# Patient Record
Sex: FEMALE | Race: WHITE | Employment: OTHER | URBAN - METROPOLITAN AREA
[De-identification: names, ages, dates, MRNs, and addresses within clinical notes are randomized per-mention and may not be internally consistent; named-entity substitution may affect disease eponyms.]

---

## 2017-01-17 PROBLEM — R07.9 CHEST PAIN: Status: ACTIVE | Noted: 2017-01-17

## 2017-01-17 PROBLEM — I10 BENIGN ESSENTIAL HYPERTENSION: Status: ACTIVE | Noted: 2017-01-17

## 2017-01-17 PROBLEM — R55 NEAR SYNCOPE: Status: ACTIVE | Noted: 2017-01-17

## 2018-03-02 ENCOUNTER — TELEPHONE (OUTPATIENT)
Dept: CARDIOLOGY CLINIC | Age: 73
End: 2018-03-02

## 2018-03-02 ENCOUNTER — OFFICE VISIT (OUTPATIENT)
Dept: CARDIOLOGY CLINIC | Age: 73
End: 2018-03-02

## 2018-03-02 VITALS
BODY MASS INDEX: 33.59 KG/M2 | HEIGHT: 66 IN | OXYGEN SATURATION: 96 % | WEIGHT: 209 LBS | DIASTOLIC BLOOD PRESSURE: 76 MMHG | HEART RATE: 90 BPM | SYSTOLIC BLOOD PRESSURE: 138 MMHG

## 2018-03-02 DIAGNOSIS — R06.09 DOE (DYSPNEA ON EXERTION): ICD-10-CM

## 2018-03-02 DIAGNOSIS — R55 SYNCOPE AND COLLAPSE: ICD-10-CM

## 2018-03-02 DIAGNOSIS — I20.0 UNSTABLE ANGINA (HCC): Primary | ICD-10-CM

## 2018-03-02 DIAGNOSIS — I10 BENIGN ESSENTIAL HYPERTENSION: ICD-10-CM

## 2018-03-02 DIAGNOSIS — R42 DIZZINESS: ICD-10-CM

## 2018-03-02 PROCEDURE — G8417 CALC BMI ABV UP PARAM F/U: HCPCS | Performed by: INTERNAL MEDICINE

## 2018-03-02 PROCEDURE — 3014F SCREEN MAMMO DOC REV: CPT | Performed by: INTERNAL MEDICINE

## 2018-03-02 PROCEDURE — 93000 ELECTROCARDIOGRAM COMPLETE: CPT | Performed by: INTERNAL MEDICINE

## 2018-03-02 PROCEDURE — G8484 FLU IMMUNIZE NO ADMIN: HCPCS | Performed by: INTERNAL MEDICINE

## 2018-03-02 PROCEDURE — 99215 OFFICE O/P EST HI 40 MIN: CPT | Performed by: INTERNAL MEDICINE

## 2018-03-02 PROCEDURE — 1090F PRES/ABSN URINE INCON ASSESS: CPT | Performed by: INTERNAL MEDICINE

## 2018-03-02 PROCEDURE — 4040F PNEUMOC VAC/ADMIN/RCVD: CPT | Performed by: INTERNAL MEDICINE

## 2018-03-02 PROCEDURE — 3017F COLORECTAL CA SCREEN DOC REV: CPT | Performed by: INTERNAL MEDICINE

## 2018-03-02 PROCEDURE — G8427 DOCREV CUR MEDS BY ELIG CLIN: HCPCS | Performed by: INTERNAL MEDICINE

## 2018-03-02 RX ORDER — ASPIRIN 81 MG/1
81 TABLET ORAL DAILY
Qty: 30 TABLET | Refills: 3
Start: 2018-03-02

## 2018-03-02 RX ORDER — DICYCLOMINE HYDROCHLORIDE 10 MG/1
20 CAPSULE ORAL
COMMUNITY
End: 2021-05-21 | Stop reason: SDUPTHER

## 2018-03-02 NOTE — TELEPHONE ENCOUNTER
Patient is scheduled with Dr. Ronda Mota for Left Heart Cath on 3/7/18 at [facility], arrival time of 9:30am to the Cath Lab. Please have patient arrive to the main entrance of [facility] and check in with the registration desk. Patient will be calling to update medications with a nurse- please review instructions at that time.

## 2018-03-02 NOTE — PROGRESS NOTES
Section of Cardiology                                     Cardiovascular Evaluation      PATIENT: Sharita Camp: 3/2/2018  MRN: [id number]  CSN: [id number]  : 1945    Primary Care Doctor: Claire Weeks MD    Reason for evaluation:   New Patient; Hypertension; Chest Pain (has not had any cp for a few days, but has been having recent cp. ); Shortness of Breath (with activity, pt states it started 6 month ago. ); Dizziness (all the time, pt states it has been an on going issue/ ); History of present illness:  Jenny Land is a 67 y.o. patient who presents for chest pain, VELASQUEZ, dizziness, syncope and HTN. The pain is located on left side of her chest. She has been awaken from sleep with this pain. It occurs more often with activity. She has had the pain for a few months but it has worsened recently. She also admits to being short of breath with exertion. Denied PND or orthopnea. She has been getting dizzy. This occurrs most often when she gets up from sitting position. He sister states augusto has had syncopal episodes. Last episode was in [place] 17. She has dizziness prior to her syncopal episodes. Denies  edema, palpitations. Past Medical History:   has a past medical history of Benign essential hypertension; Bowel obstruction; Cancer [facility]); GERD (gastroesophageal reflux disease); Hypertension; and Irritable bowel syndrome (IBS). Surgical History:   has a past surgical history that includes Colon surgery; Cholecystectomy; Appendectomy; Colonoscopy (); and Colonoscopy (3/29/16). Social History:   reports that she quit smoking about 34 years ago. She has a 10.00 pack-year smoking history. She has never used smokeless tobacco. She reports that she does not drink alcohol or use drugs. Family History:  family history includes Cancer in her brother and mother; Heart Disease in her brother and sister. Home Medications:  Reviewed and are listed in nursing record. affect. Alert, oriented x 3       Neurologic: Normal gross motor and sensory exam.       DIAGNOSTIC WORK UP:  Lab Data:  CBC:   Lab Results   Component Value Date    WBC 5.5 01/17/2017    HGB 12.1 01/17/2017    HCT 34.8 01/17/2017    MCV 84.8 01/17/2017     01/17/2017     BMP:   Lab Results   Component Value Date     01/17/2017    K 3.8 01/17/2017     01/17/2017    CO2 27 01/17/2017    BUN 11 01/17/2017    CREATININE 0.9 01/17/2017    CALCIUM 9.2 01/17/2017    GFRAA >60 01/17/2017    MG 2.0 12/02/2013     LFTS:   Lab Results   Component Value Date    ALT 13 01/16/2017    AST 20 01/16/2017    ALKPHOS 97 01/16/2017    PROT 7.0 01/16/2017    AGRATIO 1.3 01/16/2017    BILITOT 0.4 01/16/2017     PT/INR: No results found for: PTINR  LIPID PANEL: No components found for: CHLPL  Lab Results   Component Value Date    TRIG 148 01/17/2017     Lab Results   Component Value Date    HDL 45 01/17/2017     Lab Results   Component Value Date    LDLCALC 146 (H) 01/17/2017     TSH: No results found for: TSH  FREET4: No results found for: Lafonda Maul: No components found for: FREET3  BNP: No results found for: BNP    Procedure/Imaging:  I have reviewed the below testing personally and my interpretation is below. ECHO 1/17/17:  Normal left ventricular systolic function with an estimated ejection fraction of 55%. Grade I diastolic dysfunction with normal filling pressure. Systolic pulmonary artery pressure (SPAP) is normal and estimated at 32 mmHg  (RA pressure 3 mmHg). STRESS TEST 1/17/17: There is normal isotope uptake at stress and rest. There is no evidence of  myocardial ischemia or scar. Hyperdynamic LV systolic function with CD>80%  with uniform wall motion. Low risk study. Assessment:  1. Unstable angina ([address]     2. VELASQUEZ (dyspnea on exertion)     3. Dizziness     4. Syncope and collapse     5. Benign essential hypertension       Recommendation:  - She has chest pain c/w angina.  Stress test

## 2018-03-14 ENCOUNTER — TELEPHONE (OUTPATIENT)
Dept: CARDIOLOGY CLINIC | Age: 73
End: 2018-03-14

## 2018-03-14 NOTE — TELEPHONE ENCOUNTER
Spoke with patient. Discussed her lab results with her. Even compared them to her lipids in 2017. Patient states that she will consider taking another medication other than lipitor. She states she has tried it in the passed and it doesn't agree with her. Would you be willing to RX something different?

## 2018-03-14 NOTE — TELEPHONE ENCOUNTER
Pt would like to go over recent labs. She states she does not want to take Lipitor, or really any other med for cholesterol. If her numbers are not that bad, she would like to try to improve diet first. Pt has not picked up this med. She states she tried Lipitor in the past and it made her feel funny and sick to her stomach. She did start the Ranexa. Please call to advise.

## 2018-03-16 RX ORDER — ROSUVASTATIN CALCIUM 20 MG/1
20 TABLET, COATED ORAL NIGHTLY
Qty: 90 TABLET | Refills: 3 | Status: SHIPPED | OUTPATIENT
Start: 2018-03-16 | End: 2018-03-19 | Stop reason: ALTCHOICE

## 2018-03-19 RX ORDER — ATORVASTATIN CALCIUM 40 MG/1
40 TABLET, FILM COATED ORAL DAILY
Qty: 30 TABLET | Refills: 3 | Status: SHIPPED | OUTPATIENT
Start: 2018-03-19 | End: 2018-04-09 | Stop reason: SINTOL

## 2018-04-09 ENCOUNTER — OFFICE VISIT (OUTPATIENT)
Dept: CARDIOLOGY CLINIC | Age: 73
End: 2018-04-09

## 2018-04-09 VITALS
HEIGHT: 66 IN | BODY MASS INDEX: 34.23 KG/M2 | OXYGEN SATURATION: 94 % | HEART RATE: 78 BPM | SYSTOLIC BLOOD PRESSURE: 114 MMHG | DIASTOLIC BLOOD PRESSURE: 80 MMHG | WEIGHT: 213 LBS

## 2018-04-09 DIAGNOSIS — R42 DIZZINESS: ICD-10-CM

## 2018-04-09 DIAGNOSIS — I10 BENIGN ESSENTIAL HYPERTENSION: ICD-10-CM

## 2018-04-09 DIAGNOSIS — E78.2 MIXED HYPERLIPIDEMIA: ICD-10-CM

## 2018-04-09 DIAGNOSIS — I25.118 CORONARY ARTERY DISEASE OF NATIVE ARTERY OF NATIVE HEART WITH STABLE ANGINA PECTORIS (HCC): ICD-10-CM

## 2018-04-09 DIAGNOSIS — I25.89 CARDIAC MICROVASCULAR DISEASE: Primary | ICD-10-CM

## 2018-04-09 DIAGNOSIS — R06.09 DOE (DYSPNEA ON EXERTION): ICD-10-CM

## 2018-04-09 PROBLEM — I25.85 CARDIAC MICROVASCULAR DISEASE: Status: ACTIVE | Noted: 2018-04-09

## 2018-04-09 PROCEDURE — G8598 ASA/ANTIPLAT THER USED: HCPCS | Performed by: INTERNAL MEDICINE

## 2018-04-09 PROCEDURE — 1123F ACP DISCUSS/DSCN MKR DOCD: CPT | Performed by: INTERNAL MEDICINE

## 2018-04-09 PROCEDURE — 3017F COLORECTAL CA SCREEN DOC REV: CPT | Performed by: INTERNAL MEDICINE

## 2018-04-09 PROCEDURE — G8400 PT W/DXA NO RESULTS DOC: HCPCS | Performed by: INTERNAL MEDICINE

## 2018-04-09 PROCEDURE — 1036F TOBACCO NON-USER: CPT | Performed by: INTERNAL MEDICINE

## 2018-04-09 PROCEDURE — 4040F PNEUMOC VAC/ADMIN/RCVD: CPT | Performed by: INTERNAL MEDICINE

## 2018-04-09 PROCEDURE — 1090F PRES/ABSN URINE INCON ASSESS: CPT | Performed by: INTERNAL MEDICINE

## 2018-04-09 PROCEDURE — 99214 OFFICE O/P EST MOD 30 MIN: CPT | Performed by: INTERNAL MEDICINE

## 2018-04-09 PROCEDURE — G8427 DOCREV CUR MEDS BY ELIG CLIN: HCPCS | Performed by: INTERNAL MEDICINE

## 2018-04-09 PROCEDURE — G8417 CALC BMI ABV UP PARAM F/U: HCPCS | Performed by: INTERNAL MEDICINE

## 2018-04-09 PROCEDURE — 3014F SCREEN MAMMO DOC REV: CPT | Performed by: INTERNAL MEDICINE

## 2018-04-09 RX ORDER — RANOLAZINE 500 MG/1
500 TABLET, EXTENDED RELEASE ORAL 2 TIMES DAILY
Qty: 180 TABLET | Refills: 3 | Status: SHIPPED | OUTPATIENT
Start: 2018-04-09 | End: 2018-04-17 | Stop reason: SDUPTHER

## 2018-04-11 ENCOUNTER — TELEPHONE (OUTPATIENT)
Dept: CARDIOLOGY CLINIC | Age: 73
End: 2018-04-11

## 2018-04-17 RX ORDER — RANOLAZINE 500 MG/1
500 TABLET, EXTENDED RELEASE ORAL 2 TIMES DAILY
Qty: 180 TABLET | Refills: 3 | Status: SHIPPED | OUTPATIENT
Start: 2018-04-17 | End: 2019-07-25

## 2018-04-30 ENCOUNTER — HOSPITAL ENCOUNTER (OUTPATIENT)
Dept: PULMONOLOGY | Age: 73
Discharge: HOME | End: 2018-04-30
Attending: INTERNAL MEDICINE | Admitting: INTERNAL MEDICINE

## 2018-04-30 DIAGNOSIS — R06.09 DOE (DYSPNEA ON EXERTION): ICD-10-CM

## 2018-04-30 DIAGNOSIS — I20.9 ANGINA PECTORIS (HCC): ICD-10-CM

## 2018-04-30 DIAGNOSIS — I25.89 CARDIAC MICROVASCULAR DISEASE: ICD-10-CM

## 2018-04-30 RX ORDER — ALBUTEROL SULFATE 2.5 MG/3ML
2.5 SOLUTION RESPIRATORY (INHALATION) ONCE
Status: COMPLETED | OUTPATIENT
Start: 2018-04-30 | End: 2018-04-30

## 2018-04-30 RX ADMIN — ALBUTEROL SULFATE 2.5 MG: 2.5 SOLUTION RESPIRATORY (INHALATION) at 15:14

## 2018-08-20 ENCOUNTER — HOSPITAL ENCOUNTER (OUTPATIENT)
Dept: GENERAL RADIOLOGY | Age: 73
Discharge: HOME | End: 2018-08-20
Payer: MEDICARE

## 2018-08-20 ENCOUNTER — HOSPITAL ENCOUNTER (OUTPATIENT)
Age: 73
Discharge: HOME | End: 2018-08-20
Payer: MEDICARE

## 2018-08-20 DIAGNOSIS — R06.02 SOB (SHORTNESS OF BREATH): ICD-10-CM

## 2018-08-20 PROCEDURE — 71046 X-RAY EXAM CHEST 2 VIEWS: CPT

## 2018-09-28 ENCOUNTER — OFFICE VISIT (OUTPATIENT)
Dept: CARDIOLOGY CLINIC | Age: 73
End: 2018-09-28
Payer: MEDICARE

## 2018-09-28 VITALS
OXYGEN SATURATION: 95 % | DIASTOLIC BLOOD PRESSURE: 78 MMHG | HEART RATE: 86 BPM | HEIGHT: 66 IN | BODY MASS INDEX: 34.55 KG/M2 | WEIGHT: 215 LBS | SYSTOLIC BLOOD PRESSURE: 124 MMHG

## 2018-09-28 DIAGNOSIS — I25.89 CARDIAC MICROVASCULAR DISEASE: Primary | ICD-10-CM

## 2018-09-28 DIAGNOSIS — R06.09 DOE (DYSPNEA ON EXERTION): ICD-10-CM

## 2018-09-28 DIAGNOSIS — I10 BENIGN ESSENTIAL HYPERTENSION: ICD-10-CM

## 2018-09-28 DIAGNOSIS — E78.2 MIXED HYPERLIPIDEMIA: ICD-10-CM

## 2018-09-28 DIAGNOSIS — R42 DIZZINESS: ICD-10-CM

## 2018-09-28 DIAGNOSIS — I25.118 CORONARY ARTERY DISEASE OF NATIVE ARTERY OF NATIVE HEART WITH STABLE ANGINA PECTORIS (HCC): ICD-10-CM

## 2018-09-28 DIAGNOSIS — R55 NEAR SYNCOPE: ICD-10-CM

## 2018-09-28 PROCEDURE — 99214 OFFICE O/P EST MOD 30 MIN: CPT | Performed by: INTERNAL MEDICINE

## 2018-09-28 PROCEDURE — G8427 DOCREV CUR MEDS BY ELIG CLIN: HCPCS | Performed by: INTERNAL MEDICINE

## 2018-09-28 PROCEDURE — G8417 CALC BMI ABV UP PARAM F/U: HCPCS | Performed by: INTERNAL MEDICINE

## 2018-09-28 PROCEDURE — 1090F PRES/ABSN URINE INCON ASSESS: CPT | Performed by: INTERNAL MEDICINE

## 2018-09-28 PROCEDURE — 1036F TOBACCO NON-USER: CPT | Performed by: INTERNAL MEDICINE

## 2018-09-28 PROCEDURE — 3017F COLORECTAL CA SCREEN DOC REV: CPT | Performed by: INTERNAL MEDICINE

## 2018-09-28 PROCEDURE — 4040F PNEUMOC VAC/ADMIN/RCVD: CPT | Performed by: INTERNAL MEDICINE

## 2018-09-28 PROCEDURE — G8400 PT W/DXA NO RESULTS DOC: HCPCS | Performed by: INTERNAL MEDICINE

## 2018-09-28 PROCEDURE — G8598 ASA/ANTIPLAT THER USED: HCPCS | Performed by: INTERNAL MEDICINE

## 2018-09-28 PROCEDURE — 1101F PT FALLS ASSESS-DOCD LE1/YR: CPT | Performed by: INTERNAL MEDICINE

## 2018-09-28 PROCEDURE — 1123F ACP DISCUSS/DSCN MKR DOCD: CPT | Performed by: INTERNAL MEDICINE

## 2018-09-28 RX ORDER — ROSUVASTATIN CALCIUM 20 MG/1
20 TABLET, COATED ORAL DAILY
Qty: 30 TABLET | Refills: 0
Start: 2018-09-28 | End: 2019-04-01 | Stop reason: SDUPTHER

## 2018-09-28 RX ORDER — LORATADINE 10 MG/1
10 CAPSULE, LIQUID FILLED ORAL DAILY
COMMUNITY
End: 2020-10-13 | Stop reason: ALTCHOICE

## 2018-09-28 NOTE — PROGRESS NOTES
(IBS). Surgical History:   has a past surgical history that includes Colon surgery; Cholecystectomy; Appendectomy; Colonoscopy (2014); and Colonoscopy (3/29/16). Social History:   reports that she quit smoking about 35 years ago. She has a 10.00 pack-year smoking history. She has never used smokeless tobacco. She reports that she does not drink alcohol or use drugs. Family History:  family history includes Cancer in her brother and mother; Heart Disease in her brother and sister. Home Medications:  Reviewed and are listed in nursing record. and/or listed below  Current Outpatient Prescriptions   Medication Sig Dispense Refill    loratadine (CLARITIN) 10 MG capsule Take 10 mg by mouth daily      ranolazine (RANEXA) 500 MG extended release tablet Take 1 tablet by mouth 2 times daily 180 tablet 3    b complex vitamins capsule Take 1 capsule by mouth daily      vitamin B-6 (PYRIDOXINE) 250 MG tablet Take 250 mg by mouth daily      vitamin C (ASCORBIC ACID) 500 MG tablet Take 500 mg by mouth daily      Cholecalciferol (VITAMIN D) 2000 units CAPS capsule Take 1 capsule by mouth daily      Melatonin 2.5 MG CAPS Take 1 tablet by mouth daily      CALCIUM-MAGNESIUM-ZINC PO Take 1 capsule by mouth daily      aspirin EC 81 MG EC tablet Take 1 tablet by mouth daily 30 tablet 3    dicyclomine (BENTYL) 10 MG capsule Take 10 mg by mouth 4 times daily (before meals and nightly)      polyethylene glycol (GLYCOLAX) packet Take 17 g by mouth daily       lisinopril (PRINIVIL;ZESTRIL) 10 MG tablet Take 10 mg by mouth daily.  sertraline (ZOLOFT) 100 MG tablet Take 100 mg by mouth daily.  ALPRAZolam (XANAX) 0.25 MG tablet Take 0.5 mg by mouth nightly as needed.       diclofenac (VOLTAREN) 50 MG EC tablet Take 1 tablet by mouth 2 times daily 30 tablet 0    amLODIPine (NORVASC) 5 MG tablet Take 5 mg by mouth daily      Elastic Bandages & Supports (JOBST KNEE HIGH COMPRESSION ) MISC Wear compression stockings during the day. You may remove them at night 1 each 1     No current facility-administered medications for this visit. Allergies:  Dilaudid [hydromorphone]; Phenergan [promethazine]; Lipitor [atorvastatin]; and Penicillins     Review of Systems:   All 14 point review of symptoms completed. Pertinent positives identified in the HPI, all other review of symptoms negative. Physical Examination:    /78   Pulse 86   Ht 5' 6\" (1.676 m)   Wt 215 lb (97.5 kg)   SpO2 95%   BMI 34.70 kg/m²      General Appearance:  Alert, cooperative, no distress, appears stated age       Head:  Normocephalic, without obvious abnormality, atraumatic   Eyes:  PERRL, conjunctiva/corneas clear       Nose: Nares normal, no drainage or sinus tenderness   Throat: Lips, mucosa, and tongue normal       Neck: Supple, symmetrical, trachea midline, no adenopathy, thyroid: not enlarged, symmetric, no tenderness/mass/nodules, no carotid bruit or JVD       Lungs:   Clear to auscultation bilaterally, respirations unlabored   Chest Wall:  No tenderness or deformity       Heart:  Regular rhythm and normal rate; S1, S2 are normal; no murmur noted; no rub or gallop       Abdomen:   Soft, non-tender, bowel sounds active all four quadrants,  no masses, no organomegaly       Extremities: No cyanosis or edema. No deformity of hands or feet   Pulses: 2+ and symmetric       Skin: Skin color, texture, turgor normal. No rashes or lesions       Pysch: Normal mood and affect.  Alert, oriented x 3       Neurologic: Normal gross motor and sensory exam.       DIAGNOSTIC WORK UP:  Lab Data:  CBC:   Lab Results   Component Value Date    WBC 5.6 03/07/2018    HGB 13.5 03/07/2018    HCT 39.3 03/07/2018    MCV 85.8 03/07/2018     03/07/2018     BMP:   Lab Results   Component Value Date     03/07/2018    K 3.7 03/07/2018     03/07/2018    CO2 27 03/07/2018    BUN 12 03/07/2018    CREATININE 1.0 03/07/2018    CALCIUM 9.8 03/07/2018

## 2018-09-28 NOTE — LETTER
[facility] Cardiology - [address] [address]  Phone: [phone]  Fax: [phone]    Miley Galarza MD        October 3, 2018     Mercedes Dukes MD  [address] [place] [address] Dr. Jay Hickman [id number]    Patient: Muriel Dia  MR Number: [id number]  YOB: 1945  Date of Visit: 2018    Dear Dr. Mercedes Dukes:    Below are the relevant portions of my assessment and plan of care. Section of Cardiology                                     Cardiovascular Evaluation      PATIENT: Anusha Blairker: 2018  MRN: [id number]  CSN: [id number]  : 1945    Primary Care Doctor: Mercedes Dukes MD    Reason for evaluation:   New Patient; Hypertension; Chest Pain (has not had any cp for a few days, but has been having recent cp. ); Shortness of Breath (with activity, pt states it started 6 month ago. ); Dizziness (all the time, pt states it has been an on going issue/ ); History of present illness:  Muriel Dia is a 75 y.o. patient who presents for chest pain, VELASQUEZ, dizziness, syncope and HTN. The pain is located on left side of her chest. She has been awaken from sleep with this pain. It occurs more often with activity. She has had the pain for a few months but it has worsened recently. She also admits to being short of breath with exertion. Denied PND or orthopnea. She has been getting dizzy. This occurrs most often when she gets up from sitting position. He sister states augusto has had syncopal episodes. Last episode was in [place] 17. She has dizziness prior to her syncopal episodes. Denies  edema, palpitations. Today she presents for follow up of chest pain, VELASQUEZ, CAD, HLD and HTN. She was started on Ranexa following the cardiac cath for anginal pain. She reports resolution of her chest pain since then. She is still short of breath with exertion. Denied PND or orthopnea.  Dizziness improved off

## 2018-10-03 NOTE — COMMUNICATION BODY
stockings during the day. You may remove them at night 1 each 1     No current facility-administered medications for this visit. Allergies:  Dilaudid [hydromorphone]; Phenergan [promethazine]; Lipitor [atorvastatin]; and Penicillins     Review of Systems:   All 14 point review of symptoms completed. Pertinent positives identified in the HPI, all other review of symptoms negative. Physical Examination:    /78   Pulse 86   Ht 5' 6\" (1.676 m)   Wt 215 lb (97.5 kg)   SpO2 95%   BMI 34.70 kg/m²       General Appearance:  Alert, cooperative, no distress, appears stated age       Head:  Normocephalic, without obvious abnormality, atraumatic   Eyes:  PERRL, conjunctiva/corneas clear       Nose: Nares normal, no drainage or sinus tenderness   Throat: Lips, mucosa, and tongue normal       Neck: Supple, symmetrical, trachea midline, no adenopathy, thyroid: not enlarged, symmetric, no tenderness/mass/nodules, no carotid bruit or JVD       Lungs:   Clear to auscultation bilaterally, respirations unlabored   Chest Wall:  No tenderness or deformity       Heart:  Regular rhythm and normal rate; S1, S2 are normal; no murmur noted; no rub or gallop       Abdomen:   Soft, non-tender, bowel sounds active all four quadrants,  no masses, no organomegaly       Extremities: No cyanosis or edema. No deformity of hands or feet   Pulses: 2+ and symmetric       Skin: Skin color, texture, turgor normal. No rashes or lesions       Pysch: Normal mood and affect.  Alert, oriented x 3       Neurologic: Normal gross motor and sensory exam.       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 She will also continue Lisinopril, as ACEI have been shown to be benfical in microvascular angina. 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 - Her dizziness have resolved since we stopped her Clonidine. Her BP is stable and at goal < 130/80 mmHg. - Her dyspnea is due to deconditioning. She had normal EF/filing pressures on cath and PFTs showed no obstructive/restrictive lung disease.   - She is on moderate dose Crestor, goal LDL is < 70. It was 179 prior to initiation of statin therapy. She did not get her lipid panel and will obtain one. She will continue low dose Asa.  -She will see Dr. Rodger Bailey in six months. If you have questions, please do not hesitate to call me. I look forward to following Vance Delacruz along with you.       Shaheed Oneill MD, [facility] [place] [place]  [phone] [facility]  [phone] [facility]  9/28/2018 2:49 PM

## 2018-10-09 ENCOUNTER — HOSPITAL ENCOUNTER (EMERGENCY)
Age: 73
Discharge: HOME | End: 2018-10-09
Attending: EMERGENCY MEDICINE
Payer: MEDICARE

## 2018-10-09 ENCOUNTER — APPOINTMENT (OUTPATIENT)
Dept: GENERAL RADIOLOGY | Age: 73
End: 2018-10-09
Payer: MEDICARE

## 2018-10-09 VITALS
RESPIRATION RATE: 19 BRPM | OXYGEN SATURATION: 96 % | WEIGHT: 210 LBS | DIASTOLIC BLOOD PRESSURE: 99 MMHG | HEIGHT: 66 IN | HEART RATE: 89 BPM | BODY MASS INDEX: 33.75 KG/M2 | SYSTOLIC BLOOD PRESSURE: 156 MMHG | TEMPERATURE: 97.4 F

## 2018-10-09 DIAGNOSIS — R07.89 OTHER CHEST PAIN: Primary | ICD-10-CM

## 2018-10-09 LAB
A/G RATIO: 1.4 (ref 1.1–2.2)
ALBUMIN SERPL-MCNC: 4.2 G/DL (ref 3.4–5)
ALP BLD-CCNC: 112 U/L (ref 40–129)
ALT SERPL-CCNC: 13 U/L (ref 10–40)
ANION GAP SERPL CALCULATED.3IONS-SCNC: 11 MMOL/L (ref 3–16)
AST SERPL-CCNC: 20 U/L (ref 15–37)
BASOPHILS ABSOLUTE: 0 K/UL (ref 0–0.2)
BASOPHILS RELATIVE PERCENT: 0.8 %
BILIRUB SERPL-MCNC: 0.3 MG/DL (ref 0–1)
BUN BLDV-MCNC: 14 MG/DL (ref 7–20)
CALCIUM SERPL-MCNC: 9.4 MG/DL (ref 8.3–10.6)
CHLORIDE BLD-SCNC: 104 MMOL/L (ref 99–110)
CO2: 24 MMOL/L (ref 21–32)
CREAT SERPL-MCNC: 1.2 MG/DL (ref 0.6–1.2)
EOSINOPHILS ABSOLUTE: 0.2 K/UL (ref 0–0.6)
EOSINOPHILS RELATIVE PERCENT: 4 %
GFR AFRICAN AMERICAN: 53
GFR NON-AFRICAN AMERICAN: 44
GLOBULIN: 3 G/DL
GLUCOSE BLD-MCNC: 107 MG/DL (ref 70–99)
HCT VFR BLD CALC: 36.9 % (ref 36–48)
HEMOGLOBIN: 12.8 G/DL (ref 12–16)
LYMPHOCYTES ABSOLUTE: 1.8 K/UL (ref 1–5.1)
LYMPHOCYTES RELATIVE PERCENT: 30.1 %
MCH RBC QN AUTO: 30.8 PG (ref 26–34)
MCHC RBC AUTO-ENTMCNC: 34.8 G/DL (ref 31–36)
MCV RBC AUTO: 88.3 FL (ref 80–100)
MONOCYTES ABSOLUTE: 0.7 K/UL (ref 0–1.3)
MONOCYTES RELATIVE PERCENT: 12.6 %
NEUTROPHILS ABSOLUTE: 3.1 K/UL (ref 1.7–7.7)
NEUTROPHILS RELATIVE PERCENT: 52.5 %
PDW BLD-RTO: 13.1 % (ref 12.4–15.4)
PLATELET # BLD: 209 K/UL (ref 135–450)
PMV BLD AUTO: 8.2 FL (ref 5–10.5)
POTASSIUM SERPL-SCNC: 3.8 MMOL/L (ref 3.5–5.1)
RBC # BLD: 4.17 M/UL (ref 4–5.2)
SODIUM BLD-SCNC: 139 MMOL/L (ref 136–145)
TOTAL PROTEIN: 7.2 G/DL (ref 6.4–8.2)
TROPONIN: <0.01 NG/ML
TROPONIN: <0.01 NG/ML
WBC # BLD: 5.8 K/UL (ref 4–11)

## 2018-10-09 PROCEDURE — 80053 COMPREHEN METABOLIC PANEL: CPT

## 2018-10-09 PROCEDURE — 93010 ELECTROCARDIOGRAM REPORT: CPT | Performed by: INTERNAL MEDICINE

## 2018-10-09 PROCEDURE — 71046 X-RAY EXAM CHEST 2 VIEWS: CPT

## 2018-10-09 PROCEDURE — 93005 ELECTROCARDIOGRAM TRACING: CPT | Performed by: EMERGENCY MEDICINE

## 2018-10-09 PROCEDURE — 85025 COMPLETE CBC W/AUTO DIFF WBC: CPT

## 2018-10-09 PROCEDURE — 6370000000 HC RX 637 (ALT 250 FOR IP): Performed by: PHYSICIAN ASSISTANT

## 2018-10-09 PROCEDURE — 99285 EMERGENCY DEPT VISIT HI MDM: CPT

## 2018-10-09 PROCEDURE — 84484 ASSAY OF TROPONIN QUANT: CPT

## 2018-10-09 RX ORDER — AMLODIPINE BESYLATE 5 MG/1
5 TABLET ORAL ONCE
Status: COMPLETED | OUTPATIENT
Start: 2018-10-09 | End: 2018-10-09

## 2018-10-09 RX ORDER — ASPIRIN 81 MG/1
243 TABLET, CHEWABLE ORAL ONCE
Status: COMPLETED | OUTPATIENT
Start: 2018-10-09 | End: 2018-10-09

## 2018-10-09 RX ADMIN — ASPIRIN 243 MG: 81 TABLET, CHEWABLE ORAL at 20:33

## 2018-10-09 RX ADMIN — AMLODIPINE BESYLATE 5 MG: 5 TABLET ORAL at 21:56

## 2018-10-09 ASSESSMENT — PAIN SCALES - GENERAL: PAINLEVEL_OUTOF10: 2

## 2018-10-09 ASSESSMENT — HEART SCORE: ECG: 0

## 2018-10-10 NOTE — ED PROVIDER NOTES
[address] ED  eMERGENCY dEPARTMENT eNCOUnter        Pt Name: Naa Tabares  MRN: [id number]  [place] 1945  Date of evaluation: 10/9/2018  Provider: Sina Craig PA-C  PCP: Laurel Schulz MD  ED Attending: No att. providers found    [address] [place] [address]       Chief Complaint   Patient presents with    Chest Pain     pt c/o throbbing chest pain that started this morning. HISTORY OF PRESENT ILLNESS   (Location/Symptom, Timing/Onset, Context/Setting, Quality, Duration, Modifying Factors, Severity)  Note limiting factors. Naa Tabares is a 75 y.o. female presents to the emergency department with left-sided chest pain. Patient states that it started around 10 AM this morning when she was watching the weather channel. She denied any exertional aggravation. She did have associated lightheadedness. She states that she has had a productive cough with phlegm for the past few days. She describes her pain as a 2 out of 10 throbbing pain on the left side, but states that it stopped around 8 PM this evening. Denied shortness of breath, nausea, vomiting, fever, chills, diaphoresis or radiation of her pain. Patient is a former smoker and has a history of hypertension and high cholesterol. She also has a history of angina. Nursing Notes were all reviewed and agreed with or any disagreements were addressed  in the HPI. REVIEW OF SYSTEMS    (2-9 systems for level 4, 10 or more for level 5)     Review of Systems    Positives and Pertinent negatives as per HPI. Except as noted above in the ROS, all other systems were reviewed and negative.        PAST MEDICAL HISTORY     Past Medical History:   Diagnosis Date    Benign essential hypertension 1/17/2017    Bowel obstruction (HCC)     Cancer (HCC)     colon    GERD (gastroesophageal reflux disease)     Hypertension     Irritable bowel syndrome (IBS)          SURGICAL HISTORY       Past Surgical History:   Procedure Laterality

## 2018-10-11 LAB
EKG ATRIAL RATE: 93 BPM
EKG DIAGNOSIS: NORMAL
EKG P AXIS: 21 DEGREES
EKG P-R INTERVAL: 186 MS
EKG Q-T INTERVAL: 364 MS
EKG QRS DURATION: 90 MS
EKG QTC CALCULATION (BAZETT): 452 MS
EKG R AXIS: -16 DEGREES
EKG T AXIS: 55 DEGREES
EKG VENTRICULAR RATE: 93 BPM

## 2018-12-06 ENCOUNTER — HOSPITAL ENCOUNTER (EMERGENCY)
Age: 73
Discharge: HOME | End: 2018-12-06
Attending: EMERGENCY MEDICINE
Payer: MEDICARE

## 2018-12-06 VITALS
HEART RATE: 86 BPM | WEIGHT: 214 LBS | BODY MASS INDEX: 34.39 KG/M2 | DIASTOLIC BLOOD PRESSURE: 109 MMHG | RESPIRATION RATE: 18 BRPM | OXYGEN SATURATION: 97 % | TEMPERATURE: 98 F | HEIGHT: 66 IN | SYSTOLIC BLOOD PRESSURE: 179 MMHG

## 2018-12-06 DIAGNOSIS — K08.89 PAIN, DENTAL: Primary | ICD-10-CM

## 2018-12-06 PROCEDURE — 99282 EMERGENCY DEPT VISIT SF MDM: CPT

## 2018-12-06 RX ORDER — CITALOPRAM 20 MG/1
40 TABLET ORAL DAILY
COMMUNITY
End: 2020-05-17

## 2018-12-06 RX ORDER — HYDROCODONE BITARTRATE AND ACETAMINOPHEN 5; 325 MG/1; MG/1
1 TABLET ORAL EVERY 4 HOURS PRN
Qty: 14 TABLET | Refills: 0 | Status: SHIPPED | OUTPATIENT
Start: 2018-12-06 | End: 2018-12-09

## 2018-12-06 RX ORDER — DOXYCYCLINE HYCLATE 100 MG
100 TABLET ORAL 2 TIMES DAILY
Qty: 14 TABLET | Refills: 0 | Status: SHIPPED | OUTPATIENT
Start: 2018-12-06 | End: 2018-12-13

## 2018-12-06 ASSESSMENT — PAIN DESCRIPTION - ORIENTATION: ORIENTATION: LEFT;UPPER

## 2018-12-06 ASSESSMENT — PAIN DESCRIPTION - PAIN TYPE: TYPE: ACUTE PAIN

## 2018-12-06 ASSESSMENT — PAIN DESCRIPTION - FREQUENCY: FREQUENCY: CONTINUOUS

## 2018-12-06 ASSESSMENT — PAIN SCALES - GENERAL: PAINLEVEL_OUTOF10: 2

## 2018-12-06 ASSESSMENT — PAIN DESCRIPTION - ONSET: ONSET: SUDDEN

## 2018-12-06 ASSESSMENT — PAIN DESCRIPTION - DESCRIPTORS: DESCRIPTORS: ACHING

## 2019-01-09 ENCOUNTER — INITIAL CONSULT (OUTPATIENT)
Dept: NEUROLOGY | Age: 74
End: 2019-01-09
Payer: MEDICARE

## 2019-01-09 VITALS
SYSTOLIC BLOOD PRESSURE: 150 MMHG | WEIGHT: 214 LBS | HEIGHT: 65 IN | HEART RATE: 75 BPM | OXYGEN SATURATION: 96 % | DIASTOLIC BLOOD PRESSURE: 105 MMHG | BODY MASS INDEX: 35.65 KG/M2

## 2019-01-09 DIAGNOSIS — E78.5 DYSLIPIDEMIA: ICD-10-CM

## 2019-01-09 DIAGNOSIS — I10 HTN (HYPERTENSION), BENIGN: ICD-10-CM

## 2019-01-09 DIAGNOSIS — F34.1 DYSTHYMIA: ICD-10-CM

## 2019-01-09 DIAGNOSIS — G31.84 MCI (MILD COGNITIVE IMPAIRMENT): Primary | ICD-10-CM

## 2019-01-09 PROCEDURE — G8484 FLU IMMUNIZE NO ADMIN: HCPCS | Performed by: PSYCHIATRY & NEUROLOGY

## 2019-01-09 PROCEDURE — G8427 DOCREV CUR MEDS BY ELIG CLIN: HCPCS | Performed by: PSYCHIATRY & NEUROLOGY

## 2019-01-09 PROCEDURE — G8400 PT W/DXA NO RESULTS DOC: HCPCS | Performed by: PSYCHIATRY & NEUROLOGY

## 2019-01-09 PROCEDURE — 1123F ACP DISCUSS/DSCN MKR DOCD: CPT | Performed by: PSYCHIATRY & NEUROLOGY

## 2019-01-09 PROCEDURE — G8417 CALC BMI ABV UP PARAM F/U: HCPCS | Performed by: PSYCHIATRY & NEUROLOGY

## 2019-01-09 PROCEDURE — 99204 OFFICE O/P NEW MOD 45 MIN: CPT | Performed by: PSYCHIATRY & NEUROLOGY

## 2019-01-09 PROCEDURE — 4040F PNEUMOC VAC/ADMIN/RCVD: CPT | Performed by: PSYCHIATRY & NEUROLOGY

## 2019-01-09 PROCEDURE — 1036F TOBACCO NON-USER: CPT | Performed by: PSYCHIATRY & NEUROLOGY

## 2019-01-09 PROCEDURE — G8599 NO ASA/ANTIPLAT THER USE RNG: HCPCS | Performed by: PSYCHIATRY & NEUROLOGY

## 2019-01-09 PROCEDURE — 1090F PRES/ABSN URINE INCON ASSESS: CPT | Performed by: PSYCHIATRY & NEUROLOGY

## 2019-01-09 PROCEDURE — 1101F PT FALLS ASSESS-DOCD LE1/YR: CPT | Performed by: PSYCHIATRY & NEUROLOGY

## 2019-01-09 PROCEDURE — 3017F COLORECTAL CA SCREEN DOC REV: CPT | Performed by: PSYCHIATRY & NEUROLOGY

## 2019-01-31 ENCOUNTER — TELEPHONE (OUTPATIENT)
Dept: CARDIOLOGY CLINIC | Age: 74
End: 2019-01-31

## 2019-02-18 ENCOUNTER — HOSPITAL ENCOUNTER (OUTPATIENT)
Dept: MRI IMAGING | Age: 74
Discharge: HOME | End: 2019-02-18
Payer: MEDICARE

## 2019-02-18 DIAGNOSIS — G31.84 MCI (MILD COGNITIVE IMPAIRMENT): ICD-10-CM

## 2019-02-18 PROCEDURE — 70551 MRI BRAIN STEM W/O DYE: CPT

## 2019-02-26 ENCOUNTER — TELEPHONE (OUTPATIENT)
Dept: CARDIOLOGY CLINIC | Age: 74
End: 2019-02-26

## 2019-02-28 RX ORDER — ISOSORBIDE MONONITRATE 30 MG/1
30 TABLET, EXTENDED RELEASE ORAL DAILY
Qty: 30 TABLET | Refills: 5 | Status: SHIPPED | OUTPATIENT
Start: 2019-02-28 | End: 2019-07-25

## 2019-04-02 RX ORDER — ROSUVASTATIN CALCIUM 20 MG/1
20 TABLET, COATED ORAL DAILY
Qty: 90 TABLET | Refills: 5 | Status: SHIPPED | OUTPATIENT
Start: 2019-04-02 | End: 2020-04-29

## 2019-06-03 ENCOUNTER — OFFICE VISIT (OUTPATIENT)
Dept: CARDIOLOGY CLINIC | Age: 74
End: 2019-06-03
Payer: MEDICARE

## 2019-06-03 VITALS
DIASTOLIC BLOOD PRESSURE: 82 MMHG | HEIGHT: 66 IN | SYSTOLIC BLOOD PRESSURE: 128 MMHG | HEART RATE: 82 BPM | BODY MASS INDEX: 34.39 KG/M2 | WEIGHT: 214 LBS | OXYGEN SATURATION: 97 %

## 2019-06-03 DIAGNOSIS — I25.118 CORONARY ARTERY DISEASE OF NATIVE ARTERY OF NATIVE HEART WITH STABLE ANGINA PECTORIS (HCC): Primary | ICD-10-CM

## 2019-06-03 DIAGNOSIS — E78.2 MIXED HYPERLIPIDEMIA: ICD-10-CM

## 2019-06-03 DIAGNOSIS — I10 HTN (HYPERTENSION), BENIGN: ICD-10-CM

## 2019-06-03 DIAGNOSIS — R06.09 DOE (DYSPNEA ON EXERTION): ICD-10-CM

## 2019-06-03 PROCEDURE — G8400 PT W/DXA NO RESULTS DOC: HCPCS | Performed by: INTERNAL MEDICINE

## 2019-06-03 PROCEDURE — 99213 OFFICE O/P EST LOW 20 MIN: CPT | Performed by: INTERNAL MEDICINE

## 2019-06-03 PROCEDURE — 1090F PRES/ABSN URINE INCON ASSESS: CPT | Performed by: INTERNAL MEDICINE

## 2019-06-03 PROCEDURE — G8599 NO ASA/ANTIPLAT THER USE RNG: HCPCS | Performed by: INTERNAL MEDICINE

## 2019-06-03 PROCEDURE — 4040F PNEUMOC VAC/ADMIN/RCVD: CPT | Performed by: INTERNAL MEDICINE

## 2019-06-03 PROCEDURE — 1123F ACP DISCUSS/DSCN MKR DOCD: CPT | Performed by: INTERNAL MEDICINE

## 2019-06-03 PROCEDURE — G8427 DOCREV CUR MEDS BY ELIG CLIN: HCPCS | Performed by: INTERNAL MEDICINE

## 2019-06-03 PROCEDURE — 3017F COLORECTAL CA SCREEN DOC REV: CPT | Performed by: INTERNAL MEDICINE

## 2019-06-03 PROCEDURE — 1036F TOBACCO NON-USER: CPT | Performed by: INTERNAL MEDICINE

## 2019-06-03 PROCEDURE — G8417 CALC BMI ABV UP PARAM F/U: HCPCS | Performed by: INTERNAL MEDICINE

## 2019-06-03 PROCEDURE — 93000 ELECTROCARDIOGRAM COMPLETE: CPT | Performed by: INTERNAL MEDICINE

## 2019-06-03 NOTE — PROGRESS NOTES
[facility]   CARDIAC EVALUATION NOTE  [phone]      PCP:  Stefany Bruce MD    Reason for Consultation/Chief Complaint:  Check up    Subjective   History of Present Illness:  Kristi Bo is a 75 y.o. patient with a history of CAD, HT and HLD who presents for follow up. Her echo from 01/17/17 showed her EF was 55% with grade I DD. Her stress test from 07/17/17 showed no evidence of ischemia. On 03/02/18 she presented for chest pain, VELASQUEZ, dizziness, syncope and HTN. The pain is located on left side of her chest. She has been awaken from sleep with this pain. It occurs more often with activity. She has had the pain for a few months but it has worsened recently. She also admits to being short of breath with exertion. She has been getting dizzy. This occurrs most often when she gets up from sitting position. He sister states patient has had syncopal episodes. Last episode was in 09/17/17. She has dizziness prior to her syncopal episodes. Her cardiac cath from 03/07/18 showed moderate CAD of mid LAD, mild distal LAD, prox circumflex and mid RCA. She was started on Ranexa following the cardiac cath for anginal pain. She reports resolution of her chest pain since then. She is still short of breath with exertion. She presented to the ER on 10/09/18 with CP. His trop was negative x2. Today reports the angina is better. She states her SOB is no better or no worse. She states she is not taking the Ranexa any longer. She doesn't think it made much difference. Patient presently denies chest pain, palpitations, dizziness or syncope. Past Medical History:   has a past medical history of Benign essential hypertension, Bowel obstruction ([address] Cancer ([address] GERD (gastroesophageal reflux disease), Hypertension, and Irritable bowel syndrome (IBS). Surgical History:   has a past surgical history that includes Colon surgery; Cholecystectomy;  Appendectomy; Colonoscopy (2014); and Colonoscopy (3/29/16). Social History: Kristi Vu    reports that she quit smoking about 35 years ago. She has a 10.00 pack-year smoking history. She has never used smokeless tobacco. She reports that she does not drink alcohol or use drugs. Family History:  family history includes Cancer in her brother and mother; Heart Disease in her brother and sister. Home Medications:  Were reviewed and are listed in nursing record and/or below  Prior to Admission medications    Medication Sig Start Date End Date Taking? Authorizing Provider   rosuvastatin (CRESTOR) 20 MG tablet Take 1 tablet by mouth daily 4/2/19  Yes Soy Hernandez MD   citalopram (CELEXA) 20 MG tablet Take 20 mg by mouth daily    Yes Historical Provider, MD   loratadine (CLARITIN) 10 MG capsule Take 10 mg by mouth daily   Yes Historical Provider, MD   vitamin B-6 (PYRIDOXINE) 250 MG tablet Take 250 mg by mouth daily   Yes Historical Provider, MD   vitamin C (ASCORBIC ACID) 500 MG tablet Take 500 mg by mouth daily   Yes Historical Provider, MD   Cholecalciferol (VITAMIN D) 2000 units CAPS capsule Take 1 capsule by mouth daily   Yes Historical Provider, MD   CALCIUM-MAGNESIUM-ZINC PO Take 1 capsule by mouth daily   Yes Historical Provider, MD   aspirin EC 81 MG EC tablet Take 1 tablet by mouth daily 3/2/18  Yes Harish Guillory MD   dicyclomine (BENTYL) 10 MG capsule Take 10 mg by mouth 4 times daily (before meals and nightly)   Yes Historical Provider, MD   polyethylene glycol (GLYCOLAX) packet Take 17 g by mouth daily    Yes Historical Provider, MD   lisinopril (PRINIVIL;ZESTRIL) 10 MG tablet Take 10 mg by mouth daily.    Yes Historical Provider, MD   isosorbide mononitrate (IMDUR) 30 MG extended release tablet Take 1 tablet by mouth daily Take 1 tablet in the morning 2/28/19   Soy Hernandez MD   ranolazine (RANEXA) 500 MG extended release tablet Take 1 tablet by mouth 2 times daily 4/17/18   Harish Guillory MD Allergies:  Dilaudid [hydromorphone]; Phenergan [promethazine]; Lipitor [atorvastatin]; and Penicillins     Review of Systems:   A 14 point review of symptoms completed. Pertinent positives identified in the HPI, all other review of symptoms negative as below.       Objective   PHYSICAL EXAM:    Vitals:    06/03/19 1332   BP: 128/82   Pulse: 82   SpO2: 97%    Weight: 214 lb (97.1 kg)         General Appearance:  Alert, cooperative, no distress, appears stated age   Head:  Normocephalic, without obvious abnormality, atraumatic   Eyes:  PERRL, conjunctiva/corneas clear   Nose: Nares normal, no drainage or sinus tenderness   Throat: Lips, mucosa, and tongue normal   Neck: Supple, symmetrical, trachea midline, no adenopathy, thyroid: not enlarged, symmetric, no tenderness/mass/nodules, no carotid bruit or JVD   Lungs:   Clear to auscultation bilaterally, respirations unlabored   Chest Wall:  No deformity or tenderness   Heart:  Regular rate and rhythm, S1, S2 normal, no murmur, rub or gallop   Abdomen:   Soft, non-tender, bowel sounds active all four quadrants,  no masses, no organomegaly   Extremities: Extremities normal, atraumatic, no cyanosis, TRACE edema    Pulses: 2+ and symmetric   Skin: Skin color, texture, turgor normal, no rashes or lesions   Pysch: Normal mood and affect   Neurologic: Normal gross motor and sensory exam.         Labs   CBC:   Lab Results   Component Value Date    WBC 5.8 10/09/2018    RBC 4.17 10/09/2018    HGB 12.8 10/09/2018    HCT 36.9 10/09/2018    MCV 88.3 10/09/2018    RDW 13.1 10/09/2018     10/09/2018     CMP:  Lab Results   Component Value Date     10/09/2018    K 3.8 10/09/2018     10/09/2018    CO2 24 10/09/2018    BUN 14 10/09/2018    CREATININE 1.2 10/09/2018    GFRAA 53 10/09/2018    AGRATIO 1.4 10/09/2018    LABGLOM 44 10/09/2018    GLUCOSE 107 10/09/2018    PROT 7.2 10/09/2018    CALCIUM 9.4 10/09/2018    BILITOT 0.3 10/09/2018    ALKPHOS 112 10/09/2018 AST 20 10/09/2018    ALT 13 10/09/2018     PT/INR:  No results found for: PTINR  HgBA1c:No results found for: LABA1C  Lab Results   Component Value Date    TROPONINI <0.01 10/09/2018         Cardiac Data     Last EKG:       nsr first deg av block    Echo: 01/17/17  Normal left ventricular systolic function with an estimated ejection   fraction of 55%.   Grade I diastolic dysfunction with normal filling pressure.   Systolic pulmonary artery pressure (SPAP) is normal and estimated at 32 mmHg   (RA pressure 3 mmHg).       Stress Test: 07/17/17  Summary  There is normal isotope uptake at stress and rest. There is no evidence of  myocardial ischemia or scar. Hyperdynamic LV systolic function with UD>92%  with uniform wall motion. Low risk study.         Cath: 03/07/18  IMPRESSION:  1.  Moderate CAD of mid LAD as well as mild CAD of distal LAD, proximal  circumflex and mid RCA. 2.  Normal left ventricular systolic function. 3.  Normal left ventricular filling pressure. RECOMMENDATION:  The patient has moderate disease of mid LAD. There was no  high-grade disease noted.  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  Will start her on Yesiac Jane will  avoid oral nitrates as she does have a history of vasodepressor syncope and  the nitrates can make it worse.  She is already on Lisinopril, which she  will continue as ACE inhibitors have shown to be benfical in microvascular  angina.  I will also start her on moderate-dose of Lipitor for the coronary  artery disease.  She will continue with low-dose of aspirin. Studies:       I have reviewed labs and imaging/xray/diagnostic testing in this note.     Assessment        Patient Active Problem List   Diagnosis    GERD (gastroesophageal reflux disease)    Irritable bowel syndrome (IBS)    Benign essential hypertension    Chest pain    Near syncope    Syncope and collapse    VELASQUEZ (dyspnea on exertion)    Dizziness    Cardiac microvascular disease    Coronary artery disease of native artery of native heart with stable angina pectoris ([address]    Mixed hyperlipidemia    MCI (mild cognitive impairment)    HTN (hypertension), benign    Dysthymia    Dyslipidemia          Plan      1. Continue all medications  2. May remain off the Ranexa  3. Follow up in 1 year       Thank you for allowing us to participate in the care of Rita Piper. Please call me with any questions [phone]. This note was scribed in the presence of Dr. Logan Greer MD by Maddy Barroso RN. Phoenix Jerome MD, [facility]   Interventional Cardiologist  [facility]  [phone] [facility]  [phone] [address] [place] [address]  6/3/2019 1:41 PM    I will address the patient's cardiac risk factors and adjusted pharmacologic treatment as needed. In addition, I have reinforced the need for patient directed risk factor modification. Tobacco use was discussed with the patient and educated on the negative effects and was asked not to use. All questions and concerns were addressed to the patient/family. Alternatives to my treatment were discussed. I, Dr Phoenix Jerome, personally performed the services described in this documentation, as scribed by the above signed scribe in my presence. It is both accurate and complete to my knowledge. I agree with the details independently gathered by the clinical support staff and the scribed note accurately describes my personal service to the patient.

## 2019-07-25 ENCOUNTER — APPOINTMENT (OUTPATIENT)
Dept: GENERAL RADIOLOGY | Age: 74
End: 2019-07-25
Payer: MEDICARE

## 2019-07-25 ENCOUNTER — HOSPITAL ENCOUNTER (EMERGENCY)
Age: 74
Discharge: HOME | End: 2019-07-25
Attending: EMERGENCY MEDICINE
Payer: MEDICARE

## 2019-07-25 ENCOUNTER — APPOINTMENT (OUTPATIENT)
Dept: CT IMAGING | Age: 74
End: 2019-07-25
Payer: MEDICARE

## 2019-07-25 VITALS
TEMPERATURE: 98 F | RESPIRATION RATE: 20 BRPM | HEIGHT: 66 IN | HEART RATE: 80 BPM | DIASTOLIC BLOOD PRESSURE: 100 MMHG | WEIGHT: 210 LBS | BODY MASS INDEX: 33.75 KG/M2 | OXYGEN SATURATION: 95 % | SYSTOLIC BLOOD PRESSURE: 179 MMHG

## 2019-07-25 DIAGNOSIS — I10 HYPERTENSION, UNSPECIFIED TYPE: Primary | ICD-10-CM

## 2019-07-25 DIAGNOSIS — N39.0 URINARY TRACT INFECTION WITHOUT HEMATURIA, SITE UNSPECIFIED: ICD-10-CM

## 2019-07-25 LAB
A/G RATIO: 1.4 (ref 1.1–2.2)
ALBUMIN SERPL-MCNC: 4.1 G/DL (ref 3.4–5)
ALP BLD-CCNC: 93 U/L (ref 40–129)
ALT SERPL-CCNC: 12 U/L (ref 10–40)
ANION GAP SERPL CALCULATED.3IONS-SCNC: 11 MMOL/L (ref 3–16)
AST SERPL-CCNC: 22 U/L (ref 15–37)
BACTERIA: ABNORMAL /HPF
BASOPHILS ABSOLUTE: 0 K/UL (ref 0–0.2)
BASOPHILS RELATIVE PERCENT: 0.5 %
BILIRUB SERPL-MCNC: 0.6 MG/DL (ref 0–1)
BILIRUBIN URINE: NEGATIVE
BLOOD, URINE: NEGATIVE
BUN BLDV-MCNC: 11 MG/DL (ref 7–20)
CALCIUM SERPL-MCNC: 9.4 MG/DL (ref 8.3–10.6)
CHLORIDE BLD-SCNC: 100 MMOL/L (ref 99–110)
CLARITY: CLEAR
CO2: 25 MMOL/L (ref 21–32)
COLOR: ABNORMAL
CREAT SERPL-MCNC: 1 MG/DL (ref 0.6–1.2)
EKG ATRIAL RATE: 76 BPM
EKG DIAGNOSIS: NORMAL
EKG P AXIS: 32 DEGREES
EKG P-R INTERVAL: 204 MS
EKG Q-T INTERVAL: 404 MS
EKG QRS DURATION: 88 MS
EKG QTC CALCULATION (BAZETT): 454 MS
EKG R AXIS: -7 DEGREES
EKG T AXIS: 47 DEGREES
EKG VENTRICULAR RATE: 76 BPM
EOSINOPHILS ABSOLUTE: 0.2 K/UL (ref 0–0.6)
EOSINOPHILS RELATIVE PERCENT: 3.6 %
EPITHELIAL CELLS, UA: ABNORMAL /HPF
GFR AFRICAN AMERICAN: >60
GFR NON-AFRICAN AMERICAN: 54
GLOBULIN: 2.9 G/DL
GLUCOSE BLD-MCNC: 96 MG/DL (ref 70–99)
GLUCOSE URINE: NEGATIVE MG/DL
HCT VFR BLD CALC: 34.5 % (ref 36–48)
HEMOGLOBIN: 12 G/DL (ref 12–16)
KETONES, URINE: NEGATIVE MG/DL
LEUKOCYTE ESTERASE, URINE: ABNORMAL
LYMPHOCYTES ABSOLUTE: 1.5 K/UL (ref 1–5.1)
LYMPHOCYTES RELATIVE PERCENT: 28.1 %
MCH RBC QN AUTO: 30.9 PG (ref 26–34)
MCHC RBC AUTO-ENTMCNC: 34.7 G/DL (ref 31–36)
MCV RBC AUTO: 89.1 FL (ref 80–100)
MICROSCOPIC EXAMINATION: YES
MONOCYTES ABSOLUTE: 0.6 K/UL (ref 0–1.3)
MONOCYTES RELATIVE PERCENT: 11.1 %
NEUTROPHILS ABSOLUTE: 3.1 K/UL (ref 1.7–7.7)
NEUTROPHILS RELATIVE PERCENT: 56.7 %
NITRITE, URINE: NEGATIVE
PDW BLD-RTO: 13.1 % (ref 12.4–15.4)
PH UA: 6 (ref 5–8)
PLATELET # BLD: 166 K/UL (ref 135–450)
PMV BLD AUTO: 7.6 FL (ref 5–10.5)
POTASSIUM REFLEX MAGNESIUM: 3.6 MMOL/L (ref 3.5–5.1)
PROTEIN UA: NEGATIVE MG/DL
RBC # BLD: 3.87 M/UL (ref 4–5.2)
RBC UA: ABNORMAL /HPF (ref 0–2)
SODIUM BLD-SCNC: 136 MMOL/L (ref 136–145)
SPECIFIC GRAVITY UA: <=1.005 (ref 1–1.03)
TOTAL PROTEIN: 7 G/DL (ref 6.4–8.2)
URINE REFLEX TO CULTURE: YES
URINE TYPE: ABNORMAL
UROBILINOGEN, URINE: 0.2 E.U./DL
WBC # BLD: 5.5 K/UL (ref 4–11)
WBC UA: ABNORMAL /HPF (ref 0–5)

## 2019-07-25 PROCEDURE — 96374 THER/PROPH/DIAG INJ IV PUSH: CPT

## 2019-07-25 PROCEDURE — 6360000002 HC RX W HCPCS: Performed by: PHYSICIAN ASSISTANT

## 2019-07-25 PROCEDURE — 96361 HYDRATE IV INFUSION ADD-ON: CPT

## 2019-07-25 PROCEDURE — 2580000003 HC RX 258: Performed by: PHYSICIAN ASSISTANT

## 2019-07-25 PROCEDURE — 99284 EMERGENCY DEPT VISIT MOD MDM: CPT

## 2019-07-25 PROCEDURE — 6370000000 HC RX 637 (ALT 250 FOR IP): Performed by: PHYSICIAN ASSISTANT

## 2019-07-25 PROCEDURE — 81001 URINALYSIS AUTO W/SCOPE: CPT

## 2019-07-25 PROCEDURE — 93005 ELECTROCARDIOGRAM TRACING: CPT | Performed by: PHYSICIAN ASSISTANT

## 2019-07-25 PROCEDURE — 80053 COMPREHEN METABOLIC PANEL: CPT

## 2019-07-25 PROCEDURE — 87186 SC STD MICRODIL/AGAR DIL: CPT

## 2019-07-25 PROCEDURE — 85025 COMPLETE CBC W/AUTO DIFF WBC: CPT

## 2019-07-25 PROCEDURE — 87086 URINE CULTURE/COLONY COUNT: CPT

## 2019-07-25 PROCEDURE — 70450 CT HEAD/BRAIN W/O DYE: CPT

## 2019-07-25 PROCEDURE — 93010 ELECTROCARDIOGRAM REPORT: CPT | Performed by: INTERNAL MEDICINE

## 2019-07-25 PROCEDURE — 71046 X-RAY EXAM CHEST 2 VIEWS: CPT

## 2019-07-25 PROCEDURE — 87077 CULTURE AEROBIC IDENTIFY: CPT

## 2019-07-25 RX ORDER — HYDRALAZINE HYDROCHLORIDE 20 MG/ML
5 INJECTION INTRAMUSCULAR; INTRAVENOUS ONCE
Status: COMPLETED | OUTPATIENT
Start: 2019-07-25 | End: 2019-07-25

## 2019-07-25 RX ORDER — CEPHALEXIN 500 MG/1
500 CAPSULE ORAL 2 TIMES DAILY
Qty: 20 CAPSULE | Refills: 0 | Status: SHIPPED | OUTPATIENT
Start: 2019-07-25 | End: 2019-08-04

## 2019-07-25 RX ORDER — LANOLIN ALCOHOL/MO/W.PET/CERES
3 CREAM (GRAM) TOPICAL NIGHTLY PRN
COMMUNITY
End: 2020-09-30

## 2019-07-25 RX ORDER — AMLODIPINE BESYLATE 10 MG/1
5 TABLET ORAL DAILY
Qty: 15 TABLET | Refills: 0 | Status: SHIPPED | OUTPATIENT
Start: 2019-07-25 | End: 2020-10-13 | Stop reason: DRUGHIGH

## 2019-07-25 RX ORDER — ACETAMINOPHEN 500 MG
1000 TABLET ORAL ONCE
Status: COMPLETED | OUTPATIENT
Start: 2019-07-25 | End: 2019-07-25

## 2019-07-25 RX ORDER — 0.9 % SODIUM CHLORIDE 0.9 %
1000 INTRAVENOUS SOLUTION INTRAVENOUS ONCE
Status: COMPLETED | OUTPATIENT
Start: 2019-07-25 | End: 2019-07-25

## 2019-07-25 RX ADMIN — HYDRALAZINE HYDROCHLORIDE 5 MG: 20 INJECTION INTRAMUSCULAR; INTRAVENOUS at 16:00

## 2019-07-25 RX ADMIN — ACETAMINOPHEN 1000 MG: 500 TABLET ORAL at 16:00

## 2019-07-25 RX ADMIN — SODIUM CHLORIDE 1000 ML: 9 INJECTION, SOLUTION INTRAVENOUS at 16:00

## 2019-07-25 ASSESSMENT — ENCOUNTER SYMPTOMS
NAUSEA: 0
VOMITING: 0
SHORTNESS OF BREATH: 0
COUGH: 0

## 2019-07-25 ASSESSMENT — PAIN DESCRIPTION - LOCATION
LOCATION: HEAD
LOCATION: HEAD

## 2019-07-25 ASSESSMENT — PAIN SCALES - GENERAL
PAINLEVEL_OUTOF10: 1
PAINLEVEL_OUTOF10: 3
PAINLEVEL_OUTOF10: 3

## 2019-07-25 ASSESSMENT — PAIN DESCRIPTION - DESCRIPTORS: DESCRIPTORS: ACHING

## 2019-07-25 ASSESSMENT — PAIN DESCRIPTION - PAIN TYPE: TYPE: ACUTE PAIN

## 2019-07-25 NOTE — ED PROVIDER NOTES
Co-signs this chart in the absence of a cardiologist.  Please see their note for interpretation of EKG. RADIOLOGY:     Interpretation per the Radiologist below, if available at the time of this note:    CT Head WO Contrast   Final Result   No acute intracranial abnormality. Periventricular and scattered frontal parietal white matter disease, similar   to prior, likely due to small-vessel ischemic change      Mild paranasal sinus disease         XR CHEST STANDARD (2 VW)   Final Result   No evidence of acute cardiopulmonary disease. Xr Chest Standard (2 Vw)    Result Date: 7/25/2019  EXAMINATION: TWO XRAY VIEWS OF THE CHEST 7/25/2019 2:26 pm COMPARISON: October 9, 2018 HISTORY: ORDERING SYSTEM PROVIDED HISTORY: other TECHNOLOGIST PROVIDED HISTORY: Reason for exam:->other Reason for Exam: dizzy FINDINGS: Normal cardiac size. No evidence of pneumonia, edema or other acute pulmonary process. No evidence of acute process of the cardiac or mediastinal structures. No evidence of pneumothorax or pleural effusion. No evidence of acute cardiopulmonary disease. Ct Head Wo Contrast    Result Date: 7/25/2019  EXAMINATION: CT OF THE HEAD WITHOUT CONTRAST  7/25/2019 2:20 pm TECHNIQUE: CT of the head was performed without the administration of intravenous contrast. Dose modulation, iterative reconstruction, and/or weight based adjustment of the mA/kV was utilized to reduce the radiation dose to as low as reasonably achievable. COMPARISON: May 2018 HISTORY: ORDERING SYSTEM PROVIDED HISTORY: HTN mild headache 2 days TECHNOLOGIST PROVIDED HISTORY: Has a \"code stroke\" or \"stroke alert\" been called? ->No Reason for Exam: HTN mild headache 2 days Acuity: Acute Type of Exam: Initial FINDINGS: BRAIN/VENTRICLES: Ventricles are midline in position. No intracerebral masses are identified. No mass effect or midline shift noted. No acute intracranial hemorrhage is seen. Mild periventricular hypodensity is seen.

## 2019-07-26 ENCOUNTER — TELEPHONE (OUTPATIENT)
Dept: CARDIOLOGY CLINIC | Age: 74
End: 2019-07-26

## 2019-07-27 LAB
ORGANISM: ABNORMAL
URINE CULTURE, ROUTINE: ABNORMAL
URINE CULTURE, ROUTINE: ABNORMAL

## 2019-10-21 ENCOUNTER — TELEPHONE (OUTPATIENT)
Dept: PULMONOLOGY | Age: 74
End: 2019-10-21

## 2020-04-29 RX ORDER — ROSUVASTATIN CALCIUM 20 MG/1
TABLET, COATED ORAL
Qty: 90 TABLET | Refills: 1 | Status: SHIPPED | OUTPATIENT
Start: 2020-04-29 | End: 2020-11-20

## 2020-05-15 ENCOUNTER — HOSPITAL ENCOUNTER (OUTPATIENT)
Age: 75
Discharge: HOME | DRG: 915 | End: 2020-05-15
Payer: MEDICARE

## 2020-05-15 ENCOUNTER — HOSPITAL ENCOUNTER (OUTPATIENT)
Dept: GENERAL RADIOLOGY | Age: 75
Discharge: HOME | DRG: 915 | End: 2020-05-15
Payer: MEDICARE

## 2020-05-15 PROCEDURE — 73030 X-RAY EXAM OF SHOULDER: CPT

## 2020-05-17 ENCOUNTER — APPOINTMENT (OUTPATIENT)
Dept: GENERAL RADIOLOGY | Age: 75
DRG: 915 | End: 2020-05-17
Payer: MEDICARE

## 2020-05-17 ENCOUNTER — HOSPITAL ENCOUNTER (INPATIENT)
Age: 75
LOS: 2 days | Discharge: HOME | DRG: 915 | End: 2020-05-20
Attending: EMERGENCY MEDICINE | Admitting: INTERNAL MEDICINE
Payer: MEDICARE

## 2020-05-17 ENCOUNTER — APPOINTMENT (OUTPATIENT)
Dept: CT IMAGING | Age: 75
DRG: 915 | End: 2020-05-17
Payer: MEDICARE

## 2020-05-17 PROBLEM — R41.82 AMS (ALTERED MENTAL STATUS): Status: ACTIVE | Noted: 2020-05-17

## 2020-05-17 LAB
A/G RATIO: 1.8 (ref 1.1–2.2)
ALBUMIN SERPL-MCNC: 4.7 G/DL (ref 3.4–5)
ALP BLD-CCNC: 83 U/L (ref 40–129)
ALT SERPL-CCNC: 30 U/L (ref 10–40)
AMMONIA: 17 UMOL/L (ref 11–51)
AMORPHOUS: ABNORMAL /HPF
AMPHETAMINE SCREEN, URINE: NORMAL
ANION GAP SERPL CALCULATED.3IONS-SCNC: 13 MMOL/L (ref 3–16)
ANION GAP SERPL CALCULATED.3IONS-SCNC: 13 MMOL/L (ref 3–16)
AST SERPL-CCNC: 86 U/L (ref 15–37)
BARBITURATE SCREEN URINE: NORMAL
BASE EXCESS ARTERIAL: -6.3 MMOL/L (ref -3–3)
BASE EXCESS VENOUS: -4.6 MMOL/L (ref -3–3)
BASOPHILS ABSOLUTE: 0 K/UL (ref 0–0.2)
BASOPHILS RELATIVE PERCENT: 0.2 %
BENZODIAZEPINE SCREEN, URINE: NORMAL
BILIRUB SERPL-MCNC: 0.8 MG/DL (ref 0–1)
BILIRUBIN URINE: ABNORMAL
BLOOD, URINE: ABNORMAL
BUN BLDV-MCNC: 28 MG/DL (ref 7–20)
BUN BLDV-MCNC: 30 MG/DL (ref 7–20)
CALCIUM SERPL-MCNC: 10.1 MG/DL (ref 8.3–10.6)
CALCIUM SERPL-MCNC: 10.4 MG/DL (ref 8.3–10.6)
CANNABINOID SCREEN URINE: NORMAL
CARBOXYHEMOGLOBIN ARTERIAL: 0.2 % (ref 0–1.5)
CARBOXYHEMOGLOBIN: 3.7 % (ref 0–1.5)
CHLORIDE BLD-SCNC: 93 MMOL/L (ref 99–110)
CHLORIDE BLD-SCNC: 98 MMOL/L (ref 99–110)
CLARITY: CLEAR
CO2: 18 MMOL/L (ref 21–32)
CO2: 20 MMOL/L (ref 21–32)
COCAINE METABOLITE SCREEN URINE: NORMAL
COLOR: YELLOW
CREAT SERPL-MCNC: 0.9 MG/DL (ref 0.6–1.2)
CREAT SERPL-MCNC: 1.1 MG/DL (ref 0.6–1.2)
CRYSTALS, UA: ABNORMAL /HPF
EKG ATRIAL RATE: 100 BPM
EKG DIAGNOSIS: NORMAL
EKG P AXIS: 23 DEGREES
EKG P-R INTERVAL: 178 MS
EKG Q-T INTERVAL: 330 MS
EKG QRS DURATION: 90 MS
EKG QTC CALCULATION (BAZETT): 425 MS
EKG R AXIS: -7 DEGREES
EKG T AXIS: 31 DEGREES
EKG VENTRICULAR RATE: 100 BPM
EOSINOPHILS ABSOLUTE: 0 K/UL (ref 0–0.6)
EOSINOPHILS RELATIVE PERCENT: 0 %
EPITHELIAL CELLS, UA: ABNORMAL /HPF (ref 0–5)
ETHANOL: NORMAL MG/DL (ref 0–0.08)
GFR AFRICAN AMERICAN: 58
GFR AFRICAN AMERICAN: >60
GFR NON-AFRICAN AMERICAN: 48
GFR NON-AFRICAN AMERICAN: >60
GLOBULIN: 2.6 G/DL
GLUCOSE BLD-MCNC: 115 MG/DL (ref 70–99)
GLUCOSE BLD-MCNC: 126 MG/DL (ref 70–99)
GLUCOSE BLD-MCNC: 137 MG/DL (ref 70–99)
GLUCOSE URINE: NEGATIVE MG/DL
HCO3 ARTERIAL: 16.4 MMOL/L (ref 21–29)
HCO3 VENOUS: 20 MMOL/L (ref 23–29)
HCT VFR BLD CALC: 33.9 % (ref 36–48)
HEMOGLOBIN, ART, EXTENDED: 11.8 G/DL (ref 12–16)
HEMOGLOBIN: 11.5 G/DL (ref 12–16)
HYALINE CASTS: ABNORMAL /LPF (ref 0–2)
KETONES, URINE: NEGATIVE MG/DL
LEUKOCYTE ESTERASE, URINE: ABNORMAL
LYMPHOCYTES ABSOLUTE: 1.1 K/UL (ref 1–5.1)
LYMPHOCYTES RELATIVE PERCENT: 8.9 %
Lab: NORMAL
MAGNESIUM: 2.3 MG/DL (ref 1.8–2.4)
MCH RBC QN AUTO: 30.7 PG (ref 26–34)
MCHC RBC AUTO-ENTMCNC: 34 G/DL (ref 31–36)
MCV RBC AUTO: 90.2 FL (ref 80–100)
METHADONE SCREEN, URINE: NORMAL
METHEMOGLOBIN ARTERIAL: 0.3 %
METHEMOGLOBIN VENOUS: 0.3 %
MICROSCOPIC EXAMINATION: YES
MONOCYTES ABSOLUTE: 1.2 K/UL (ref 0–1.3)
MONOCYTES RELATIVE PERCENT: 9.1 %
NEUTROPHILS ABSOLUTE: 10.4 K/UL (ref 1.7–7.7)
NEUTROPHILS RELATIVE PERCENT: 81.8 %
NITRITE, URINE: NEGATIVE
O2 CONTENT ARTERIAL: 16 ML/DL
O2 CONTENT, VEN: 13 VOL %
O2 SAT, ARTERIAL: 97.9 %
O2 SAT, VEN: 75 %
O2 THERAPY: ABNORMAL
O2 THERAPY: ABNORMAL
OPIATE SCREEN URINE: NORMAL
OXYCODONE URINE: NORMAL
PCO2 ARTERIAL: 24.9 MMHG (ref 35–45)
PCO2, VEN: 35.6 MMHG (ref 40–50)
PDW BLD-RTO: 13.2 % (ref 12.4–15.4)
PERFORMED ON: ABNORMAL
PH ARTERIAL: 7.44 (ref 7.35–7.45)
PH UA: 5.5
PH UA: 5.5 (ref 5–8)
PH VENOUS: 7.37 (ref 7.35–7.45)
PHENCYCLIDINE SCREEN URINE: NORMAL
PLATELET # BLD: 203 K/UL (ref 135–450)
PMV BLD AUTO: 8.4 FL (ref 5–10.5)
PO2 ARTERIAL: 100.9 MMHG (ref 75–108)
PO2, VEN: 41.1 MMHG (ref 25–40)
POTASSIUM REFLEX MAGNESIUM: 3.9 MMOL/L (ref 3.5–5.1)
POTASSIUM SERPL-SCNC: 3.9 MMOL/L (ref 3.5–5.1)
PRO-BNP: 378 PG/ML (ref 0–449)
PROPOXYPHENE SCREEN: NORMAL
PROTEIN UA: ABNORMAL MG/DL
RBC # BLD: 3.76 M/UL (ref 4–5.2)
RBC UA: ABNORMAL /HPF (ref 0–4)
SODIUM BLD-SCNC: 126 MMOL/L (ref 136–145)
SODIUM BLD-SCNC: 129 MMOL/L (ref 136–145)
SODIUM BLD-SCNC: 131 MMOL/L (ref 136–145)
SPECIFIC GRAVITY UA: >=1.03 (ref 1–1.03)
TCO2 ARTERIAL: 17.1 MMOL/L
TCO2 CALC VENOUS: 21 MMOL/L
TOTAL PROTEIN: 7.3 G/DL (ref 6.4–8.2)
TROPONIN: <0.01 NG/ML
TROPONIN: <0.01 NG/ML
URINE TYPE: ABNORMAL
UROBILINOGEN, URINE: 0.2 E.U./DL
WBC # BLD: 12.7 K/UL (ref 4–11)
WBC UA: ABNORMAL /HPF (ref 0–5)

## 2020-05-17 PROCEDURE — 36600 WITHDRAWAL OF ARTERIAL BLOOD: CPT

## 2020-05-17 PROCEDURE — 72125 CT NECK SPINE W/O DYE: CPT

## 2020-05-17 PROCEDURE — 2700000000 HC OXYGEN THERAPY PER DAY

## 2020-05-17 PROCEDURE — 6370000000 HC RX 637 (ALT 250 FOR IP): Performed by: EMERGENCY MEDICINE

## 2020-05-17 PROCEDURE — 96361 HYDRATE IV INFUSION ADD-ON: CPT

## 2020-05-17 PROCEDURE — 84484 ASSAY OF TROPONIN QUANT: CPT

## 2020-05-17 PROCEDURE — 2580000003 HC RX 258: Performed by: INTERNAL MEDICINE

## 2020-05-17 PROCEDURE — 71045 X-RAY EXAM CHEST 1 VIEW: CPT

## 2020-05-17 PROCEDURE — 87086 URINE CULTURE/COLONY COUNT: CPT

## 2020-05-17 PROCEDURE — 93005 ELECTROCARDIOGRAM TRACING: CPT | Performed by: EMERGENCY MEDICINE

## 2020-05-17 PROCEDURE — G0480 DRUG TEST DEF 1-7 CLASSES: HCPCS

## 2020-05-17 PROCEDURE — 2580000003 HC RX 258: Performed by: EMERGENCY MEDICINE

## 2020-05-17 PROCEDURE — G0378 HOSPITAL OBSERVATION PER HR: HCPCS

## 2020-05-17 PROCEDURE — 2580000003 HC RX 258: Performed by: PHYSICIAN ASSISTANT

## 2020-05-17 PROCEDURE — 82803 BLOOD GASES ANY COMBINATION: CPT

## 2020-05-17 PROCEDURE — 96374 THER/PROPH/DIAG INJ IV PUSH: CPT

## 2020-05-17 PROCEDURE — 82140 ASSAY OF AMMONIA: CPT

## 2020-05-17 PROCEDURE — 94640 AIRWAY INHALATION TREATMENT: CPT

## 2020-05-17 PROCEDURE — 83880 ASSAY OF NATRIURETIC PEPTIDE: CPT

## 2020-05-17 PROCEDURE — 94761 N-INVAS EAR/PLS OXIMETRY MLT: CPT

## 2020-05-17 PROCEDURE — 6370000000 HC RX 637 (ALT 250 FOR IP): Performed by: INTERNAL MEDICINE

## 2020-05-17 PROCEDURE — 81001 URINALYSIS AUTO W/SCOPE: CPT

## 2020-05-17 PROCEDURE — 93010 ELECTROCARDIOGRAM REPORT: CPT | Performed by: INTERNAL MEDICINE

## 2020-05-17 PROCEDURE — 99285 EMERGENCY DEPT VISIT HI MDM: CPT

## 2020-05-17 PROCEDURE — 6370000000 HC RX 637 (ALT 250 FOR IP): Performed by: PHYSICIAN ASSISTANT

## 2020-05-17 PROCEDURE — 70450 CT HEAD/BRAIN W/O DYE: CPT

## 2020-05-17 PROCEDURE — 96372 THER/PROPH/DIAG INJ SC/IM: CPT

## 2020-05-17 PROCEDURE — 6360000002 HC RX W HCPCS: Performed by: EMERGENCY MEDICINE

## 2020-05-17 PROCEDURE — 6360000002 HC RX W HCPCS: Performed by: PHYSICIAN ASSISTANT

## 2020-05-17 PROCEDURE — 84295 ASSAY OF SERUM SODIUM: CPT

## 2020-05-17 PROCEDURE — 6360000002 HC RX W HCPCS: Performed by: INTERNAL MEDICINE

## 2020-05-17 PROCEDURE — 80307 DRUG TEST PRSMV CHEM ANLYZR: CPT

## 2020-05-17 PROCEDURE — 96375 TX/PRO/DX INJ NEW DRUG ADDON: CPT

## 2020-05-17 PROCEDURE — 36415 COLL VENOUS BLD VENIPUNCTURE: CPT

## 2020-05-17 PROCEDURE — 80053 COMPREHEN METABOLIC PANEL: CPT

## 2020-05-17 PROCEDURE — 85025 COMPLETE CBC W/AUTO DIFF WBC: CPT

## 2020-05-17 PROCEDURE — 83735 ASSAY OF MAGNESIUM: CPT

## 2020-05-17 RX ORDER — ASPIRIN 81 MG/1
81 TABLET ORAL DAILY
Status: DISCONTINUED | OUTPATIENT
Start: 2020-05-17 | End: 2020-05-20 | Stop reason: HOSPADM

## 2020-05-17 RX ORDER — 0.9 % SODIUM CHLORIDE 0.9 %
500 INTRAVENOUS SOLUTION INTRAVENOUS ONCE
Status: COMPLETED | OUTPATIENT
Start: 2020-05-17 | End: 2020-05-17

## 2020-05-17 RX ORDER — ACETAMINOPHEN 325 MG/1
650 TABLET ORAL EVERY 6 HOURS PRN
Status: DISCONTINUED | OUTPATIENT
Start: 2020-05-17 | End: 2020-05-20 | Stop reason: HOSPADM

## 2020-05-17 RX ORDER — PROMETHAZINE HYDROCHLORIDE 25 MG/1
12.5 TABLET ORAL EVERY 6 HOURS PRN
Status: DISCONTINUED | OUTPATIENT
Start: 2020-05-17 | End: 2020-05-17

## 2020-05-17 RX ORDER — TRAMADOL HYDROCHLORIDE 50 MG/1
TABLET ORAL
Status: ON HOLD | COMMUNITY
Start: 2020-05-14 | End: 2020-05-19 | Stop reason: SINTOL

## 2020-05-17 RX ORDER — LEVALBUTEROL 1.25 MG/.5ML
0.63 SOLUTION, CONCENTRATE RESPIRATORY (INHALATION) EVERY 4 HOURS PRN
Status: DISCONTINUED | OUTPATIENT
Start: 2020-05-17 | End: 2020-05-18

## 2020-05-17 RX ORDER — CITALOPRAM 40 MG/1
40 TABLET ORAL DAILY
Status: ON HOLD | COMMUNITY
Start: 2019-06-06 | End: 2020-05-31 | Stop reason: SDUPTHER

## 2020-05-17 RX ORDER — IPRATROPIUM BROMIDE AND ALBUTEROL SULFATE 2.5; .5 MG/3ML; MG/3ML
1 SOLUTION RESPIRATORY (INHALATION) EVERY 4 HOURS PRN
Status: DISCONTINUED | OUTPATIENT
Start: 2020-05-17 | End: 2020-05-17

## 2020-05-17 RX ORDER — LISINOPRIL 10 MG/1
TABLET ORAL
COMMUNITY
Start: 2018-10-09 | End: 2020-05-17

## 2020-05-17 RX ORDER — SODIUM CHLORIDE 0.9 % (FLUSH) 0.9 %
10 SYRINGE (ML) INJECTION PRN
Status: DISCONTINUED | OUTPATIENT
Start: 2020-05-17 | End: 2020-05-20 | Stop reason: HOSPADM

## 2020-05-17 RX ORDER — DEXAMETHASONE SODIUM PHOSPHATE 10 MG/ML
8 INJECTION, SOLUTION INTRAMUSCULAR; INTRAVENOUS ONCE
Status: COMPLETED | OUTPATIENT
Start: 2020-05-17 | End: 2020-05-17

## 2020-05-17 RX ORDER — ACETAMINOPHEN 650 MG/1
650 SUPPOSITORY RECTAL EVERY 6 HOURS PRN
Status: DISCONTINUED | OUTPATIENT
Start: 2020-05-17 | End: 2020-05-20 | Stop reason: HOSPADM

## 2020-05-17 RX ORDER — SODIUM CHLORIDE 0.9 % (FLUSH) 0.9 %
10 SYRINGE (ML) INJECTION EVERY 12 HOURS SCHEDULED
Status: DISCONTINUED | OUTPATIENT
Start: 2020-05-17 | End: 2020-05-20 | Stop reason: HOSPADM

## 2020-05-17 RX ORDER — ROSUVASTATIN CALCIUM 10 MG/1
20 TABLET, COATED ORAL DAILY
Status: DISCONTINUED | OUTPATIENT
Start: 2020-05-17 | End: 2020-05-20 | Stop reason: HOSPADM

## 2020-05-17 RX ORDER — SODIUM CHLORIDE 9 MG/ML
INJECTION, SOLUTION INTRAVENOUS CONTINUOUS
Status: DISCONTINUED | OUTPATIENT
Start: 2020-05-17 | End: 2020-05-20 | Stop reason: HOSPADM

## 2020-05-17 RX ORDER — IPRATROPIUM BROMIDE AND ALBUTEROL SULFATE 2.5; .5 MG/3ML; MG/3ML
1 SOLUTION RESPIRATORY (INHALATION) ONCE
Status: COMPLETED | OUTPATIENT
Start: 2020-05-17 | End: 2020-05-17

## 2020-05-17 RX ORDER — POLYETHYLENE GLYCOL 3350 17 G/17G
17 POWDER, FOR SOLUTION ORAL DAILY PRN
Status: DISCONTINUED | OUTPATIENT
Start: 2020-05-17 | End: 2020-05-20 | Stop reason: HOSPADM

## 2020-05-17 RX ORDER — LORAZEPAM 2 MG/ML
0.5 INJECTION INTRAMUSCULAR EVERY 6 HOURS PRN
Status: DISCONTINUED | OUTPATIENT
Start: 2020-05-17 | End: 2020-05-18

## 2020-05-17 RX ORDER — BUSPIRONE HYDROCHLORIDE 5 MG/1
10 TABLET ORAL DAILY
Status: ON HOLD | COMMUNITY
Start: 2020-01-14 | End: 2020-05-31 | Stop reason: SDUPTHER

## 2020-05-17 RX ORDER — ONDANSETRON 2 MG/ML
4 INJECTION INTRAMUSCULAR; INTRAVENOUS EVERY 6 HOURS PRN
Status: DISCONTINUED | OUTPATIENT
Start: 2020-05-17 | End: 2020-05-20 | Stop reason: HOSPADM

## 2020-05-17 RX ORDER — AMLODIPINE BESYLATE 5 MG/1
5 TABLET ORAL DAILY
Status: DISCONTINUED | OUTPATIENT
Start: 2020-05-17 | End: 2020-05-18

## 2020-05-17 RX ORDER — 0.9 % SODIUM CHLORIDE 0.9 %
500 INTRAVENOUS SOLUTION INTRAVENOUS ONCE
Status: COMPLETED | OUTPATIENT
Start: 2020-05-17 | End: 2020-05-18

## 2020-05-17 RX ADMIN — DEXAMETHASONE SODIUM PHOSPHATE 8 MG: 10 INJECTION, SOLUTION INTRAMUSCULAR; INTRAVENOUS at 08:55

## 2020-05-17 RX ADMIN — ASPIRIN 81 MG: 81 TABLET, COATED ORAL at 18:41

## 2020-05-17 RX ADMIN — IPRATROPIUM BROMIDE AND ALBUTEROL SULFATE 1 AMPULE: .5; 3 SOLUTION RESPIRATORY (INHALATION) at 08:55

## 2020-05-17 RX ADMIN — IPRATROPIUM BROMIDE AND ALBUTEROL SULFATE 1 AMPULE: .5; 3 SOLUTION RESPIRATORY (INHALATION) at 19:06

## 2020-05-17 RX ADMIN — ROSUVASTATIN CALCIUM 20 MG: 10 TABLET, FILM COATED ORAL at 18:41

## 2020-05-17 RX ADMIN — LORAZEPAM 0.5 MG: 2 INJECTION INTRAMUSCULAR; INTRAVENOUS at 19:36

## 2020-05-17 RX ADMIN — ENOXAPARIN SODIUM 40 MG: 40 INJECTION SUBCUTANEOUS at 18:41

## 2020-05-17 RX ADMIN — SODIUM CHLORIDE 500 ML: 9 INJECTION, SOLUTION INTRAVENOUS at 22:15

## 2020-05-17 RX ADMIN — AMLODIPINE BESYLATE 5 MG: 5 TABLET ORAL at 18:41

## 2020-05-17 RX ADMIN — SODIUM CHLORIDE 500 ML: 9 INJECTION, SOLUTION INTRAVENOUS at 09:43

## 2020-05-17 RX ADMIN — SODIUM CHLORIDE: 9 INJECTION, SOLUTION INTRAVENOUS at 18:42

## 2020-05-17 ASSESSMENT — ENCOUNTER SYMPTOMS
SHORTNESS OF BREATH: 1
ABDOMINAL PAIN: 0
DIARRHEA: 0
NAUSEA: 0
BACK PAIN: 0
COUGH: 0
VOMITING: 0
STRIDOR: 0
SINUS PRESSURE: 1

## 2020-05-17 NOTE — ED NOTES
Pt attempting to get out of bed and very unsteady on her feet. Pt states \"I am trying to get over there. What's that noise? \" Pt pointing to a wall. Writer advised patient that there is no noise and that she needed to get back into bed so that she does not fall. Pt not easily re-directable, as she continues to be confused.      Jono Dave RN  05/17/20 9115

## 2020-05-17 NOTE — PROGRESS NOTES
Spoke with Dr. Dulce Schuler about pt's labs, wheezing, general condition. Dr. Dulce Schuler reviewing pt's case.

## 2020-05-17 NOTE — ED NOTES
Pt continues to attempt to get out of bed. Pt is very unsteady on her feet. Pt re-educated on staying in bed to keep her from falling.      Juan Covarrubias RN  05/17/20 4556

## 2020-05-17 NOTE — ED NOTES
Pt continues to try and get out of bed to get to \"the people\" outside of her room. Pt assisted back into bed at this time.       Aric Haney RN  05/17/20 1038

## 2020-05-17 NOTE — ED PROVIDER NOTES
colon    Heart Disease Sister     Heart Disease Brother           SOCIAL HISTORY       Social History     Socioeconomic History    Marital status:      Spouse name: Not on file    Number of children: Not on file    Years of education: Not on file    Highest education level: Not on file   Occupational History    Not on file   Social Needs    Financial resource strain: Not on file    Food insecurity     Worry: Not on file     Inability: Not on file    Transportation needs     Medical: Not on file     Non-medical: Not on file   Tobacco Use    Smoking status: Former Smoker     Packs/day: 1.00     Years: 10.00     Pack years: 10.00     Last attempt to quit: 1983     Years since quittin.6    Smokeless tobacco: Never Used   Substance and Sexual Activity    Alcohol use: No    Drug use: No    Sexual activity: Not on file   Lifestyle    Physical activity     Days per week: Not on file     Minutes per session: Not on file    Stress: Not on file   Relationships    Social connections     Talks on phone: Not on file     Gets together: Not on file     Attends [employer] service: Not on file     Active member of club or organization: Not on file     Attends meetings of clubs or organizations: Not on file     Relationship status: Not on file    Intimate partner violence     Fear of current or ex partner: Not on file     Emotionally abused: Not on file     Physically abused: Not on file     Forced sexual activity: Not on file   Other Topics Concern    Not on file   Social History Narrative    Not on file       SCREENINGS                PHYSICAL EXAM    (up to 7 for level 4, 8 or more for level 5)     ED Triage Vitals   BP Temp Temp src Pulse Resp SpO2 Height Weight   -- -- -- -- -- -- -- --       Physical Exam  Vitals signs and nursing note reviewed. Constitutional:       General: She is awake. She is in acute distress (mild). Appearance: Normal appearance.  She is well-developed and or rebound. Negative signs include Tejeda's sign and McBurney's sign. Musculoskeletal: Normal range of motion. General: No tenderness or deformity. Skin:     General: Skin is warm and dry. Coloration: Skin is not pale. Findings: No erythema or rash. Neurological:      General: No focal deficit present. Mental Status: She is alert and oriented to person, place, and time. Mental status is at baseline. GCS: GCS eye subscore is 4. GCS verbal subscore is 5. GCS motor subscore is 6. Cranial Nerves: Cranial nerves are intact. No cranial nerve deficit or facial asymmetry. Sensory: Sensation is intact. No sensory deficit. Motor: Motor function is intact. No weakness, tremor, atrophy, abnormal muscle tone, seizure activity or pronator drift. Comments: AOx4 but does appear to occasionally have some delay in speech but I feel that is related to her work of breathing     NIHSS = 0 on initial evaluation    Psychiatric:         Attention and Perception: Attention normal.         Mood and Affect: Mood is anxious. Speech: She is communicative. Speech is delayed. Speech is not rapid and pressured, slurred or tangential.         Behavior: Behavior normal. Behavior is cooperative.              DIAGNOSTIC RESULTS   :    Labs Reviewed   BLOOD GAS, VENOUS - Abnormal; Notable for the following components:       Result Value    pCO2, Adarsh 35.6 (*)     pO2, Adarsh 41.1 (*)     HCO3, Venous 20.0 (*)     Base Excess, Adarsh -4.6 (*)     Carboxyhemoglobin 3.7 (*)     All other components within normal limits    Narrative:     Performed at:  [place] [address],  [place], [facility]   Phone [phone]   CBC WITH AUTO DIFFERENTIAL - Abnormal; Notable for the following components:    WBC 12.7 (*)     RBC 3.76 (*)     Hemoglobin 11.5 (*)     Hematocrit 33.9 (*)     Neutrophils Absolute 10.4 (*)     All other components within normal limits Narrative:     Performed at:  [facility],  [place], [facility]   Phone [phone]   COMPREHENSIVE METABOLIC PANEL - Abnormal; Notable for the following components:    Sodium 126 (*)     Chloride 93 (*)     CO2 20 (*)     Glucose 115 (*)     BUN 30 (*)     GFR Non- 48 (*)     GFR  58 (*)     AST 86 (*)     All other components within normal limits    Narrative:     Performed at:  [facility],  [place], [facility]   Phone [phone]   URINALYSIS - Abnormal; Notable for the following components:    Bilirubin Urine SMALL (*)     Blood, Urine SMALL (*)     Protein, UA TRACE (*)     Leukocyte Esterase, Urine TRACE (*)     All other components within normal limits    Narrative:     Performed at:  [facility]) [facility],  [place], [facility]   Phone [phone]   MICROSCOPIC URINALYSIS - Abnormal; Notable for the following components:    Crystals, UA 2+ Ca.  Oxalate (*)     All other components within normal limits    Narrative:     Performed at:  [facility]) [facility],  [place]   Phone [phone]   SODIUM - Abnormal; Notable for the following components:    Sodium 131 (*)     All other components within normal limits    Narrative:     Performed at:  [place] [address],  [place], [facility]   Phone [phone]   BRAIN NATRIURETIC PEPTIDE    Narrative:     Performed at:  [facility],  [place], [facility]   Phone [phone]   TROPONIN    Narrative:     Performed at:  [facility],  [place], [facility]   Phone [phone]   URINE DRUG SCREEN    Narrative:     Performed at:  [facility]4 05/17/20 1120 05/17/20 1228 05/17/20 1239   BP: (!) 168/90   (!) 161/102   Pulse: 108   104   Resp: 22   21   Temp:    98.5 °F (36.9 °C)   TempSrc:    Oral   SpO2: 94% 95% 95% 93%   Weight:       Height:           Patient was given the following medications:  Medications   ipratropium-albuterol (DUONEB) nebulizer solution 1 ampule (1 ampule Inhalation Given 5/17/20 0855)   dexamethasone (PF) (DECADRON) injection 8 mg (8 mg Intravenous Given 5/17/20 0855)   0.9 % sodium chloride bolus (0 mLs Intravenous Stopped 5/17/20 1043)       Patient was evaluated a concern for reportedly starting tramadol and Bactrim and  thinking she may be having an allergic reaction. Based on examination, she did have wheezing and some trouble with breathing at this time I am more concerned with possibility of COPD versus CHF exacerbation. Story not suggestive of pulmonary embolism. She will need a cardiac work-up along with chest x-ray to further evaluate for her symptoms. At this time, I feel that allergic reaction is less likely based on her appearance. Story not suggestive of coronavirus. No reported fever. However, due to concern for possibility of reactive airway disease, I did give her a breathing treatment along with Decadron to see if this helps with her symptoms. Upon repeat evaluation, she did have some improvement in regards to her respiratory status although still had transmitted upper airway sounds. Still is having some delayed speech but again still no focal neuro deficits. I do not feel that stroke is the cause of her symptoms and on top of that she woke up acting this way and I do not feel that she is a TPA candidate anyway. She does still seem confused in the room and therefore would benefit from further evaluation in the hospital.  This may be related to the recent tramadol. No concern for seizure at this time. She is stable for the floor with telemetry.   She will need further evaluation in regards to her breathing with pulmonology consultation. I have low suspicion for coronavirus. The patient tolerated their visit well. The patient and / or the family were informed of the results of any tests, a time was given to answer questions. FINAL IMPRESSION      1. Delirium    2. Altered mental status, unspecified altered mental status type    3. Shortness of breath    4. Unsteady gait    5.  Hyponatremia          DISPOSITION/PLAN   DISPOSITION  -decision to admit      PATIENT REFERRED TO:  Wilfredo Shaw MD  [address] [place] [address] Dr. Thompson [address] [place] [address]            DISCHARGEMEDICATIONS:  New Prescriptions    No medications on file       DISCONTINUED MEDICATIONS:  Discontinued Medications    CITALOPRAM (CELEXA) 20 MG TABLET    Take 40 mg by mouth daily     LISINOPRIL (ZESTRIL) 10 MG TABLET    Take by mouth              (Please note that portions of this note were completed with a voicerecognition program.  Efforts were made to edit the dictations but occasionally words are mis-transcribed.)    Anita Hadley MD (electronically signed)            Anita Hadley MD  05/17/20 7721

## 2020-05-17 NOTE — PROGRESS NOTES
Perfect serve placed to Dr. Coto Spine for pt audibly wheezing and restlessness. Awaiting call back.

## 2020-05-17 NOTE — ED TRIAGE NOTES
Chief Complaint   Patient presents with    Allergic Reaction     bactrim and tramadol reaction was unable to control extermities in the bed.  appears confused when asking questions

## 2020-05-17 NOTE — ED NOTES
Writer out to speak with patients , and he states that the patient has not been acting right in a few days. Pts  states that the patient has been really confused and unsteady on her feet. Pt was taken to her PCP on Thursday where she was put on percocet and bactrim for a UTI. Pt was getting really aggressive when she was on the percocet so her PCP switched it to tramadol.       Emely Orr RN  05/17/20 1058

## 2020-05-18 ENCOUNTER — APPOINTMENT (OUTPATIENT)
Dept: GENERAL RADIOLOGY | Age: 75
DRG: 915 | End: 2020-05-18
Payer: MEDICARE

## 2020-05-18 PROBLEM — J96.00 ACUTE RESPIRATORY FAILURE (HCC): Status: ACTIVE | Noted: 2020-05-18

## 2020-05-18 PROBLEM — R50.9 LOW GRADE FEVER: Status: ACTIVE | Noted: 2020-05-18

## 2020-05-18 PROBLEM — G93.40 ACUTE ENCEPHALOPATHY: Status: ACTIVE | Noted: 2020-05-18

## 2020-05-18 PROBLEM — J96.01 ACUTE RESPIRATORY FAILURE WITH HYPOXEMIA (HCC): Status: ACTIVE | Noted: 2020-05-18

## 2020-05-18 LAB
ALBUMIN SERPL-MCNC: 4.3 G/DL (ref 3.4–5)
ALP BLD-CCNC: 75 U/L (ref 40–129)
ALT SERPL-CCNC: 49 U/L (ref 10–40)
ANION GAP SERPL CALCULATED.3IONS-SCNC: 10 MMOL/L (ref 3–16)
ANION GAP SERPL CALCULATED.3IONS-SCNC: 12 MMOL/L (ref 3–16)
ANION GAP SERPL CALCULATED.3IONS-SCNC: 12 MMOL/L (ref 3–16)
AST SERPL-CCNC: 128 U/L (ref 15–37)
BASE EXCESS ARTERIAL: -4.9 MMOL/L (ref -3–3)
BASOPHILS ABSOLUTE: 0 K/UL (ref 0–0.2)
BASOPHILS RELATIVE PERCENT: 0 %
BILIRUB SERPL-MCNC: 0.5 MG/DL (ref 0–1)
BILIRUBIN DIRECT: <0.2 MG/DL (ref 0–0.3)
BILIRUBIN, INDIRECT: ABNORMAL MG/DL (ref 0–1)
BUN BLDV-MCNC: 23 MG/DL (ref 7–20)
BUN BLDV-MCNC: 29 MG/DL (ref 7–20)
BUN BLDV-MCNC: 31 MG/DL (ref 7–20)
CALCIUM SERPL-MCNC: 8.7 MG/DL (ref 8.3–10.6)
CALCIUM SERPL-MCNC: 8.9 MG/DL (ref 8.3–10.6)
CALCIUM SERPL-MCNC: 9.1 MG/DL (ref 8.3–10.6)
CARBOXYHEMOGLOBIN ARTERIAL: 0.3 % (ref 0–1.5)
CHLORIDE BLD-SCNC: 102 MMOL/L (ref 99–110)
CHLORIDE BLD-SCNC: 103 MMOL/L (ref 99–110)
CHLORIDE BLD-SCNC: 104 MMOL/L (ref 99–110)
CO2: 20 MMOL/L (ref 21–32)
CO2: 21 MMOL/L (ref 21–32)
CO2: 21 MMOL/L (ref 21–32)
CREAT SERPL-MCNC: 0.7 MG/DL (ref 0.6–1.2)
CREAT SERPL-MCNC: 0.8 MG/DL (ref 0.6–1.2)
CREAT SERPL-MCNC: 0.9 MG/DL (ref 0.6–1.2)
EKG ATRIAL RATE: 126 BPM
EKG DIAGNOSIS: NORMAL
EKG P AXIS: 24 DEGREES
EKG P-R INTERVAL: 150 MS
EKG Q-T INTERVAL: 302 MS
EKG QRS DURATION: 82 MS
EKG QTC CALCULATION (BAZETT): 437 MS
EKG R AXIS: -23 DEGREES
EKG T AXIS: 22 DEGREES
EKG VENTRICULAR RATE: 126 BPM
EOSINOPHILS ABSOLUTE: 0 K/UL (ref 0–0.6)
EOSINOPHILS RELATIVE PERCENT: 0 %
GFR AFRICAN AMERICAN: >60
GFR NON-AFRICAN AMERICAN: >60
GLUCOSE BLD-MCNC: 118 MG/DL (ref 70–99)
GLUCOSE BLD-MCNC: 122 MG/DL (ref 70–99)
GLUCOSE BLD-MCNC: 125 MG/DL (ref 70–99)
GLUCOSE BLD-MCNC: 145 MG/DL (ref 70–99)
HCO3 ARTERIAL: 20.2 MMOL/L (ref 21–29)
HCT VFR BLD CALC: 31.9 % (ref 36–48)
HEMOGLOBIN, ART, EXTENDED: 11.2 G/DL (ref 12–16)
HEMOGLOBIN: 10.6 G/DL (ref 12–16)
LYMPHOCYTES ABSOLUTE: 1.7 K/UL (ref 1–5.1)
LYMPHOCYTES RELATIVE PERCENT: 11 %
MCH RBC QN AUTO: 29.9 PG (ref 26–34)
MCHC RBC AUTO-ENTMCNC: 33.4 G/DL (ref 31–36)
MCV RBC AUTO: 89.5 FL (ref 80–100)
METHEMOGLOBIN ARTERIAL: 0.3 %
MONOCYTES ABSOLUTE: 0.8 K/UL (ref 0–1.3)
MONOCYTES RELATIVE PERCENT: 5 %
NEUTROPHILS ABSOLUTE: 12.9 K/UL (ref 1.7–7.7)
NEUTROPHILS RELATIVE PERCENT: 84 %
O2 CONTENT ARTERIAL: 15 ML/DL
O2 SAT, ARTERIAL: 96.1 %
O2 THERAPY: ABNORMAL
PCO2 ARTERIAL: 37.2 MMHG (ref 35–45)
PDW BLD-RTO: 13 % (ref 12.4–15.4)
PERFORMED ON: ABNORMAL
PH ARTERIAL: 7.35 (ref 7.35–7.45)
PLATELET # BLD: 208 K/UL (ref 135–450)
PLATELET SLIDE REVIEW: ADEQUATE
PMV BLD AUTO: 8 FL (ref 5–10.5)
PO2 ARTERIAL: 85.2 MMHG (ref 75–108)
POTASSIUM REFLEX MAGNESIUM: 3.7 MMOL/L (ref 3.5–5.1)
POTASSIUM REFLEX MAGNESIUM: 3.9 MMOL/L (ref 3.5–5.1)
POTASSIUM REFLEX MAGNESIUM: 4 MMOL/L (ref 3.5–5.1)
RBC # BLD: 3.56 M/UL (ref 4–5.2)
SARS-COV-2, PCR: NOT DETECTED
SLIDE REVIEW: ABNORMAL
SODIUM BLD-SCNC: 134 MMOL/L (ref 136–145)
SODIUM BLD-SCNC: 134 MMOL/L (ref 136–145)
SODIUM BLD-SCNC: 137 MMOL/L (ref 136–145)
TCO2 ARTERIAL: 21.3 MMOL/L
TOTAL PROTEIN: 6.9 G/DL (ref 6.4–8.2)
TSH REFLEX: 1.33 UIU/ML (ref 0.27–4.2)
WBC # BLD: 15.4 K/UL (ref 4–11)

## 2020-05-18 PROCEDURE — 99291 CRITICAL CARE FIRST HOUR: CPT | Performed by: INTERNAL MEDICINE

## 2020-05-18 PROCEDURE — 6370000000 HC RX 637 (ALT 250 FOR IP): Performed by: INTERNAL MEDICINE

## 2020-05-18 PROCEDURE — 36415 COLL VENOUS BLD VENIPUNCTURE: CPT

## 2020-05-18 PROCEDURE — 93010 ELECTROCARDIOGRAM REPORT: CPT | Performed by: INTERNAL MEDICINE

## 2020-05-18 PROCEDURE — 85025 COMPLETE CBC W/AUTO DIFF WBC: CPT

## 2020-05-18 PROCEDURE — 2000000000 HC ICU R&B

## 2020-05-18 PROCEDURE — 96372 THER/PROPH/DIAG INJ SC/IM: CPT

## 2020-05-18 PROCEDURE — 96375 TX/PRO/DX INJ NEW DRUG ADDON: CPT

## 2020-05-18 PROCEDURE — 84443 ASSAY THYROID STIM HORMONE: CPT

## 2020-05-18 PROCEDURE — 96361 HYDRATE IV INFUSION ADD-ON: CPT

## 2020-05-18 PROCEDURE — U0003 INFECTIOUS AGENT DETECTION BY NUCLEIC ACID (DNA OR RNA); SEVERE ACUTE RESPIRATORY SYNDROME CORONAVIRUS 2 (SARS-COV-2) (CORONAVIRUS DISEASE [COVID-19]), AMPLIFIED PROBE TECHNIQUE, MAKING USE OF HIGH THROUGHPUT TECHNOLOGIES AS DESCRIBED BY CMS-2020-01-R: HCPCS

## 2020-05-18 PROCEDURE — 6360000002 HC RX W HCPCS: Performed by: INTERNAL MEDICINE

## 2020-05-18 PROCEDURE — 2700000000 HC OXYGEN THERAPY PER DAY

## 2020-05-18 PROCEDURE — 93005 ELECTROCARDIOGRAM TRACING: CPT | Performed by: PHYSICIAN ASSISTANT

## 2020-05-18 PROCEDURE — 2500000003 HC RX 250 WO HCPCS: Performed by: PHYSICIAN ASSISTANT

## 2020-05-18 PROCEDURE — 94761 N-INVAS EAR/PLS OXIMETRY MLT: CPT

## 2020-05-18 PROCEDURE — 2580000003 HC RX 258: Performed by: PHYSICIAN ASSISTANT

## 2020-05-18 PROCEDURE — 71045 X-RAY EXAM CHEST 1 VIEW: CPT

## 2020-05-18 PROCEDURE — 80076 HEPATIC FUNCTION PANEL: CPT

## 2020-05-18 PROCEDURE — 94640 AIRWAY INHALATION TREATMENT: CPT

## 2020-05-18 PROCEDURE — 82803 BLOOD GASES ANY COMBINATION: CPT

## 2020-05-18 PROCEDURE — 6360000002 HC RX W HCPCS: Performed by: PHYSICIAN ASSISTANT

## 2020-05-18 PROCEDURE — 80048 BASIC METABOLIC PNL TOTAL CA: CPT

## 2020-05-18 PROCEDURE — 6370000000 HC RX 637 (ALT 250 FOR IP): Performed by: PHYSICIAN ASSISTANT

## 2020-05-18 RX ORDER — SODIUM CHLORIDE FOR INHALATION 0.9 %
3 VIAL, NEBULIZER (ML) INHALATION EVERY 4 HOURS PRN
Status: DISCONTINUED | OUTPATIENT
Start: 2020-05-18 | End: 2020-05-20 | Stop reason: HOSPADM

## 2020-05-18 RX ORDER — METHYLPREDNISOLONE SODIUM SUCCINATE 40 MG/ML
40 INJECTION, POWDER, LYOPHILIZED, FOR SOLUTION INTRAMUSCULAR; INTRAVENOUS EVERY 8 HOURS
Status: DISCONTINUED | OUTPATIENT
Start: 2020-05-18 | End: 2020-05-18

## 2020-05-18 RX ORDER — EPINEPHRINE 1 MG/ML
0.1 INJECTION, SOLUTION, CONCENTRATE INTRAVENOUS ONCE
Status: DISCONTINUED | OUTPATIENT
Start: 2020-05-18 | End: 2020-05-18

## 2020-05-18 RX ORDER — LEVALBUTEROL 1.25 MG/.5ML
0.63 SOLUTION, CONCENTRATE RESPIRATORY (INHALATION) 4 TIMES DAILY
Status: DISCONTINUED | OUTPATIENT
Start: 2020-05-18 | End: 2020-05-18

## 2020-05-18 RX ORDER — LEVALBUTEROL 1.25 MG/.5ML
0.63 SOLUTION, CONCENTRATE RESPIRATORY (INHALATION) EVERY 4 HOURS PRN
Status: DISCONTINUED | OUTPATIENT
Start: 2020-05-18 | End: 2020-05-20 | Stop reason: HOSPADM

## 2020-05-18 RX ORDER — DIPHENHYDRAMINE HYDROCHLORIDE 50 MG/ML
25 INJECTION INTRAMUSCULAR; INTRAVENOUS ONCE
Status: COMPLETED | OUTPATIENT
Start: 2020-05-18 | End: 2020-05-18

## 2020-05-18 RX ADMIN — METHYLPREDNISOLONE SODIUM SUCCINATE 40 MG: 40 INJECTION, POWDER, FOR SOLUTION INTRAMUSCULAR; INTRAVENOUS at 08:47

## 2020-05-18 RX ADMIN — Medication 10 ML: at 21:15

## 2020-05-18 RX ADMIN — DIPHENHYDRAMINE HYDROCHLORIDE 25 MG: 50 INJECTION, SOLUTION INTRAMUSCULAR; INTRAVENOUS at 08:46

## 2020-05-18 RX ADMIN — SODIUM CHLORIDE: 9 INJECTION, SOLUTION INTRAVENOUS at 12:45

## 2020-05-18 RX ADMIN — Medication 10 ML: at 12:45

## 2020-05-18 RX ADMIN — IPRATROPIUM BROMIDE 0.5 MG: 0.5 SOLUTION RESPIRATORY (INHALATION) at 06:52

## 2020-05-18 RX ADMIN — ENOXAPARIN SODIUM 40 MG: 40 INJECTION SUBCUTANEOUS at 12:45

## 2020-05-18 RX ADMIN — MUPIROCIN: 20 OINTMENT TOPICAL at 12:45

## 2020-05-18 RX ADMIN — LEVALBUTEROL 0.63 MG: 1.25 SOLUTION, CONCENTRATE RESPIRATORY (INHALATION) at 06:52

## 2020-05-18 RX ADMIN — MUPIROCIN: 20 OINTMENT TOPICAL at 21:15

## 2020-05-18 RX ADMIN — FAMOTIDINE 20 MG: 10 INJECTION, SOLUTION INTRAVENOUS at 08:47

## 2020-05-18 RX ADMIN — SODIUM CHLORIDE: 9 INJECTION, SOLUTION INTRAVENOUS at 23:47

## 2020-05-18 RX ADMIN — RACEPINEPHRINE HYDROCHLORIDE 11.25 MG: 11.25 SOLUTION RESPIRATORY (INHALATION) at 08:52

## 2020-05-18 NOTE — CONSULTS
Patient is being seen at the request of Lynnette Whitlock for a consultation for acute encephalopathy    HISTORY OF PRESENT ILLNESS: The patient is a 58-year-old woman with a past medical history of hypertension, colon cancer, distant former tobacco abuse who was brought in by ambulance to the emergency department with altered mental status and confusion yesterday. During initial evaluation in the emergency department the etiology of her altered mental status was not identified. She was able to give some history and reported some sinus congestion and shortness of breath. Of note the patient had recently been started on Bactrim for a urinary tract infection and tramadol. This morning the patient was noted to be in more respiratory distress and was confused not responding to voice. A rapid response was called and the patient was transferred to the ICU for further care. A nasal trumpet was placed with improvement in upper airway obstruction. An arterial blood gas did not reveal significant abnormalities in gas exchange. Patient is unable to provide any history at present as she is confused and poorly interactive. Reportedly patient did receive a dose of Ativan overnight for confusion. PAST MEDICAL HISTORY:  Past Medical History:   Diagnosis Date    Benign essential hypertension 1/17/2017    Bowel obstruction (HCC)     Cancer (HCC)     colon    GERD (gastroesophageal reflux disease)     Hypertension     Irritable bowel syndrome (IBS)      PAST SURGICAL HISTORY:  Past Surgical History:   Procedure Laterality Date    APPENDECTOMY      CHOLECYSTECTOMY      COLON SURGERY      COLONOSCOPY  2014    mass    COLONOSCOPY  3/29/16    normal       FAMILY HISTORY:  family history includes Cancer in her brother and mother; Heart Disease in her brother and sister. SOCIAL HISTORY:   reports that she quit smoking about 36 years ago. She has a 10.00 pack-year smoking history.  She has never used smokeless

## 2020-05-18 NOTE — ACP (ADVANCE CARE PLANNING)
Advance Care Planning   Advance Care Planning Clinical Specialist  Conversation Note      Date of ACP Conversation: 5/18/2020    Conversation Conducted with:   Patient with Loyda 50: Next of Kin by law (only applies in absence of above) Antwan Mendez - Spouse     ACP Clinical Specialist: Delia Lewis RN    Readmission: Observation status- 05/29/2020- No change in the ACP note dated 5/18/20. Amy So RN      *When Decision Maker makes decisions on behalf of the incapacitated patient: Decision Maker is asked to consider and make decisions based on patient values, known preferences, or best interests. Current Designated Health Care Decision Maker:   (as entered in [address] field. Validate  this information as still accurate & up-to-date; edit [employer] [facility] field as needed.)    If no Decision Maker listed above or available through scanned documents, then:    [address] [place] [address]   Who do you trust to make healthcare decisions for you? Name:  Antwan Mendez  Phone  Number: [phone] or [phone]  Can this person be reached and be able to respond quickly, such as within a few minutes or hours? Yes    Who would be your back-up decision maker? Name: Mimi Verduzco  [phone] or sister, Jelani Anna [phone]  Phone Number:     For below questions, when conducting conversation with [facility], substitute \"she\" and \"her\" for \"you\" and \"your\". Hospitalization  If your health were to worsen and it became clear that your chance of recovery was unlikely, what would your preferences be regarding hospitalization?:    Choice:  [x]  The patient would want hospitalization  []  The patient would prefer comfort-focused treatment without hospitalization.     Ventilation  If you were in your present state of health and suddenly became very ill and were unable to breathe on your

## 2020-05-18 NOTE — H&P
No conjunctival injection. ENT: No discharge. Edema of the uvula  Neck: No JVD. No Carotid Bruit. Trachea midline. Resp: + accessory muscle use. No crackles. No wheezes. + rhonchi. CV: Regular rate. Regular rhythm. No murmur. No rub. No edema. Capillary Refill: Brisk,< 3 seconds   Peripheral Pulses: +2 palpable, equal bilaterally   GI: Non-tender. Non-distended. No masses. No organomegaly. Normal bowel sounds. No hernia. Skin: Warm and dry. No nodule on exposed extremities. No rash on exposed extremities. M/S: No cyanosis. No joint deformity. No clubbing. Neuro: Awake. Grossly nonfocal    Psych: Oriented x 3. + anxiety no agitation. CBC:   Recent Labs     05/17/20  0812 05/18/20  0511   WBC 12.7* 15.4*   HGB 11.5* 10.6*   HCT 33.9* 31.9*   MCV 90.2 89.5    208     BMP:   Recent Labs     05/17/20  0812 05/17/20  1211 05/17/20  1915 05/18/20  0511   * 131* 129* 134*   K 3.9  --  3.9 4.0   CL 93*  --  98* 103   CO2 20*  --  18* 21   BUN 30*  --  28* 31*   CREATININE 1.1  --  0.9 0.9     LIVER PROFILE:   Recent Labs     05/17/20  0812 05/18/20  0511   AST 86* 128*   ALT 30 49*   BILIDIR  --  <0.2   BILITOT 0.8 0.5   ALKPHOS 83 75     UA:  Recent Labs     05/17/20  1002   COLORU Yellow   PHUR 5.5  5.5   WBCUA 0-2   RBCUA None seen   CLARITYU Clear   SPECGRAV >=1.030   LEUKOCYTESUR TRACE*   UROBILINOGEN 0.2   BILIRUBINUR SMALL*   BLOODU SMALL*   GLUCOSEU Negative   AMORPHOUS 1+     CARDIAC ENZYMES  Recent Labs     05/17/20  0812 05/17/20  1211   TROPONINI <0.01 <0.01     CULTURES  Urine Cx: pending     EKG:  I have reviewed the EKG with the following interpretation:   NSR, normal axis, normal interval, no acute ST segment changes concerning for acute ischemia     RADIOLOGY  CT Head WO Contrast   Final Result   No acute intracranial abnormality appreciated. CT Cervical Spine WO Contrast   Final Result   No acute fracture or subluxation of cervical spine.       Mild degenerative

## 2020-05-18 NOTE — PROGRESS NOTES
Patient  is not able to demonstrated the ability to move from a reclining position to an upright position within the recliner. Patient is confused, demented and /or unable to follow instruction.   [facility]

## 2020-05-18 NOTE — PROGRESS NOTES
Pt is only responding to pain. VSS. Assessment as charted. - Pt was not answering any of the writers questions, spastic episodes lasting approx 10 seconds were occurring, pt was diaphoretic w/ labored breathing, tachypneic, and she was using accessory muscles- clinical and MD were notified and came to bedside- after blood work and an assessment were made- MD concluded the \"labored breathing\" was r/t sleep apnea, her decrease in cognition was slightly worse than baseline (possibly r/t prior Ativan administration) and that we were possibly hyper-oxygenating her. He advised to keep SpO2 90-94%. He also decided the spastic episodes were not seizures and maybe apart of an underlying parkison's issue or could be r/t perc/bactrim/tramadol reaction.   - Pts SpO2 has remained within those parameters on 2L NC.  - Pt is on tele monitoring and has been running SR/ST.   - Pts sister Cristal Olivera was updated on pts condition and said that her current state is consistent w/ how she has been the past few days. She also stated that pt has had the deaths of close relatives in recent past and suggested the trauma could be an additional factor. Pt is currently resting in her bed that is locked and in its lowest position with her call light within reach (currently unable to use), non-skid socks, tele-sitter, and bed alarm on. Will continue to monitor closely.

## 2020-05-18 NOTE — PROGRESS NOTES
Pt placed in soft restraints d/t pulling off monitor leads multiple times, pulling at IV tubing and increased confusion. MD notified. Pt unable to answer any questions and is responds to pain.  Vitals are stable

## 2020-05-19 PROBLEM — R06.02 SHORTNESS OF BREATH: Status: ACTIVE | Noted: 2018-03-02

## 2020-05-19 LAB
ANION GAP SERPL CALCULATED.3IONS-SCNC: 11 MMOL/L (ref 3–16)
ANION GAP SERPL CALCULATED.3IONS-SCNC: 12 MMOL/L (ref 3–16)
ANION GAP SERPL CALCULATED.3IONS-SCNC: 13 MMOL/L (ref 3–16)
BASOPHILS ABSOLUTE: 0 K/UL (ref 0–0.2)
BASOPHILS RELATIVE PERCENT: 0.1 %
BUN BLDV-MCNC: 23 MG/DL (ref 7–20)
BUN BLDV-MCNC: 23 MG/DL (ref 7–20)
BUN BLDV-MCNC: 24 MG/DL (ref 7–20)
CALCIUM SERPL-MCNC: 8.7 MG/DL (ref 8.3–10.6)
CALCIUM SERPL-MCNC: 8.8 MG/DL (ref 8.3–10.6)
CALCIUM SERPL-MCNC: 9.1 MG/DL (ref 8.3–10.6)
CHLORIDE BLD-SCNC: 100 MMOL/L (ref 99–110)
CHLORIDE BLD-SCNC: 102 MMOL/L (ref 99–110)
CHLORIDE BLD-SCNC: 99 MMOL/L (ref 99–110)
CO2: 22 MMOL/L (ref 21–32)
CO2: 22 MMOL/L (ref 21–32)
CO2: 24 MMOL/L (ref 21–32)
CREAT SERPL-MCNC: 0.7 MG/DL (ref 0.6–1.2)
CREAT SERPL-MCNC: 0.8 MG/DL (ref 0.6–1.2)
CREAT SERPL-MCNC: 0.8 MG/DL (ref 0.6–1.2)
EOSINOPHILS ABSOLUTE: 0 K/UL (ref 0–0.6)
EOSINOPHILS RELATIVE PERCENT: 0.1 %
GFR AFRICAN AMERICAN: >60
GFR NON-AFRICAN AMERICAN: >60
GLUCOSE BLD-MCNC: 103 MG/DL (ref 70–99)
GLUCOSE BLD-MCNC: 105 MG/DL (ref 70–99)
GLUCOSE BLD-MCNC: 94 MG/DL (ref 70–99)
HCT VFR BLD CALC: 32.9 % (ref 36–48)
HEMOGLOBIN: 10.9 G/DL (ref 12–16)
LYMPHOCYTES ABSOLUTE: 1.5 K/UL (ref 1–5.1)
LYMPHOCYTES RELATIVE PERCENT: 10.9 %
MAGNESIUM: 2 MG/DL (ref 1.8–2.4)
MAGNESIUM: 2.2 MG/DL (ref 1.8–2.4)
MCH RBC QN AUTO: 29.8 PG (ref 26–34)
MCHC RBC AUTO-ENTMCNC: 33.1 G/DL (ref 31–36)
MCV RBC AUTO: 90 FL (ref 80–100)
MONOCYTES ABSOLUTE: 1.5 K/UL (ref 0–1.3)
MONOCYTES RELATIVE PERCENT: 11.4 %
NEUTROPHILS ABSOLUTE: 10.3 K/UL (ref 1.7–7.7)
NEUTROPHILS RELATIVE PERCENT: 77.5 %
PDW BLD-RTO: 13.1 % (ref 12.4–15.4)
PLATELET # BLD: 175 K/UL (ref 135–450)
PMV BLD AUTO: 7.7 FL (ref 5–10.5)
POTASSIUM REFLEX MAGNESIUM: 3.1 MMOL/L (ref 3.5–5.1)
POTASSIUM REFLEX MAGNESIUM: 3.3 MMOL/L (ref 3.5–5.1)
POTASSIUM REFLEX MAGNESIUM: 3.6 MMOL/L (ref 3.5–5.1)
RBC # BLD: 3.66 M/UL (ref 4–5.2)
SODIUM BLD-SCNC: 134 MMOL/L (ref 136–145)
SODIUM BLD-SCNC: 135 MMOL/L (ref 136–145)
SODIUM BLD-SCNC: 136 MMOL/L (ref 136–145)
URINE CULTURE, ROUTINE: NORMAL
WBC # BLD: 13.3 K/UL (ref 4–11)

## 2020-05-19 PROCEDURE — 1200000000 HC SEMI PRIVATE

## 2020-05-19 PROCEDURE — 80048 BASIC METABOLIC PNL TOTAL CA: CPT

## 2020-05-19 PROCEDURE — 36415 COLL VENOUS BLD VENIPUNCTURE: CPT

## 2020-05-19 PROCEDURE — 6370000000 HC RX 637 (ALT 250 FOR IP): Performed by: PHYSICIAN ASSISTANT

## 2020-05-19 PROCEDURE — 97530 THERAPEUTIC ACTIVITIES: CPT

## 2020-05-19 PROCEDURE — 85025 COMPLETE CBC W/AUTO DIFF WBC: CPT

## 2020-05-19 PROCEDURE — 97116 GAIT TRAINING THERAPY: CPT

## 2020-05-19 PROCEDURE — 6370000000 HC RX 637 (ALT 250 FOR IP): Performed by: INTERNAL MEDICINE

## 2020-05-19 PROCEDURE — 97162 PT EVAL MOD COMPLEX 30 MIN: CPT

## 2020-05-19 PROCEDURE — 97166 OT EVAL MOD COMPLEX 45 MIN: CPT

## 2020-05-19 PROCEDURE — 6360000002 HC RX W HCPCS: Performed by: PHYSICIAN ASSISTANT

## 2020-05-19 PROCEDURE — 83735 ASSAY OF MAGNESIUM: CPT

## 2020-05-19 PROCEDURE — 99232 SBSQ HOSP IP/OBS MODERATE 35: CPT | Performed by: INTERNAL MEDICINE

## 2020-05-19 PROCEDURE — 99233 SBSQ HOSP IP/OBS HIGH 50: CPT | Performed by: INTERNAL MEDICINE

## 2020-05-19 PROCEDURE — 97535 SELF CARE MNGMENT TRAINING: CPT

## 2020-05-19 PROCEDURE — 2580000003 HC RX 258: Performed by: PHYSICIAN ASSISTANT

## 2020-05-19 RX ORDER — LIDOCAINE 4 G/G
1 PATCH TOPICAL NIGHTLY
Status: DISCONTINUED | OUTPATIENT
Start: 2020-05-19 | End: 2020-05-20 | Stop reason: HOSPADM

## 2020-05-19 RX ORDER — POTASSIUM CHLORIDE 750 MG/1
20 TABLET, EXTENDED RELEASE ORAL ONCE
Status: COMPLETED | OUTPATIENT
Start: 2020-05-19 | End: 2020-05-19

## 2020-05-19 RX ORDER — AMLODIPINE BESYLATE 5 MG/1
5 TABLET ORAL DAILY
Status: DISCONTINUED | OUTPATIENT
Start: 2020-05-19 | End: 2020-05-20 | Stop reason: HOSPADM

## 2020-05-19 RX ORDER — LIDOCAINE 4 G/G
1 PATCH TOPICAL DAILY
Status: DISCONTINUED | OUTPATIENT
Start: 2020-05-20 | End: 2020-05-19

## 2020-05-19 RX ORDER — CETIRIZINE HYDROCHLORIDE 10 MG/1
10 TABLET ORAL DAILY
Status: ON HOLD | COMMUNITY
Start: 2020-05-19 | End: 2020-05-20 | Stop reason: HOSPADM

## 2020-05-19 RX ADMIN — ACETAMINOPHEN 650 MG: 325 TABLET ORAL at 21:17

## 2020-05-19 RX ADMIN — POTASSIUM CHLORIDE 20 MEQ: 750 TABLET, EXTENDED RELEASE ORAL at 09:05

## 2020-05-19 RX ADMIN — ACETAMINOPHEN 650 MG: 325 TABLET ORAL at 09:53

## 2020-05-19 RX ADMIN — ENOXAPARIN SODIUM 40 MG: 40 INJECTION SUBCUTANEOUS at 08:41

## 2020-05-19 RX ADMIN — MUPIROCIN: 20 OINTMENT TOPICAL at 08:39

## 2020-05-19 RX ADMIN — Medication 10 ML: at 08:59

## 2020-05-19 ASSESSMENT — PAIN DESCRIPTION - PAIN TYPE
TYPE: ACUTE PAIN;CHRONIC PAIN
TYPE: ACUTE PAIN;CHRONIC PAIN

## 2020-05-19 ASSESSMENT — PAIN DESCRIPTION - LOCATION: LOCATION: HIP

## 2020-05-19 ASSESSMENT — PAIN SCALES - GENERAL
PAINLEVEL_OUTOF10: 6
PAINLEVEL_OUTOF10: 6
PAINLEVEL_OUTOF10: 10
PAINLEVEL_OUTOF10: 2

## 2020-05-19 NOTE — PROGRESS NOTES
Up to toilet x2 from chair and then bed, dizzy with position changes and standing, brief stridor noted with ambulation. Denies dyspnea or SOB. Sp02 96% on room air. Very unsteady gait with ambulation, requiring assist of 1 for steadying. PT/OT consult ordered. 1100-Dr Barbour in to see patient, will tx patient to 2W. Patient in agreeable at this time. Had 2 XXXXXXLG BMs,   Purewick ext. catheter placed, to 40 mmHG wall suction. 1130-Call light in reach, bed alarm active, fall precautions reviewed with patient, who agrees to use call light.

## 2020-05-19 NOTE — PROGRESS NOTES
PT, POC, importance of continued activity, DC recommendations, safety awareness, transfer techniques and calling for assist with mobility. Assessment  Pt seen for Physical Therapy evaluation in acute care setting. Pt demonstrated decreased Activity tolerance, Balance, Safety and Strength as well as decreased independence with Ambulation, Bed Mobility  and Transfers. Recommending SNF upon discharge as patient functioning well below baseline, demonstrates good rehab potential and unable to return home due to pt functioning below her baseline requiring min A with RW due to unsteadiness with ambulation. Goals : To be met in 3 visits:  1). Independent with LE Ex x 10 reps    To be met in 6 visits:  1). Supine to/from sit: SBA  2). Sit to/from stand: CGA  3). Bed to chair: CGA  4). Gait: Ambulate 50 ft.  with  CGA and use of LRAD (least restrictive assistive device)  5). Tolerate B LE exercises 3 sets of 10-15 reps  6). Ascend/descend 1 steps with Min A  with use of no handrail and LRAD (least restrictive assistive device)    Rehabilitation Potential: Good  Strengths for achieving goals include:   PLOF and Pt cooperative   Barriers to achieving goals include:    Weakness    Plan    To be seen 3-5 x / week  while in acute care setting for therapeutic exercises, bed mobility, transfers, progressive gait training, balance training, and family/patient education. Signature: Isreal Ortega, PT, DPT #[id number]    If patient discharges from this facility prior to next visit, this note will serve as the Discharge Summary.

## 2020-05-19 NOTE — PROGRESS NOTES
Shift change, bedside report given to [facility]. Pt exhibits no s/s of distress. Call light in reach. Care has been transferred at this time.

## 2020-05-19 NOTE — PROGRESS NOTES
Pt still having some moments confusion, pt had pulled off leads, pulse ox and PIV in right hand. Pt still progressing well and has had some supervised sips of water and was able to swallow without choking or coughing.

## 2020-05-19 NOTE — PROGRESS NOTES
ICU Progress Note    Admit Date:  5/17/2020    Subjective:  Ms. Elizabeth Guillory seen on RA. Up and ambulating in room but nurse notes unsteady gait  Pt reports she took bactrim with no issues in the past. New med is tramadol     Had stridor yesterday with uvular swelling and transferred to ICU, given racemic epi and improved. Mild confusion resolved    Objective:   Patient Vitals for the past 4 hrs:   BP Pulse Resp SpO2   05/19/20 1000 (!) 141/87 96 21 97 %   05/19/20 0900 (!) 164/90 91 25 93 %   05/19/20 0800 (!) 148/93 96 (!) 32 91 %   05/19/20 0700 (!) 149/95 90 26 97 %            Intake/Output Summary (Last 24 hours) at 5/19/2020 1045  Last data filed at 5/19/2020 2301  Gross per 24 hour   Intake 2229 ml   Output 1100 ml   Net 1129 ml       Physical Exam:          General:  eldelry female up in bed  Awake, alert and oriented. Appears to be not in any distress  Mucous Membranes:  Pink , anicteric  Uvula not swollen   Neck: No JVD, no carotid bruit, no thyromegaly  Chest:  Clear to auscultation bilaterally, no added sounds- no stridor noted   Cardiovascular:  RRR S1S2 heard, no murmurs or gallops  Abdomen:  Soft, undistended, non tender, no organomegaly, BS present  Extremities: No edema or cyanosis.  Distal pulses well felt  Neurological : grossly normal        Scheduled Meds:   mupirocin   Nasal BID    aspirin EC  81 mg Oral Daily    [Held by provider] rosuvastatin  20 mg Oral Daily    sodium chloride flush  10 mL Intravenous 2 times per day    enoxaparin  40 mg Subcutaneous Daily       Continuous Infusions:   sodium chloride Stopped (05/19/20 0859)       PRN Meds:  sodium chloride nebulizer, ipratropium, levalbuterol, sodium chloride flush, acetaminophen **OR** acetaminophen, polyethylene glycol, [DISCONTINUED] promethazine **OR** ondansetron      Data:  CBC:   Recent Labs     05/17/20  0812 05/18/20  0511 05/19/20  0628   WBC 12.7* 15.4* 13.3*   HGB 11.5* 10.6* 10.9*   HCT 33.9* 31.9* 32.9*   MCV 90.2 89.5 90.0  208 175     BMP:   Recent Labs     05/18/20  1243 05/18/20  2127 05/19/20  0628   * 137 136   K 3.9 3.7 3.3*    104 102   CO2 20* 21 22   BUN 29* 23* 23*   CREATININE 0.8 0.7 0.7     LIVER PROFILE:   Recent Labs     05/17/20  0812 05/18/20  0511   AST 86* 128*   ALT 30 49*   BILIDIR  --  <0.2   BILITOT 0.8 0.5   ALKPHOS 83 75     CULTURES    Urine cx: NGTD     RADIOLOGY    XR CHEST PORTABLE   Final Result   New vascular congestion and mild perihilar interstitial edema. CT Head WO Contrast   Final Result   No acute intracranial abnormality appreciated. CT Cervical Spine WO Contrast   Final Result   No acute fracture or subluxation of cervical spine. Mild degenerative changes. XR CHEST PORTABLE   Final Result   No acute cardiopulmonary abnormality appreciated. Assessment/Plan:    Acute hypoxic respiratory failure - Resolved    Anaphylaxis with allergic reaction to tramadol /bactrim    - Pt does not use supp O2 at home   - required 3L NC O2 to maintain O2 saturations   - Initial concerned from ED for possible COPD AE --> stridor noted on exam with increased WOB & posterior oropharyngeal edema, concerning for  allergic rxn  - Racemic epi x 1 dose   - IV steroids, Benadryl and Pepcid given with quick improvement  - transferred to ICU- monitored closely , off oxygen today .  Feeling better    updated allergy list     Acute metabolic Encephalopathy - likely with meds    - reportedly new confusion  over the last few days   - Was started on Abx for UTI?-->Bactrim  - Urine Cx: NGTD  - ABG from ED without acute findings, repeat 5/18 w/ new resp changes   - Ammonia levels WNL, CT head WNL   - hold tramadol and benzos  - mentation improved now     Hypokalemia  - mild, repleted  - repeat BMP tomorrow     Tachycardia   - Patients heart rate is up to 140's this morning   - EKG from ED without acute changes   - STAT repeat EKG - sinus tach, non-acute  - continue tele monitor, HR improved     Hyponatremia - Resolved   - Appears dry on exam, possibly 2/2 to hypovolemia with new confusion?  - IVF slowly   - Na improving      Leukocytosis   - Mild on arrival   - Received IV Steroids  - No fevers, CXR clear, UA without clear source of infection   - Continue to monitor - trending down 13.3 today     Elevated LFTs  - mild elevated on admission   - Increased overnight   - no TTP  - continue to monitor      Generalized Weakness   - PT/OT evaluation when appropriate      HTN  - BP is elevated   - Continue Norvasc   - Hold Lisinopril for now with oropharyngeal edema-->no lip swelling but would hold and monitor before resuming      GERD  - Continue PPI     DVT Prophylaxis: Lovenox  Diet: DIET GENERAL;  Code Status: Full Code    Dispo: back to [facility]note 3[facility], MD 5/19/2020 1:18 PM

## 2020-05-19 NOTE — PROGRESS NOTES
Inpatient Occupational Therapy  Evaluation and Treatment    Unit: ICU  Date:  2020  Patient Name:    Herman Schroeder  Admitting diagnosis:  AMS (altered mental status) [R41.82]  Acute respiratory failure ([address] [J96.00]  Admit Date:  2020  Precautions/Restrictions/WB Status/ Lines/ Wounds/ Oxygen: Fall risk, Bed/chair alarm and Telemetry    Treatment Time:  7948-3266  Treatment Number: 1     Billable Treatment Time: 30 minutes   Total Treatment Time:   40  minutes    Patient Goals for Therapy:  \" go home \"      Discharge Recommendations: SNF  DME needs for discharge: defer to facility       Therapy recommendations for staff:   CGA with use of rolling walker (RW) for all ambulation to/from bathroom    History of Present Illness:   67 y.o. female presenting today due to concern for waking up this morning per  with significant trouble breathing and being concerned that she may be having an allergic reaction to the recent Bactrim and tramadol that she has been taking for a UTI. Pt has past medical history of hypertension, colon cancer, distant former tobacco abuse   Home Health S4 Level Recommendation:  NA  AM-PAC Score: 15    Preadmission Environment    Pt. Lives with spouse  Home environment:  one story home  Steps to enter first floor: one step   Steps to second floor: N/A  Bathroom: tub/shower unit, grab bars and standard height commode  Equipment owned: rollator, wc (transport chair), shower chair/bench, SPC, lift chair, reacher and lifealert    Preadmission Status:  Pt. Able to drive: Yes  Pt Fully independent with ADLs: Yes  Pt.  Required assistance from family for: Cleaning, Cooking and Laundry -  and pt share house hold tasks  Pt. independent for transfers and gait and walked with Rollator  History of falls Yes 6-7x in the last year     Pain  Yes  Ratin/10   Location:back   Pain Medicine Status: RN notified      Cognition    A&O Person, Time and Situation pt stated she was at [facility]

## 2020-05-19 NOTE — PLAN OF CARE
Fall precautions reviewed, patient compliant. Chronic back pain issues. PT/OT consult. Resumed po diet.

## 2020-05-19 NOTE — PROGRESS NOTES
updated on status. Med reconcilliation completed for home meds. Resting in bed. Bed alarm active, call light in reach.

## 2020-05-20 VITALS
BODY MASS INDEX: 32.22 KG/M2 | RESPIRATION RATE: 16 BRPM | OXYGEN SATURATION: 94 % | WEIGHT: 200.5 LBS | HEART RATE: 90 BPM | SYSTOLIC BLOOD PRESSURE: 146 MMHG | TEMPERATURE: 97.1 F | DIASTOLIC BLOOD PRESSURE: 96 MMHG | HEIGHT: 66 IN

## 2020-05-20 LAB
ANION GAP SERPL CALCULATED.3IONS-SCNC: 10 MMOL/L (ref 3–16)
ANION GAP SERPL CALCULATED.3IONS-SCNC: 11 MMOL/L (ref 3–16)
BUN BLDV-MCNC: 16 MG/DL (ref 7–20)
BUN BLDV-MCNC: 21 MG/DL (ref 7–20)
CALCIUM SERPL-MCNC: 8.5 MG/DL (ref 8.3–10.6)
CALCIUM SERPL-MCNC: 8.8 MG/DL (ref 8.3–10.6)
CHLORIDE BLD-SCNC: 95 MMOL/L (ref 99–110)
CHLORIDE BLD-SCNC: 98 MMOL/L (ref 99–110)
CO2: 24 MMOL/L (ref 21–32)
CO2: 24 MMOL/L (ref 21–32)
CREAT SERPL-MCNC: 0.7 MG/DL (ref 0.6–1.2)
CREAT SERPL-MCNC: 0.7 MG/DL (ref 0.6–1.2)
GFR AFRICAN AMERICAN: >60
GFR AFRICAN AMERICAN: >60
GFR NON-AFRICAN AMERICAN: >60
GFR NON-AFRICAN AMERICAN: >60
GLUCOSE BLD-MCNC: 109 MG/DL (ref 70–99)
GLUCOSE BLD-MCNC: 152 MG/DL (ref 70–99)
MAGNESIUM: 1.7 MG/DL (ref 1.8–2.4)
MAGNESIUM: 1.9 MG/DL (ref 1.8–2.4)
POTASSIUM REFLEX MAGNESIUM: 3.1 MMOL/L (ref 3.5–5.1)
POTASSIUM REFLEX MAGNESIUM: 3.1 MMOL/L (ref 3.5–5.1)
SODIUM BLD-SCNC: 129 MMOL/L (ref 136–145)
SODIUM BLD-SCNC: 133 MMOL/L (ref 136–145)

## 2020-05-20 PROCEDURE — 6360000002 HC RX W HCPCS: Performed by: PHYSICIAN ASSISTANT

## 2020-05-20 PROCEDURE — 6370000000 HC RX 637 (ALT 250 FOR IP): Performed by: INTERNAL MEDICINE

## 2020-05-20 PROCEDURE — 83735 ASSAY OF MAGNESIUM: CPT

## 2020-05-20 PROCEDURE — 80048 BASIC METABOLIC PNL TOTAL CA: CPT

## 2020-05-20 PROCEDURE — 6370000000 HC RX 637 (ALT 250 FOR IP): Performed by: PHYSICIAN ASSISTANT

## 2020-05-20 PROCEDURE — 36415 COLL VENOUS BLD VENIPUNCTURE: CPT

## 2020-05-20 PROCEDURE — 99232 SBSQ HOSP IP/OBS MODERATE 35: CPT | Performed by: INTERNAL MEDICINE

## 2020-05-20 PROCEDURE — 99238 HOSP IP/OBS DSCHRG MGMT 30/<: CPT | Performed by: INTERNAL MEDICINE

## 2020-05-20 PROCEDURE — 2580000003 HC RX 258: Performed by: PHYSICIAN ASSISTANT

## 2020-05-20 RX ORDER — POTASSIUM CHLORIDE 750 MG/1
40 TABLET, EXTENDED RELEASE ORAL ONCE
Status: COMPLETED | OUTPATIENT
Start: 2020-05-20 | End: 2020-05-20

## 2020-05-20 RX ORDER — MAGNESIUM SULFATE 1 G/100ML
1 INJECTION INTRAVENOUS ONCE
Status: COMPLETED | OUTPATIENT
Start: 2020-05-20 | End: 2020-05-20

## 2020-05-20 RX ADMIN — ASPIRIN 81 MG: 81 TABLET, COATED ORAL at 09:42

## 2020-05-20 RX ADMIN — MAGNESIUM SULFATE HEPTAHYDRATE 1 G: 1 INJECTION, SOLUTION INTRAVENOUS at 14:33

## 2020-05-20 RX ADMIN — AMLODIPINE BESYLATE 5 MG: 5 TABLET ORAL at 09:42

## 2020-05-20 RX ADMIN — ENOXAPARIN SODIUM 40 MG: 40 INJECTION SUBCUTANEOUS at 09:42

## 2020-05-20 RX ADMIN — POTASSIUM CHLORIDE 40 MEQ: 750 TABLET, EXTENDED RELEASE ORAL at 13:18

## 2020-05-20 RX ADMIN — Medication 10 ML: at 09:43

## 2020-05-20 ASSESSMENT — PAIN SCALES - GENERAL: PAINLEVEL_OUTOF10: 0

## 2020-05-20 NOTE — DISCHARGE INSTR - COC
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    Mixed hyperlipidemia E78.2    MCI (mild cognitive impairment) G31.84    HTN (hypertension), benign I10    Dysthymia F34.1    Dyslipidemia E78.5    AMS (altered mental status) R41.82    Acute encephalopathy G93.40    Acute respiratory failure with hypoxemia (HCC) J96.01    Low grade fever R50.9    Hyponatremia E87.1    Acute respiratory failure (HCC) J96.00    Unsteady gait R26.81       Isolation/Infection:   Isolation          No Isolation        Patient Infection Status     Infection Onset Added Last Indicated Last Indicated By Review Planned Expiration Resolved Resolved By    None active    Resolved    COVID-19 Rule Out 05/18/20 05/18/20 05/18/20 COVID-19 (Ordered)   05/18/20 Rule-Out Test Resulted          Nurse Assessment:  Last Vital Signs: BP (!) 146/96   Pulse 90   Temp 97.1 °F (36.2 °C) (Oral)   Resp 16   Ht 5' 6\" (1.676 m)   Wt 200 lb 8 oz (90.9 kg)   SpO2 94%   BMI 32.36 kg/m²     Last documented pain score (0-10 scale): Pain Level: 0  Last Weight:   Wt Readings from Last 1 Encounters:   05/19/20 200 lb 8 oz (90.9 kg)     Mental Status:  oriented and alert    IV Access:  - None    Nursing Mobility/ADLs:  Walking   Independent  Transfer  Independent  Bathing  Independent  Dressing  Independent  Toileting  Assisted  Feeding  Independent  Med Admin  Independent  Med Delivery   whole    Wound Care Documentation and Therapy:        Elimination:  Continence:   · Bowel: {YES / FU:29496}  · Bladder: {YES / AQ:90085}  Urinary Catheter: None   Colostomy/Ileostomy/Ileal Conduit: {YES / ST:37912}       Date of Last BM: ***  No intake or output data in the 24 hours ending 05/20/20 1329  No intake/output data recorded. Safety Concerns:     History of Falls (last 30 days)    Impairments/Disabilities:      None    Nutrition Therapy:  Current Nutrition Therapy:   - Oral Diet:  General    Routes of Feeding: Oral  Liquids:  Thin Liquids  Daily Fluid Restriction: no  Last Modified Barium Swallow with Video (Video Swallowing Test): not done    Treatments at the Time of Hospital Discharge:   Respiratory Treatments: none  Oxygen Therapy:  none  Ventilator:    - No ventilator support    Rehab Therapies: Physical Therapy and Occupational Therapy Nurse HHA  Weight Bearing Status/Restrictions: No weight bearing restirctions  Other Medical Equipment (for information only, NOT a DME order):  none  Other Treatments: HOME HEALTH CARE: LEVEL 3 [address] to establish plan of care for patient over 60 day period   Nursing  Initial home SN evaluation visit to occur within 24-48 hours for:  1)  medication management  2)  VS and clinical assessment  3)  S&S chronic disease exacerbation education + when to contact MD/NP  4)  care coordination  Medication Reconciliation during 1st SN visit  PT/OT/Speech   Evaluations in home within 24-48 hours of discharge to include DME and home safety   Frontload therapy 5 days, then 3x a week   OT to evaluate if patient has [address] needs for personal care    evaluation within 24-48 hours to evaluate resources & insurance for potential AL, [place], LTC, and Medicaid options   Palliative Care referral within 5 days of hospital discharge   PCP Visit scheduled within 3 - 7 days of hospital discharge [address] [facility] (If patient is agreeable and meets guidelines)      Patient's personal belongings (please select all that are sent with patient):  None    RN SIGNATURE:  Electronically signed by Patience Boyce RN on 5/20/20 at 1:35 PM EDT    CASE MANAGEMENT/SOCIAL WORK SECTION    Inpatient Status Date: 5/18/20 Inpatient    Readmission Risk Assessment Score:  Readmission Risk              Risk of Unplanned Readmission:        15           Discharging to Facility/ Agency   · Discharging to Facility/ Agency   · Name:  [facility] care    · Address: [address] [place] [address]

## 2020-05-20 NOTE — PLAN OF CARE
Problem: Infection:  Goal: Will remain free from infection  Description: Will remain free from infection  5/20/2020 0110 by Hilda James RN  Outcome: Ongoing  5/19/2020 1148 by Sarah Sandoval RN  Outcome: Ongoing     Problem: Safety:  Goal: Free from accidental physical injury  Description: Free from accidental physical injury  5/20/2020 0110 by Hilda James RN  Outcome: Ongoing  5/19/2020 1148 by Sarah Sandoval RN  Outcome: Ongoing  Goal: Free from intentional harm  Description: Free from intentional harm  5/20/2020 0110 by Hilda James RN  Outcome: Ongoing  5/19/2020 1148 by Sarah Sandoval RN  Outcome: Ongoing     Problem: Daily Care:  Goal: Daily care needs are met  Description: Daily care needs are met  5/20/2020 0110 by Hilda James RN  Outcome: Ongoing  5/19/2020 1148 by Sarah Sandoval RN  Outcome: Ongoing     Problem: Pain:  Goal: Patient's pain/discomfort is manageable  Description: Patient's pain/discomfort is manageable  5/20/2020 0110 by Hilda James RN  Outcome: Ongoing  5/19/2020 1148 by Sarah Sandoval RN  Outcome: Ongoing  Goal: Pain level will decrease  Description: Pain level will decrease  Outcome: Ongoing  Goal: Control of acute pain  Description: Control of acute pain  Outcome: Ongoing  Goal: Control of chronic pain  Description: Control of chronic pain  Outcome: Ongoing     Problem: Skin Integrity:  Goal: Skin integrity will stabilize  Description: Skin integrity will stabilize  5/19/2020 1148 by Sarah Sandoval RN  Outcome: Ongoing     Problem: Discharge Planning:  Goal: Patients continuum of care needs are met  Description: Patients continuum of care needs are met  5/19/2020 1148 by Sarah Sandoval RN  Outcome: Ongoing     Problem: Falls - Risk of:  Goal: Will remain free from falls  Description: Will remain free from falls  5/19/2020 1148 by Sarah Sandoval RN  Outcome: Ongoing  Goal: Absence of physical injury  Description: Absence of physical injury  5/19/2020 1148 by Ruben Wolfe

## 2020-05-20 NOTE — CARE COORDINATION
[facility]    DC order noted, all docs needed have been faxed to [facility] for home care services.     Home care to see patient within 24-48 hrs    Елена Garcia RN, BSN CTN  [facility] [phone]
goals: return home vs home with hhc vs STR. DISCHARGE PLAN: reviewed chart and spoke with pt spouse via telephone. Per pt spouse, pt is IPTA and lives with him at home. States that plan is for pt to return home at discharge and will consider home with hhc or STR if indicated. Will cont to follow. Explained Case Management role/services.

## 2020-05-20 NOTE — DISCHARGE SUMMARY
CT Cervical Spine WO Contrast   Final Result   No acute fracture or subluxation of cervical spine. Mild degenerative changes. XR CHEST PORTABLE   Final Result   No acute cardiopulmonary abnormality appreciated. Discharge Medications     Medication List      CHANGE how you take these medications    CeleXA 40 MG tablet  Generic drug:  citalopram  What changed:  Another medication with the same name was removed. Continue taking this medication, and follow the directions you see here. lisinopril 10 MG tablet  Commonly known as:  PRINIVIL;ZESTRIL  What changed:  Another medication with the same name was removed. Continue taking this medication, and follow the directions you see here. CONTINUE taking these medications    amLODIPine 10 MG tablet  Commonly known as:  Norvasc  Take 0.5 tablets by mouth daily     aspirin EC 81 MG EC tablet  Take 1 tablet by mouth daily     busPIRone 5 MG tablet  Commonly known as:  BUSPAR     Claritin 10 MG capsule  Generic drug:  loratadine     dicyclomine 10 MG capsule  Commonly known as:  BENTYL     melatonin 3 MG Tabs tablet     polyethylene glycol 17 g packet  Commonly known as:  GLYCOLAX     rosuvastatin 20 MG tablet  Commonly known as:  CRESTOR  TAKE 1 TABLET EVERY DAY        STOP taking these medications    CALCIUM-MAGNESIUM-ZINC PO     cetirizine 10 MG tablet  Commonly known as:  ZYRTEC     traMADol 50 MG tablet  Commonly known as:  ULTRAM     vitamin B-6 250 MG tablet  Commonly known as:  PYRIDOXINE     vitamin C 500 MG tablet  Commonly known as:  ASCORBIC ACID     vitamin D 50 MCG (2000 UT) Caps capsule          Discharged in stable condition to home    Follow Up:   Follow up with PCP      Cullen Avila 5/20/2020 10:06 PM

## 2020-05-20 NOTE — PROGRESS NOTES
Pulmonary & Critical Care Medicine  Progress Note    CC:acute encephalopathy    Events of Last 24 hours:   Patient feels better, and is more alert. EXAM:  BP (!) 146/96   Pulse 90   Temp 97.1 °F (36.2 °C) (Oral)   Resp 16   Ht 5' 6\" (1.676 m)   Wt 200 lb 8 oz (90.9 kg)   SpO2 94%   BMI 32.36 kg/m²  on RA  Tmax: 98.6  CVP:    No intake or output data in the 24 hours ending 05/20/20 1202  Gen: No distress. Normocephalic, atraumatic. Eyes: PERRL. No sclera icterus. No conjunctival injection. ENT: No discharge. Pharynx clear. Neck: Trachea midline. No obvious mass. Resp: No accessory muscle use. No crackles. No wheezes. No rhonchi. No dullness on percussion. CV: Regular rate. Regular rhythm. No murmur or rub. No edema. GI: Non-tender. Non-distended. No hernia. Skin: Warm and dry. No nodule on exposed extremities. Lymph: No cervical LAD. No supraclavicular LAD. M/S: No cyanosis. No joint deformity. No clubbing. Neuro: Awake and alert, conversant. Moving all 4 extremities.      Medications:   potassium chloride  40 mEq Oral Once    amLODIPine  5 mg Oral Daily    lidocaine  1 patch Transdermal Nightly    mupirocin   Nasal BID    aspirin EC  81 mg Oral Daily    [Held by provider] rosuvastatin  20 mg Oral Daily    sodium chloride flush  10 mL Intravenous 2 times per day    enoxaparin  40 mg Subcutaneous Daily     PRN Meds:  sodium chloride nebulizer, ipratropium, levalbuterol, sodium chloride flush, acetaminophen **OR** acetaminophen, polyethylene glycol, [DISCONTINUED] promethazine **OR** ondansetron    Results:  CBC:   Recent Labs     05/18/20  0511 05/19/20  0628   WBC 15.4* 13.3*   HGB 10.6* 10.9*   HCT 31.9* 32.9*   MCV 89.5 90.0    175     BMP:   Recent Labs     05/19/20  1251 05/19/20  2104 05/20/20  0521   * 134* 133*   K 3.6 3.1* 3.1*    99 98*   CO2 24 22 24   BUN 24* 23* 21*   CREATININE 0.8 0.8 0.7     LIVER PROFILE:   Recent Labs     05/18/20  0511

## 2020-05-28 ENCOUNTER — HOSPITAL ENCOUNTER (OUTPATIENT)
Age: 75
Setting detail: OBSERVATION
Discharge: HOME | End: 2020-05-31
Attending: STUDENT IN AN ORGANIZED HEALTH CARE EDUCATION/TRAINING PROGRAM | Admitting: INTERNAL MEDICINE
Payer: MEDICARE

## 2020-05-28 ENCOUNTER — APPOINTMENT (OUTPATIENT)
Dept: GENERAL RADIOLOGY | Age: 75
End: 2020-05-28
Payer: MEDICARE

## 2020-05-28 PROBLEM — R55 PRE-SYNCOPE: Status: ACTIVE | Noted: 2020-05-28

## 2020-05-28 LAB
A/G RATIO: 1.8 (ref 1.1–2.2)
ALBUMIN SERPL-MCNC: 4.2 G/DL (ref 3.4–5)
ALP BLD-CCNC: 83 U/L (ref 40–129)
ALT SERPL-CCNC: 17 U/L (ref 10–40)
ANION GAP SERPL CALCULATED.3IONS-SCNC: 11 MMOL/L (ref 3–16)
AST SERPL-CCNC: 19 U/L (ref 15–37)
BACTERIA: ABNORMAL /HPF
BASOPHILS ABSOLUTE: 0 K/UL (ref 0–0.2)
BASOPHILS RELATIVE PERCENT: 0.7 %
BILIRUB SERPL-MCNC: 0.4 MG/DL (ref 0–1)
BILIRUBIN URINE: NEGATIVE
BLOOD, URINE: ABNORMAL
BUN BLDV-MCNC: 11 MG/DL (ref 7–20)
CALCIUM SERPL-MCNC: 9.4 MG/DL (ref 8.3–10.6)
CHLORIDE BLD-SCNC: 99 MMOL/L (ref 99–110)
CLARITY: ABNORMAL
CO2: 24 MMOL/L (ref 21–32)
COLOR: ABNORMAL
CREAT SERPL-MCNC: 0.8 MG/DL (ref 0.6–1.2)
EKG ATRIAL RATE: 82 BPM
EKG DIAGNOSIS: NORMAL
EKG P AXIS: 23 DEGREES
EKG P-R INTERVAL: 198 MS
EKG Q-T INTERVAL: 400 MS
EKG QRS DURATION: 88 MS
EKG QTC CALCULATION (BAZETT): 467 MS
EKG R AXIS: -14 DEGREES
EKG T AXIS: 34 DEGREES
EKG VENTRICULAR RATE: 82 BPM
EOSINOPHILS ABSOLUTE: 0.1 K/UL (ref 0–0.6)
EOSINOPHILS RELATIVE PERCENT: 2 %
EPITHELIAL CELLS, UA: ABNORMAL /HPF (ref 0–5)
GFR AFRICAN AMERICAN: >60
GFR NON-AFRICAN AMERICAN: >60
GLOBULIN: 2.4 G/DL
GLUCOSE BLD-MCNC: 101 MG/DL (ref 70–99)
GLUCOSE URINE: NEGATIVE MG/DL
HCT VFR BLD CALC: 35.3 % (ref 36–48)
HEMOGLOBIN: 11.9 G/DL (ref 12–16)
KETONES, URINE: NEGATIVE MG/DL
LEUKOCYTE ESTERASE, URINE: ABNORMAL
LYMPHOCYTES ABSOLUTE: 1.4 K/UL (ref 1–5.1)
LYMPHOCYTES RELATIVE PERCENT: 22.7 %
MCH RBC QN AUTO: 30.1 PG (ref 26–34)
MCHC RBC AUTO-ENTMCNC: 33.8 G/DL (ref 31–36)
MCV RBC AUTO: 89.2 FL (ref 80–100)
MICROSCOPIC EXAMINATION: YES
MONOCYTES ABSOLUTE: 0.7 K/UL (ref 0–1.3)
MONOCYTES RELATIVE PERCENT: 11.6 %
NEUTROPHILS ABSOLUTE: 4 K/UL (ref 1.7–7.7)
NEUTROPHILS RELATIVE PERCENT: 63 %
NITRITE, URINE: NEGATIVE
PDW BLD-RTO: 13.1 % (ref 12.4–15.4)
PH UA: 7 (ref 5–8)
PLATELET # BLD: 215 K/UL (ref 135–450)
PMV BLD AUTO: 7.5 FL (ref 5–10.5)
POTASSIUM REFLEX MAGNESIUM: 3.7 MMOL/L (ref 3.5–5.1)
PRO-BNP: 163 PG/ML (ref 0–449)
PROTEIN UA: NEGATIVE MG/DL
RBC # BLD: 3.96 M/UL (ref 4–5.2)
RBC UA: ABNORMAL /HPF (ref 0–4)
SODIUM BLD-SCNC: 134 MMOL/L (ref 136–145)
SPECIFIC GRAVITY UA: <=1.005 (ref 1–1.03)
TOTAL PROTEIN: 6.6 G/DL (ref 6.4–8.2)
TROPONIN: <0.01 NG/ML
TROPONIN: <0.01 NG/ML
URINE REFLEX TO CULTURE: YES
URINE TYPE: ABNORMAL
UROBILINOGEN, URINE: 0.2 E.U./DL
WBC # BLD: 6.3 K/UL (ref 4–11)
WBC UA: ABNORMAL /HPF (ref 0–5)

## 2020-05-28 PROCEDURE — 93010 ELECTROCARDIOGRAM REPORT: CPT | Performed by: INTERNAL MEDICINE

## 2020-05-28 PROCEDURE — 84484 ASSAY OF TROPONIN QUANT: CPT

## 2020-05-28 PROCEDURE — 96366 THER/PROPH/DIAG IV INF ADDON: CPT

## 2020-05-28 PROCEDURE — 81001 URINALYSIS AUTO W/SCOPE: CPT

## 2020-05-28 PROCEDURE — 93005 ELECTROCARDIOGRAM TRACING: CPT | Performed by: STUDENT IN AN ORGANIZED HEALTH CARE EDUCATION/TRAINING PROGRAM

## 2020-05-28 PROCEDURE — 36415 COLL VENOUS BLD VENIPUNCTURE: CPT

## 2020-05-28 PROCEDURE — 83880 ASSAY OF NATRIURETIC PEPTIDE: CPT

## 2020-05-28 PROCEDURE — 80053 COMPREHEN METABOLIC PANEL: CPT

## 2020-05-28 PROCEDURE — 99219 PR INITIAL OBSERVATION CARE/DAY 50 MINUTES: CPT | Performed by: PHYSICIAN ASSISTANT

## 2020-05-28 PROCEDURE — 6360000002 HC RX W HCPCS: Performed by: EMERGENCY MEDICINE

## 2020-05-28 PROCEDURE — 96361 HYDRATE IV INFUSION ADD-ON: CPT

## 2020-05-28 PROCEDURE — 71046 X-RAY EXAM CHEST 2 VIEWS: CPT

## 2020-05-28 PROCEDURE — 87077 CULTURE AEROBIC IDENTIFY: CPT

## 2020-05-28 PROCEDURE — 6360000002 HC RX W HCPCS: Performed by: PHYSICIAN ASSISTANT

## 2020-05-28 PROCEDURE — 99285 EMERGENCY DEPT VISIT HI MDM: CPT

## 2020-05-28 PROCEDURE — 2580000003 HC RX 258: Performed by: STUDENT IN AN ORGANIZED HEALTH CARE EDUCATION/TRAINING PROGRAM

## 2020-05-28 PROCEDURE — 96365 THER/PROPH/DIAG IV INF INIT: CPT

## 2020-05-28 PROCEDURE — 6370000000 HC RX 637 (ALT 250 FOR IP): Performed by: EMERGENCY MEDICINE

## 2020-05-28 PROCEDURE — G0378 HOSPITAL OBSERVATION PER HR: HCPCS

## 2020-05-28 PROCEDURE — 2580000003 HC RX 258: Performed by: EMERGENCY MEDICINE

## 2020-05-28 PROCEDURE — 87186 SC STD MICRODIL/AGAR DIL: CPT

## 2020-05-28 PROCEDURE — 96374 THER/PROPH/DIAG INJ IV PUSH: CPT

## 2020-05-28 PROCEDURE — 85025 COMPLETE CBC W/AUTO DIFF WBC: CPT

## 2020-05-28 PROCEDURE — 87086 URINE CULTURE/COLONY COUNT: CPT

## 2020-05-28 PROCEDURE — 96372 THER/PROPH/DIAG INJ SC/IM: CPT

## 2020-05-28 PROCEDURE — 2580000003 HC RX 258: Performed by: PHYSICIAN ASSISTANT

## 2020-05-28 PROCEDURE — 6370000000 HC RX 637 (ALT 250 FOR IP): Performed by: PHYSICIAN ASSISTANT

## 2020-05-28 RX ORDER — DICYCLOMINE HYDROCHLORIDE 10 MG/1
10 CAPSULE ORAL
Status: DISCONTINUED | OUTPATIENT
Start: 2020-05-28 | End: 2020-05-31 | Stop reason: HOSPADM

## 2020-05-28 RX ORDER — ROSUVASTATIN CALCIUM 10 MG/1
20 TABLET, COATED ORAL DAILY
Status: DISCONTINUED | OUTPATIENT
Start: 2020-05-28 | End: 2020-05-31 | Stop reason: HOSPADM

## 2020-05-28 RX ORDER — CETIRIZINE HYDROCHLORIDE 10 MG/1
5 TABLET ORAL DAILY
Status: DISCONTINUED | OUTPATIENT
Start: 2020-05-28 | End: 2020-05-31 | Stop reason: HOSPADM

## 2020-05-28 RX ORDER — POTASSIUM CHLORIDE 7.45 MG/ML
10 INJECTION INTRAVENOUS PRN
Status: DISCONTINUED | OUTPATIENT
Start: 2020-05-28 | End: 2020-05-31 | Stop reason: HOSPADM

## 2020-05-28 RX ORDER — ASPIRIN 81 MG/1
81 TABLET ORAL DAILY
Status: DISCONTINUED | OUTPATIENT
Start: 2020-05-28 | End: 2020-05-31 | Stop reason: HOSPADM

## 2020-05-28 RX ORDER — SODIUM CHLORIDE 0.9 % (FLUSH) 0.9 %
10 SYRINGE (ML) INJECTION PRN
Status: DISCONTINUED | OUTPATIENT
Start: 2020-05-28 | End: 2020-05-31 | Stop reason: HOSPADM

## 2020-05-28 RX ORDER — POTASSIUM CHLORIDE 20 MEQ/1
40 TABLET, EXTENDED RELEASE ORAL PRN
Status: DISCONTINUED | OUTPATIENT
Start: 2020-05-28 | End: 2020-05-31 | Stop reason: HOSPADM

## 2020-05-28 RX ORDER — SODIUM CHLORIDE 9 MG/ML
INJECTION, SOLUTION INTRAVENOUS CONTINUOUS
Status: DISCONTINUED | OUTPATIENT
Start: 2020-05-28 | End: 2020-05-29

## 2020-05-28 RX ORDER — POLYETHYLENE GLYCOL 3350 17 G/17G
17 POWDER, FOR SOLUTION ORAL DAILY
Status: DISCONTINUED | OUTPATIENT
Start: 2020-05-28 | End: 2020-05-31 | Stop reason: HOSPADM

## 2020-05-28 RX ORDER — SODIUM CHLORIDE 0.9 % (FLUSH) 0.9 %
10 SYRINGE (ML) INJECTION EVERY 12 HOURS SCHEDULED
Status: DISCONTINUED | OUTPATIENT
Start: 2020-05-28 | End: 2020-05-31 | Stop reason: HOSPADM

## 2020-05-28 RX ORDER — ACETAMINOPHEN 325 MG/1
650 TABLET ORAL EVERY 6 HOURS PRN
Status: DISCONTINUED | OUTPATIENT
Start: 2020-05-28 | End: 2020-05-31 | Stop reason: HOSPADM

## 2020-05-28 RX ORDER — ACETAMINOPHEN 325 MG/1
650 TABLET ORAL ONCE
Status: COMPLETED | OUTPATIENT
Start: 2020-05-28 | End: 2020-05-28

## 2020-05-28 RX ORDER — ACETAMINOPHEN 650 MG/1
650 SUPPOSITORY RECTAL EVERY 6 HOURS PRN
Status: DISCONTINUED | OUTPATIENT
Start: 2020-05-28 | End: 2020-05-31 | Stop reason: HOSPADM

## 2020-05-28 RX ORDER — PROMETHAZINE HYDROCHLORIDE 25 MG/1
12.5 TABLET ORAL EVERY 6 HOURS PRN
Status: DISCONTINUED | OUTPATIENT
Start: 2020-05-28 | End: 2020-05-28

## 2020-05-28 RX ORDER — BUSPIRONE HYDROCHLORIDE 5 MG/1
5 TABLET ORAL DAILY
Status: DISCONTINUED | OUTPATIENT
Start: 2020-05-28 | End: 2020-05-31 | Stop reason: HOSPADM

## 2020-05-28 RX ORDER — POLYETHYLENE GLYCOL 3350 17 G/17G
17 POWDER, FOR SOLUTION ORAL DAILY PRN
Status: DISCONTINUED | OUTPATIENT
Start: 2020-05-28 | End: 2020-05-31 | Stop reason: HOSPADM

## 2020-05-28 RX ORDER — CITALOPRAM 20 MG/1
40 TABLET ORAL DAILY
Status: DISCONTINUED | OUTPATIENT
Start: 2020-05-28 | End: 2020-05-30

## 2020-05-28 RX ORDER — MAGNESIUM SULFATE IN WATER 40 MG/ML
2 INJECTION, SOLUTION INTRAVENOUS PRN
Status: DISCONTINUED | OUTPATIENT
Start: 2020-05-28 | End: 2020-05-31 | Stop reason: HOSPADM

## 2020-05-28 RX ORDER — ONDANSETRON 2 MG/ML
4 INJECTION INTRAMUSCULAR; INTRAVENOUS EVERY 6 HOURS PRN
Status: DISCONTINUED | OUTPATIENT
Start: 2020-05-28 | End: 2020-05-31 | Stop reason: HOSPADM

## 2020-05-28 RX ORDER — 0.9 % SODIUM CHLORIDE 0.9 %
1000 INTRAVENOUS SOLUTION INTRAVENOUS ONCE
Status: COMPLETED | OUTPATIENT
Start: 2020-05-28 | End: 2020-05-28

## 2020-05-28 RX ORDER — LANOLIN ALCOHOL/MO/W.PET/CERES
3 CREAM (GRAM) TOPICAL NIGHTLY PRN
Status: DISCONTINUED | OUTPATIENT
Start: 2020-05-28 | End: 2020-05-31 | Stop reason: HOSPADM

## 2020-05-28 RX ADMIN — ENOXAPARIN SODIUM 40 MG: 40 INJECTION SUBCUTANEOUS at 21:28

## 2020-05-28 RX ADMIN — SODIUM CHLORIDE 1000 ML: 9 INJECTION, SOLUTION INTRAVENOUS at 13:55

## 2020-05-28 RX ADMIN — POLYETHYLENE GLYCOL (3350) 17 G: 17 POWDER, FOR SOLUTION ORAL at 21:26

## 2020-05-28 RX ADMIN — LISINOPRIL 15 MG: 5 TABLET ORAL at 21:27

## 2020-05-28 RX ADMIN — ASPIRIN 81 MG: 81 TABLET, COATED ORAL at 21:26

## 2020-05-28 RX ADMIN — ROSUVASTATIN CALCIUM 20 MG: 10 TABLET, FILM COATED ORAL at 21:27

## 2020-05-28 RX ADMIN — ACETAMINOPHEN 650 MG: 325 TABLET ORAL at 17:32

## 2020-05-28 RX ADMIN — CEFTRIAXONE SODIUM 1 G: 1 INJECTION, POWDER, FOR SOLUTION INTRAMUSCULAR; INTRAVENOUS at 15:56

## 2020-05-28 RX ADMIN — BUSPIRONE HYDROCHLORIDE 5 MG: 5 TABLET ORAL at 21:26

## 2020-05-28 RX ADMIN — SODIUM CHLORIDE: 9 INJECTION, SOLUTION INTRAVENOUS at 21:25

## 2020-05-28 RX ADMIN — DICYCLOMINE HYDROCHLORIDE 10 MG: 10 CAPSULE ORAL at 21:26

## 2020-05-28 RX ADMIN — Medication 10 ML: at 21:27

## 2020-05-28 ASSESSMENT — PAIN DESCRIPTION - PAIN TYPE: TYPE: CHRONIC PAIN

## 2020-05-28 ASSESSMENT — PAIN SCALES - GENERAL
PAINLEVEL_OUTOF10: 7
PAINLEVEL_OUTOF10: 3

## 2020-05-28 NOTE — PROGRESS NOTES
Bedside report given to [facility], RN, pt stable at this time. Call light within reach.

## 2020-05-28 NOTE — ED PROVIDER NOTES
abnormality appreciated. Ct Cervical Spine Wo Contrast Result Date: 5/17/2020  EXAMINATION: CT OF THE CERVICAL SPINE WITHOUT CONTRAST 5/17/2020 8:37 am TECHNIQUE: CT of the cervical spine was performed without the administration of intravenous contrast. Multiplanar reformatted images are provided for review. Dose modulation, iterative reconstruction, and/or weight based adjustment of the mA/kV was utilized to reduce the radiation dose to as low as reasonably achievable. COMPARISON: None. HISTORY: ORDERING SYSTEM PROVIDED HISTORY: neck pain, reported syncope TECHNOLOGIST PROVIDED HISTORY: If patient is on cardiac monitor and/or pulse ox, they may be taken off cardiac monitor and pulse ox, left on O2 if currently on. All monitors reattached when patient returns to room. Reason for exam:->neck pain, reported syncope Reason for Exam: syncope episode with headache Acuity: Acute Type of Exam: Initial FINDINGS: BONES/ALIGNMENT: There is no acute fracture or traumatic malalignment. DEGENERATIVE CHANGES: Small anterior osteophytes at C3 through C7. Mild C5-C6 disc space narrowing with posterior osteophytes. SOFT TISSUES: There is no prevertebral soft tissue swelling. No acute fracture or subluxation of cervical spine. Mild degenerative changes. Xr Chest Portable Result Date: 5/18/2020  EXAMINATION: ONE XRAY VIEW OF THE CHEST 5/18/2020 2:32 pm COMPARISON: May 17, 2020 HISTORY: ORDERING SYSTEM PROVIDED HISTORY: Hypoxemia TECHNOLOGIST PROVIDED HISTORY: Reason for exam:->Hypoxemia Reason for Exam: hypoxia Acuity: Acute Type of Exam: Initial FINDINGS: There is new vascular congestion. Faint bilateral interstitial opacity also present. No focal consolidation. No pneumothorax and no evidence of pleural effusion. No evidence of acute process of the cardiac or mediastinal structures which appear unchanged     New vascular congestion and mild perihilar interstitial edema.      Xr Chest Portable Result Date: 5/17/2020  EXAMINATION: ONE XRAY VIEW OF THE CHEST 5/17/2020 8:27 am COMPARISON: 07/25/2019 HISTORY: ORDERING SYSTEM PROVIDED HISTORY: SOB TECHNOLOGIST PROVIDED HISTORY: Reason for exam:->SOB Reason for Exam: SOB Acuity: Acute Type of Exam: Initial FINDINGS: Mediastinal silhouette is prominent grossly stable. Lungs appear grossly clear. No acute bony abnormality identified. No acute cardiopulmonary abnormality appreciated. EKG INTERPRETATION:  EKG by my preliminary interpretation shows sinus rhythm with rate of 82, normal axis, normal intervals, with no ST changes indicative of ischemia at this time. PROCEDURES:   Procedures    ASSESSMENT AND PLAN:  Benito Keene is a 67 y.o. female who presents with dizziness and orthostatic hypotension. Her exam was unremarkable but nonetheless I was still concern for cardiopulmonary causes as well as infectious. I did obtained labs including CXR, EKG, UA and repeat orthostatic BP. Labs were pending when I signed off to Dr. Sarabjit Parker. At the end pt was admitted for low BP and UTI. ClINICAL IMPRESSION:  1. Acute UTI    2. Orthostatic hypotension          DISPOSITION Admitted 05/28/2020 04:08:52 PM  Marciano Weinberg MD (electronically signed)  5/28/2020  _________________________________________________________________________________________  _________________________________________________________________________________________  This record is transcribed utilizing voice recognition technology. There are inherent limitations in this technology. In addition, there may be limitations in editing of this report. If there are any discrepancies, please contact me directly.        Marciano Weinberg MD  05/28/20 4703

## 2020-05-28 NOTE — PROGRESS NOTES
4 Eyes Skin Assessment     The patient is being assess for   Admission    I agree that 2 RN's have performed a thorough Head to Toe Skin Assessment on the patient. ALL assessment sites listed below have been assessed. Areas assessed by both nurses:   [x]   Head, Face, and Ears   [x]   Shoulders, Back, and Chest, Abdomen  [x]   Arms, Elbows, and Hands   [x]   Coccyx, Sacrum, and Ischium  [x]   Legs, Feet, and Heels        Scattered bruising  Redness in periarea    **SHARE this note so that the co-signing nurse is able to place an eSignature**    Co-signer eSignature: Electronically signed by Garry Mcintosh RN on 5/30/20 at 2:20 AM EDT    Does the Patient have Skin Breakdown?   No          Robert Prevention initiated:  Yes   Wound Care Orders initiated:  No      [facility] nurse consulted for Pressure Injury (Stage 3,4, Unstageable, DTI, NWPT, Complex wounds)and New or Established Ostomies:  No      Primary Nurse eSignature: Electronically signed by Zi Busby RN on 5/28/20 at 7:40 PM EDT

## 2020-05-29 LAB
ANION GAP SERPL CALCULATED.3IONS-SCNC: 12 MMOL/L (ref 3–16)
BASOPHILS ABSOLUTE: 0.1 K/UL (ref 0–0.2)
BASOPHILS RELATIVE PERCENT: 0.8 %
BUN BLDV-MCNC: 7 MG/DL (ref 7–20)
CALCIUM SERPL-MCNC: 8.8 MG/DL (ref 8.3–10.6)
CHLORIDE BLD-SCNC: 102 MMOL/L (ref 99–110)
CO2: 20 MMOL/L (ref 21–32)
CREAT SERPL-MCNC: 0.6 MG/DL (ref 0.6–1.2)
EOSINOPHILS ABSOLUTE: 0.2 K/UL (ref 0–0.6)
EOSINOPHILS RELATIVE PERCENT: 2.8 %
GFR AFRICAN AMERICAN: >60
GFR NON-AFRICAN AMERICAN: >60
GLUCOSE BLD-MCNC: 94 MG/DL (ref 70–99)
HCT VFR BLD CALC: 33.3 % (ref 36–48)
HEMOGLOBIN: 11.6 G/DL (ref 12–16)
LV EF: 55 %
LVEF MODALITY: NORMAL
LYMPHOCYTES ABSOLUTE: 1.7 K/UL (ref 1–5.1)
LYMPHOCYTES RELATIVE PERCENT: 26.2 %
MAGNESIUM: 1.8 MG/DL (ref 1.8–2.4)
MCH RBC QN AUTO: 31.8 PG (ref 26–34)
MCHC RBC AUTO-ENTMCNC: 35 G/DL (ref 31–36)
MCV RBC AUTO: 90.8 FL (ref 80–100)
MONOCYTES ABSOLUTE: 0.7 K/UL (ref 0–1.3)
MONOCYTES RELATIVE PERCENT: 11.6 %
NEUTROPHILS ABSOLUTE: 3.7 K/UL (ref 1.7–7.7)
NEUTROPHILS RELATIVE PERCENT: 58.6 %
PDW BLD-RTO: 12.8 % (ref 12.4–15.4)
PLATELET # BLD: 184 K/UL (ref 135–450)
PMV BLD AUTO: 7.4 FL (ref 5–10.5)
POTASSIUM REFLEX MAGNESIUM: 3.2 MMOL/L (ref 3.5–5.1)
RBC # BLD: 3.66 M/UL (ref 4–5.2)
SODIUM BLD-SCNC: 134 MMOL/L (ref 136–145)
TROPONIN: <0.01 NG/ML
WBC # BLD: 6.4 K/UL (ref 4–11)

## 2020-05-29 PROCEDURE — 80048 BASIC METABOLIC PNL TOTAL CA: CPT

## 2020-05-29 PROCEDURE — 96366 THER/PROPH/DIAG IV INF ADDON: CPT

## 2020-05-29 PROCEDURE — 2580000003 HC RX 258: Performed by: PHYSICIAN ASSISTANT

## 2020-05-29 PROCEDURE — 99224 PR SBSQ OBSERVATION CARE/DAY 15 MINUTES: CPT | Performed by: INTERNAL MEDICINE

## 2020-05-29 PROCEDURE — 97116 GAIT TRAINING THERAPY: CPT

## 2020-05-29 PROCEDURE — 97530 THERAPEUTIC ACTIVITIES: CPT

## 2020-05-29 PROCEDURE — 6360000002 HC RX W HCPCS: Performed by: PHYSICIAN ASSISTANT

## 2020-05-29 PROCEDURE — 2580000003 HC RX 258: Performed by: INTERNAL MEDICINE

## 2020-05-29 PROCEDURE — 6370000000 HC RX 637 (ALT 250 FOR IP): Performed by: PHYSICIAN ASSISTANT

## 2020-05-29 PROCEDURE — 85025 COMPLETE CBC W/AUTO DIFF WBC: CPT

## 2020-05-29 PROCEDURE — G0378 HOSPITAL OBSERVATION PER HR: HCPCS

## 2020-05-29 PROCEDURE — 97162 PT EVAL MOD COMPLEX 30 MIN: CPT

## 2020-05-29 PROCEDURE — 96361 HYDRATE IV INFUSION ADD-ON: CPT

## 2020-05-29 PROCEDURE — 83735 ASSAY OF MAGNESIUM: CPT

## 2020-05-29 PROCEDURE — 93306 TTE W/DOPPLER COMPLETE: CPT

## 2020-05-29 PROCEDURE — 97166 OT EVAL MOD COMPLEX 45 MIN: CPT

## 2020-05-29 PROCEDURE — 36415 COLL VENOUS BLD VENIPUNCTURE: CPT

## 2020-05-29 PROCEDURE — 96372 THER/PROPH/DIAG INJ SC/IM: CPT

## 2020-05-29 RX ORDER — SODIUM CHLORIDE 9 MG/ML
INJECTION, SOLUTION INTRAVENOUS CONTINUOUS
Status: DISCONTINUED | OUTPATIENT
Start: 2020-05-29 | End: 2020-05-31

## 2020-05-29 RX ADMIN — SODIUM CHLORIDE: 9 INJECTION, SOLUTION INTRAVENOUS at 09:39

## 2020-05-29 RX ADMIN — ACETAMINOPHEN 650 MG: 325 TABLET ORAL at 09:19

## 2020-05-29 RX ADMIN — CITALOPRAM HYDROBROMIDE 40 MG: 20 TABLET ORAL at 09:19

## 2020-05-29 RX ADMIN — DICYCLOMINE HYDROCHLORIDE 10 MG: 10 CAPSULE ORAL at 07:34

## 2020-05-29 RX ADMIN — POLYETHYLENE GLYCOL (3350) 17 G: 17 POWDER, FOR SOLUTION ORAL at 21:25

## 2020-05-29 RX ADMIN — DICYCLOMINE HYDROCHLORIDE 10 MG: 10 CAPSULE ORAL at 11:05

## 2020-05-29 RX ADMIN — LISINOPRIL 15 MG: 5 TABLET ORAL at 09:19

## 2020-05-29 RX ADMIN — BUSPIRONE HYDROCHLORIDE 5 MG: 5 TABLET ORAL at 09:19

## 2020-05-29 RX ADMIN — Medication 10 ML: at 21:26

## 2020-05-29 RX ADMIN — ACETAMINOPHEN 650 MG: 325 TABLET ORAL at 15:52

## 2020-05-29 RX ADMIN — CEFTRIAXONE SODIUM 1 G: 1 INJECTION, POWDER, FOR SOLUTION INTRAMUSCULAR; INTRAVENOUS at 16:51

## 2020-05-29 RX ADMIN — DICYCLOMINE HYDROCHLORIDE 10 MG: 10 CAPSULE ORAL at 16:51

## 2020-05-29 RX ADMIN — LISINOPRIL 15 MG: 5 TABLET ORAL at 21:25

## 2020-05-29 RX ADMIN — POTASSIUM CHLORIDE 40 MEQ: 1500 TABLET, EXTENDED RELEASE ORAL at 09:19

## 2020-05-29 RX ADMIN — SODIUM CHLORIDE: 9 INJECTION, SOLUTION INTRAVENOUS at 16:52

## 2020-05-29 RX ADMIN — ENOXAPARIN SODIUM 40 MG: 40 INJECTION SUBCUTANEOUS at 09:18

## 2020-05-29 RX ADMIN — ACETAMINOPHEN 650 MG: 325 TABLET ORAL at 23:45

## 2020-05-29 RX ADMIN — MELATONIN 3 MG ORAL TABLET 3 MG: 3 TABLET ORAL at 21:25

## 2020-05-29 ASSESSMENT — PAIN SCALES - GENERAL
PAINLEVEL_OUTOF10: 1
PAINLEVEL_OUTOF10: 3

## 2020-05-29 ASSESSMENT — PAIN DESCRIPTION - DESCRIPTORS: DESCRIPTORS: DISCOMFORT

## 2020-05-29 ASSESSMENT — PAIN DESCRIPTION - PROGRESSION: CLINICAL_PROGRESSION: NOT CHANGED

## 2020-05-29 ASSESSMENT — PAIN DESCRIPTION - LOCATION: LOCATION: SHOULDER

## 2020-05-29 ASSESSMENT — PAIN DESCRIPTION - ORIENTATION: ORIENTATION: RIGHT

## 2020-05-29 ASSESSMENT — PAIN DESCRIPTION - PAIN TYPE: TYPE: CHRONIC PAIN

## 2020-05-29 NOTE — FLOWSHEET NOTE
05/29/20 0700   Vital Signs   Pulse 86   Heart Rate Source Monitor   Resp 18   BP (!) 163/92   BP Location Left upper arm   BP Upper/Lower Upper   Orthostatic B/P and Pulse? Yes   Blood Pressure Lying 163/92   Pulse Lying 86 PER MINUTE   Blood Pressure Sitting 108/74   Pulse Sitting 91 PER MINUTE   Blood Pressure Standing 105/71   Pulse Standing 90 PER MINUTE   Significant BP shifts from laying to sitting, patient very dizzy from laying to sitting. Not much change standing.

## 2020-05-29 NOTE — PLAN OF CARE
Problem: Falls - Risk of:  Goal: Will remain free from falls  Description: Will remain free from falls  Outcome: Ongoing  Goal: Absence of physical injury  Description: Absence of physical injury  Outcome: Ongoing     Problem: SAFETY  Goal: Free from accidental physical injury  Outcome: Ongoing  Goal: Free from intentional harm  Outcome: Ongoing     Problem: DAILY CARE  Goal: Daily care needs are met  Outcome: Ongoing     Problem: PAIN  Goal: Patient's pain/discomfort is manageable  Outcome: Ongoing     Problem: SKIN INTEGRITY  Goal: Skin integrity is maintained or improved  Outcome: Ongoing     Problem: KNOWLEDGE DEFICIT  Goal: Patient/S.O. demonstrates understanding of disease process, treatment plan, medications, and discharge instructions.   Outcome: Ongoing     Problem: DISCHARGE BARRIERS  Goal: Patient's continuum of care needs are met  Outcome: Ongoing

## 2020-05-29 NOTE — PROGRESS NOTES
Inpatient Occupational Therapy  Evaluation and Treatment    Unit: PCU  Date:  5/29/2020  Patient Name:    Nain Simms  Admitting diagnosis:  Pre-syncope [R55]  Admit Date:  5/28/2020  Precautions/Restrictions/WB Status/ Lines/ Wounds/ Oxygen: Fall risk, Bed/chair alarm, Lines -IV and Telemetry     Pt orthostatic with activity, please see vitals under \"Activity Tolerance\" in blue below (pt asymptomatic despite drop in BP)    Treatment Time:  8568-1588  Treatment Number: 1   Timed code treatment minutes 20 minutes + 10 min eval   Total Treatment minutes:   60   Minutes (time split with PT due to OBS status)    Patient Goals for Therapy:  \" go home today \"      Discharge Recommendations: SNF-patient refuses, will need 24 hour assist and HHOT/PT  DME needs for discharge: Needs Met       Therapy recommendations for staff:   Assist of 1 with use of rolling walker (RW) for all ambulation to/from bathroom, watch BPand use BSC if needed    History of Present Illness: from H&P Rosaa Morning PA 5/28/20  \"The patient is a 67 y.o. female with HTN, hx of colon cancer, GERD, HTN, IBS and depression who was recently admitted for an anaphylactic reaction who presented to [facility] ED with complaint of dizziness. Patient reports that she has not really felt well since she was recent discharge about a week ago. She states that she is felt somewhat unsteady on her feet and has had to have her  walk with her. She states over the last couple of days she has noticed worsening dizziness and states that it is mostly when she tries to stand up and get up to walk around. She denies any associated chest pain, cough, vision changes, numbness or tingling, fevers or chills. She has been noticing increased urinary frequency. Patient reports that she did recently have some changes to 1 of her medications at home by her primary care provider but does not believe that she actually has changed the dosing yet at home.    reports that Balance:Impaired-CGA with RW    Bed mobility:    Supine to sit:   Modified Independent flat bed  Sit to supine:   Independent flat bed  Rolling:    Not Tested  Scooting in sitting:  Independent  Scooting to head of bed:   Not Tested    Bridging:   Not Tested    Transfers:    Sit to stand:  SBA  Stand to sit:  SBA  Bed to chair:   SBA-CGA with RW, cues for hand placement  Standard toilet: Not Tested  Bed to [facility]:  Not Tested    Dressing:      UE:   Not Tested  LE:    Not Tested    Bathing:    UE:  Not Tested  LE:  Not Tested    Eating:   Not Tested    Toileting:  Not Tested    Activity Tolerance   Pt completed therapy session with No adverse symptoms noted w/activity - did report one episode of vision \"clearing, like a pop\" after returning to sitting after first bout of functional mobility in room, possibly as BP increasing to normal range. Activity Tolerance   Pt completed therapy session with No adverse symptoms noted w/activity. Pt denied dizziness throughout session. Supine (at rest) SpO2: 95% on RA  HR: 84 bpm  BP: 159/92  Sitting EOB   HR:  89 bpm  BP: 119/76  After 2 minutes sitting EOB   HR: 96 bpm  BP: 125/78  Standing  HR:   BP: 111/74  After ambulation (standing)   HR:   BP: 75/48  2 minutes seated rest   HR: 94 bpm  BP: 137/81  After 2nd ambulation (standing) SpO2: 97% on RA  HR: 94 bpm  BP: 87/57     Positioning Needs:   Pt in bed, alarm set, positioned in proper neutral alignment and pressure relief provided. Exercise / Activities Initiated:   N/A    Patient/Family Education:   Role of OT  Recommendations for DC  Energy conservation techniques-importance of having rest places in home to allow for sitting as needed when dizzy/blurry vision occurs  Safe RW use/hand placement    Assessment of Deficits: Pt seen for Occupational therapy evaluation in acute care setting.   Pt demonstrated decreased Activity tolerance, ADLs, IADLs, Balance , Bathing, Bed mobility, Dressing, ROM, Safety Awareness,

## 2020-05-29 NOTE — PROGRESS NOTES
Inpatient Physical Therapy Evaluation and Treatment    Unit: PCU  Date:  5/29/2020  Patient Name:    Nain Simms  Admitting diagnosis:  Pre-syncope [R55]  Admit Date:  5/28/2020  Precautions/Restrictions/WB Status/ Lines/ Wounds/ Oxygen: Fall risk, Bed/chair alarm and Telemetry, orthostatic    Pt orthostatic with activity, please see vitals under \"Activity Tolerance\" in blue below (pt asymptomatic despite drop in BP)    Treatment Time:  14:10-15:10  Treatment Number:  1   Timed Code Treatment Minutes: 20 minutes (10 minutes eval + 30 minutes split with OT due to observation status)  Total Treatment Minutes:  60  minutes    Patient Goals for Therapy: \" go home today \"          Discharge Recommendations: SNF - pt declining, home with 24/7 assist and home PT  DME needs for discharge: Needs Met       Therapy recommendation for EMS Transport: can transport by wheelchair    Therapy recommendations for staff:   Assist of 1 with use of rolling walker (RW) for all transfers and ambulation within room (pt's BP drops very low with ambulation but pt is asymptomatic)    History of Present Illness: (Per Beau Million H&P on 5/28/20) 67 y.o. female with HTN, hx of colon cancer, GERD, HTN, IBS and depression who was recently admitted for an anaphylactic reaction who presented to [facility] ED with complaint of dizziness. Patient reports that she has not really felt well since she was recent discharge about a week ago. She states that she is felt somewhat unsteady on her feet and has had to have her  walk with her. She states over the last couple of days she has noticed worsening dizziness and states that it is mostly when she tries to stand up and get up to walk around. Pt found to have UTI in ED. Pt has outpatient visit scheduled on June 9th with [facility] to assess her R shoulder pain.     Home Health S4 Level Recommendation:  Level 3 Safety  AM-PAC Mobility Score    AM-PAC Inpatient Mobility Raw Score : 20 Preadmission Environment    Pt. [facility] [address] spouse  Home environment:    one story home  Steps to enter first floor: one step   Bathroom: tub/shower unit, grab bars and standard height commode  Equipment owned: rollator, w/c (transport chair), shower bench, SPC, BSC, lift chair, reacher and life alert, pulse oximeter, BP cuff     Preadmission Status:  Pt. Able to drive: Yes  Pt Fully independent with ADLs: Yes up until a week ago, now trying to use shower bench but not quite comfortable with it  Pt. Required assistance from family for: Cleaning, Cooking and [place]-  and pt share household tasks  Pt. independent for transfers and gait and walked with Rollator when having back pain, without AD when feeling better  History of falls Yes 3 in the last 6 months, unclear cause, slipped off toilet when reaching for something one time  Had just started home OT/PT/nurse prior to admit.     Pain  Yes  Ratin  Location:RUE (has been hurting for 2 weeks, gradually improving, has an appointment at [facility] next week)  Pain Medicine Status: Received pain med prior to tx       Cognition    A&O Person, Place and Time (able to state \"hospital\" but unclear which one)  Able to follow 1 step commands  Pt appears to possibly have some memory issues as she doesn't remember her falls and  fills in gaps.      Subjective  Patient lying supine in bed with spouse present. Pt agreeable to this PT eval & tx. Upper Extremity ROM/Strength  Please see OT evaluation. Lower Extremity ROM / Strength   AROM WFL: Yes    Strength Assessment (measured on a 0-5 scale):  R LE   Quad   4   Ant Tib  4   Hamstring 4   Iliopsoas 4  L LE  Quad   4+   Ant Tib  4+   Hamstring 4+   Iliopsoas 4+    Lower Extremity Sensation    WNL    Lower Extremity Proprioception:   NT    Coordination and Tone  WNL    Balance  Sitting:  Good ;  Independent  Comments: 5 + 3 minutes sitting EOB, denies dizziness   Pt reported her vision \"cleared\" after

## 2020-05-29 NOTE — FLOWSHEET NOTE
05/29/20 0245   Vital Signs   Temp 97.8 °F (36.6 °C)   Temp Source Oral   Pulse 80   Heart Rate Source Monitor   Resp 18   /83   BP Location Left upper arm   BP Upper/Lower Upper   MAP (mmHg) 100   Patient Position Semi fowlers   Level of Consciousness 0   MEWS Score 1   Height and Weight   Weight 199 lb 12.8 oz (90.6 kg)   Weight Method Bedside scale   BMI (Calculated) 32.3   Oxygen Therapy   SpO2 94 %   O2 Device None (Room air)   Patient denies need, pure wick draining well, call light in reach, no changes to neuro check.

## 2020-05-29 NOTE — PROGRESS NOTES
AM assessment completed, see flowsheet. Patient resting in bed at this time with  at bedside. All needs met. Respirations easy and even at rest. Bed in lowest position and locked, SR up x2. Call light and bedside table within reach.

## 2020-05-30 LAB
ANION GAP SERPL CALCULATED.3IONS-SCNC: 12 MMOL/L (ref 3–16)
BUN BLDV-MCNC: 6 MG/DL (ref 7–20)
CALCIUM SERPL-MCNC: 8.9 MG/DL (ref 8.3–10.6)
CHLORIDE BLD-SCNC: 102 MMOL/L (ref 99–110)
CO2: 21 MMOL/L (ref 21–32)
CREAT SERPL-MCNC: 0.7 MG/DL (ref 0.6–1.2)
GFR AFRICAN AMERICAN: >60
GFR NON-AFRICAN AMERICAN: >60
GLUCOSE BLD-MCNC: 99 MG/DL (ref 70–99)
ORGANISM: ABNORMAL
POTASSIUM SERPL-SCNC: 3.3 MMOL/L (ref 3.5–5.1)
SODIUM BLD-SCNC: 135 MMOL/L (ref 136–145)
URINE CULTURE, ROUTINE: ABNORMAL

## 2020-05-30 PROCEDURE — 2580000003 HC RX 258: Performed by: PHYSICIAN ASSISTANT

## 2020-05-30 PROCEDURE — 96366 THER/PROPH/DIAG IV INF ADDON: CPT

## 2020-05-30 PROCEDURE — 6370000000 HC RX 637 (ALT 250 FOR IP): Performed by: PHYSICIAN ASSISTANT

## 2020-05-30 PROCEDURE — 2580000003 HC RX 258: Performed by: INTERNAL MEDICINE

## 2020-05-30 PROCEDURE — 6370000000 HC RX 637 (ALT 250 FOR IP): Performed by: INTERNAL MEDICINE

## 2020-05-30 PROCEDURE — 80048 BASIC METABOLIC PNL TOTAL CA: CPT

## 2020-05-30 PROCEDURE — G0378 HOSPITAL OBSERVATION PER HR: HCPCS

## 2020-05-30 PROCEDURE — 96372 THER/PROPH/DIAG INJ SC/IM: CPT

## 2020-05-30 PROCEDURE — 36415 COLL VENOUS BLD VENIPUNCTURE: CPT

## 2020-05-30 PROCEDURE — 6360000002 HC RX W HCPCS: Performed by: PHYSICIAN ASSISTANT

## 2020-05-30 RX ORDER — CITALOPRAM 20 MG/1
20 TABLET ORAL DAILY
Status: DISCONTINUED | OUTPATIENT
Start: 2020-05-31 | End: 2020-05-31 | Stop reason: HOSPADM

## 2020-05-30 RX ORDER — AMLODIPINE BESYLATE 5 MG/1
5 TABLET ORAL DAILY
Status: DISCONTINUED | OUTPATIENT
Start: 2020-05-30 | End: 2020-05-31 | Stop reason: HOSPADM

## 2020-05-30 RX ORDER — AMLODIPINE BESYLATE 5 MG/1
5 TABLET ORAL DAILY
Status: DISCONTINUED | OUTPATIENT
Start: 2020-05-30 | End: 2020-05-30

## 2020-05-30 RX ADMIN — DICYCLOMINE HYDROCHLORIDE 10 MG: 10 CAPSULE ORAL at 06:39

## 2020-05-30 RX ADMIN — DICYCLOMINE HYDROCHLORIDE 10 MG: 10 CAPSULE ORAL at 09:59

## 2020-05-30 RX ADMIN — DICLOFENAC 2 G: 10 GEL TOPICAL at 03:50

## 2020-05-30 RX ADMIN — DICLOFENAC 2 G: 10 GEL TOPICAL at 17:29

## 2020-05-30 RX ADMIN — BUSPIRONE HYDROCHLORIDE 5 MG: 5 TABLET ORAL at 09:59

## 2020-05-30 RX ADMIN — ROSUVASTATIN CALCIUM 20 MG: 10 TABLET, FILM COATED ORAL at 09:59

## 2020-05-30 RX ADMIN — ACETAMINOPHEN 650 MG: 325 TABLET ORAL at 09:58

## 2020-05-30 RX ADMIN — DICLOFENAC 2 G: 10 GEL TOPICAL at 09:42

## 2020-05-30 RX ADMIN — ACETAMINOPHEN 650 MG: 325 TABLET ORAL at 21:42

## 2020-05-30 RX ADMIN — POTASSIUM CHLORIDE 40 MEQ: 1500 TABLET, EXTENDED RELEASE ORAL at 09:58

## 2020-05-30 RX ADMIN — DICYCLOMINE HYDROCHLORIDE 10 MG: 10 CAPSULE ORAL at 15:38

## 2020-05-30 RX ADMIN — DICLOFENAC 2 G: 10 GEL TOPICAL at 21:42

## 2020-05-30 RX ADMIN — DICYCLOMINE HYDROCHLORIDE 10 MG: 10 CAPSULE ORAL at 21:41

## 2020-05-30 RX ADMIN — ASPIRIN 81 MG: 81 TABLET, COATED ORAL at 10:00

## 2020-05-30 RX ADMIN — SODIUM CHLORIDE: 9 INJECTION, SOLUTION INTRAVENOUS at 13:16

## 2020-05-30 RX ADMIN — ENOXAPARIN SODIUM 40 MG: 40 INJECTION SUBCUTANEOUS at 09:57

## 2020-05-30 RX ADMIN — Medication 10 ML: at 21:43

## 2020-05-30 RX ADMIN — CEFTRIAXONE SODIUM 1 G: 1 INJECTION, POWDER, FOR SOLUTION INTRAMUSCULAR; INTRAVENOUS at 15:38

## 2020-05-30 RX ADMIN — CETIRIZINE HYDROCHLORIDE 5 MG: 10 TABLET ORAL at 09:59

## 2020-05-30 ASSESSMENT — PAIN DESCRIPTION - FREQUENCY
FREQUENCY: CONTINUOUS
FREQUENCY: CONTINUOUS

## 2020-05-30 ASSESSMENT — PAIN DESCRIPTION - LOCATION
LOCATION: SHOULDER
LOCATION: SHOULDER

## 2020-05-30 ASSESSMENT — PAIN DESCRIPTION - DESCRIPTORS
DESCRIPTORS: DISCOMFORT
DESCRIPTORS: BURNING;SHARP

## 2020-05-30 ASSESSMENT — PAIN DESCRIPTION - ORIENTATION
ORIENTATION: RIGHT
ORIENTATION: RIGHT

## 2020-05-30 ASSESSMENT — PAIN SCALES - GENERAL
PAINLEVEL_OUTOF10: 3
PAINLEVEL_OUTOF10: 0
PAINLEVEL_OUTOF10: 6

## 2020-05-30 ASSESSMENT — PAIN DESCRIPTION - PROGRESSION
CLINICAL_PROGRESSION: NOT CHANGED
CLINICAL_PROGRESSION: NOT CHANGED

## 2020-05-30 ASSESSMENT — PAIN DESCRIPTION - ONSET: ONSET: ON-GOING

## 2020-05-30 ASSESSMENT — PAIN DESCRIPTION - PAIN TYPE
TYPE: ACUTE PAIN
TYPE: CHRONIC PAIN

## 2020-05-30 ASSESSMENT — PAIN - FUNCTIONAL ASSESSMENT: PAIN_FUNCTIONAL_ASSESSMENT: PREVENTS OR INTERFERES SOME ACTIVE ACTIVITIES AND ADLS

## 2020-05-30 NOTE — FLOWSHEET NOTE
05/30/20 1425   Encounter Summary   Services provided to: Patient and family together   Referral/Consult From: [address] [place] [address] Drive; Children;Family members; Zoroastrianism/mona community   Place of Christianity   ([employer])   Continue Visiting No  (Chaplaincy services declined )   Complexity of Encounter Moderate   Length of Encounter 15 minutes   Spiritual Assessment Completed Yes   Spiritual/Alevism   Type Spiritual support   Assessment Calm   Intervention Active listening   Outcome Refused/declined

## 2020-05-30 NOTE — PROGRESS NOTES
Progress Note    Admit Date:  5/28/2020    67year old female presented with c/o dizziness. Recent admission for anaphylactic reaction. Since then, she has felt unsteady on her feet. Subjective:  Ms. [place] feels better and asking about discharge.  at bedside. Her SBP lying was 160s. Sitting dropped to 114 and standing dropped further to 669 systolic. Symptomatic. Objective:   Patient Vitals for the past 4 hrs:   BP Temp Temp src Pulse Resp SpO2   05/30/20 1542 (!) 160/93 98.3 °F (36.8 °C) Oral 84 17 97 %            Intake/Output Summary (Last 24 hours) at 5/30/2020 1636  Last data filed at 5/30/2020 1317  Gross per 24 hour   Intake 1925 ml   Output 3150 ml   Net -1225 ml       Physical Exam:  Gen: Elderly female, no distress. Alert. Eyes: PERRL. No sclera icterus. No conjunctival injection. ENT: No discharge. Pharynx clear. Neck:  Trachea midline. Resp: No accessory muscle use. No crackles. No wheezes. No rhonchi. CV: Regular rate. Regular rhythm. No murmur. No rub. No edema. GI: Non-tender. Non-distended Normal bowel sounds. Skin: Warm and dry. M/S: No cyanosis. No joint deformity. No clubbing. Neuro: Awake. Grossly nonfocal, ambulates with mild assistance from her   Psych: Oriented x 3. No anxiety or agitation.            Data:  CBC:   Recent Labs     05/28/20  1346 05/29/20  0536   WBC 6.3 6.4   HGB 11.9* 11.6*   HCT 35.3* 33.3*   MCV 89.2 90.8    184     BMP:   Recent Labs     05/28/20  1346 05/29/20  0536 05/30/20  0445   * 134* 135*   K 3.7 3.2* 3.3*   CL 99 102 102   CO2 24 20* 21   BUN 11 7 6*   CREATININE 0.8 0.6 0.7     LIVER PROFILE:   Recent Labs     05/28/20  1346   AST 19   ALT 17   BILITOT 0.4   ALKPHOS 83     PT/INR: No results for input(s): PROTIME, INR in the last 72 hours.     CULTURES  Urine Cx: pending     RADIOLOGY  XR CHEST STANDARD (2 VW)   Final Result   Small amount of perihilar bilateral pulmonary disease, which may either be

## 2020-05-30 NOTE — PROGRESS NOTES
Patient fell asleep briefly, awakened confused to place, situation, time, called family who called back to have nursing check on patient, patient reorients well, was panicked, calms, pain in arm improved, denies needs, call light in reach.

## 2020-05-30 NOTE — FLOWSHEET NOTE
05/30/20 1542   Vital Signs   Temp 98.3 °F (36.8 °C)   Temp Source Oral   Pulse 84   Heart Rate Source Monitor   Resp 17   BP (!) 160/93   BP Location Left Arm   BP Upper/Lower Upper   MAP (mmHg) 116   Level of Consciousness 0   MEWS Score 1   Patient Currently in Pain Denies   Oxygen Therapy   SpO2 97 %   O2 Device None (Room air)

## 2020-05-30 NOTE — FLOWSHEET NOTE
05/29/20 2000   Vital Signs   Temp 98.8 °F (37.1 °C)   Temp Source Oral   Pulse 84   Heart Rate Source Monitor   Resp 18   BP (!) 166/91   BP Location Left upper arm   BP Upper/Lower Upper   MAP (mmHg) 116   Patient Position Semi fowlers   Level of Consciousness 0   MEWS Score 1   Oxygen Therapy   SpO2 95 %   Pulse Oximeter Device Mode Intermittent   Pulse Oximeter Device Location Right;Finger   O2 Device None (Room air)   Patient assessment complete, states she feels like she is having a panic attack at times, wants to talk to doctor in am about medication to help with panic attacks. Calm at present, denies needs, call light in reach.

## 2020-05-31 VITALS
HEART RATE: 90 BPM | TEMPERATURE: 98 F | HEIGHT: 66 IN | RESPIRATION RATE: 17 BRPM | WEIGHT: 194.31 LBS | BODY MASS INDEX: 31.23 KG/M2 | OXYGEN SATURATION: 94 % | DIASTOLIC BLOOD PRESSURE: 94 MMHG | SYSTOLIC BLOOD PRESSURE: 163 MMHG

## 2020-05-31 LAB
ANION GAP SERPL CALCULATED.3IONS-SCNC: 14 MMOL/L (ref 3–16)
BUN BLDV-MCNC: 5 MG/DL (ref 7–20)
CALCIUM SERPL-MCNC: 9.4 MG/DL (ref 8.3–10.6)
CHLORIDE BLD-SCNC: 100 MMOL/L (ref 99–110)
CO2: 22 MMOL/L (ref 21–32)
CREAT SERPL-MCNC: 0.7 MG/DL (ref 0.6–1.2)
GFR AFRICAN AMERICAN: >60
GFR NON-AFRICAN AMERICAN: >60
GLUCOSE BLD-MCNC: 110 MG/DL (ref 70–99)
POTASSIUM REFLEX MAGNESIUM: 3.6 MMOL/L (ref 3.5–5.1)
SODIUM BLD-SCNC: 136 MMOL/L (ref 136–145)

## 2020-05-31 PROCEDURE — 94761 N-INVAS EAR/PLS OXIMETRY MLT: CPT

## 2020-05-31 PROCEDURE — G0378 HOSPITAL OBSERVATION PER HR: HCPCS

## 2020-05-31 PROCEDURE — 6370000000 HC RX 637 (ALT 250 FOR IP): Performed by: INTERNAL MEDICINE

## 2020-05-31 PROCEDURE — 6370000000 HC RX 637 (ALT 250 FOR IP): Performed by: PHYSICIAN ASSISTANT

## 2020-05-31 PROCEDURE — 80048 BASIC METABOLIC PNL TOTAL CA: CPT

## 2020-05-31 PROCEDURE — 2580000003 HC RX 258: Performed by: INTERNAL MEDICINE

## 2020-05-31 PROCEDURE — 36415 COLL VENOUS BLD VENIPUNCTURE: CPT

## 2020-05-31 RX ORDER — CITALOPRAM 20 MG/1
20 TABLET ORAL DAILY
Qty: 30 TABLET | Refills: 0 | Status: SHIPPED | OUTPATIENT
Start: 2020-05-31 | End: 2020-09-30

## 2020-05-31 RX ORDER — DOXYCYCLINE HYCLATE 100 MG
100 TABLET ORAL 2 TIMES DAILY
Qty: 10 TABLET | Refills: 0 | Status: SHIPPED | OUTPATIENT
Start: 2020-05-31 | End: 2021-09-14 | Stop reason: SDUPTHER

## 2020-05-31 RX ORDER — BUSPIRONE HYDROCHLORIDE 5 MG/1
5 TABLET ORAL DAILY
Qty: 30 TABLET | Refills: 0 | Status: SHIPPED | OUTPATIENT
Start: 2020-05-31 | End: 2020-10-13 | Stop reason: DRUGHIGH

## 2020-05-31 RX ADMIN — DICYCLOMINE HYDROCHLORIDE 10 MG: 10 CAPSULE ORAL at 12:04

## 2020-05-31 RX ADMIN — CITALOPRAM HYDROBROMIDE 20 MG: 20 TABLET ORAL at 10:18

## 2020-05-31 RX ADMIN — ACETAMINOPHEN 650 MG: 325 TABLET ORAL at 12:04

## 2020-05-31 RX ADMIN — DICYCLOMINE HYDROCHLORIDE 10 MG: 10 CAPSULE ORAL at 07:00

## 2020-05-31 RX ADMIN — DICLOFENAC 2 G: 10 GEL TOPICAL at 10:18

## 2020-05-31 RX ADMIN — AMLODIPINE BESYLATE 5 MG: 5 TABLET ORAL at 10:17

## 2020-05-31 RX ADMIN — CETIRIZINE HYDROCHLORIDE 5 MG: 10 TABLET ORAL at 10:17

## 2020-05-31 RX ADMIN — ROSUVASTATIN CALCIUM 20 MG: 10 TABLET, FILM COATED ORAL at 10:17

## 2020-05-31 RX ADMIN — SODIUM CHLORIDE: 9 INJECTION, SOLUTION INTRAVENOUS at 07:00

## 2020-05-31 RX ADMIN — ASPIRIN 81 MG: 81 TABLET, COATED ORAL at 10:17

## 2020-05-31 RX ADMIN — BUSPIRONE HYDROCHLORIDE 5 MG: 5 TABLET ORAL at 10:17

## 2020-05-31 NOTE — FLOWSHEET NOTE
05/31/20 0500   Vital Signs   Temp 98.1 °F (36.7 °C)   Temp Source Oral   Pulse 85   Heart Rate Source Monitor   Resp 14   BP (!) 165/93   BP Location Left Arm   BP Upper/Lower Upper   Patient Position Semi fowlers   Level of Consciousness 0   MEWS Score 0   Patient Currently in Pain No   Oxygen Therapy   SpO2 94 %   O2 Device None (Room air)     Patient sleeping when entering the room. No requests at this time. Call light within reach, will continue to monitor.

## 2020-05-31 NOTE — DISCHARGE SUMMARY
cephalic, atraumatic without obvious deformity. Pupils equal, round, and reactive to light. Extra ocular muscles intact. Conjunctivae/corneas clear. Neck: Supple, with full range of motion. No jugular venous distention. Trachea midline. Respiratory:  Normal respiratory effort. Clear to auscultation, bilaterally without Rales/Wheezes/Rhonchi. Cardiovascular:  Regular rate and rhythm with normal S1/S2 without murmurs, rubs or gallops. Abdomen: Soft, non-tender, non-distended with normal bowel sounds. Musculoskeletal:  No clubbing, cyanosis or edema bilaterally. Full range of motion without deformity. Skin: Skin color, texture, turgor normal.  No rashes or lesions. Neurologic:  Neurovascularly intact without any focal sensory/motor deficits. Cranial nerves: II-XII intact, grossly non-focal.  Psychiatric:  Alert and oriented, thought content appropriate, normal insight  Capillary Refill: Brisk,< 3 seconds   Peripheral Pulses: +2 palpable, equal bilaterally       Labs: For convenience and continuity at follow-up the following most recent labs are provided:      CBC:    Lab Results   Component Value Date    WBC 6.4 05/29/2020    HGB 11.6 05/29/2020    HCT 33.3 05/29/2020     05/29/2020       Renal:    Lab Results   Component Value Date     05/31/2020    K 3.6 05/31/2020     05/31/2020    CO2 22 05/31/2020    BUN 5 05/31/2020    CREATININE 0.7 05/31/2020    CALCIUM 9.4 05/31/2020         Significant Diagnostic Studies    Radiology:   XR CHEST STANDARD (2 VW)   Final Result   Small amount of perihilar bilateral pulmonary disease, which may either be   edema or pneumonia. Consults:     IP CONSULT TO HOSPITALIST  IP CONSULT TO HOME CARE NEEDS    Disposition:  home     Condition at Discharge: Stable    Discharge Instructions/Follow-up:  PCP 1 week.      Code Status:  Full Code     Activity: activity as tolerated    Diet: regular diet      Discharge Medications:     Current Discharge Medication List           Details   diclofenac sodium (VOLTAREN) 1 % GEL Apply 2 g topically 4 times daily as needed (joint pain)  Qty: 1 Tube, Refills: 2      doxycycline hyclate (VIBRA-TABS) 100 MG tablet Take 1 tablet by mouth 2 times daily for 5 days  Qty: 10 tablet, Refills: 0              Details   busPIRone (BUSPAR) 5 MG tablet Take 1 tablet by mouth daily Recent change in medication dose from Dr Scooby Salinas, 2 days prior to admission. Qty: 30 tablet, Refills: 0      citalopram (CELEXA) 20 MG tablet Take 1 tablet by mouth daily  Qty: 30 tablet, Refills: 0              Details   rosuvastatin (CRESTOR) 20 MG tablet TAKE 1 TABLET EVERY DAY  Qty: 90 tablet, Refills: 1      melatonin 3 MG TABS tablet Take 3 mg by mouth nightly as needed      amLODIPine (NORVASC) 10 MG tablet Take 0.5 tablets by mouth daily  Qty: 15 tablet, Refills: 0      loratadine (CLARITIN) 10 MG capsule Take 10 mg by mouth daily      aspirin EC 81 MG EC tablet Take 1 tablet by mouth daily  Qty: 30 tablet, Refills: 3      dicyclomine (BENTYL) 10 MG capsule Take 10 mg by mouth 4 times daily (before meals and nightly)      polyethylene glycol (GLYCOLAX) packet Take 17 g by mouth daily              Time Spent on discharge is more than 30 minutes in the examination, evaluation, counseling and review of medications and discharge plan. Signed:    Pearl Delong MD   5/31/2020      Thank you Genevieve Carrington MD for the opportunity to be involved in this patient's care. If you have any questions or concerns please feel free to contact me at [phone].

## 2020-05-31 NOTE — CARE COORDINATION
DISCHARGE ORDER  Date/Time 2020 10:36 AM  Completed by: Jesus Riggins, Case Management    Patient Name: Jeevan Elias    : 1945  Admitting Diagnosis: Pre-syncope [R55]      Admit order Date and Status: OBS  (verify MD's last order for status of admission)      Noted discharge order. Confirmed discharge plan with patient who is alert and oriented. Discharge Plan: DC home and resume [facility] services with [facility]. Refuses SNF placement (recommended). Pt agreeable to this DCP. Family will provide transportation home. Reviewed chart. Role of discharge planner explained and patient verbalized understanding. Discharge order is noted. Has Home O2 in place on admit:  No    Pt is being d/c'd to home today. Pt's O2 sats are 94% on RA. Discharge timeout done with Salma Trejo RN. All discharge needs and concerns addressed.

## 2020-05-31 NOTE — DISCHARGE INSTR - COC
Continuity of Care Form    Patient Name: Alexis Bustos   :  1945  MRN:  [id number]    Admit date:  2020  Discharge date:  2020    Code Status Order: Full Code   Advance Directives:   [facility] Documentation     Date/Time Healthcare Directive Type of Healthcare Directive Copy in [facility] [address] Agent's Name Healthcare Agent's Phone Number    [phone]  Yes, patient has an advance directive for healthcare treatment  Health care treatment directive  Yes, copy in chart  Healthcare power of   Deanne Acosta  --          Admitting Physician:  Harmony Granados MD  PCP: Johana Schirmer, MD    Discharging Nurse: Verito Concepcion Paul Natanael Unit/Room#: /5832-56  Discharging Unit Phone Number: [phone]    Emergency Contact:   Extended Emergency Contact Information  Primary Emergency Contact: Raymond Shipley  Address: [address] [place] [address] Phone: [phone]  Relation: Spouse  Secondary Emergency Contact: [address] [place] [address] Phone: [phone]  Relation: Child   needed?  No    Past Surgical History:  Past Surgical History:   Procedure Laterality Date    APPENDECTOMY      CHOLECYSTECTOMY      COLON SURGERY      COLONOSCOPY      mass    COLONOSCOPY  3/29/16    normal       Immunization History:   Immunization History   Administered Date(s) Administered    Influenza Vaccine, unspecified formulation 2016    Influenza Virus Vaccine 2014, 2016    Influenza, High Dose (Fluzone 65 yrs and older) 10/20/2016, 2019    Influenza, Quadv, IM, PF (6 mo and older Fluzone, Flulaval, Fluarix, and 3 yrs and older Afluria) 2018    Pneumococcal Conjugate 13-valent ([id number]) 10/20/2016    Pneumococcal Polysaccharide ([id number]) 2015, 2016    Tdap (Boostrix, Adacel) 2012       Active Problems:  Patient

## 2020-06-09 ENCOUNTER — OFFICE VISIT (OUTPATIENT)
Dept: ORTHOPEDIC SURGERY | Age: 75
End: 2020-06-09
Payer: MEDICARE

## 2020-06-09 VITALS — WEIGHT: 194.22 LBS | HEIGHT: 66 IN | BODY MASS INDEX: 31.21 KG/M2

## 2020-06-09 PROBLEM — M25.531 RIGHT WRIST PAIN: Status: ACTIVE | Noted: 2020-06-09

## 2020-06-09 PROBLEM — S63.501A RIGHT WRIST SPRAIN, INITIAL ENCOUNTER: Status: ACTIVE | Noted: 2020-06-09

## 2020-06-09 PROBLEM — S46.011A ROTATOR CUFF STRAIN, RIGHT, INITIAL ENCOUNTER: Status: ACTIVE | Noted: 2020-06-09

## 2020-06-09 PROBLEM — M25.511 ACUTE PAIN OF RIGHT SHOULDER: Status: ACTIVE | Noted: 2020-06-09

## 2020-06-09 PROCEDURE — 1090F PRES/ABSN URINE INCON ASSESS: CPT | Performed by: ORTHOPAEDIC SURGERY

## 2020-06-09 PROCEDURE — L3660 SO 8 AB RSTR CAN/WEB PRE OTS: HCPCS | Performed by: ORTHOPAEDIC SURGERY

## 2020-06-09 PROCEDURE — 99203 OFFICE O/P NEW LOW 30 MIN: CPT | Performed by: ORTHOPAEDIC SURGERY

## 2020-06-09 PROCEDURE — G8417 CALC BMI ABV UP PARAM F/U: HCPCS | Performed by: ORTHOPAEDIC SURGERY

## 2020-06-09 PROCEDURE — L3908 WHO COCK-UP NONMOLDE PRE OTS: HCPCS | Performed by: ORTHOPAEDIC SURGERY

## 2020-06-09 PROCEDURE — G8427 DOCREV CUR MEDS BY ELIG CLIN: HCPCS | Performed by: ORTHOPAEDIC SURGERY

## 2020-06-09 NOTE — PROGRESS NOTES
tablet Take 1 tablet by mouth daily 30 tablet 0    diclofenac sodium (VOLTAREN) 1 % GEL Apply 2 g topically 4 times daily as needed (joint pain) 1 Tube 2    rosuvastatin (CRESTOR) 20 MG tablet TAKE 1 TABLET EVERY DAY 90 tablet 1    melatonin 3 MG TABS tablet Take 3 mg by mouth nightly as needed      amLODIPine (NORVASC) 10 MG tablet Take 0.5 tablets by mouth daily 15 tablet 0    loratadine (CLARITIN) 10 MG capsule Take 10 mg by mouth daily      aspirin EC 81 MG EC tablet Take 1 tablet by mouth daily 30 tablet 3    dicyclomine (BENTYL) 10 MG capsule Take 10 mg by mouth 4 times daily (before meals and nightly)      polyethylene glycol (GLYCOLAX) packet Take 17 g by mouth daily        No current facility-administered medications for this visit.         Social    Social History     Socioeconomic History    Marital status:      Spouse name: Not on file    Number of children: Not on file    Years of education: Not on file    Highest education level: Not on file   Occupational History    Not on file   Social Needs    Financial resource strain: Not on file    Food insecurity     Worry: Not on file     Inability: Not on file    Transportation needs     Medical: Not on file     Non-medical: Not on file   Tobacco Use    Smoking status: Former Smoker     Packs/day: 1.00     Years: 10.00     Pack years: 10.00     Last attempt to quit: 1983     Years since quittin.7    Smokeless tobacco: Never Used   Substance and Sexual Activity    Alcohol use: No    Drug use: No    Sexual activity: Not on file   Lifestyle    Physical activity     Days per week: Not on file     Minutes per session: Not on file    Stress: Not on file   Relationships    Social connections     Talks on phone: Not on file     Gets together: Not on file     Attends Synagogue service: Not on file     Active member of club or organization: Not on file     Attends meetings of clubs or organizations: Not on file     Relationship

## 2020-07-16 NOTE — PROGRESS NOTES
[address]   CARDIAC EVALUATION NOTE  [phone]      PCP:  Keaton Vasquez MD    Reason for Consultation/Chief Complaint: follow up for CAD and dizziness    Subjective   History of Present Illness:  Jehovah Witness   Allen Esteban is a 75 y.o. patient with a history of CAD, HT and HLD who presents for follow up. Her echo from 01/17/17 showed her EF was 55% with grade I DD. Her stress test from 07/17/17 showed no evidence of ischemia. On 03/02/18 she presented for chest pain, VELASQUEZ, dizziness, syncope and HTN. The pain is located on left side of her chest. She has been awaken from sleep with this pain. It occurs more often with activity. She has had the pain for a few months but it has worsened recently. She also admits to being short of breath with exertion. She has been getting dizzy. This occurrs most often when she gets up from sitting position. He sister states patient has had syncopal episodes. Last episode was in 09/17/17. She has dizziness prior to her syncopal episodes. Her cardiac cath from 03/07/18 showed moderate CAD of mid LAD, mild distal LAD, prox circumflex and mid RCA. She was started on Ranexa following the cardiac cath for anginal pain. She reports resolution of her chest pain since then. She is still short of breath with exertion. She presented to the ER on 10/09/18 with CP. His trop was negative x2. She was admitted 05/17-05/20/20 with AMS and UTI. She seemed to have an allergic reaction on bactrim that was started as outpatient for UTI. She was given IV steroids, benadryl and pepcid. She also had tachycardia during admission. She received IV fluids slowly to help with hyponatremia and UTI. She was admitted 05/28-05/31/20 for pre-syncope due to [place]. Her norvasc was decreased and the lisinopril was discontinued. Her echo from 05/28/20 showed her EF was 55% with grade I DD. Today she reports she has dizziness with standing occasionally.  She denies CP, SOB, palpitations or syncope. Past Medical History:   has a past medical history of Benign essential hypertension, Bowel obstruction ([facility] [address] Cancer ([facility] [address] GERD (gastroesophageal reflux disease), Hypertension, and Irritable bowel syndrome (IBS). Surgical History:   has a past surgical history that includes Colon surgery; Cholecystectomy; Appendectomy; Colonoscopy (2014); and Colonoscopy (3/29/16). Social History: Kristi Vu    reports that she quit smoking about 36 years ago. She has a 10.00 pack-year smoking history. She has never used smokeless tobacco. She reports that she does not drink alcohol or use drugs. Family History:  family history includes Cancer in her brother and mother; Heart Disease in her brother and sister. Home Medications:  Were reviewed and are listed in nursing record and/or below  Prior to Admission medications    Medication Sig Start Date End Date Taking? Authorizing Provider   montelukast (SINGULAIR) 10 MG tablet Take 10 mg by mouth nightly   Yes Historical Provider, MD   fludrocortisone (FLORINEF) 0.1 MG tablet Take 0.1 mg by mouth 2 times daily   Yes Historical Provider, MD   FLUoxetine HCl (PROZAC PO) Take by mouth   Yes Historical Provider, MD   busPIRone (BUSPAR) 5 MG tablet Take 1 tablet by mouth daily Recent change in medication dose from Dr Yandy Valenzuela, 2 days prior to admission. Patient taking differently: Take 5 mg by mouth 3 times daily Recent change in medication dose from Dr Yandy Valenzuela, 2 days prior to admission.  5/31/20  Yes John Blackman MD   rosuvastatin (CRESTOR) 20 MG tablet TAKE 1 TABLET EVERY DAY 4/29/20  Yes Keon Black MD   amLODIPine (NORVASC) 10 MG tablet Take 0.5 tablets by mouth daily 7/25/19 7/17/20 Yes Melodie Dong PA-C   loratadine (CLARITIN) 10 MG capsule Take 10 mg by mouth daily   Yes Historical Provider, MD   aspirin EC 81 MG EC tablet Take 1 tablet by mouth daily 3/2/18  Yes James Franks MD   dicyclomine (BENTYL) 10 MG capsule Take 10 mg by Pysch: Normal mood and affect   Neurologic: Normal gross motor and sensory exam.         Labs   CBC:   Lab Results   Component Value Date    WBC 6.4 2020    RBC 3.66 2020    HGB 11.6 2020    HCT 33.3 2020    MCV 90.8 2020    RDW 12.8 2020     2020     CMP:  Lab Results   Component Value Date     2020    K 3.6 2020     2020    CO2 22 2020    BUN 5 2020    CREATININE 0.7 2020    GFRAA >60 2020    AGRATIO 1.8 2020    LABGLOM >60 2020    GLUCOSE 110 2020    PROT 6.6 2020    CALCIUM 9.4 2020    BILITOT 0.4 2020    ALKPHOS 83 2020    AST 19 2020    ALT 17 2020     PT/INR:  No results found for: PTINR  HgBA1c:No results found for: LABA1C  Lab Results   Component Value Date    TROPONINI <0.01 2020         Cardiac Data     Last EK20  SR HR 82 PACs      Echo: 17  Normal left ventricular systolic function with an estimated ejection   fraction of 55%.   Grade I diastolic dysfunction with normal filling pressure.   Systolic pulmonary artery pressure (SPAP) is normal and estimated at 32 mmHg   (RA pressure 3 mmHg).    Echo: 20   Summary   LV systolic function is normal with EF estimated at 55%. No regional wall motion abnormalities. There is mild concentric left ventricular hypertrophy. Grade I diastolic dysfunction with normal LV filling pressure. Aortic valve appears sclerotic but opens adequately. Systolic pulmonary artery pressure (SPAP) is normal estimated at 25mmHg (RAP   of 8mmHg).    Stress Test: 17  Summary  There is normal isotope uptake at stress and rest. There is no evidence of  myocardial ischemia or scar. Hyperdynamic LV systolic function with KD>53%  with uniform wall motion.  Low risk study.         Cath: 18  IMPRESSION:  1.  Moderate CAD of mid LAD as well as mild CAD of distal LAD, proximal  circumflex and mid RCA. 2.  Normal left ventricular systolic function. 3.  Normal left ventricular filling pressure. RECOMMENDATION:  The patient has moderate disease of mid LAD. There was no  high-grade disease noted.  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  Will start her on Maurie Tone will  avoid oral nitrates as she does have a history of vasodepressor syncope and  the nitrates can make it worse.  She is already on Lisinopril, which she  will continue as ACE inhibitors have shown to be benfical in microvascular  angina.  I will also start her on moderate-dose of Lipitor for the coronary  artery disease.  She will continue with low-dose of aspirin. Studies:       I have reviewed labs and imaging/xray/diagnostic testing in this note. Assessment      1. Coronary artery disease of native artery of native heart with stable angina pectoris ([address]    2. Dyslipidemia    3. Essential hypertension    4. Mixed hyperlipidemia    5. Cardiac microvascular disease    6. HTN (hypertension), benign    7. Orthostatic hypotension              Plan   1. Recommend ankle and leg exercises for episodes of orthostatic hypotension with standing  2. Continue current medications  3. Follow up in 1 year      Scribe's attestation: This note was scribed in the presence of Dr. Tala Germain by Amador Zapata RN      Thank you for allowing us to participate in the care of Sony Fuller. Please call me with any questions [phone]. Tala Germain MD, [facility]   Interventional Cardiologist  [facility]  [phone] [facility]  [phone] [address] [place] [address]  7/17/2020 2:51 PM    I will address the patient's cardiac risk factors and adjusted pharmacologic treatment as needed. In addition, I have reinforced the need for patient directed risk factor modification. Tobacco use was discussed with the patient and educated on the negative effects and was asked not to use.  All questions and concerns were addressed to the patient/family. Alternatives to my treatment were discussed. I, Dr Angelica Petit, personally performed the services described in this documentation, as scribed by the above signed scribe in my presence. It is both accurate and complete to my knowledge. I agree with the details independently gathered by the clinical support staff and the scribed note accurately describes my personal service to the patient.

## 2020-07-17 ENCOUNTER — OFFICE VISIT (OUTPATIENT)
Dept: CARDIOLOGY CLINIC | Age: 75
End: 2020-07-17
Payer: MEDICARE

## 2020-07-17 VITALS
OXYGEN SATURATION: 97 % | BODY MASS INDEX: 31.57 KG/M2 | HEART RATE: 85 BPM | DIASTOLIC BLOOD PRESSURE: 78 MMHG | WEIGHT: 189.5 LBS | HEIGHT: 65 IN | SYSTOLIC BLOOD PRESSURE: 124 MMHG

## 2020-07-17 PROCEDURE — G8400 PT W/DXA NO RESULTS DOC: HCPCS | Performed by: INTERNAL MEDICINE

## 2020-07-17 PROCEDURE — G8417 CALC BMI ABV UP PARAM F/U: HCPCS | Performed by: INTERNAL MEDICINE

## 2020-07-17 PROCEDURE — 1036F TOBACCO NON-USER: CPT | Performed by: INTERNAL MEDICINE

## 2020-07-17 PROCEDURE — 4040F PNEUMOC VAC/ADMIN/RCVD: CPT | Performed by: INTERNAL MEDICINE

## 2020-07-17 PROCEDURE — 1090F PRES/ABSN URINE INCON ASSESS: CPT | Performed by: INTERNAL MEDICINE

## 2020-07-17 PROCEDURE — 1123F ACP DISCUSS/DSCN MKR DOCD: CPT | Performed by: INTERNAL MEDICINE

## 2020-07-17 PROCEDURE — 99213 OFFICE O/P EST LOW 20 MIN: CPT | Performed by: INTERNAL MEDICINE

## 2020-07-17 PROCEDURE — G8427 DOCREV CUR MEDS BY ELIG CLIN: HCPCS | Performed by: INTERNAL MEDICINE

## 2020-07-17 RX ORDER — MONTELUKAST SODIUM 10 MG/1
10 TABLET ORAL NIGHTLY
COMMUNITY
End: 2020-10-23 | Stop reason: ALTCHOICE

## 2020-07-17 RX ORDER — FLUDROCORTISONE ACETATE 0.1 MG/1
0.1 TABLET ORAL 2 TIMES DAILY
COMMUNITY
End: 2020-12-08 | Stop reason: ALTCHOICE

## 2020-08-10 ENCOUNTER — TELEPHONE (OUTPATIENT)
Dept: FAMILY MEDICINE CLINIC | Age: 75
End: 2020-08-10

## 2020-09-30 ENCOUNTER — TELEMEDICINE (OUTPATIENT)
Dept: FAMILY MEDICINE CLINIC | Age: 75
End: 2020-09-30
Payer: MEDICARE

## 2020-09-30 PROCEDURE — 1090F PRES/ABSN URINE INCON ASSESS: CPT | Performed by: NURSE PRACTITIONER

## 2020-09-30 PROCEDURE — 99204 OFFICE O/P NEW MOD 45 MIN: CPT | Performed by: NURSE PRACTITIONER

## 2020-09-30 PROCEDURE — G8400 PT W/DXA NO RESULTS DOC: HCPCS | Performed by: NURSE PRACTITIONER

## 2020-09-30 PROCEDURE — 1123F ACP DISCUSS/DSCN MKR DOCD: CPT | Performed by: NURSE PRACTITIONER

## 2020-09-30 PROCEDURE — G8427 DOCREV CUR MEDS BY ELIG CLIN: HCPCS | Performed by: NURSE PRACTITIONER

## 2020-09-30 PROCEDURE — 4040F PNEUMOC VAC/ADMIN/RCVD: CPT | Performed by: NURSE PRACTITIONER

## 2020-09-30 RX ORDER — DIAZEPAM 5 MG/1
5 TABLET ORAL EVERY 6 HOURS PRN
COMMUNITY
End: 2021-01-15 | Stop reason: ALTCHOICE

## 2020-09-30 RX ORDER — OMEGA-3 FATTY ACIDS CAP DELAYED RELEASE 1000 MG 1000 MG
3000 CAPSULE DELAYED RELEASE ORAL
COMMUNITY
End: 2021-11-24 | Stop reason: ALTCHOICE

## 2020-09-30 RX ORDER — CITALOPRAM 40 MG/1
40 TABLET ORAL DAILY
COMMUNITY
End: 2021-05-19 | Stop reason: SDUPTHER

## 2020-09-30 ASSESSMENT — ENCOUNTER SYMPTOMS: RESPIRATORY NEGATIVE: 1

## 2020-09-30 NOTE — PROGRESS NOTES
2020    TELEHEALTH EVALUATION -- Audio/Visual (During QVOGI-02 public health emergency)    HPI:    Roselyn Cartwright (:  1945) has requested an audio/video evaluation for the following concern(s):    HPI    Ms. Mar Veliz presents for a virtual visit today to discuss her memory. She is establishing care. She is seeing Dr. Raleigh Marin with cardiology and Dr. Lisseth Smith with neurology. She said she saw Dr. Lisseth Smith once last week and has yet to follow up. She was seeing Dr. Dereck Rea as her previous PCP. She admits to a past medical history of IBS, colon cancer, CAD, orthostatic hypotension, hypertension, hyperlipidemia. She states she has been having confusion. She cannot remember hardly anything anymore. She has a hard time concentrating. She has a hard time remembering recent events. Her family has told her she is more emotional and Olden  admits to feeling anxious frequently. She admits to hearing people in her home when nobody is there. Her family is concerned about her having dementia. Review of Systems   Constitutional: Negative. Respiratory: Negative. Cardiovascular: Negative. Skin: Negative. Neurological: Negative. Negative for dizziness, tremors, seizures, syncope, facial asymmetry, speech difficulty, weakness, light-headedness, numbness and headaches. Psychiatric/Behavioral: Positive for agitation, confusion, decreased concentration, dysphoric mood and hallucinations (Auditory). Negative for behavioral problems, self-injury, sleep disturbance and suicidal ideas. The patient is nervous/anxious. The patient is not hyperactive. Prior to Visit Medications    Medication Sig Taking? Authorizing Provider   citalopram (CELEXA) 40 MG tablet Take 40 mg by mouth daily Yes Historical Provider, MD   diazePAM (VALIUM) 5 MG tablet Take 5 mg by mouth every 6 hours as needed for Anxiety.  1/2 pill two times a day Yes Historical Provider, MD   Multiple Vitamin (MULTI-VITAMIN DAILY PO) Take by mouth Yes Historical Provider, MD   Omega-3 Fatty Acids (FISH OIL) 1000 MG CPDR Take 3,000 mg by mouth Yes Historical Provider, MD   Calcium Carbonate-Vitamin D (CALCIUM 500 + D) 500-125 MG-UNIT TABS Take by mouth Yes Historical Provider, MD   Coenzyme Q10 (CO Q 10 PO) Take by mouth Yes Historical Provider, MD   montelukast (SINGULAIR) 10 MG tablet Take 10 mg by mouth nightly Yes Historical Provider, MD   fludrocortisone (FLORINEF) 0.1 MG tablet Take 0.1 mg by mouth 2 times daily Yes Historical Provider, MD   busPIRone (BUSPAR) 5 MG tablet Take 1 tablet by mouth daily Recent change in medication dose from Dr Freda Argueta, 2 days prior to admission.   Patient taking differently: Take 15 mg by mouth 3 times daily  Yes Isabel Chinchilla MD   rosuvastatin (CRESTOR) 20 MG tablet TAKE 1 TABLET EVERY DAY Yes Rao Recio MD   loratadine (CLARITIN) 10 MG capsule Take 10 mg by mouth daily Yes Historical Provider, MD   aspirin EC 81 MG EC tablet Take 1 tablet by mouth daily Yes Alessandro Douglas MD   dicyclomine (BENTYL) 10 MG capsule Take 20 mg by mouth 4 times daily (before meals and nightly)  Yes Historical Provider, MD   polyethylene glycol (GLYCOLAX) packet Take 17 g by mouth daily  Yes Historical Provider, MD   amLODIPine (NORVASC) 10 MG tablet Take 0.5 tablets by mouth daily  Melodie Dong PA-C       Social History     Tobacco Use    Smoking status: Former Smoker     Packs/day: 1.00     Years: 10.00     Pack years: 10.00     Last attempt to quit: 1983     Years since quittin.0    Smokeless tobacco: Never Used   Substance Use Topics    Alcohol use: No    Drug use: No        Allergies   Allergen Reactions    Bactrim [Sulfamethoxazole-Trimethoprim] Anaphylaxis    Ultram [Tramadol Hcl] Anaphylaxis     stridor    Dilaudid [Hydromorphone] Other (See Comments)     Hallucinations    Phenergan [Promethazine] Nausea Only    Lipitor [Atorvastatin]      Nausea    Penicillins    ,   Past Medical History:   Diagnosis Date    Anxiety     Benign essential hypertension 2017    Bowel obstruction (HCC)     CAD (coronary artery disease)     Cancer ([facility] [address] 2012    colon    Depression     GERD (gastroesophageal reflux disease)     Hyperlipidemia     Hypertension     Irritable bowel syndrome (IBS)     Obesity     Osteoarthritis    ,   Past Surgical History:   Procedure Laterality Date    APPENDECTOMY      CHOLECYSTECTOMY      COLON SURGERY      COLONOSCOPY      mass    COLONOSCOPY  3/29/16    normal   ,   Social History     Tobacco Use    Smoking status: Former Smoker     Packs/day: 1.00     Years: 10.00     Pack years: 10.00     Last attempt to quit: 1983     Years since quittin.0    Smokeless tobacco: Never Used   Substance Use Topics    Alcohol use: No    Drug use: No   ,   Family History   Problem Relation Age of Onset    Cancer Mother         colon    Cancer Brother         colon    Heart Disease Sister     Heart Disease Brother    ,   Immunization History   Administered Date(s) Administered    Influenza Vaccine, unspecified formulation 2016    Influenza Virus Vaccine 2014, 2016    Influenza, High Dose (Fluzone 65 yrs and older) 10/20/2016, 2019    Influenza, Quadv, IM, PF (6 mo and older Fluzone, Flulaval, Fluarix, and 3 yrs and older Afluria) 2018    Pneumococcal Conjugate 13-valent ([id number]) 10/20/2016    Pneumococcal Polysaccharide ([id number]) 2015, 2016    Tdap (Boostrix, Adacel) 2012   ,   Health Maintenance   Topic Date Due    Shingles Vaccine (1 of 2) 1994    DEXA (modify frequency per FRAX score)  1999    Lipid screen  2019    Annual Wellness Visit (AWV)  2019    Flu vaccine (1) 2020    Potassium monitoring  2021    Creatinine monitoring  2021    DTaP/Tdap/Td vaccine (2 - Td) 2022    Pneumococcal 65+ years Vaccine  Completed    Hepatitis A vaccine  Aged C/ Lionel Mackenzie 18 small remote strokes, concern for vascular dementia. Referral placed to [address] [place] [address] Neurology as patient does not seem comfortable with her current neurologist.  Recommend blood work to check for causes of her memory loss including B12. She is scheduled next week for blood work and then an in office appointment the following week. As far as her CAD, she is to continue to follow up with cardiology. Diagnoses and all orders for this visit:    Encounter to establish care    MCI (mild cognitive impairment)  -     MIKI Ponce MD, Neurology, [facility]    Memory loss  -     MIKI Ponce MD, Neurology, [facility]    Coronary artery disease of native artery of native heart with stable angina pectoris [facility])        Return in about 4 weeks (around 10/28/2020) for In office appt - 60 min. Spenser Spears is a 67 y.o. female being evaluated by a Virtual Visit (video visit) encounter to address concerns as mentioned above. A caregiver was present when appropriate. Due to this being a TeleHealth encounter (During [facility]-79 public health emergency), evaluation of the following organ systems was limited: Vitals/Constitutional/EENT/Resp/CV/GI//MS/Neuro/Skin/Heme-Lymph-Imm. Pursuant to the emergency declaration under the [address] [place] [address], [address] [place] [address] authority and the [employer] and [employer] Act, this Virtual Visit was conducted with patient's (and/or legal guardian's) consent, to reduce the patient's risk of exposure to COVID-19 and provide necessary medical care. The patient (and/or legal guardian) has also been advised to contact this office for worsening conditions or problems, and seek emergency medical treatment and/or call 911 if deemed necessary.      Patient identification was verified at the start of the visit: Yes     Services were provided through a video synchronous discussion virtually to substitute for in-person clinic visit. Patient and provider were located at their individual homes. --[facility], APRN - CNP on 9/30/2020 at 4:46 PM    An electronic signature was used to authenticate this note. Patient should call the office immediately with new or ongoing signs or symptoms or worsening, or proceed to the emergency room. All entries in chief complaint and history of present illness are reviewed and validated by me. No changes in past medical history, past surgical history, social history, or family history were noted during the patient encounter unless specifically listed above. All updates of past medical history, past surgical history, social history, or family history were reviewed personally by me during the office visit. All problems listed in the assessment are stable unless noted otherwise. Medication profile reviewed personally by me during the office visit. Medication side effects and possible impairments from medications were discussed as applicable. Every effort has been made to assure accurate transcription by this voice recognition software.   However, mistakes in transcription may still occur

## 2020-10-06 ENCOUNTER — NURSE ONLY (OUTPATIENT)
Dept: FAMILY MEDICINE CLINIC | Age: 75
End: 2020-10-06
Payer: MEDICARE

## 2020-10-06 LAB
A/G RATIO: 1.7 (ref 1.1–2.2)
ALBUMIN SERPL-MCNC: 4.2 G/DL (ref 3.4–5)
ALP BLD-CCNC: 78 U/L (ref 40–129)
ALT SERPL-CCNC: 13 U/L (ref 10–40)
ANION GAP SERPL CALCULATED.3IONS-SCNC: 12 MMOL/L (ref 3–16)
AST SERPL-CCNC: 22 U/L (ref 15–37)
BASOPHILS ABSOLUTE: 0 K/UL (ref 0–0.2)
BASOPHILS RELATIVE PERCENT: 0.7 %
BILIRUB SERPL-MCNC: 0.6 MG/DL (ref 0–1)
BUN BLDV-MCNC: 15 MG/DL (ref 7–20)
CALCIUM SERPL-MCNC: 9.7 MG/DL (ref 8.3–10.6)
CHLORIDE BLD-SCNC: 94 MMOL/L (ref 99–110)
CHOLESTEROL, TOTAL: 111 MG/DL (ref 0–199)
CO2: 30 MMOL/L (ref 21–32)
CREAT SERPL-MCNC: 0.8 MG/DL (ref 0.6–1.2)
EOSINOPHILS ABSOLUTE: 0.2 K/UL (ref 0–0.6)
EOSINOPHILS RELATIVE PERCENT: 4 %
FOLATE: 14.39 NG/ML (ref 4.78–24.2)
GFR AFRICAN AMERICAN: >60
GFR NON-AFRICAN AMERICAN: >60
GLOBULIN: 2.5 G/DL
GLUCOSE BLD-MCNC: 98 MG/DL (ref 70–99)
HCT VFR BLD CALC: 34.7 % (ref 36–48)
HDLC SERPL-MCNC: 68 MG/DL (ref 40–60)
HEMOGLOBIN: 12 G/DL (ref 12–16)
LDL CHOLESTEROL CALCULATED: 29 MG/DL
LYMPHOCYTES ABSOLUTE: 1.1 K/UL (ref 1–5.1)
LYMPHOCYTES RELATIVE PERCENT: 21.3 %
MCH RBC QN AUTO: 30.2 PG (ref 26–34)
MCHC RBC AUTO-ENTMCNC: 34.7 G/DL (ref 31–36)
MCV RBC AUTO: 87 FL (ref 80–100)
MONOCYTES ABSOLUTE: 0.6 K/UL (ref 0–1.3)
MONOCYTES RELATIVE PERCENT: 12.1 %
NEUTROPHILS ABSOLUTE: 3.3 K/UL (ref 1.7–7.7)
NEUTROPHILS RELATIVE PERCENT: 61.9 %
PDW BLD-RTO: 13.1 % (ref 12.4–15.4)
PLATELET # BLD: 210 K/UL (ref 135–450)
PMV BLD AUTO: 8.5 FL (ref 5–10.5)
POTASSIUM SERPL-SCNC: 2.5 MMOL/L (ref 3.5–5.1)
RBC # BLD: 3.99 M/UL (ref 4–5.2)
SODIUM BLD-SCNC: 136 MMOL/L (ref 136–145)
T4 FREE: 1.5 NG/DL (ref 0.9–1.8)
TOTAL PROTEIN: 6.7 G/DL (ref 6.4–8.2)
TRIGL SERPL-MCNC: 68 MG/DL (ref 0–150)
TSH REFLEX: 4.58 UIU/ML (ref 0.27–4.2)
VITAMIN B-12: 366 PG/ML (ref 211–911)
VITAMIN D 25-HYDROXY: 44.2 NG/ML
VLDLC SERPL CALC-MCNC: 14 MG/DL
WBC # BLD: 5.3 K/UL (ref 4–11)

## 2020-10-06 PROCEDURE — 36415 COLL VENOUS BLD VENIPUNCTURE: CPT | Performed by: NURSE PRACTITIONER

## 2020-10-07 ENCOUNTER — HOSPITAL ENCOUNTER (OUTPATIENT)
Age: 75
Discharge: HOME | End: 2020-10-07
Payer: MEDICARE

## 2020-10-07 LAB — POTASSIUM SERPL-SCNC: 2.7 MMOL/L (ref 3.5–5.1)

## 2020-10-07 PROCEDURE — 36415 COLL VENOUS BLD VENIPUNCTURE: CPT

## 2020-10-07 PROCEDURE — 84132 ASSAY OF SERUM POTASSIUM: CPT

## 2020-10-08 DIAGNOSIS — R79.89 ABNORMAL CBC: ICD-10-CM

## 2020-10-08 LAB
IRON SATURATION: 22 % (ref 15–50)
IRON: 80 UG/DL (ref 37–145)
TOTAL IRON BINDING CAPACITY: 363 UG/DL (ref 260–445)

## 2020-10-08 RX ORDER — POTASSIUM CHLORIDE 20 MEQ/1
TABLET, EXTENDED RELEASE ORAL
Qty: 60 TABLET | Refills: 0 | Status: SHIPPED | OUTPATIENT
Start: 2020-10-08 | End: 2020-11-07

## 2020-10-13 ENCOUNTER — OFFICE VISIT (OUTPATIENT)
Dept: FAMILY MEDICINE CLINIC | Age: 75
End: 2020-10-13
Payer: MEDICARE

## 2020-10-13 VITALS
DIASTOLIC BLOOD PRESSURE: 78 MMHG | TEMPERATURE: 97.3 F | OXYGEN SATURATION: 97 % | WEIGHT: 194 LBS | HEIGHT: 65 IN | BODY MASS INDEX: 32.32 KG/M2 | HEART RATE: 81 BPM | SYSTOLIC BLOOD PRESSURE: 104 MMHG

## 2020-10-13 LAB
ANION GAP SERPL CALCULATED.3IONS-SCNC: 10 MMOL/L (ref 3–16)
BUN BLDV-MCNC: 15 MG/DL (ref 7–20)
CALCIUM SERPL-MCNC: 9.6 MG/DL (ref 8.3–10.6)
CHLORIDE BLD-SCNC: 102 MMOL/L (ref 99–110)
CO2: 28 MMOL/L (ref 21–32)
CREAT SERPL-MCNC: 0.8 MG/DL (ref 0.6–1.2)
GFR AFRICAN AMERICAN: >60
GFR NON-AFRICAN AMERICAN: >60
GLUCOSE BLD-MCNC: 101 MG/DL (ref 70–99)
MAGNESIUM: 2 MG/DL (ref 1.8–2.4)
POTASSIUM SERPL-SCNC: 4.4 MMOL/L (ref 3.5–5.1)
SODIUM BLD-SCNC: 140 MMOL/L (ref 136–145)

## 2020-10-13 PROCEDURE — 4040F PNEUMOC VAC/ADMIN/RCVD: CPT | Performed by: NURSE PRACTITIONER

## 2020-10-13 PROCEDURE — 1090F PRES/ABSN URINE INCON ASSESS: CPT | Performed by: NURSE PRACTITIONER

## 2020-10-13 PROCEDURE — 99215 OFFICE O/P EST HI 40 MIN: CPT | Performed by: NURSE PRACTITIONER

## 2020-10-13 PROCEDURE — G0008 ADMIN INFLUENZA VIRUS VAC: HCPCS | Performed by: NURSE PRACTITIONER

## 2020-10-13 PROCEDURE — G8427 DOCREV CUR MEDS BY ELIG CLIN: HCPCS | Performed by: NURSE PRACTITIONER

## 2020-10-13 PROCEDURE — 90686 IIV4 VACC NO PRSV 0.5 ML IM: CPT | Performed by: NURSE PRACTITIONER

## 2020-10-13 PROCEDURE — G8417 CALC BMI ABV UP PARAM F/U: HCPCS | Performed by: NURSE PRACTITIONER

## 2020-10-13 PROCEDURE — 1036F TOBACCO NON-USER: CPT | Performed by: NURSE PRACTITIONER

## 2020-10-13 PROCEDURE — G8482 FLU IMMUNIZE ORDER/ADMIN: HCPCS | Performed by: NURSE PRACTITIONER

## 2020-10-13 PROCEDURE — 1123F ACP DISCUSS/DSCN MKR DOCD: CPT | Performed by: NURSE PRACTITIONER

## 2020-10-13 PROCEDURE — G8400 PT W/DXA NO RESULTS DOC: HCPCS | Performed by: NURSE PRACTITIONER

## 2020-10-13 PROCEDURE — 36415 COLL VENOUS BLD VENIPUNCTURE: CPT | Performed by: NURSE PRACTITIONER

## 2020-10-13 PROCEDURE — 80048 BASIC METABOLIC PNL TOTAL CA: CPT | Performed by: NURSE PRACTITIONER

## 2020-10-13 RX ORDER — AMLODIPINE BESYLATE 5 MG/1
TABLET ORAL
COMMUNITY
Start: 2020-08-21 | End: 2020-12-08 | Stop reason: DRUGHIGH

## 2020-10-13 RX ORDER — MIRTAZAPINE 7.5 MG/1
7.5 TABLET, FILM COATED ORAL NIGHTLY
Qty: 30 TABLET | Refills: 2 | Status: SHIPPED | OUTPATIENT
Start: 2020-10-13 | End: 2021-01-04 | Stop reason: SDUPTHER

## 2020-10-13 RX ORDER — BUSPIRONE HYDROCHLORIDE 15 MG/1
TABLET ORAL
COMMUNITY
Start: 2020-10-11 | End: 2021-06-01 | Stop reason: SDUPTHER

## 2020-10-13 NOTE — PROGRESS NOTES
and has a neuro geriatric psych evaluation scheduled in 2 weeks. She admits that she does not have an appetite. Her  states that all she ever does is drink water and she craves water. He states she will drink 4 glasses of water and just 1 minute. He states that she urinates frequently related to how much she is drinking. She is always tired. She has had problems with her potassium staying within normal limits. She is being treated with steroids currently for orthostatic hypotension. He is not sure he is prescribing her steroids for the orthostatic hypotension, but states he believes it is cardiology. Family states she feels tired all the time and just weak. She does not want to do much. She admits that she does not necessarily crave salt but craves water constantly.     Past Medical History:   Diagnosis Date    Anxiety     Benign essential hypertension 2017    Bowel obstruction (HCC)     CAD (coronary artery disease)     Cancer ([facility] [address]     colon    Depression     GERD (gastroesophageal reflux disease)     Hyperlipidemia     Hypertension     Irritable bowel syndrome (IBS)     Obesity     Osteoarthritis       Past Surgical History:   Procedure Laterality Date    APPENDECTOMY      CHOLECYSTECTOMY      COLON SURGERY      COLONOSCOPY  2014    mass    COLONOSCOPY  3/29/16    normal       Family History   Problem Relation Age of Onset    Cancer Mother         colon    Cancer Brother         colon    Heart Disease Sister     Heart Disease Brother        Social History     Tobacco Use    Smoking status: Former Smoker     Packs/day: 1.00     Years: 10.00     Pack years: 10.00     Last attempt to quit: 1983     Years since quittin.1    Smokeless tobacco: Never Used   Substance Use Topics    Alcohol use: No         Current Outpatient Medications   Medication Sig Dispense Refill    amLODIPine (NORVASC) 5 MG tablet       busPIRone (BUSPAR) 15 MG tablet       mirtazapine (REMERON) 7.5 MG tablet Take 1 tablet by mouth nightly 30 tablet 2    potassium chloride (KLOR-CON M) 20 MEQ extended release tablet Take 2 tablets by mouth two times a day with food for 5 days and then as directed. 60 tablet 0    citalopram (CELEXA) 40 MG tablet Take 40 mg by mouth daily      diazePAM (VALIUM) 5 MG tablet Take 5 mg by mouth every 6 hours as needed for Anxiety. 1/2 pill two times a day      Multiple Vitamin (MULTI-VITAMIN DAILY PO) Take by mouth      Omega-3 Fatty Acids (FISH OIL) 1000 MG CPDR Take 3,000 mg by mouth      Calcium Carbonate-Vitamin D (CALCIUM 500 + D) 500-125 MG-UNIT TABS Take by mouth      Coenzyme Q10 (CO Q 10 PO) Take by mouth      montelukast (SINGULAIR) 10 MG tablet Take 10 mg by mouth nightly      fludrocortisone (FLORINEF) 0.1 MG tablet Take 0.1 mg by mouth 2 times daily      rosuvastatin (CRESTOR) 20 MG tablet TAKE 1 TABLET EVERY DAY 90 tablet 1    aspirin EC 81 MG EC tablet Take 1 tablet by mouth daily 30 tablet 3    dicyclomine (BENTYL) 10 MG capsule Take 20 mg by mouth 4 times daily (before meals and nightly)       polyethylene glycol (GLYCOLAX) packet Take 17 g by mouth daily        No current facility-administered medications for this visit.       Allergies   Allergen Reactions    Bactrim [Sulfamethoxazole-Trimethoprim] Anaphylaxis    Ultram [Tramadol Hcl] Anaphylaxis     stridor    Dilaudid [Hydromorphone] Other (See Comments)     Hallucinations    Phenergan [Promethazine] Nausea Only    Lipitor [Atorvastatin]      Nausea    Penicillins        Subjective:      Review of Systems      Objective:     Vitals:    10/13/20 1417   BP: 104/78   Site: Left Upper Arm   Position: Sitting   Cuff Size: Large Adult   Pulse: 81   Temp: 97.3 °F (36.3 °C)   SpO2: 97%   Weight: 194 lb (88 kg)   Height: 5' 5\" (1.651 m)     Wt Readings from Last 3 Encounters:   10/13/20 194 lb (88 kg)   07/17/20 189 lb 8 oz (86 kg)   06/09/20 194 lb 3.6 oz (88.1 kg)     Temp Readings from Last 3 Encounters:   10/13/20 97.3 °F (36.3 °C)   05/31/20 98 °F (36.7 °C) (Oral)   05/20/20 97.1 °F (36.2 °C) (Oral)     BP Readings from Last 3 Encounters:   10/13/20 104/78   07/17/20 124/78   05/31/20 (!) 163/94     Pulse Readings from Last 3 Encounters:   10/13/20 81   07/17/20 85   05/31/20 90     Physical Exam  Vitals signs and nursing note reviewed. Constitutional:       General: She is not in acute distress. Appearance: Normal appearance. She is well-developed. She is not diaphoretic. HENT:      Head: Normocephalic and atraumatic. Right Ear: Tympanic membrane, ear canal and external ear normal. There is no impacted cerumen. Left Ear: Tympanic membrane, ear canal and external ear normal. There is no impacted cerumen. Nose: Nose normal. No congestion or rhinorrhea. Mouth/Throat:      Mouth: Mucous membranes are moist.      Pharynx: Oropharynx is clear. No oropharyngeal exudate or posterior oropharyngeal erythema. Eyes:      General: No scleral icterus. Right eye: No discharge. Left eye: No discharge. Extraocular Movements: Extraocular movements intact. Conjunctiva/sclera: Conjunctivae normal.      Pupils: Pupils are equal, round, and reactive to light. Neck:      Musculoskeletal: Normal range of motion and neck supple. No neck rigidity or muscular tenderness. Vascular: No carotid bruit. Trachea: No tracheal deviation. Cardiovascular:      Rate and Rhythm: Normal rate and regular rhythm. Pulses: Normal pulses. Heart sounds: Normal heart sounds. No murmur. No friction rub. No gallop. Pulmonary:      Effort: Pulmonary effort is normal. No respiratory distress. Breath sounds: Normal breath sounds. No stridor. No wheezing, rhonchi or rales. Chest:      Chest wall: No tenderness. Abdominal:      General: Bowel sounds are normal. There is no distension. Palpations: Abdomen is soft. There is no mass. Tenderness: There is no abdominal tenderness. There is no guarding or rebound. Hernia: No hernia is present. Musculoskeletal: Normal range of motion. General: No swelling, tenderness, deformity or signs of injury. Right lower le+ Pitting Edema present. Left lower le+ Pitting Edema present. Lymphadenopathy:      Cervical: No cervical adenopathy. Skin:     General: Skin is warm and dry. Capillary Refill: Capillary refill takes less than 2 seconds. Coloration: Skin is not jaundiced or pale. Findings: No bruising, erythema, lesion or rash. Neurological:      General: No focal deficit present. Mental Status: She is alert and oriented to person, place, and time. Mental status is at baseline. Cranial Nerves: No cranial nerve deficit. Sensory: No sensory deficit. Motor: No weakness or abnormal muscle tone. Coordination: Coordination normal.      Gait: Gait normal.      Deep Tendon Reflexes: Reflexes are normal and symmetric. Reflexes normal.   Psychiatric:         Mood and Affect: Mood is anxious and depressed. Affect is tearful. Speech: Speech is tangential.         Behavior: Behavior is slowed. Thought Content: Thought content normal.         Cognition and Memory: She exhibits impaired recent memory. Judgment: Judgment normal.         Orders Only on 10/08/2020   Component Date Value Ref Range Status    Iron 10/06/2020 80  37 - 145 ug/dL Final    TIBC 10/06/2020 363  260 - 445 ug/dL Final    Iron Saturation 10/06/2020 22  15 - 50 % Final           Assessment & Plan: The following diagnoses and conditions are stable with no further orders unless indicated:  1. Anxiety    2. Loss of appetite    3. Hypokalemia    4. Polyphagia    5. Chronic fatigue    6. Memory loss    7. Orthostatic hypotension    8. Need for influenza vaccination        6 [facility] was seen today for other and anxiety.     Discussed with Allie Cheadle and her  that her anxiety seems to be related to either dementia or underlying psychiatric disorder. Recommend telling her  waiting on geriatric psychiatry testing. Mony Obregon and her  are both concerned with how severe her anxiety is as it is debilitating. She is currently taking Valium 2 times a day, citalopram 40 mg daily, BuSpar 3 times a day. Discussed with Mony Obregon and her  about Remeron. Will place on low-dose Remeron. Educated family and her  on potential side effects of Remeron and thought her  verbalized understanding like to go ahead and try the medication. If geriatric psychiatry testing did not reveal causes to her anxiety being related to potential dementia, she will need to follow-up with psychiatry. As far as her lack of appetite, hypokalemia, polyphasia, generalized weakness and fatigue, orthostatic hypotension, there is concern for possible Wells's disease. Referral given for follow-up with endocrinology. Diagnoses and all orders for this visit:    Anxiety  -     mirtazapine (REMERON) 7.5 MG tablet; Take 1 tablet by mouth nightly    Loss of appetite  -     [facility] End[facility]ology [facility] Diabetes    Hypokalemia  -     [facility]ology [facility] Diabetes  -     Basic Metabolic Panel  -     Magnesium    Polyphagia  -     [facility] Endocr[facility]ology and Diabetes    Chronic fatigue  -     [facility] End[facility]ology [facility] Diabetes    Memory loss    Orthostatic hypotension  -     [facility] Endocrinology and Diabetes    Need for influenza vaccination  -     INFLUENZA, QUADV, 3 YRS AND OLDER, IM PF, PREFILL SYR OR SDV, 0.5ML (AFLURIA QUADV, PF)      Prior to Visit Medications    Medication Sig Taking?  Authorizing Provider   amLODIPine (NORVASC) 5 MG tablet  Yes Historical Provider, MD   busPIRone (BUSPAR) 15 MG tablet  Yes Historical Provider, MD   mirtazapine (REMERON) 7.5 MG tablet Take 1 tablet by mouth nightly Yes MOHINDER Hull - CNP potassium chloride (KLOR-CON M) 20 MEQ extended release tablet Take 2 tablets by mouth two times a day with food for 5 days and then as directed. Yes MOHINDER Whittington - CNP   citalopram (CELEXA) 40 MG tablet Take 40 mg by mouth daily Yes Historical Provider, MD   diazePAM (VALIUM) 5 MG tablet Take 5 mg by mouth every 6 hours as needed for Anxiety. 1/2 pill two times a day Yes Historical Provider, MD   Multiple Vitamin (MULTI-VITAMIN DAILY PO) Take by mouth Yes Historical Provider, MD   Omega-3 Fatty Acids (FISH OIL) 1000 MG CPDR Take 3,000 mg by mouth Yes Historical Provider, MD   Calcium Carbonate-Vitamin D (CALCIUM 500 + D) 500-125 MG-UNIT TABS Take by mouth Yes Historical Provider, MD   Coenzyme Q10 (CO Q 10 PO) Take by mouth Yes Historical Provider, MD   montelukast (SINGULAIR) 10 MG tablet Take 10 mg by mouth nightly Yes Historical Provider, MD   fludrocortisone (FLORINEF) 0.1 MG tablet Take 0.1 mg by mouth 2 times daily Yes Historical Provider, MD   rosuvastatin (CRESTOR) 20 MG tablet TAKE 1 TABLET EVERY DAY Yes Noar Everett MD   aspirin EC 81 MG EC tablet Take 1 tablet by mouth daily Yes Corrie Donahue MD   dicyclomine (BENTYL) 10 MG capsule Take 20 mg by mouth 4 times daily (before meals and nightly)  Yes Historical Provider, MD   polyethylene glycol (GLYCOLAX) packet Take 17 g by mouth daily  Yes Historical Provider, MD        Orders Placed This Encounter   Medications    mirtazapine (REMERON) 7.5 MG tablet     Sig: Take 1 tablet by mouth nightly     Dispense:  30 tablet     Refill:  2         Return in about 6 weeks (around 11/24/2020) for Anxiety - 60 min . Patient should call the office immediately with new or ongoing signs or symptoms or worsening, or proceedto the emergency room. No changes in past medical history, past surgical history, social history, or family history were noted during the patient encounter unless specifically listed above.   All updates of past

## 2020-10-23 ENCOUNTER — VIRTUAL VISIT (OUTPATIENT)
Dept: ENDOCRINOLOGY | Age: 75
End: 2020-10-23
Payer: MEDICARE

## 2020-10-23 PROCEDURE — 4040F PNEUMOC VAC/ADMIN/RCVD: CPT | Performed by: INTERNAL MEDICINE

## 2020-10-23 PROCEDURE — 1123F ACP DISCUSS/DSCN MKR DOCD: CPT | Performed by: INTERNAL MEDICINE

## 2020-10-23 PROCEDURE — G8400 PT W/DXA NO RESULTS DOC: HCPCS | Performed by: INTERNAL MEDICINE

## 2020-10-23 PROCEDURE — 1090F PRES/ABSN URINE INCON ASSESS: CPT | Performed by: INTERNAL MEDICINE

## 2020-10-23 PROCEDURE — 99204 OFFICE O/P NEW MOD 45 MIN: CPT | Performed by: INTERNAL MEDICINE

## 2020-10-23 PROCEDURE — G8427 DOCREV CUR MEDS BY ELIG CLIN: HCPCS | Performed by: INTERNAL MEDICINE

## 2020-10-23 PROCEDURE — G8482 FLU IMMUNIZE ORDER/ADMIN: HCPCS | Performed by: INTERNAL MEDICINE

## 2020-10-23 PROCEDURE — 1036F TOBACCO NON-USER: CPT | Performed by: INTERNAL MEDICINE

## 2020-10-23 PROCEDURE — G8417 CALC BMI ABV UP PARAM F/U: HCPCS | Performed by: INTERNAL MEDICINE

## 2020-10-23 NOTE — PROGRESS NOTES
Subjective:          Pursuant to the emergency declaration under the [facility], [facility] waiver authority and the [employer] Act, this Virtual  Visit was conducted, with patient's consent, to reduce the patient's risk of exposure to COVID-19 and provide continuity of care for an established patient. Patient was at home. Provider was at home. No one else was involved  Services were provided through a video synchronous discussion virtually to substitute for in-person clinic visit. Seen for chronic fatigue, orthostatic hypotension, hypokalemia, lack of appetite. She has a h/o dizziness/ syncope, reports orthostasis. Since 2018    Has seen cardiology. Currently on fludrocortisone  0.1mg BID, helped her BP, started on it a few weeks ago    On K supplement, had low K since started fludrocortisone. Now improved. Mild, controlled, no worsening factors    Does reports chronic fatigue but has improved  Also has cold intolerance    No h/o steroids use.      Reports lack of appetite, for 2 years,states it has improved now    H/o HTN, on norvasc    Labs showed high TSH, no h/o thyroid hormone replacement    10/20 TSH 4.58  FT4 1.5  5/20 TSH 1.33    FH: Sister thyroid problem    SH: No smoking, drinking    All: Dilaudid    Past Medical History:   Diagnosis Date    Anxiety     Benign essential hypertension 1/17/2017    [facility] [address]     CAD (coronary artery disease)     Cancer ([facility] [address] 2012    colon    Depression     GERD (gastroesophageal reflux disease)     Hyperlipidemia     Hypertension     Irritable bowel syndrome (IBS)     Obesity     Osteoarthritis      Past Surgical History:   Procedure Laterality Date    APPENDECTOMY      CHOLECYSTECTOMY      COLON SURGERY      COLONOSCOPY  2014    mass    COLONOSCOPY  3/29/16    normal     Current Outpatient Medications   Medication Sig Dispense Refill    amLODIPine (NORVASC) 5 MG tablet       busPIRone (BUSPAR) 15 MG tablet       mirtazapine (REMERON) 7.5 MG tablet Take 1 tablet by mouth nightly 30 tablet 2    potassium chloride (KLOR-CON M) 20 MEQ extended release tablet Take 2 tablets by mouth two times a day with food for 5 days and then as directed. (Patient taking differently: Take 20 mEq by mouth daily Take 2 tablets by mouth two times a day with food for 5 days and then as directed.) 60 tablet 0    citalopram (CELEXA) 40 MG tablet Take 40 mg by mouth daily      diazePAM (VALIUM) 5 MG tablet Take 5 mg by mouth every 6 hours as needed for Anxiety. 1/2 pill two times a day      Omega-3 Fatty Acids (FISH OIL) 1000 MG CPDR Take 3,000 mg by mouth      Calcium Carbonate-Vitamin D (CALCIUM 500 + D) 500-125 MG-UNIT TABS Take by mouth      Coenzyme Q10 (CO Q 10 PO) Take by mouth      fludrocortisone (FLORINEF) 0.1 MG tablet Take 0.1 mg by mouth 2 times daily      rosuvastatin (CRESTOR) 20 MG tablet TAKE 1 TABLET EVERY DAY 90 tablet 1    aspirin EC 81 MG EC tablet Take 1 tablet by mouth daily 30 tablet 3    dicyclomine (BENTYL) 10 MG capsule Take 20 mg by mouth 4 times daily (before meals and nightly)       polyethylene glycol (GLYCOLAX) packet Take 17 g by mouth daily        No current facility-administered medications for this visit. Review of Systems  Constitutional: + for weight loss and +malaise/fatigue. Negative for fever and chills. HENT: Negative for hearing loss, ear pain, nosebleeds, neck pain and tinnitus. Eyes: Negative for blurred vision. Negative for double vision, photophobia and pain. Respiratory: Negative for cough and sputum production. Cardiovascular: Negative for chest pain, palpitations and leg swelling. Gastrointestinal: Negative for nausea, vomiting and abdominal pain. Genitourinary: Negative for dysuria, urgency and frequency. Musculoskeletal: Negative for back pain.  No joint pain  Skin: Negative for itching Lab Review  No results found for: TSH  No results found for: FREET4      Assessment:     1. Chronic Fatigue/Hypothyroid:  Labs showed subclinical hypothyroidism. Will repeat with antibodies. Does report cold intolerance. If levels low, may start low dose levothyroxine    2. Orthostatic Hypotension: Improved since on fludrocortisone per patient. Hypokalemia likely due to it and states started after taking medication. On K supplement. Followed by cardiology. Will check cortisol, rule out adrenal insufficiency    3. Lack of appetite: States improved, since on diet plan from her sister    3. Anxiety/Depression     Plan: 1. Check cortisol, ACTH  2. Order TFT, Thyroid antibodies  3. Furthur treatment based on results.

## 2020-10-24 ENCOUNTER — HOSPITAL ENCOUNTER (OUTPATIENT)
Age: 75
Discharge: HOME | End: 2020-10-24
Payer: MEDICARE

## 2020-10-24 PROCEDURE — 36415 COLL VENOUS BLD VENIPUNCTURE: CPT

## 2020-10-24 PROCEDURE — 86800 THYROGLOBULIN ANTIBODY: CPT

## 2020-10-24 PROCEDURE — 86376 MICROSOMAL ANTIBODY EACH: CPT

## 2020-10-24 PROCEDURE — 82533 TOTAL CORTISOL: CPT

## 2020-10-24 PROCEDURE — 84443 ASSAY THYROID STIM HORMONE: CPT

## 2020-10-24 PROCEDURE — 82024 ASSAY OF ACTH: CPT

## 2020-10-24 PROCEDURE — 84439 ASSAY OF FREE THYROXINE: CPT

## 2020-10-25 LAB
ANTI-THYROGLOB ABS: 15 IU/ML
CORTISOL - AM: 16 UG/DL (ref 4.3–22.4)
T4 FREE: 1 NG/DL (ref 0.9–1.8)
THYROID PEROXIDASE (TPO) ABS: 304 IU/ML
TSH REFLEX: 6.77 UIU/ML (ref 0.27–4.2)

## 2020-10-26 ENCOUNTER — TELEPHONE (OUTPATIENT)
Dept: FAMILY MEDICINE CLINIC | Age: 75
End: 2020-10-26

## 2020-10-26 RX ORDER — LEVOTHYROXINE SODIUM 0.03 MG/1
25 TABLET ORAL DAILY
Qty: 90 TABLET | Refills: 1 | Status: SHIPPED | OUTPATIENT
Start: 2020-10-26 | End: 2020-12-09 | Stop reason: SDUPTHER

## 2020-10-26 NOTE — TELEPHONE ENCOUNTER
Pt states that her O2 is 91% and the pulse ox that she has was flashing with the reading. Having no problem breathing until she starts talking a lot and she does not have oxygen. Was wanting to see if there is any concern with it being at 91%. And please call [phone].

## 2020-10-28 LAB — ADRENOCORTICOTROPIC HORMONE: 79 PG/ML (ref 6–58)

## 2020-11-03 PROBLEM — R55 SYNCOPE AND COLLAPSE: Status: RESOLVED | Noted: 2018-03-02 | Resolved: 2020-11-03

## 2020-11-03 PROBLEM — I10 ESSENTIAL HYPERTENSION: Status: RESOLVED | Noted: 2017-01-17 | Resolved: 2020-11-03

## 2020-11-10 ENCOUNTER — TELEPHONE (OUTPATIENT)
Dept: FAMILY MEDICINE CLINIC | Age: 75
End: 2020-11-10

## 2020-11-11 ENCOUNTER — NURSE ONLY (OUTPATIENT)
Dept: FAMILY MEDICINE CLINIC | Age: 75
End: 2020-11-11
Payer: MEDICARE

## 2020-11-11 LAB
BILIRUBIN, POC: NEGATIVE
BLOOD URINE, POC: ABNORMAL
CLARITY, POC: ABNORMAL
COLOR, POC: YELLOW
GLUCOSE URINE, POC: NEGATIVE
KETONES, POC: NEGATIVE
LEUKOCYTE EST, POC: ABNORMAL
NITRITE, POC: NEGATIVE
PH, POC: 7
PROTEIN, POC: NEGATIVE
SPECIFIC GRAVITY, POC: 1.02
UROBILINOGEN, POC: 0.2

## 2020-11-11 PROCEDURE — 36415 COLL VENOUS BLD VENIPUNCTURE: CPT | Performed by: NURSE PRACTITIONER

## 2020-11-11 PROCEDURE — 81002 URINALYSIS NONAUTO W/O SCOPE: CPT | Performed by: NURSE PRACTITIONER

## 2020-11-12 LAB
POTASSIUM SERPL-SCNC: 4.1 MMOL/L (ref 3.5–5.1)
URINE CULTURE, ROUTINE: NORMAL

## 2020-11-20 RX ORDER — ROSUVASTATIN CALCIUM 20 MG/1
TABLET, COATED ORAL
Qty: 90 TABLET | Refills: 2 | Status: SHIPPED | OUTPATIENT
Start: 2020-11-20 | End: 2021-06-21

## 2020-11-23 ENCOUNTER — TELEPHONE (OUTPATIENT)
Dept: FAMILY MEDICINE CLINIC | Age: 75
End: 2020-11-23

## 2020-11-23 NOTE — TELEPHONE ENCOUNTER
BP readings from [facility]    11-18  136/96  11-19  137/85   11-20  152/90  11-21  150/95      Recommendations?

## 2020-11-23 NOTE — TELEPHONE ENCOUNTER
What is her blood pressure running?   Both amlodipine and Florinef can contribute to fluid retention and leg swelling

## 2020-11-23 NOTE — TELEPHONE ENCOUNTER
Pt's  called and said that pt has been having a lot of swelling in her legs and feet. Has been elevating them but don't seem to be helping. And she has been getting really upset she uses [facility] pharmacy in [place].

## 2020-11-27 RX ORDER — POTASSIUM CHLORIDE 20 MEQ/1
TABLET, EXTENDED RELEASE ORAL
Qty: 180 TABLET | Refills: 0 | Status: SHIPPED | OUTPATIENT
Start: 2020-11-27 | End: 2021-02-25 | Stop reason: SDUPTHER

## 2020-12-08 ENCOUNTER — OFFICE VISIT (OUTPATIENT)
Dept: FAMILY MEDICINE CLINIC | Age: 75
End: 2020-12-08
Payer: MEDICARE

## 2020-12-08 VITALS
TEMPERATURE: 97.1 F | WEIGHT: 204 LBS | HEART RATE: 82 BPM | SYSTOLIC BLOOD PRESSURE: 110 MMHG | BODY MASS INDEX: 33.99 KG/M2 | HEIGHT: 65 IN | DIASTOLIC BLOOD PRESSURE: 70 MMHG | OXYGEN SATURATION: 98 %

## 2020-12-08 PROCEDURE — G8417 CALC BMI ABV UP PARAM F/U: HCPCS | Performed by: NURSE PRACTITIONER

## 2020-12-08 PROCEDURE — 36415 COLL VENOUS BLD VENIPUNCTURE: CPT | Performed by: NURSE PRACTITIONER

## 2020-12-08 PROCEDURE — 4040F PNEUMOC VAC/ADMIN/RCVD: CPT | Performed by: NURSE PRACTITIONER

## 2020-12-08 PROCEDURE — G8427 DOCREV CUR MEDS BY ELIG CLIN: HCPCS | Performed by: NURSE PRACTITIONER

## 2020-12-08 PROCEDURE — G8482 FLU IMMUNIZE ORDER/ADMIN: HCPCS | Performed by: NURSE PRACTITIONER

## 2020-12-08 PROCEDURE — 1036F TOBACCO NON-USER: CPT | Performed by: NURSE PRACTITIONER

## 2020-12-08 PROCEDURE — 1090F PRES/ABSN URINE INCON ASSESS: CPT | Performed by: NURSE PRACTITIONER

## 2020-12-08 PROCEDURE — 99214 OFFICE O/P EST MOD 30 MIN: CPT | Performed by: NURSE PRACTITIONER

## 2020-12-08 PROCEDURE — G8400 PT W/DXA NO RESULTS DOC: HCPCS | Performed by: NURSE PRACTITIONER

## 2020-12-08 PROCEDURE — 1123F ACP DISCUSS/DSCN MKR DOCD: CPT | Performed by: NURSE PRACTITIONER

## 2020-12-08 RX ORDER — FLUDROCORTISONE ACETATE 0.1 MG/1
0.1 TABLET ORAL 2 TIMES DAILY
Qty: 30 TABLET | Refills: 0
Start: 2020-12-08 | End: 2021-05-13 | Stop reason: SDUPTHER

## 2020-12-08 RX ORDER — AMLODIPINE BESYLATE 2.5 MG/1
2.5 TABLET ORAL DAILY
Qty: 30 TABLET | Refills: 3 | Status: SHIPPED
Start: 2020-12-08 | End: 2021-04-26 | Stop reason: SDUPTHER

## 2020-12-08 ASSESSMENT — ENCOUNTER SYMPTOMS
RESPIRATORY NEGATIVE: 1
GASTROINTESTINAL NEGATIVE: 1

## 2020-12-08 NOTE — PATIENT INSTRUCTIONS
Recommend wearing compression hose to help with the swelling in her legs during the day. Recommend keeping legs raised above heart when sitting to help with swelling in legs.

## 2020-12-08 NOTE — PROGRESS NOTES
[facility]  [facility]  [address] [place] [address]  [phone] [id number]  Dept: [phone]  Dept Fax: [phone]  Loc: Lb Starks is a 67 y.o. female who presents today for her medical conditions/complaints as noted below. Daisha Brown is c/o of Other (pt has recently had low potassium and has been having that checked and is taking potassium to keep levels up. ) and Edema (pt has recently had some swelling in her ankles and it goes down of the night but comes back as she is up through out the day. )        HPI:     Chief Complaint   Patient presents with    Other     pt has recently had low potassium and has been having that checked and is taking potassium to keep levels up.  Edema     pt has recently had some swelling in her ankles and it goes down of the night but comes back as she is up through out the day. HPI    Mariah Waters presents to the office to follow up on her hypokalemia. She has been taking her potassium supplement as directed. She is doing well with her potassium supplement as prescribed and denies any side effects. Mariah Waters admits to having some swelling in her ankles that she notices goes down at the end of the day. She notices the swelling more when she is up and moving around during the day. She did not notice a difference in her leg swelling while stopping Florinef. She states that she recently completed physical therapy about 3 to 4 weeks ago and has stopped doing the exercises that she was given in physical therapy. She states she can tell that she feels weaker because of the swelling in her ankles or because of not completing the physical therapy. She feels like the swelling in her legs has been the same as it was at her last visit. She has not noticed an increase in the swelling of her legs since her last visit. Mariah Waters states that her biggest concern is her anxiety and depression still.   She has not noticed a difference with the addition of mirtazapine. She states that she has followed up with neurology and her geriatric psychiatry testing was pushed back to February as she was recently started on the levothyroxine for hypothyroidism. She did not notice a difference in the levothyroxine with her symptoms. Her  states he actually feels like she is gotten worse with her anxiety and depression over the last 2 weeks. He states that she is scared to walk again and is scared that she is going to fall again just like she was before she started the levothyroxine. He admits that she is taking the levothyroxine daily on empty stomach. He admits that she is having crying spells out of nowhere. She denies any thoughts of hurting herself or others, but admits to her grandson killing himself about 1.5 years ago and it has caused her to be more depressed. Her  believes that her depression and anxiety are the same now as it was before her grandson passed away. Past Medical History:   Diagnosis Date    Anxiety     Benign essential hypertension 2017    Bowel obstruction (HCC)     CAD (coronary artery disease)     Cancer ([facility] [address] 2012    colon    Depression     GERD (gastroesophageal reflux disease)     Hyperlipidemia     Hypertension     Irritable bowel syndrome (IBS)     Obesity     Osteoarthritis       Past Surgical History:   Procedure Laterality Date    APPENDECTOMY      CHOLECYSTECTOMY      COLON SURGERY      COLONOSCOPY      mass    COLONOSCOPY  3/29/16    normal       Family History   Problem Relation Age of Onset    Cancer Mother         colon    Cancer Brother         colon    Heart Disease Sister     Heart Disease Brother        Social History     Tobacco Use    Smoking status: Former Smoker     Packs/day: 1.00     Years: 10.00     Pack years: 10.00     Last attempt to quit: 1983     Years since quittin.2    Smokeless tobacco: Never Used   Substance Use Topics    Alcohol use:  No palpitations. Gastrointestinal: Negative. Endocrine: Positive for cold intolerance (Improving). Negative for heat intolerance, polydipsia, polyphagia and polyuria. Genitourinary: Negative. Skin: Negative. Neurological: Negative. Psychiatric/Behavioral: Negative. Objective:     Vitals:    12/08/20 1059   BP: 110/70   Site: Right Upper Arm   Position: Sitting   Cuff Size: Large Adult   Pulse: 82   Temp: 97.1 °F (36.2 °C)   SpO2: 98%   Weight: 204 lb (92.5 kg)   Height: 5' 5\" (1.651 m)     Wt Readings from Last 3 Encounters:   12/08/20 204 lb (92.5 kg)   10/13/20 194 lb (88 kg)   07/17/20 189 lb 8 oz (86 kg)     Temp Readings from Last 3 Encounters:   12/08/20 97.1 °F (36.2 °C)   10/13/20 97.3 °F (36.3 °C)   05/31/20 98 °F (36.7 °C) (Oral)     BP Readings from Last 3 Encounters:   12/08/20 110/70   10/13/20 104/78   07/17/20 124/78     Pulse Readings from Last 3 Encounters:   12/08/20 82   10/13/20 81   07/17/20 85     Physical Exam  Vitals signs and nursing note reviewed. Constitutional:       General: She is not in acute distress. Appearance: Normal appearance. She is well-developed. She is obese. She is not diaphoretic. HENT:      Head: Normocephalic and atraumatic. Right Ear: Tympanic membrane, ear canal and external ear normal. There is no impacted cerumen. Left Ear: Tympanic membrane, ear canal and external ear normal. There is no impacted cerumen. Nose: Nose normal. No congestion or rhinorrhea. Mouth/Throat:      Mouth: Mucous membranes are moist.      Pharynx: Oropharynx is clear. No oropharyngeal exudate or posterior oropharyngeal erythema. Eyes:      General: No scleral icterus. Right eye: No discharge. Left eye: No discharge. Conjunctiva/sclera: Conjunctivae normal.   Neck:      Musculoskeletal: Normal range of motion and neck supple. No neck rigidity or muscular tenderness. Vascular: No carotid bruit.       Trachea: No tracheal deviation. Cardiovascular:      Rate and Rhythm: Normal rate and regular rhythm. Pulses: Normal pulses. Heart sounds: Normal heart sounds. No murmur. No friction rub. No gallop. Pulmonary:      Effort: Pulmonary effort is normal. No respiratory distress. Breath sounds: Normal breath sounds. No stridor. No wheezing, rhonchi or rales. Chest:      Chest wall: No tenderness. Abdominal:      General: Bowel sounds are normal. There is no distension. Palpations: Abdomen is soft. There is no mass. Tenderness: There is no abdominal tenderness. There is no guarding or rebound. Hernia: No hernia is present. Musculoskeletal: Normal range of motion. General: No swelling, tenderness, deformity or signs of injury. Right lower le+ Pitting Edema present. Left lower le+ Pitting Edema present. Lymphadenopathy:      Cervical: No cervical adenopathy. Skin:     General: Skin is warm and dry. Capillary Refill: Capillary refill takes less than 2 seconds. Coloration: Skin is not jaundiced or pale. Findings: No bruising, erythema, lesion or rash. Neurological:      General: No focal deficit present. Mental Status: She is alert and oriented to person, place, and time. Mental status is at baseline. Cranial Nerves: No cranial nerve deficit. Sensory: No sensory deficit. Motor: No weakness or abnormal muscle tone. Coordination: Coordination normal.      Gait: Gait normal.      Deep Tendon Reflexes: Reflexes are normal and symmetric. Reflexes normal.   Psychiatric:         Mood and Affect: Mood is depressed. Affect is tearful. Speech: Speech is delayed and tangential.         Behavior: Behavior is slowed. Thought Content: Thought content normal.         Cognition and Memory: Memory is impaired. She exhibits impaired recent memory. Judgment: Judgment normal. Judgment is not impulsive or inappropriate. Nurse Only on 11/11/2020   Component Date Value Ref Range Status    Potassium 11/11/2020 4.1  3.5 - 5.1 mmol/L Final    Color, UA 11/11/2020 yellow   Final    Clarity, UA 11/11/2020 hazy   Final    Glucose, UA POC 11/11/2020 negative   Final    Bilirubin, UA 11/11/2020 negative   Final    Ketones, UA 11/11/2020 negative   Final    Spec Grav, UA 11/11/2020 1.025   Final    Blood, UA POC 11/11/2020 trace-intact   Final    pH, UA 11/11/2020 7.0   Final    Protein, UA POC 11/11/2020 negative   Final    Urobilinogen, UA 11/11/2020 0.2   Final    Leukocytes, UA 11/11/2020 LARGE   Final    Nitrite, UA 11/11/2020 negative   Final    Urine Culture, Routine 11/12/2020 <50,000 CFU/ml mixed skin/urogenital iker. No further workup   Final           Assessment & Plan: The following diagnoses and conditions are stable with no further orders unless indicated:  1. Hypokalemia    2. Hypothyroidism, unspecified type    3. Orthostatic hypotension    4. Essential hypertension    5. Anxiety with depression        Katie Bonilla was seen today for other and edema. We will check her magnesium today along with her potassium. She is doing well with her potassium supplement and feels it has increased her energy level. Since she has stopped the Florinef she has not noticed and improvement in the swelling of her legs. She may resume her Florinef and discussed with the prescribing provider if she should continue with the Florinef. The swelling in her legs about the same as her previous visit. Recommend decreasing her amlodipine from 5 mg to 2-1/2 to see if this may help with her leg swelling. Recommend her wearing compression hose to see if this will also help with her swelling of her legs. Recommend her trying to elevate her legs when sitting above her heart. She is to follow-up in 1 week for blood pressure check. Virtual visit in 1 week to discuss her blood test results and her annual Medicare wellness visit. Concerned that her anxiety and depression are coinciding with dementia. Recommend her continuing to get her geriatric psychiatry testing. Abhishek Garcia was seen today for other and edema. Diagnoses and all orders for this visit:    Hypokalemia  -     Basic Metabolic Panel  -     Magnesium    Hypothyroidism, unspecified type  -     TSH with Reflex    Orthostatic hypotension  -     fludrocortisone (FLORINEF) 0.1 MG tablet; Take 1 tablet by mouth 2 times daily    Essential hypertension  -     amLODIPine (NORVASC) 2.5 MG tablet; 1 tablet daily    Anxiety with depression          Prior to Visit Medications    Medication Sig Taking? Authorizing Provider   amLODIPine (NORVASC) 2.5 MG tablet 1 tablet daily Yes MOHINDER Nguyen CNP   fludrocortisone (FLORINEF) 0.1 MG tablet Take 1 tablet by mouth 2 times daily Yes MOHINDER Nguyen CNP   potassium chloride (KLOR-CON M) 20 MEQ extended release tablet Take 1 tablet by mouth two times a day Yes MOHINDER Nguyen CNP   rosuvastatin (CRESTOR) 20 MG tablet TAKE 1 TABLET EVERY DAY Yes Martha Long MD   levothyroxine (SYNTHROID) 25 MCG tablet Take 1 tablet by mouth daily Yes Amina Prado MD   busPIRone (BUSPAR) 15 MG tablet  Yes Historical Provider, MD   mirtazapine (REMERON) 7.5 MG tablet Take 1 tablet by mouth nightly Yes MOHINDER Nguyen CNP   citalopram (CELEXA) 40 MG tablet Take 40 mg by mouth daily Yes Historical Provider, MD   diazePAM (VALIUM) 5 MG tablet Take 5 mg by mouth every 6 hours as needed for Anxiety.  1/2 pill two times a day Yes Historical Provider, MD   Omega-3 Fatty Acids (FISH OIL) 1000 MG CPDR Take 3,000 mg by mouth Yes Historical Provider, MD   Calcium Carbonate-Vitamin D (CALCIUM 500 + D) 500-125 MG-UNIT TABS Take by mouth Yes Historical Provider, MD   Coenzyme Q10 (CO Q 10 PO) Take by mouth Yes Historical Provider, MD   aspirin EC 81 MG EC tablet Take 1 tablet by mouth daily Yes Shravan Mujica MD dicyclomine (BENTYL) 10 MG capsule Take 20 mg by mouth 4 times daily (before meals and nightly)  Yes Historical Provider, MD   polyethylene glycol (GLYCOLAX) packet Take 17 g by mouth daily  Yes Historical Provider, MD        Orders Placed This Encounter   Medications    amLODIPine (NORVASC) 2.5 MG tablet     Si tablet daily     Dispense:  30 tablet     Refill:  3    fludrocortisone (FLORINEF) 0.1 MG tablet     Sig: Take 1 tablet by mouth 2 times daily     Dispense:  30 tablet     Refill:  0         Return in about 1 week (around 12/15/2020) for AMV - VV and nurse visit for BP check in one week. Patient should call the office immediately with new or ongoing signs or symptoms or worsening, or proceedto the emergency room. No changes in past medical history, past surgical history, social history, or family history were noted during the patient encounter unless specifically listed above. All updates of past medicalhistory, past surgical history, social history, or family history were reviewed personally by me during the office visit. All problems listed in the assessment are stable unless noted otherwise. Medication profilereviewed personally by me during the office visit. Medication side effects and possible impairments from medications were discussed as applicable.     Call if pattern of symptoms change or persists for an extended time. This document was prepared by a combination of typing and transcription through a voice recognition software.

## 2020-12-09 LAB
ANION GAP SERPL CALCULATED.3IONS-SCNC: 12 MMOL/L (ref 3–16)
BUN BLDV-MCNC: 22 MG/DL (ref 7–20)
CALCIUM SERPL-MCNC: 9.6 MG/DL (ref 8.3–10.6)
CHLORIDE BLD-SCNC: 101 MMOL/L (ref 99–110)
CO2: 26 MMOL/L (ref 21–32)
CREAT SERPL-MCNC: 1 MG/DL (ref 0.6–1.2)
GFR AFRICAN AMERICAN: >60
GFR NON-AFRICAN AMERICAN: 54
GLUCOSE BLD-MCNC: 100 MG/DL (ref 70–99)
MAGNESIUM: 2.3 MG/DL (ref 1.8–2.4)
POTASSIUM SERPL-SCNC: 4.5 MMOL/L (ref 3.5–5.1)
SODIUM BLD-SCNC: 139 MMOL/L (ref 136–145)
T4 FREE: 1.2 NG/DL (ref 0.9–1.8)
TSH REFLEX: 6.27 UIU/ML (ref 0.27–4.2)

## 2020-12-09 RX ORDER — LEVOTHYROXINE SODIUM 0.05 MG/1
50 TABLET ORAL DAILY
Qty: 90 TABLET | Refills: 1 | Status: SHIPPED | OUTPATIENT
Start: 2020-12-09 | End: 2021-01-26 | Stop reason: SDUPTHER

## 2020-12-15 ENCOUNTER — NURSE ONLY (OUTPATIENT)
Dept: FAMILY MEDICINE CLINIC | Age: 75
End: 2020-12-15
Payer: MEDICARE

## 2020-12-15 ENCOUNTER — TELEPHONE (OUTPATIENT)
Dept: FAMILY MEDICINE CLINIC | Age: 75
End: 2020-12-15

## 2020-12-15 LAB
BILIRUBIN, POC: NEGATIVE
BLOOD URINE, POC: NEGATIVE
CLARITY, POC: NORMAL
COLOR, POC: YELLOW
GLUCOSE URINE, POC: NEGATIVE
KETONES, POC: NEGATIVE
LEUKOCYTE EST, POC: NORMAL
NITRITE, POC: NEGATIVE
PH, POC: 7
PROTEIN, POC: NEGATIVE
SPECIFIC GRAVITY, POC: 1.02
UROBILINOGEN, POC: 0.2

## 2020-12-15 PROCEDURE — 81002 URINALYSIS NONAUTO W/O SCOPE: CPT | Performed by: NURSE PRACTITIONER

## 2020-12-21 ENCOUNTER — TELEPHONE (OUTPATIENT)
Dept: FAMILY MEDICINE CLINIC | Age: 75
End: 2020-12-21

## 2020-12-21 RX ORDER — FUROSEMIDE 20 MG/1
20 TABLET ORAL DAILY
Qty: 30 TABLET | Refills: 0 | Status: SHIPPED | OUTPATIENT
Start: 2020-12-21 | End: 2021-01-06 | Stop reason: SDUPTHER

## 2020-12-21 NOTE — TELEPHONE ENCOUNTER
Lasix 20 mg by mouth daily as needed for swelling quantity #30.   May report next week how she is doing

## 2020-12-21 NOTE — TELEPHONE ENCOUNTER
Pt called stating that her feet  Started swelling about a week ago. PCP recommended compression hose, which worked for a couple of days. That last 2 days pt states that the swelling has gotten worse and she feels like she's gotten a bee sting all over her body. States it even hurts to walk. Call back pt with recommendations [phone]  Routing to Dr. Leydi Wilson due to PCP out of office.

## 2020-12-29 ENCOUNTER — OFFICE VISIT (OUTPATIENT)
Dept: ORTHOPEDIC SURGERY | Age: 75
End: 2020-12-29
Payer: MEDICARE

## 2020-12-29 VITALS — TEMPERATURE: 98.6 F | RESPIRATION RATE: 14 BRPM | BODY MASS INDEX: 33.98 KG/M2 | WEIGHT: 203.93 LBS | HEIGHT: 65 IN

## 2020-12-29 PROCEDURE — G8417 CALC BMI ABV UP PARAM F/U: HCPCS | Performed by: PHYSICIAN ASSISTANT

## 2020-12-29 PROCEDURE — 1123F ACP DISCUSS/DSCN MKR DOCD: CPT | Performed by: PHYSICIAN ASSISTANT

## 2020-12-29 PROCEDURE — G8400 PT W/DXA NO RESULTS DOC: HCPCS | Performed by: PHYSICIAN ASSISTANT

## 2020-12-29 PROCEDURE — 99203 OFFICE O/P NEW LOW 30 MIN: CPT | Performed by: PHYSICIAN ASSISTANT

## 2020-12-29 PROCEDURE — 1090F PRES/ABSN URINE INCON ASSESS: CPT | Performed by: PHYSICIAN ASSISTANT

## 2020-12-29 PROCEDURE — G8427 DOCREV CUR MEDS BY ELIG CLIN: HCPCS | Performed by: PHYSICIAN ASSISTANT

## 2020-12-29 PROCEDURE — 1036F TOBACCO NON-USER: CPT | Performed by: PHYSICIAN ASSISTANT

## 2020-12-29 PROCEDURE — G8482 FLU IMMUNIZE ORDER/ADMIN: HCPCS | Performed by: PHYSICIAN ASSISTANT

## 2020-12-29 PROCEDURE — 4040F PNEUMOC VAC/ADMIN/RCVD: CPT | Performed by: PHYSICIAN ASSISTANT

## 2020-12-29 NOTE — PROGRESS NOTES
New Patient: SPINE    Referring Provider:  No ref. provider found    Chief Complaint   Patient presents with    Lower Back Pain     NP LSP - 5 yrs. no AUBREY. constant. c/o bouts of flare-ups. doesn't wake her from sleep. no ambulatory aids but feels unsteady on her feet. taking Tylenol OTC PRN for pain.  Leg Pain     B/L LEGS - radiates into upper legs, into knees. HISTORY OF PRESENT ILLNESS:      · The patient is being sent at the request of No ref. provider found in consultation as a new spine patient for low back pain and bilateral leg pain. The patient is a 67 y.o. female whom reports symptoms for 5 years. Symptoms progressed over the last  5 years. Patient reports there was not a significant event to cause the symptoms. Today discomfort is report at 8 out of 10, describing it as aching, throbbing, tingling. Symptoms are aggravated by: standing, sitting. Patient has undergone recent treatment including, oral medications (Tylenol), topical medications. Patient denies previous lumbar spine surgery. · The patient presents today as a new patient for the lower back and bilateral leg pain. The patient describes that her pain will radiate down the bilateral legs into the upper thigh to approximately the knee. The patient has had this pain for about 5 years without an accident or injury and it is a constant pain. She describes that the pain has increased over the past two months. The patient did recently have a very bad UTI and she was in a wheelchair due to the pain. She now uses a walker around her home. The patient has \"bouts of flareups. \"  She describes this does not wake her up when sleeping and she does not use any assistive devices to help her walk however she does feel unsteady and unstable on her feet. She is currently taking Tylenol over-the-counter as needed for her pain.     Pain Assessment  Location of Pain: Back(LSP)  Location Modifiers: Left, Right(KNEES)  Severity of Pain: 8 Quality of Pain: Throbbing, Aching(TINGLING)  Duration of Pain: Persistent  Frequency of Pain: Constant  Date Pain First Started: (5 YRS - C/O FLARE UPS)  Aggravating Factors: Standing(SITTING)  Limiting Behavior: Yes  Result of Injury: No  Work-Related Injury: No  Are there other pain locations you wish to document?: No      Associated signs and symptoms:   Neurogenic bowel or bladder symptoms:  no   Perceived weakness:  yes   Difficulty walking:  yes    Recent Imaging (within past one year)   Xrays: no   MRI or CT of spine: no    Current/Past Treatment:   · Physical Therapy:  none  · Chiropractic:  none  · Injection:  none  · Medications:   NSAIDS:  none , but Tylenol   Muscle relaxer:  none   Steriods:  none   Neuropathic medications:  none   Opioids:  none  · Previous surgery:  no  · Previous surgical consult:  no  · Other:  · Infection control  · Tested positive for MRSA in past 12 months:  no  · Tested positive for MSSA \"staph infection\" in past 12 months: no  · Tested positive for VRE (Vancomycin Resistant Enterococci) in past 12 months:   no  · Currently on any antibiotics for an infection: no  · Anticoagulants:  · On a blood thinner:  yes , ASA   · Any history of bleeding disorder: no   · MRI Contraindication: no   · Previous Pain Management: no       Past medical, surgical, social and family history reviewed with the patient.      Past Medical History:   Past Medical History:   Diagnosis Date    Anxiety     Benign essential hypertension 1/17/2017    [facility]el obstruction ([facility] [address]     CAD (coronary artery disease)     Cancer ([facility] [address] 2012    colon    Depression     GERD (gastroesophageal reflux disease)     Hyperlipidemia     Hypertension     Irritable bowel syndrome (IBS)     Obesity     Osteoarthritis       Past Surgical History:     Past Surgical History:   Procedure Laterality Date    APPENDECTOMY      CHOLECYSTECTOMY      COLON SURGERY      COLONOSCOPY  2014    mass    COLONOSCOPY  3/29/16 normal     Current Medications:     Current Outpatient Medications:     furosemide (LASIX) 20 MG tablet, Take 1 tablet by mouth daily, Disp: 30 tablet, Rfl: 0    levothyroxine (SYNTHROID) 50 MCG tablet, Take 1 tablet by mouth daily, Disp: 90 tablet, Rfl: 1    amLODIPine (NORVASC) 2.5 MG tablet, 1 tablet daily, Disp: 30 tablet, Rfl: 3    fludrocortisone (FLORINEF) 0.1 MG tablet, Take 1 tablet by mouth 2 times daily, Disp: 30 tablet, Rfl: 0    potassium chloride (KLOR-CON M) 20 MEQ extended release tablet, Take 1 tablet by mouth two times a day, Disp: 180 tablet, Rfl: 0    rosuvastatin (CRESTOR) 20 MG tablet, TAKE 1 TABLET EVERY DAY, Disp: 90 tablet, Rfl: 2    busPIRone (BUSPAR) 15 MG tablet, , Disp: , Rfl:     mirtazapine (REMERON) 7.5 MG tablet, Take 1 tablet by mouth nightly, Disp: 30 tablet, Rfl: 2    citalopram (CELEXA) 40 MG tablet, Take 40 mg by mouth daily, Disp: , Rfl:     diazePAM (VALIUM) 5 MG tablet, Take 5 mg by mouth every 6 hours as needed for Anxiety.  1/2 pill two times a day, Disp: , Rfl:     Omega-3 Fatty Acids (FISH OIL) 1000 MG CPDR, Take 3,000 mg by mouth, Disp: , Rfl:     Calcium Carbonate-Vitamin D (CALCIUM 500 + D) 500-125 MG-UNIT TABS, Take by mouth, Disp: , Rfl:     Coenzyme Q10 (CO Q 10 PO), Take by mouth, Disp: , Rfl:     aspirin EC 81 MG EC tablet, Take 1 tablet by mouth daily, Disp: 30 tablet, Rfl: 3    dicyclomine (BENTYL) 10 MG capsule, Take 20 mg by mouth 4 times daily (before meals and nightly) , Disp: , Rfl:     polyethylene glycol (GLYCOLAX) packet, Take 17 g by mouth daily , Disp: , Rfl:   Allergies:  Bactrim [sulfamethoxazole-trimethoprim], Ultram [tramadol hcl], Dilaudid [hydromorphone], Phenergan [promethazine], Lipitor [atorvastatin], and Penicillins  Social History: reports that she quit smoking about 37 years ago. Her smoking use included cigarettes. She started smoking about 48 years ago. She has a 10.00 pack-year smoking history. She has never used smokeless tobacco. She reports that she does not drink alcohol or use drugs. Family History:   Family History   Problem Relation Age of Onset    Cancer Mother         colon    Cancer Brother         colon    Heart Disease Sister     Heart Disease Brother          REVIEW OF SYSTEMS: ROS - 14 point    Constitutional: No fevers, chills, night sweats, unexplained weight loss  Eye: No vision changes or diplopia  ENT: No nasal congestion, postnasal drip or sore throat. No tinnitus  Respiratory: No cough or SOB  CV: No chest pain or palpitations  GI: No nausea, abdominal pain, stool changes, + dyspepsia  : No dysuria or hematuria, + incontinence   Skin: No new or changing skin lesions, no rashes  MSK: No joint swelling, morning stiffness, unusual joint pain, + lower back and bilateral leg pain. Neurological: No headache, confusion, syncope, + dizziness   Psychiatric: No excessive anxiety, + depression  Endocrine: No polyuria or polydipsia  Hematologic: No lymph node enlargement or excessive bleeding  Immunologic:No history of immune deficiency or immunomodulating drugs           PHYSICAL EXAM:    Vitals: Temperature 98.6 °F (37 °C), resp. rate 14, height 5' 5\" (1.651 m), weight 203 lb 14.8 oz (92.5 kg), not currently breastfeeding. GENERAL EXAM:  · General Apparence: Patient is adequately groomed with no evidence of malnutrition. · Psychiatric: Orientation: The patient is oriented to time, place and person. The patient's mood and affect are appropriate   · Vascular: Examination reveals no swelling and palpation reveals no tenderness in upper extremities. Swelling bilateral lower extremities without pitting edema or tenderness to palpation. Good capillary refill. · The lymphatic examination of the neck, axillae and groin reveals all areas to be without enlargement or induration. · Sensation is intact without deficit in the upper and lower extremities to light touch. · Coordination of the upper and lower extremities are normal.    CERVICAL EXAMINATION:  · Inspection: Local inspection shows no step-off or bruising. Cervical alignment is normal. No instability is noted. · Palpation and Percussion: No evidence of tenderness at the midline. Paraspinal tenderness is not present. There is no paraspinal spasm. · Range of Motion:  pain-free ROM   · Strength: 5/5 bilateral upper extremities  · Special Tests:   Spurling's and Black's are negative bilaterally. Andersen and Impingement tests are negative bilaterally. · Skin:There are no rashes, ulcerations or lesions. · Reflexes: Bilaterally triceps, biceps and brachioradialis are 2+. Clonus absent bilaterally at the feet. No pathological reflexes are noted. · Gait & station:  normal, patient ambulates without assistance and no ataxia, unsteady when walking. · Additional Examinations:  · RIGHT UPPER EXTREMITY:  Inspection/examination of the right upper extremity does not show any tenderness, deformity or injury. Range of motion is normal and pain-free. There is no gross instability. There are no rashes, ulcerations or lesions. Strength and tone are normal. No atrophy or abnormal movements are noted. · LEFT UPPER EXTREMITY: Inspection/examination of the left upper extremity does not show any tenderness, deformity or injury. Range of motion is normal and pain-free. There is no gross instability. There are no rashes, ulcerations or lesions. Strength and tone are normal. No atrophy or abnormal movements are noted. LUMBAR/SACRAL EXAMINATION:  · Inspection: Local inspection shows no step-off or bruising. Lumbar alignment is normal. No instability is noted. Spec Grav, UA 1.025     Blood, UA POC negative     pH, UA 7.0     Protein, UA POC negative     Urobilinogen, UA 0.2     Leukocytes, UA small     Nitrite, UA negative        Impression (Medical Decision Making):       1. Lumbar radiculitis    2. DDD (degenerative disc disease), lumbar    3. Spondylosis without myelopathy or radiculopathy, lumbar region    4. Pain of lumbar spine        Plan (Medical Decision Making):    I discussed the diagnosis and the treatment options with Frankie Manriquez today. In Summary:  The various treatment options were outlined and discussed with Frankie Manriquez including:  Conservative care options: physical therapy, ice, medications, bracing, and activity modification. The indications for therapeutic injections. The indications for additional imaging/laboratory studies. The indications for (possible future) interventions. After considering the various options discussed, Frankie Manriquez elected to pursue a course of treatment that includes the followin. Medications: No further recommendations for new medications. The patient will continue to take Tylenol PRN. 2. PT:  Encouraged to continue with Home exercise program.    3. Further studies: Setup Lumbar MR without contrast to evaluate for soft tissue pathology or stenosis contributing to the back pain and paresthesia. 4. Interventional:  After further imaging is obtained, interventional options will be reviewed and recommended.     5. Healthy Lifestyle Measures:  Patient education material reviewing the following was distributed to Frankie Manriquez  Anatomic drawings  Healthy lifestyle education  Osteoporosis prevention,   Back and neck pain educational information   Advanced imaging preparedness    Posture education   Proper lifting and carrying techniques,   Weight management  Quitting smoking and   Minor ways to treat back pain For further information regarding the spine conditions and to review interventional treatments the patient was directed to [employer].    6.  Follow up:  1-2 weeks. Alan Mittal was instructed to call the office if her symptoms worsen or if new symptoms appear prior to the next scheduled visit. She is specifically instructed to contact the office between now & her scheduled appointment if she has concerns related to her condition or if she needs assistance in scheduling the above tests. She is welcome to call for an appointment sooner if she has any additional concerns or questions. DANIELLE Chavarria, PA-C  Board Certified by the [employer] on Certification of Physician Assistants  [address] 57  Partner of [facility] ([facility])       This dictation was performed with a verbal recognition program [facility]) and it was checked for errors. It is possible that there are still dictated errors within this office note. If so, please bring any errors to my attention for an addendum. All efforts were made to ensure that this office note is accurate.

## 2020-12-30 ENCOUNTER — TELEPHONE (OUTPATIENT)
Dept: ORTHOPEDIC SURGERY | Age: 75
End: 2020-12-30

## 2020-12-30 NOTE — TELEPHONE ENCOUNTER
S/w patient regarding MRI LSP approval and authorization being valid until 01/29/2021. Patient was instructed to call [facility] to schedule MRI LSP, then contact our office for follow up appointment. MRI LSP results will not be given over the phone or via [employer] [facility] [employer]. Patient currently has a follow up appointment schedule for 1/12/21 @ [facility]. Advised to contact the office if needing to reschedule this to accommodate MRI scan. Patient voiced understanding of MRI results not being given over the phone.

## 2021-01-04 ENCOUNTER — VIRTUAL VISIT (OUTPATIENT)
Dept: FAMILY MEDICINE CLINIC | Age: 76
End: 2021-01-04
Payer: MEDICARE

## 2021-01-04 DIAGNOSIS — R29.6 FREQUENT FALLS: ICD-10-CM

## 2021-01-04 DIAGNOSIS — F41.9 ANXIETY: Primary | ICD-10-CM

## 2021-01-04 DIAGNOSIS — R26.89 LOSS OF BALANCE: ICD-10-CM

## 2021-01-04 PROCEDURE — 1123F ACP DISCUSS/DSCN MKR DOCD: CPT | Performed by: NURSE PRACTITIONER

## 2021-01-04 PROCEDURE — 1090F PRES/ABSN URINE INCON ASSESS: CPT | Performed by: NURSE PRACTITIONER

## 2021-01-04 PROCEDURE — G8400 PT W/DXA NO RESULTS DOC: HCPCS | Performed by: NURSE PRACTITIONER

## 2021-01-04 PROCEDURE — 4040F PNEUMOC VAC/ADMIN/RCVD: CPT | Performed by: NURSE PRACTITIONER

## 2021-01-04 PROCEDURE — G8427 DOCREV CUR MEDS BY ELIG CLIN: HCPCS | Performed by: NURSE PRACTITIONER

## 2021-01-04 PROCEDURE — 99213 OFFICE O/P EST LOW 20 MIN: CPT | Performed by: NURSE PRACTITIONER

## 2021-01-04 RX ORDER — MIRTAZAPINE 15 MG/1
15 TABLET, FILM COATED ORAL NIGHTLY
Qty: 90 TABLET | Refills: 1 | Status: SHIPPED | OUTPATIENT
Start: 2021-01-04 | End: 2021-02-19 | Stop reason: SDUPTHER

## 2021-01-04 ASSESSMENT — ENCOUNTER SYMPTOMS
WHEEZING: 0
EYE REDNESS: 0
ALLERGIC/IMMUNOLOGIC NEGATIVE: 1
RHINORRHEA: 0
EYE PAIN: 0
SINUS PRESSURE: 0
DIARRHEA: 0
COUGH: 0
GASTROINTESTINAL NEGATIVE: 1
CONSTIPATION: 0
SHORTNESS OF BREATH: 0
VOMITING: 0
CHEST TIGHTNESS: 0
PHOTOPHOBIA: 0
NAUSEA: 0
STRIDOR: 0
RESPIRATORY NEGATIVE: 1
TROUBLE SWALLOWING: 0
EYE DISCHARGE: 0
ABDOMINAL PAIN: 0
BACK PAIN: 0
EYE ITCHING: 0
COLOR CHANGE: 0
BLOOD IN STOOL: 0
APNEA: 0
SORE THROAT: 0
CHOKING: 0

## 2021-01-04 NOTE — PROGRESS NOTES
2021    TELEHEALTH EVALUATION -- Audio/Visual (During KCIGU-11 public health emergency)    HPI:    Mookie Oates (:  1945) has requested an audio/video evaluation for the following concern(s):    HPI    Reginald Moss presents for a virtual visit today as she has been having dizzy spells. She states she feels dizzy when she goes from sitting to standing. She has been taking Florinef two times a day. She is wearing compression hose. She states the dizziness has improved dramatically with the Florinef and compression hose, but still has some dizziness. She has had this dizziness for several months and the dizziness makes her feel like she is going to fall when she walks. She gets anxious about walking as she is scared she is going to fall. She is worried about the weakness in her legs leading to falls. She admits to still feeling anxious all the time. She has crying spells and feels like she has no memory. She cannot remember anything that has happened recently and it gets her upset. She states he has seen neurology, but has not followed back up with neurology yet. She is asking for a higher dose of her anxiety medications. She states her anxiety medication is not helping her at all. She feels like she cannot leave the house without a panic attack. Review of Systems   Constitutional: Positive for fatigue. Negative for activity change, appetite change, chills, diaphoresis, fever and unexpected weight change. HENT: Negative. Negative for ear pain, rhinorrhea, sinus pressure, sneezing, sore throat and trouble swallowing. Eyes: Negative for photophobia, pain, discharge, redness, itching and visual disturbance. Respiratory: Negative. Negative for apnea, cough, choking, chest tightness, shortness of breath, wheezing and stridor. Cardiovascular: Negative for chest pain, palpitations and leg swelling. Gastrointestinal: Negative. Negative for abdominal pain, blood in stool, constipation, diarrhea, nausea and vomiting. Endocrine: Positive for cold intolerance. Negative for heat intolerance, polydipsia, polyphagia and polyuria. Genitourinary: Negative. Negative for decreased urine volume, difficulty urinating, dysuria, enuresis, flank pain, frequency, genital sores, hematuria and urgency. Musculoskeletal: Negative. Negative for arthralgias, back pain, gait problem, joint swelling, myalgias, neck pain and neck stiffness. Skin: Negative. Negative for color change, pallor, rash and wound. Allergic/Immunologic: Negative. Neurological: Positive for dizziness and weakness (Bilateral lower legs). Negative for tremors, seizures, syncope, facial asymmetry, speech difficulty, light-headedness, numbness and headaches. Psychiatric/Behavioral: Positive for agitation, confusion, decreased concentration and dysphoric mood. Negative for behavioral problems, hallucinations, self-injury, sleep disturbance and suicidal ideas. The patient is nervous/anxious. The patient is not hyperactive. Prior to Visit Medications    Medication Sig Taking?  Authorizing Provider   mirtazapine (REMERON) 15 MG tablet Take 1 tablet by mouth nightly Yes Francis Red, APRN - CNP   furosemide (LASIX) 20 MG tablet Take 1 tablet by mouth daily Yes Francis Red, APRN - CNP   levothyroxine (SYNTHROID) 50 MCG tablet Take 1 tablet by mouth daily Yes Francis Red, APRN - CNP   amLODIPine (NORVASC) 2.5 MG tablet 1 tablet daily  Patient taking differently: 2.5 mg daily Pt is taking half tablet daily Yes Francis Red, APRN - CNP   fludrocortisone (FLORINEF) 0.1 MG tablet Take 1 tablet by mouth 2 times daily Yes Francis Red, APRN - CNP   potassium chloride (KLOR-CON M) 20 MEQ extended release tablet Take 1 tablet by mouth two times a day Yes Francis Red, APRN - CNP rosuvastatin (CRESTOR) 20 MG tablet TAKE 1 TABLET EVERY DAY Yes Berkley Robles MD   busPIRone (BUSPAR) 15 MG tablet  Yes Historical Provider, MD   citalopram (CELEXA) 40 MG tablet Take 40 mg by mouth daily Yes Historical Provider, MD   diazePAM (VALIUM) 5 MG tablet Take 5 mg by mouth every 6 hours as needed for Anxiety.  1/2 pill two times a day Yes Historical Provider, MD   Omega-3 Fatty Acids (FISH OIL) 1000 MG CPDR Take 3,000 mg by mouth Yes Historical Provider, MD   Calcium Carbonate-Vitamin D (CALCIUM 500 + D) 500-125 MG-UNIT TABS Take by mouth Yes Historical Provider, MD   Coenzyme Q10 (CO Q 10 PO) Take by mouth Yes Historical Provider, MD   aspirin EC 81 MG EC tablet Take 1 tablet by mouth daily Yes William Parker MD   dicyclomine (BENTYL) 10 MG capsule Take 20 mg by mouth 4 times daily (before meals and nightly)  Yes Historical Provider, MD   polyethylene glycol (GLYCOLAX) packet Take 17 g by mouth daily  Yes Historical Provider, MD       Social History     Tobacco Use    Smoking status: Former Smoker     Packs/day: 1.00     Years: 10.00     Pack years: 10.00     Types: Cigarettes     Start date:      Quit date: 1983     Years since quittin.3    Smokeless tobacco: Never Used   Substance Use Topics    Alcohol use: No    Drug use: No        Allergies   Allergen Reactions    Bactrim [Sulfamethoxazole-Trimethoprim] Anaphylaxis    Ultram [Tramadol Hcl] Anaphylaxis     stridor    Dilaudid [Hydromorphone] Other (See Comments)     Hallucinations    Phenergan [Promethazine] Nausea Only    Lipitor [Atorvastatin]      Nausea    Penicillins    ,   Past Medical History:   Diagnosis Date    Anxiety     Benign essential hypertension 2017    [facility]el obstruction ([facility]     CAD (coronary artery disease)     [facility]    colon    Depression     GERD (gastroesophageal reflux disease)     Hyperlipidemia     Hypertension     Irritable bowel syndrome (IBS)     Obesity  Osteoarthritis    ,   Past Surgical History:   Procedure Laterality Date    APPENDECTOMY      CHOLECYSTECTOMY      COLON SURGERY      COLONOSCOPY  2014    mass    COLONOSCOPY  3/29/16    normal   ,   Social History     Tobacco Use    Smoking status: Former Smoker     Packs/day: 1.00     Years: 10.00     Pack years: 10.00     Types: Cigarettes     Start date:      Quit date: 1983     Years since quittin.1    Smokeless tobacco: Never Used   Substance Use Topics    Alcohol use: No    Drug use: No   ,   Family History   Problem Relation Age of Onset    Cancer Mother         colon    Cancer Brother         colon    Heart Disease Sister     Heart Disease Brother    ,   Immunization History   Administered Date(s) Administered    Influenza Vaccine, unspecified formulation 2016    Influenza Virus Vaccine 2014, 2016    Influenza, High Dose (Fluzone 65 yrs and older) 10/20/2016, 2019    Influenza, Quadv, IM, PF (6 mo and older Fluzone, Flulaval, Fluarix, and 3 yrs and older Afluria) 2018, 10/13/2020    Pneumococcal Conjugate 13-valent ([id number]) 10/20/2016    Pneumococcal Polysaccharide ([id number]) 2015, 2016    Tdap (Boostrix, Adacel) 2012   ,   Health Maintenance   Topic Date Due    Hepatitis C screen  1945    Shingles Vaccine (1 of 2) 1994    DEXA (modify frequency per FRAX score)  1999    Annual Wellness Visit (AWV)  2019    Lipid screen  10/06/2021    Potassium monitoring  2021    Creatinine monitoring  2021    DTaP/Tdap/Td vaccine (2 - Td) 2022    Flu vaccine  Completed    Pneumococcal 65+ years Vaccine  Completed    Hepatitis A vaccine  Aged Out    Hepatitis B vaccine  Aged Out    Hib vaccine  Aged Out    Meningococcal (ACWY) vaccine  Aged Out       PHYSICAL EXAMINATION:  [ INSTRUCTIONS:  \"[x]\" Indicates a positive item  \"[]\" Indicates a negative item Constitutional: [x] Appears well-developed and well-nourished [x] No apparent distress      [] Abnormal-   Mental status  [x] Alert and awake  [x] Oriented to person/place/time [x]Able to follow commands      Eyes:  EOM    [x]  Normal  [] Abnormal-  Sclera  [x]  Normal  [] Abnormal -         Discharge [x]  None visible  [] Abnormal -    HENT:   [x] Normocephalic, atraumatic. [] Abnormal   [x] Mouth/Throat: Mucous membranes are moist.     External Ears [x] Normal  [] Abnormal-     Neck: [x] No visualized mass     Pulmonary/Chest: [x] Respiratory effort normal.  [x] No visualized signs of difficulty breathing or respiratory distress        [] Abnormal-      Musculoskeletal:   [x] Normal gait with no signs of ataxia         [x] Normal range of motion of neck        [] Abnormal-       Neurological:        [x] No Facial Asymmetry (Cranial nerve 7 motor function) (limited exam to video visit)          [x] No gaze palsy        [] Abnormal-         Skin:        [x] No significant exanthematous lesions or discoloration noted on facial skin         [] Abnormal-            Psychiatric:       [x] Normal Affect [x] No Hallucinations        [] Abnormal-     ASSESSMENT/PLAN:  Elysia Taveras was seen today for dizziness and anxiety. May increase Remeron to 15 mg. Believe a lot of her anxiety is related to dementia. She is to follow up with neurology. Patient's  states he will schedule an appointment. Home health referral placed as she has frequent falls and loss of balance. Recommend physical and occupational therapy consult. Referral placed. Diagnoses and all orders for this visit:    Anxiety  -     mirtazapine (REMERON) 15 MG tablet; Take 1 tablet by mouth nightly    Loss of balance  -     External Referral To Home Health    Frequent falls  -     External Referral To Home Health        Return in about 2 weeks (around 1/18/2021) for [facility] - 40 min. Jaimie Sanders is a 67 y.o. female being evaluated by a Virtual Visit (video visit) encounter to address concerns as mentioned above. A caregiver was present when appropriate. Due to this being a TeleHealth encounter (During RXODC-82 public health emergency), evaluation of the following organ systems was limited: Vitals/Constitutional/EENT/Resp/CV/GI//MS/Neuro/Skin/Heme-Lymph-Imm. Pursuant to the emergency declaration under the [address] [place] [address] and the [employer] Act, this Virtual Visit was conducted with patient's (and/or legal guardian's) consent, to reduce the patient's risk of exposure to COVID-19 and provide necessary medical care. The patient (and/or legal guardian) has also been advised to contact this office for worsening conditions or problems, and seek emergency medical treatment and/or call 911 if deemed necessary. Patient identification was verified at the start of the visit: Yes    Services were provided through a video synchronous discussion virtually to substitute for in-person clinic visit. Patient and provider were located at their individual homes. --MOHINDER Bullock - CNP on 1/4/2021 at 2:54 PM    An electronic signature was used to authenticate this note. Patient should call the office immediately with new or ongoing signs or symptoms or worsening, or proceed to the emergency room. All entries in chief complaint and history of present illness are reviewed and validated by me. No changes in past medical history, past surgical history, social history, or family history were noted during the patient encounter unless specifically listed above. All updates of past medical history, past surgical history, social history, or family history were reviewed personally by me during the office visit. All problems listed in the assessment are stable unless noted otherwise. Medication profile reviewed personally by me during the office visit. Medication side effects and possible impairments from medications were discussed as applicable. Every effort has been made to assure accurate transcription by this voice recognition software. However, mistakes in transcription may still occur    You are being started on a new medication. All medications have the potential for adverse effects. All medications effect each person differently. Please read and review provided information related to medication. If the medication that you have been prescribed has the potential to cause sedation, do not drive or operate car, truck, or heavy machinery until you know how the medication will effect you. If you experience any adverse effects from the medication, please call the office or report to the emergency department. See someone right away if you want to hurt or kill yourself!  If you ever feel like you might hurt yourself or someone else, do one of these things:  ?Call your doctor or nurse and tell them it is urgent  ? Call for an ambulance (in the [address] [place] [address],[address] and [facility], [address] [id number])  ? Go to the emergency room at your local hospital  ?Call the [address] [place] [address]:  [phone] I've explained to her that drugs of the SSRI class can have side effects such as weight gain, sexual dysfunction, insomnia, headache, nausea. These medications are generally effective at alleviating symptoms of anxiety and/or depression. Let me know if significant side effects do occur.

## 2021-01-06 ENCOUNTER — HOSPITAL ENCOUNTER (OUTPATIENT)
Dept: MRI IMAGING | Age: 76
Discharge: HOME | End: 2021-01-06
Payer: MEDICARE

## 2021-01-06 DIAGNOSIS — M54.16 LUMBAR RADICULITIS: ICD-10-CM

## 2021-01-06 DIAGNOSIS — M54.50 PAIN OF LUMBAR SPINE: ICD-10-CM

## 2021-01-06 DIAGNOSIS — M47.816 SPONDYLOSIS WITHOUT MYELOPATHY OR RADICULOPATHY, LUMBAR REGION: ICD-10-CM

## 2021-01-06 DIAGNOSIS — M79.89 SWELLING OF BOTH LOWER EXTREMITIES: ICD-10-CM

## 2021-01-06 DIAGNOSIS — M51.36 DDD (DEGENERATIVE DISC DISEASE), LUMBAR: ICD-10-CM

## 2021-01-06 PROCEDURE — 72148 MRI LUMBAR SPINE W/O DYE: CPT

## 2021-01-06 RX ORDER — FUROSEMIDE 20 MG/1
20 TABLET ORAL DAILY
Qty: 90 TABLET | Refills: 0 | Status: SHIPPED | OUTPATIENT
Start: 2021-01-06 | End: 2021-04-11

## 2021-01-12 ENCOUNTER — OFFICE VISIT (OUTPATIENT)
Dept: ORTHOPEDIC SURGERY | Age: 76
End: 2021-01-12
Payer: MEDICARE

## 2021-01-12 VITALS — BODY MASS INDEX: 33.98 KG/M2 | TEMPERATURE: 96.8 F | WEIGHT: 203.93 LBS | RESPIRATION RATE: 14 BRPM | HEIGHT: 65 IN

## 2021-01-12 DIAGNOSIS — M51.36 DDD (DEGENERATIVE DISC DISEASE), LUMBAR: ICD-10-CM

## 2021-01-12 DIAGNOSIS — M47.816 SPONDYLOSIS WITHOUT MYELOPATHY OR RADICULOPATHY, LUMBAR REGION: ICD-10-CM

## 2021-01-12 DIAGNOSIS — M54.16 LUMBAR RADICULITIS: Primary | ICD-10-CM

## 2021-01-12 PROCEDURE — G8427 DOCREV CUR MEDS BY ELIG CLIN: HCPCS | Performed by: STUDENT IN AN ORGANIZED HEALTH CARE EDUCATION/TRAINING PROGRAM

## 2021-01-12 PROCEDURE — 4040F PNEUMOC VAC/ADMIN/RCVD: CPT | Performed by: STUDENT IN AN ORGANIZED HEALTH CARE EDUCATION/TRAINING PROGRAM

## 2021-01-12 PROCEDURE — G8482 FLU IMMUNIZE ORDER/ADMIN: HCPCS | Performed by: STUDENT IN AN ORGANIZED HEALTH CARE EDUCATION/TRAINING PROGRAM

## 2021-01-12 PROCEDURE — 99213 OFFICE O/P EST LOW 20 MIN: CPT | Performed by: STUDENT IN AN ORGANIZED HEALTH CARE EDUCATION/TRAINING PROGRAM

## 2021-01-12 PROCEDURE — G8400 PT W/DXA NO RESULTS DOC: HCPCS | Performed by: STUDENT IN AN ORGANIZED HEALTH CARE EDUCATION/TRAINING PROGRAM

## 2021-01-12 PROCEDURE — 1036F TOBACCO NON-USER: CPT | Performed by: STUDENT IN AN ORGANIZED HEALTH CARE EDUCATION/TRAINING PROGRAM

## 2021-01-12 PROCEDURE — 1123F ACP DISCUSS/DSCN MKR DOCD: CPT | Performed by: STUDENT IN AN ORGANIZED HEALTH CARE EDUCATION/TRAINING PROGRAM

## 2021-01-12 PROCEDURE — G8417 CALC BMI ABV UP PARAM F/U: HCPCS | Performed by: STUDENT IN AN ORGANIZED HEALTH CARE EDUCATION/TRAINING PROGRAM

## 2021-01-12 PROCEDURE — 1090F PRES/ABSN URINE INCON ASSESS: CPT | Performed by: STUDENT IN AN ORGANIZED HEALTH CARE EDUCATION/TRAINING PROGRAM

## 2021-01-12 NOTE — PROGRESS NOTES
  mirtazapine (REMERON) 15 MG tablet, Take 1 tablet by mouth nightly, Disp: 90 tablet, Rfl: 1    levothyroxine (SYNTHROID) 50 MCG tablet, Take 1 tablet by mouth daily, Disp: 90 tablet, Rfl: 1    amLODIPine (NORVASC) 2.5 MG tablet, 1 tablet daily (Patient taking differently: 2.5 mg daily Pt is taking half tablet daily), Disp: 30 tablet, Rfl: 3    fludrocortisone (FLORINEF) 0.1 MG tablet, Take 1 tablet by mouth 2 times daily, Disp: 30 tablet, Rfl: 0    potassium chloride (KLOR-CON M) 20 MEQ extended release tablet, Take 1 tablet by mouth two times a day, Disp: 180 tablet, Rfl: 0    rosuvastatin (CRESTOR) 20 MG tablet, TAKE 1 TABLET EVERY DAY, Disp: 90 tablet, Rfl: 2    busPIRone (BUSPAR) 15 MG tablet, , Disp: , Rfl:     citalopram (CELEXA) 40 MG tablet, Take 40 mg by mouth daily, Disp: , Rfl:     diazePAM (VALIUM) 5 MG tablet, Take 5 mg by mouth every 6 hours as needed for Anxiety. 1/2 pill two times a day, Disp: , Rfl:     Omega-3 Fatty Acids (FISH OIL) 1000 MG CPDR, Take 3,000 mg by mouth, Disp: , Rfl:     Calcium Carbonate-Vitamin D (CALCIUM 500 + D) 500-125 MG-UNIT TABS, Take by mouth, Disp: , Rfl:     Coenzyme Q10 (CO Q 10 PO), Take by mouth, Disp: , Rfl:     aspirin EC 81 MG EC tablet, Take 1 tablet by mouth daily, Disp: 30 tablet, Rfl: 3    dicyclomine (BENTYL) 10 MG capsule, Take 20 mg by mouth 4 times daily (before meals and nightly) , Disp: , Rfl:     polyethylene glycol (GLYCOLAX) packet, Take 17 g by mouth daily , Disp: , Rfl:   Allergies:  Bactrim [sulfamethoxazole-trimethoprim], Ultram [tramadol hcl], Dilaudid [hydromorphone], Phenergan [promethazine], Lipitor [atorvastatin], and Penicillins  Social History:    reports that she quit smoking about 37 years ago. Her smoking use included cigarettes. She started smoking about 48 years ago. She has a 10.00 pack-year smoking history. She has never used smokeless tobacco. She reports that she does not drink alcohol or use drugs. · LEFT UPPER EXTREMITY: Inspection/examination of the left upper extremity does not show any tenderness, deformity or injury. Range of motion is unremarkable and pain-free. There is no gross instability. There are no rashes, ulcerations or lesions. Strength and tone are normal. No atrophy or abnormal movements are noted. LUMBAR/SACRAL EXAMINATION:  · Inspection: Local inspection shows no step-off or bruising. Lumbar alignment is normal. No instability is noted. · Palpation:   No evidence of tenderness at the midline. Lumbar paraspinal tenderness: Mild L4/5 and L5/S1 tenderness  Bursal tenderness No tenderness bilaterally  There is no paraspinal spasm. · Range of Motion: pain-free ROM  · Strength:   Strength testing is 5/5 in all muscle groups tested. · Special Tests:   Straight leg raise and crossed SLR negative. Roberto's testing is negative bilaterally. FADIR's testing is negative bilaterally. · Skin: There are no rashes, ulcerations or lesions. · Reflexes: Reflexes are symmetrically 1+ at the patellar and ankle tendons. Clonus absent bilaterally at the feet. · Gait & station: normal, patient ambulates without assistance  · Additional Examinations:  · RIGHT LOWER EXTREMITY: Inspection/examination of the right lower extremity does not show any tenderness, deformity or injury. Range of motion is normal and pain-free. There is no gross instability. There are no rashes, ulcerations or lesions. Strength and tone are normal. No atrophy or abnormal movements are noted. · LEFT LOWER EXTREMITY:  Inspection/examination of the left lower extremity does not show any tenderness, deformity or injury. Range of motion is normal and pain-free. There is no gross instability. There are no rashes, ulcerations or lesions. Strength and tone are normal. No atrophy or abnormal movements are noted.       Diagnostic Testing:    MR Lumbar spine shows  1/6/21 Conservative care options: physical therapy, ice, medications, bracing, and activity modification. The indications for therapeutic injections. The indications for additional imaging/laboratory studies. The indications for (possible future) interventions. After considering the various options discussed, Tabitha Epstein elected to pursue a course of treatment that includes the followin. Medications:  No further recommendations for new medications. 2. PT:  Encouraged to continue with HEP. 3. Further studies:  No further studies. 4. Interventional:  No further recommendations at this patient is feeling much better. 5. Follow up:  2-3 months      Tabitha Epstein was instructed to call the office if her symptoms worsen or if new symptoms appear prior to the next scheduled visit. She is specifically instructed to contact the office between now & her scheduled appointment if she has concerns related to her condition or if she needs assistance in scheduling the above tests. She is welcome to call for an appointment sooner if she has any additional concerns or questions. Rich Tan PA-C   Board Certified by the [employer] on Certification of Physician Assistants  Everton Zhong 57  Partner of [facility] ([facility])             This dictation was performed with a verbal recognition program [facility] ) and it was checked for errors. It is possible that there are still dictated errors within this office note. If so, please bring any errors to my attention for an addendum. All efforts were made to ensure that this office note is accurate.

## 2021-01-14 ENCOUNTER — TELEPHONE (OUTPATIENT)
Dept: FAMILY MEDICINE CLINIC | Age: 76
End: 2021-01-14

## 2021-01-14 NOTE — TELEPHONE ENCOUNTER
Pt called stating that she's having a lot of drainage that's going into her eyes, she has to constantly keep wiping them. Pt is requesting to be seen. No avail appts.  Call back pt with recommendations [phone]

## 2021-01-15 ENCOUNTER — OFFICE VISIT (OUTPATIENT)
Dept: FAMILY MEDICINE CLINIC | Age: 76
End: 2021-01-15
Payer: MEDICARE

## 2021-01-15 VITALS
HEART RATE: 86 BPM | OXYGEN SATURATION: 96 % | BODY MASS INDEX: 34.66 KG/M2 | WEIGHT: 208 LBS | HEIGHT: 65 IN | DIASTOLIC BLOOD PRESSURE: 86 MMHG | TEMPERATURE: 98.1 F | SYSTOLIC BLOOD PRESSURE: 132 MMHG

## 2021-01-15 DIAGNOSIS — R41.3 MEMORY LOSS: ICD-10-CM

## 2021-01-15 DIAGNOSIS — H10.33 ACUTE BACTERIAL CONJUNCTIVITIS OF BOTH EYES: Primary | ICD-10-CM

## 2021-01-15 DIAGNOSIS — F41.8 ANXIETY WITH DEPRESSION: ICD-10-CM

## 2021-01-15 PROCEDURE — 1090F PRES/ABSN URINE INCON ASSESS: CPT | Performed by: NURSE PRACTITIONER

## 2021-01-15 PROCEDURE — 99214 OFFICE O/P EST MOD 30 MIN: CPT | Performed by: NURSE PRACTITIONER

## 2021-01-15 PROCEDURE — 1036F TOBACCO NON-USER: CPT | Performed by: NURSE PRACTITIONER

## 2021-01-15 PROCEDURE — 1123F ACP DISCUSS/DSCN MKR DOCD: CPT | Performed by: NURSE PRACTITIONER

## 2021-01-15 PROCEDURE — G8482 FLU IMMUNIZE ORDER/ADMIN: HCPCS | Performed by: NURSE PRACTITIONER

## 2021-01-15 PROCEDURE — G8400 PT W/DXA NO RESULTS DOC: HCPCS | Performed by: NURSE PRACTITIONER

## 2021-01-15 PROCEDURE — 4040F PNEUMOC VAC/ADMIN/RCVD: CPT | Performed by: NURSE PRACTITIONER

## 2021-01-15 PROCEDURE — G8417 CALC BMI ABV UP PARAM F/U: HCPCS | Performed by: NURSE PRACTITIONER

## 2021-01-15 PROCEDURE — G8427 DOCREV CUR MEDS BY ELIG CLIN: HCPCS | Performed by: NURSE PRACTITIONER

## 2021-01-15 RX ORDER — ERYTHROMYCIN 5 MG/G
OINTMENT OPHTHALMIC
Qty: 1 G | Refills: 0 | Status: SHIPPED | OUTPATIENT
Start: 2021-01-15 | End: 2021-01-25

## 2021-01-15 ASSESSMENT — ENCOUNTER SYMPTOMS
PHOTOPHOBIA: 0
STRIDOR: 0
SWOLLEN GLANDS: 0
SORE THROAT: 0
EYE REDNESS: 1
EYE DISCHARGE: 1
DOUBLE VISION: 0
EYE ITCHING: 1
EYE PAIN: 0
RHINORRHEA: 1

## 2021-01-15 NOTE — PROGRESS NOTES
 Hypertension     Irritable bowel syndrome (IBS)     Obesity     Osteoarthritis       Past Surgical History:   Procedure Laterality Date    APPENDECTOMY      CHOLECYSTECTOMY      COLON SURGERY      COLONOSCOPY  2014    mass    COLONOSCOPY  3/29/16    normal       Family History   Problem Relation Age of Onset    Cancer Mother         colon    Cancer Brother         colon    Heart Disease Sister     Heart Disease Brother        Social History     Tobacco Use    Smoking status: Former Smoker     Packs/day: 1.00     Years: 10.00     Pack years: 10.00     Types: Cigarettes     Start date: 79     Quit date: 1983     Years since quittin.3    Smokeless tobacco: Never Used   Substance Use Topics    Alcohol use: No      Current Outpatient Medications   Medication Sig Dispense Refill    erythromycin (ROMYCIN) 5 MG/GM ophthalmic ointment Instill 0.5 inch 4 times daily for 5 to 7 days 1 g 0    furosemide (LASIX) 20 MG tablet Take 1 tablet by mouth daily 90 tablet 0    mirtazapine (REMERON) 15 MG tablet Take 1 tablet by mouth nightly 90 tablet 1    levothyroxine (SYNTHROID) 50 MCG tablet Take 1 tablet by mouth daily 90 tablet 1    amLODIPine (NORVASC) 2.5 MG tablet 1 tablet daily (Patient taking differently: 2.5 mg daily Pt is taking half tablet daily) 30 tablet 3    fludrocortisone (FLORINEF) 0.1 MG tablet Take 1 tablet by mouth 2 times daily 30 tablet 0    potassium chloride (KLOR-CON M) 20 MEQ extended release tablet Take 1 tablet by mouth two times a day 180 tablet 0    rosuvastatin (CRESTOR) 20 MG tablet TAKE 1 TABLET EVERY DAY 90 tablet 2    busPIRone (BUSPAR) 15 MG tablet       citalopram (CELEXA) 40 MG tablet Take 40 mg by mouth daily      diazePAM (VALIUM) 5 MG tablet Take 5 mg by mouth every 6 hours as needed for Anxiety.  1/2 pill two times a day      Omega-3 Fatty Acids (FISH OIL) 1000 MG CPDR Take 3,000 mg by mouth  Calcium Carbonate-Vitamin D (CALCIUM 500 + D) 500-125 MG-UNIT TABS Take by mouth      Coenzyme Q10 (CO Q 10 PO) Take by mouth      aspirin EC 81 MG EC tablet Take 1 tablet by mouth daily 30 tablet 3    dicyclomine (BENTYL) 10 MG capsule Take 20 mg by mouth 4 times daily (before meals and nightly)       polyethylene glycol (GLYCOLAX) packet Take 17 g by mouth daily        No current facility-administered medications for this visit. Allergies   Allergen Reactions    Bactrim [Sulfamethoxazole-Trimethoprim] Anaphylaxis    Ultram [Tramadol Hcl] Anaphylaxis     stridor    Dilaudid [Hydromorphone] Other (See Comments)     Hallucinations    Phenergan [Promethazine] Nausea Only    Lipitor [Atorvastatin]      Nausea    Penicillins        :      Review of Systems   HENT: Positive for rhinorrhea. Negative for congestion, ear discharge, ear pain, hearing loss, mouth sores and sore throat. Eyes: Positive for discharge, redness and itching. Negative for double vision, photophobia and pain. Respiratory: Negative for stridor. Neurological: Negative for headaches. Objective:     Vitals:    01/15/21 1417   BP: 132/86   Site: Right Upper Arm   Position: Sitting   Cuff Size: Medium Adult   Pulse: 86   Temp: 98.1 °F (36.7 °C)   SpO2: 96%   Weight: 208 lb (94.3 kg)   Height: 5' 5\" (1.651 m)     Wt Readings from Last 3 Encounters:   01/15/21 208 lb (94.3 kg)   01/12/21 203 lb 14.8 oz (92.5 kg)   12/29/20 203 lb 14.8 oz (92.5 kg)     Temp Readings from Last 3 Encounters:   01/15/21 98.1 °F (36.7 °C)   01/12/21 96.8 °F (36 °C) (Infrared)   12/29/20 98.6 °F (37 °C)     BP Readings from Last 3 Encounters:   01/15/21 132/86   12/08/20 110/70   10/13/20 104/78     Pulse Readings from Last 3 Encounters:   01/15/21 86   12/08/20 82   10/13/20 81     Physical Exam  Vitals signs and nursing note reviewed. Constitutional:       General: She is not in acute distress. Appearance: Normal appearance. She is well-developed. She is obese. She is not diaphoretic. HENT:      Head: Normocephalic and atraumatic. Right Ear: External ear normal.      Left Ear: External ear normal.      Nose: Nose normal.   Eyes:      General: Lids are everted, no foreign bodies appreciated. Right eye: Discharge (Yellow discharge) present. Left eye: Discharge (Yellow discharge) and hordeolum present. Extraocular Movements: Extraocular movements intact. Right eye: Normal extraocular motion and no nystagmus. Left eye: Normal extraocular motion and no nystagmus. Conjunctiva/sclera:      Right eye: Right conjunctiva is injected (Mild). Exudate present. Left eye: Left conjunctiva is injected (Mild). Exudate present. Neck:      Musculoskeletal: Normal range of motion and neck supple. No neck rigidity. Cardiovascular:      Rate and Rhythm: Normal rate. Pulmonary:      Effort: Pulmonary effort is normal.   Musculoskeletal: Normal range of motion. General: No deformity. Skin:     General: Skin is warm and dry. Coloration: Skin is not jaundiced or pale. Findings: No bruising, erythema, lesion or rash. Neurological:      Mental Status: She is alert and oriented to person, place, and time. Mental status is at baseline. Psychiatric:         Mood and Affect: Mood normal.         Speech: Speech is tangential.         Behavior: Behavior normal.         Thought Content: Thought content normal.         Cognition and Memory: She exhibits impaired recent memory.          Judgment: Judgment normal.         Nurse Only on 12/15/2020   Component Date Value Ref Range Status    Color, UA 12/15/2020 yellow   Final    Clarity, UA 12/15/2020 slightly cloudy   Final    Glucose, UA POC 12/15/2020 negative   Final    Bilirubin, UA 12/15/2020 negative   Final    Ketones, UA 12/15/2020 negative   Final    Spec Grav, UA 12/15/2020 1.025   Final  Blood, UA POC 12/15/2020 negative   Final    pH, UA 12/15/2020 7.0   Final    Protein, UA POC 12/15/2020 negative   Final    Urobilinogen, UA 12/15/2020 0.2   Final    Leukocytes, UA 12/15/2020 small   Final    Nitrite, UA 12/15/2020 negative   Final           Assessment & Plan: The following diagnoses and conditions are stable with no further orders unless indicated:  1. Acute bacterial conjunctivitis of both eyes    2. Anxiety with depression    3. Memory loss        Jin Mclean was seen today for other. Hordeolum noted in left eye and eyelashes seem to be irritating her eye causing secondary infection. She continues to touches both of her eyes in the room and most likely this is the cause of infection spreading to her right eye. Erythromycin ointment prescribed. Informed her if no improvement in 3 days or if any of her symptoms worsen, recommend her go for urgent appointment with ophthalmology. Discussed with Bianca Batista that she controlled substances are not prescribed by the office and she was aware of this when she first made her appointment. Hailey's anxiety seems to stem from her memory loss and possible underlying mental health condition. Her  helps her with recent recollection throughout the conversation, but continues to believe she does not have dementia. Recommend her follow up with psychiatry - referral given. Recommend her talk with counseling - referral given. Recommend her continue to follow up with neurology regarding her memory loss. Diagnoses and all orders for this visit:    Acute bacterial conjunctivitis of both eyes  -     erythromycin (ROMYCIN) 5 MG/GM ophthalmic ointment; Instill 0.5 inch 4 times daily for 5 to 7 days    Anxiety with depression  -     External Referral to Psychiatry    Memory loss      Prior to Visit Medications    Medication Sig Taking?  Authorizing Provider erythromycin (ROMYCIN) 5 MG/GM ophthalmic ointment Instill 0.5 inch 4 times daily for 5 to 7 days Yes MOHINDER Foster CNP   furosemide (LASIX) 20 MG tablet Take 1 tablet by mouth daily Yes MOHINDER Foster CNP   mirtazapine (REMERON) 15 MG tablet Take 1 tablet by mouth nightly Yes MOHINDER Foster CNP   levothyroxine (SYNTHROID) 50 MCG tablet Take 1 tablet by mouth daily Yes MOHINDER Foster CNP   amLODIPine (NORVASC) 2.5 MG tablet 1 tablet daily  Patient taking differently: 2.5 mg daily Pt is taking half tablet daily Yes MOHINDER Foster CNP   fludrocortisone (FLORINEF) 0.1 MG tablet Take 1 tablet by mouth 2 times daily Yes MOHINDER Foster CNP   potassium chloride (KLOR-CON M) 20 MEQ extended release tablet Take 1 tablet by mouth two times a day Yes MOHINDER Foster CNP   rosuvastatin (CRESTOR) 20 MG tablet TAKE 1 TABLET EVERY DAY Yes Jaylon Hernandez MD   busPIRone (BUSPAR) 15 MG tablet  Yes Historical Provider, MD   citalopram (CELEXA) 40 MG tablet Take 40 mg by mouth daily Yes Historical Provider, MD   diazePAM (VALIUM) 5 MG tablet Take 5 mg by mouth every 6 hours as needed for Anxiety.  1/2 pill two times a day Yes Historical Provider, MD   Omega-3 Fatty Acids (FISH OIL) 1000 MG CPDR Take 3,000 mg by mouth Yes Historical Provider, MD   Calcium Carbonate-Vitamin D (CALCIUM 500 + D) 500-125 MG-UNIT TABS Take by mouth Yes Historical Provider, MD   Coenzyme Q10 (CO Q 10 PO) Take by mouth Yes Historical Provider, MD   aspirin EC 81 MG EC tablet Take 1 tablet by mouth daily Yes Birdie Osler, MD   dicyclomine (BENTYL) 10 MG capsule Take 20 mg by mouth 4 times daily (before meals and nightly)  Yes Historical Provider, MD   polyethylene glycol (GLYCOLAX) packet Take 17 g by mouth daily  Yes Historical Provider, MD     Orders Placed This Encounter   Medications    erythromycin (ROMYCIN) 5 MG/GM ophthalmic ointment Sig: Instill 0.5 inch 4 times daily for 5 to 7 days     Dispense:  1 g     Refill:  0         Return if symptoms worsen or fail to improve. Patient should call the office immediately with new or ongoing signs or symptoms or worsening, or proceedto the emergency room. No changes in past medical history, past surgical history, social history, or family history were noted during the patient encounter unless specifically listed above. All updates of past medicalhistory, past surgical history, social history, or family history were reviewed personally by me during the office visit. All problems listed in the assessment are stable unless noted otherwise. Medication profilereviewed personally by me during the office visit. Medication side effects and possible impairments from medications were discussed as applicable. Call if pattern of symptoms change or persists for an extended time. This document was prepared by a combination of typing and transcription through a voice recognition software. All medications have the potential for adverse effects. All medications effect each person differently. Please read and review provided information related to medication. If the medication that you have been prescribed has the potential to cause sedation, do not drive or operate car, truck, or heavy machinery until you know how the medication will effect you. If you experience any adverse effects from the medication, please call the office or report to the emergency department. See someone right away if you want to hurt or kill yourself!  If you ever feel like you might hurt yourself or someone else, do one of these things:  ?Call your doctor or nurse and tell them it is urgent  ? Call for an ambulance (in the [address] [place] [address],[address] and [facility] [address])  ? Go to the emergency room at your local hospital  ?Call the [address] [place] [address] Street:  [phone] I've explained to her that drugs of the SSRI class can have side effects such as weight gain, sexual dysfunction, insomnia, headache, nausea. These medications are generally effective at alleviating symptoms of anxiety and/or depression. Let me know if significant side effects do occur.

## 2021-01-22 ENCOUNTER — TELEPHONE (OUTPATIENT)
Dept: FAMILY MEDICINE CLINIC | Age: 76
End: 2021-01-22

## 2021-01-22 NOTE — TELEPHONE ENCOUNTER
Pt states that PCP was going to get a referral for her to see physical therapy. Not seeing anything in chart. Please advise.

## 2021-01-25 ENCOUNTER — NURSE ONLY (OUTPATIENT)
Dept: FAMILY MEDICINE CLINIC | Age: 76
End: 2021-01-25
Payer: MEDICARE

## 2021-01-25 DIAGNOSIS — I10 HTN (HYPERTENSION), BENIGN: ICD-10-CM

## 2021-01-25 DIAGNOSIS — R94.6 ABNORMAL THYROID FUNCTION TEST: ICD-10-CM

## 2021-01-25 PROCEDURE — 36415 COLL VENOUS BLD VENIPUNCTURE: CPT | Performed by: NURSE PRACTITIONER

## 2021-01-26 DIAGNOSIS — E03.9 HYPOTHYROIDISM, UNSPECIFIED TYPE: ICD-10-CM

## 2021-01-26 LAB
ANION GAP SERPL CALCULATED.3IONS-SCNC: 11 MMOL/L (ref 3–16)
BUN BLDV-MCNC: 14 MG/DL (ref 7–20)
CALCIUM SERPL-MCNC: 9.7 MG/DL (ref 8.3–10.6)
CHLORIDE BLD-SCNC: 99 MMOL/L (ref 99–110)
CO2: 30 MMOL/L (ref 21–32)
CREAT SERPL-MCNC: 0.9 MG/DL (ref 0.6–1.2)
GFR AFRICAN AMERICAN: >60
GFR NON-AFRICAN AMERICAN: >60
GLUCOSE BLD-MCNC: 117 MG/DL (ref 70–99)
POTASSIUM SERPL-SCNC: 3.4 MMOL/L (ref 3.5–5.1)
SODIUM BLD-SCNC: 140 MMOL/L (ref 136–145)
T4 FREE: 1.3 NG/DL (ref 0.9–1.8)
TSH REFLEX: 7.27 UIU/ML (ref 0.27–4.2)

## 2021-01-26 RX ORDER — LEVOTHYROXINE SODIUM 0.07 MG/1
75 TABLET ORAL DAILY
Qty: 90 TABLET | Refills: 0 | Status: SHIPPED | OUTPATIENT
Start: 2021-01-26 | End: 2021-01-27

## 2021-01-29 ENCOUNTER — TELEPHONE (OUTPATIENT)
Dept: FAMILY MEDICINE CLINIC | Age: 76
End: 2021-01-29

## 2021-01-29 ENCOUNTER — VIRTUAL VISIT (OUTPATIENT)
Dept: ENDOCRINOLOGY | Age: 76
End: 2021-01-29
Payer: MEDICARE

## 2021-01-29 DIAGNOSIS — E03.9 ACQUIRED HYPOTHYROIDISM: Primary | ICD-10-CM

## 2021-01-29 PROCEDURE — 4040F PNEUMOC VAC/ADMIN/RCVD: CPT | Performed by: INTERNAL MEDICINE

## 2021-01-29 PROCEDURE — G8427 DOCREV CUR MEDS BY ELIG CLIN: HCPCS | Performed by: INTERNAL MEDICINE

## 2021-01-29 PROCEDURE — G8400 PT W/DXA NO RESULTS DOC: HCPCS | Performed by: INTERNAL MEDICINE

## 2021-01-29 PROCEDURE — 1090F PRES/ABSN URINE INCON ASSESS: CPT | Performed by: INTERNAL MEDICINE

## 2021-01-29 PROCEDURE — 99213 OFFICE O/P EST LOW 20 MIN: CPT | Performed by: INTERNAL MEDICINE

## 2021-01-29 PROCEDURE — 1123F ACP DISCUSS/DSCN MKR DOCD: CPT | Performed by: INTERNAL MEDICINE

## 2021-01-29 RX ORDER — LEVOTHYROXINE SODIUM 0.07 MG/1
75 TABLET ORAL DAILY
Qty: 90 TABLET | Refills: 0 | Status: SHIPPED | OUTPATIENT
Start: 2021-01-29 | End: 2021-03-22 | Stop reason: SDUPTHER

## 2021-01-29 NOTE — TELEPHONE ENCOUNTER
Attempted to call patient and inform of results.  answered and stated that patient is no available currently, asked if Monday worked better, stated yes. Call back with results on Monday.

## 2021-01-29 NOTE — PROGRESS NOTES
 CHOLECYSTECTOMY      COLON SURGERY      COLONOSCOPY  2014    mass    COLONOSCOPY  3/29/16    normal     Current Outpatient Medications   Medication Sig Dispense Refill    furosemide (LASIX) 20 MG tablet Take 1 tablet by mouth daily 90 tablet 0    mirtazapine (REMERON) 15 MG tablet Take 1 tablet by mouth nightly 90 tablet 1    amLODIPine (NORVASC) 2.5 MG tablet 1 tablet daily (Patient taking differently: 2.5 mg daily Pt is taking half tablet daily) 30 tablet 3    fludrocortisone (FLORINEF) 0.1 MG tablet Take 1 tablet by mouth 2 times daily 30 tablet 0    potassium chloride (KLOR-CON M) 20 MEQ extended release tablet Take 1 tablet by mouth two times a day 180 tablet 0    rosuvastatin (CRESTOR) 20 MG tablet TAKE 1 TABLET EVERY DAY 90 tablet 2    busPIRone (BUSPAR) 15 MG tablet       citalopram (CELEXA) 40 MG tablet Take 40 mg by mouth daily      Omega-3 Fatty Acids (FISH OIL) 1000 MG CPDR Take 3,000 mg by mouth      Calcium Carbonate-Vitamin D (CALCIUM 500 + D) 500-125 MG-UNIT TABS Take by mouth      Coenzyme Q10 (CO Q 10 PO) Take by mouth      aspirin EC 81 MG EC tablet Take 1 tablet by mouth daily 30 tablet 3    dicyclomine (BENTYL) 10 MG capsule Take 20 mg by mouth 4 times daily (before meals and nightly)       polyethylene glycol (GLYCOLAX) packet Take 17 g by mouth daily        No current facility-administered medications for this visit. Review of Systems  Constitutional: + for weight loss and +malaise/fatigue. Negative for fever and chills. HENT: Negative for hearing loss, ear pain, nosebleeds, neck pain and tinnitus. Eyes: Negative for blurred vision. Negative for double vision, photophobia and pain. Respiratory: Negative for cough and sputum production. Cardiovascular: Negative for chest pain, palpitations and leg swelling. Gastrointestinal: Negative for nausea, vomiting and abdominal pain. Genitourinary: Negative for dysuria, urgency and frequency. Musculoskeletal: Negative for back pain. No joint pain  Skin: Negative for itching and rash. Neurological: Negative for dizziness. Negative for tingling, tremors, focal weakness and headaches. Endo/Heme/Allergies: see HPI  Psychiatric/Behavioral: + for depression and - substance abuse. PHYSICAL EXAMINATION:  [ INSTRUCTIONS:  \"[x]\" Indicates a positive item  \"[]\" Indicates a negative item  -- DELETE ALL ITEMS NOT EXAMINED]  Vital Signs: (As obtained by patient/caregiver or practitioner observation)    Blood pressure-  Heart rate-    Respiratory rate-    Temperature-  Pulse oximetry-     Constitutional Appears well-developed and well-nourished No apparent distress        Mental status  Alert and awake  Oriented to person/place/time  Able to follow commands      Eyes:  EOM    [x]  Normal    Sclera  [x]  Normal           Discharge [x]  None visible      HENT:   [x] Normocephalic, atraumatic. [x] Mouth/Throat: Mucous membranes are moist.     External Ears [x] Normal  no discharge    Neck: [x] No visualized mass  no swelling    Pulmonary/Chest: [x] Respiratory effort normal.  [x] No visualized signs of difficulty breathing or respiratory distress             Musculoskeletal:   [x] Normal gait with no signs of ataxia         [x] Normal range of motion of neck          Head and neck stable, appears normal ROM, strength good    Neurological:        [x] No Facial Asymmetry (Cranial nerve 7 motor function) (limited exam to video visit)          [x] No gaze palsy                Skin:        [x] No significant exanthematous lesions or discoloration noted on facial skin                 Psychiatric:       [x] Normal Affect [x] No Hallucinations            Other pertinent observable physical exam findings-     Due to this being a TeleHealth encounter, evaluation of the following organ systems is limited: Vitals/Constitutional/EENT/Resp/CV/GI//MS/Neuro/Skin/Heme-Lymph-Imm. Services were provided through a video synchronous discussion virtually to substitute for in-person clinic visit. Lab Review  No results found for: TSH  No results found for: FREET4      Assessment:     1. Chronic Fatigue/Hypothyroid:  Labs showed subclinical hypothyroidism. Does report cold intolerance. I started low dose levothyroxine. Level low, increase dose    2. Orthostatic Hypotension: Improved since on fludrocortisone per patient. Hypokalemia likely due to it and states started after taking medication. On K supplement. Followed by cardiology. 3.Lack of appetite: States improved, since on diet plan from her sister    3. Anxiety/Depression: followed by PCP     Plan: 1. Change levothyroxine to 75mcg  2. TSH in 2 months

## 2021-01-29 NOTE — TELEPHONE ENCOUNTER
----- Message from Lisbet Mayes sent at 1/29/2021 11:21 AM EST -----  Subject: Message to Provider    QUESTIONS  Information for Provider? Patient would like a call with the results of   blood test done a few days ago. Please call patient back with results. ---------------------------------------------------------------------------  --------------  Syed ADAMS  What is the best way for the office to contact you? OK to leave message on   voicemail  Preferred Call Back Phone Number? [phone]  ---------------------------------------------------------------------------  --------------  SCRIPT ANSWERS  Relationship to Patient? Other  Representative Name? Ector Coy  Is the [facility] on the appropriate HIPAA document in Epic?  Yes

## 2021-02-01 NOTE — TELEPHONE ENCOUNTER
Raymond(EC) called and informed of results and recommendations. Pt's levothyroxine was increased on Friday to 75mcg. Pt is taking 20meq of potassium daily. Call back regarding recommendations with potassium.

## 2021-02-03 ENCOUNTER — TELEPHONE (OUTPATIENT)
Dept: FAMILY MEDICINE CLINIC | Age: 76
End: 2021-02-03

## 2021-02-03 NOTE — TELEPHONE ENCOUNTER
----- Message from Cristal Castillo sent at 2/3/2021 11:10 AM EST -----  Subject: Message to Provider    QUESTIONS  Information for Provider? patient is getting a shot in her eye tomorrow   for macular degeneration she is also getting her 1st covid vaccine on   friday. patient wants to know if it is okay to get both of these shots . please call her back   ---------------------------------------------------------------------------  --------------  CALL BACK INFO  What is the best way for the office to contact you? OK to leave message on   voicemail  Preferred Call Back Phone Number? [phone]  ---------------------------------------------------------------------------  --------------  SCRIPT ANSWERS  Relationship to Patient?  Self

## 2021-02-05 ENCOUNTER — IMMUNIZATION (OUTPATIENT)
Dept: PRIMARY CARE CLINIC | Age: 76
End: 2021-02-05
Payer: MEDICARE

## 2021-02-05 PROCEDURE — 0011A COVID-19, MODERNA VACCINE 100MCG/0.5ML DOSE: CPT | Performed by: FAMILY MEDICINE

## 2021-02-05 PROCEDURE — 91301 COVID-19, MODERNA VACCINE 100MCG/0.5ML DOSE: CPT | Performed by: FAMILY MEDICINE

## 2021-02-13 ENCOUNTER — HOSPITAL ENCOUNTER (EMERGENCY)
Age: 76
Discharge: HOME | End: 2021-02-13
Attending: EMERGENCY MEDICINE
Payer: MEDICARE

## 2021-02-13 VITALS
TEMPERATURE: 98.3 F | SYSTOLIC BLOOD PRESSURE: 150 MMHG | HEIGHT: 68 IN | OXYGEN SATURATION: 98 % | WEIGHT: 200 LBS | RESPIRATION RATE: 16 BRPM | DIASTOLIC BLOOD PRESSURE: 97 MMHG | HEART RATE: 98 BPM | BODY MASS INDEX: 30.31 KG/M2

## 2021-02-13 DIAGNOSIS — L02.91 ABSCESS: Primary | ICD-10-CM

## 2021-02-13 PROCEDURE — 99283 EMERGENCY DEPT VISIT LOW MDM: CPT

## 2021-02-13 PROCEDURE — 10060 I&D ABSCESS SIMPLE/SINGLE: CPT

## 2021-02-14 NOTE — ED NOTES
Placed gauze over wound and adhered with silk tape. Instructed patient and her  about changing dressing if needed and keeping wound clean.       Ran Rosales RN  02/13/21 [id number]

## 2021-02-14 NOTE — ED PROVIDER NOTES
Emergency Department Attending Note    Yaima Perkins MD    Date of ED VIsit: 2/13/2021    CHIEF COMPLAINT  Abscess      HISTORY OF PRESENT ILLNESS  Simi Saab is a 67 y.o. female  With Vital signs of BP (!) 164/97   Pulse 98   Temp 98.3 °F (36.8 °C) (Oral)   Resp 16   Ht 5' 8\" (1.727 m)   Wt 200 lb (90.7 kg)   SpO2 98%   BMI 30.41 kg/m²  who presents to the ED with a complaint of left shoulder. Patient seen and evaluated in room 3. Patient comes in complaining of a an abscess on the left shoulder that is been ongoing for the past week. She says it is gotten a little bit larger and that is been concerning to her. No fevers no chills no chills no systemic symptoms no constitutional symptoms. No rashes noted. No drainage from the abscess itself. .  No other complaints, modifying factors or associated symptoms. Patients Past medical history reviewed and listed below  Past Medical History:   Diagnosis Date    Anxiety     Benign essential hypertension 1/17/2017    Bowel obstruction ([address]     CAD (coronary artery disease)     Cancer ([address] 2012    colon    Depression     GERD (gastroesophageal reflux disease)     Hyperlipidemia     Hypertension     Irritable bowel syndrome (IBS)     Obesity     Osteoarthritis      Past Surgical History:   Procedure Laterality Date    APPENDECTOMY      CHOLECYSTECTOMY      COLON SURGERY      COLONOSCOPY  2014    mass    COLONOSCOPY  3/29/16    normal       I have reviewed the following from the nursing documentation.     Family History   Problem Relation Age of Onset    Cancer Mother         colon    Cancer Brother         colon    Heart Disease Sister     Heart Disease Brother      Social History     Socioeconomic History    Marital status:      Spouse name: Not on file    Number of children: Not on file    Years of education: Not on file    Highest education level: Not on file   Occupational History    Not on file   Social Needs  Financial resource strain: Not on file    Food insecurity     Worry: Not on file     Inability: Not on file   [employer] needs     Medical: Not on file     Non-medical: Not on file   Tobacco Use    Smoking status: Former Smoker     Packs/day: 1.00     Years: 10.00     Pack years: 10.00     Types: Cigarettes     Start date: 80     Quit date: 1983     Years since quittin.4    Smokeless tobacco: Never Used   Substance and Sexual Activity    Alcohol use: No    Drug use: No    Sexual activity: Not on file   Lifestyle    Physical activity     Days per week: Not on file     Minutes per session: Not on file    Stress: Not on file   Relationships    Social connections     Talks on phone: Not on file     Gets together: Not on file     Attends Shinto service: Not on file     Active member of club or organization: Not on file     Attends meetings of clubs or organizations: Not on file     Relationship status: Not on file    Intimate partner violence     Fear of current or ex partner: Not on file     Emotionally abused: Not on file     Physically abused: Not on file     Forced sexual activity: Not on file   Other Topics Concern    Not on file   Social History Narrative    Not on file     No current facility-administered medications for this encounter.       Current Outpatient Medications   Medication Sig Dispense Refill    levothyroxine (SYNTHROID) 75 MCG tablet Take 1 tablet by mouth daily 90 tablet 0    furosemide (LASIX) 20 MG tablet Take 1 tablet by mouth daily 90 tablet 0    mirtazapine (REMERON) 15 MG tablet Take 1 tablet by mouth nightly 90 tablet 1    amLODIPine (NORVASC) 2.5 MG tablet 1 tablet daily (Patient taking differently: 2.5 mg daily Pt is taking half tablet daily) 30 tablet 3    fludrocortisone (FLORINEF) 0.1 MG tablet Take 1 tablet by mouth 2 times daily 30 tablet 0  potassium chloride (KLOR-CON M) 20 MEQ extended release tablet Take 1 tablet by mouth two times a day 180 tablet 0    rosuvastatin (CRESTOR) 20 MG tablet TAKE 1 TABLET EVERY DAY 90 tablet 2    busPIRone (BUSPAR) 15 MG tablet       citalopram (CELEXA) 40 MG tablet Take 40 mg by mouth daily      Omega-3 Fatty Acids (FISH OIL) 1000 MG CPDR Take 3,000 mg by mouth      Calcium Carbonate-Vitamin D (CALCIUM 500 + D) 500-125 MG-UNIT TABS Take by mouth      Coenzyme Q10 (CO Q 10 PO) Take by mouth      aspirin EC 81 MG EC tablet Take 1 tablet by mouth daily 30 tablet 3    dicyclomine (BENTYL) 10 MG capsule Take 20 mg by mouth 4 times daily (before meals and nightly)       polyethylene glycol (GLYCOLAX) packet Take 17 g by mouth daily        Allergies   Allergen Reactions    Bactrim [Sulfamethoxazole-Trimethoprim] Anaphylaxis    Ultram [Tramadol Hcl] Anaphylaxis     stridor    Dilaudid [Hydromorphone] Other (See Comments)     Hallucinations    Phenergan [Promethazine] Nausea Only    Lipitor [Atorvastatin]      Nausea    Penicillins        REVIEW OF SYSTEMS  10 systems reviewed, pertinent positives per HPI otherwise noted to be negative     PHYSICAL EXAM  BP (!) 164/97   Pulse 98   Temp 98.3 °F (36.8 °C) (Oral)   Resp 16   Ht 5' 8\" (1.727 m)   Wt 200 lb (90.7 kg)   SpO2 98%   BMI 30.41 kg/m²   GENERAL APPEARANCE: Awake and alert. Cooperative. In no obvious distress. HEAD: Normocephalic. Atraumatic. EYES: PERRL. EOM's grossly intact. ENT: Mucous membranes are pink and moist.   NECK: Supple. HEART: RRR. No murmurs. LUNGS: Respirations unlabored. CTAB. Good air exchange. ABDOMEN: Soft. Non-distended. Non-tender. No masses. No organomegaly. No guarding or rebound. EXTREMITIES: No peripheral edema. Moves all extremities equally. All extremities neurovascularly intact. SKIN: Warm and dry. No acute rashes. BACK: There is a quarter sized abscess on the back of the patient's shoulder that will be incised and drained   NEUROLOGICAL: Alert and oriented. Strength 5/5, sensation intact. Gait normal.   PSYCHIATRIC: Normal mood and affect. No HI or SI expressed to me. RADIOLOGY    If acquired see below     EKG:     If acquired see below       ED COURSE/MDM    PROCEDURE:  [address] or their surrogate had an opportunity to ask questions, and the risks, benefits, and alternatives were discussed. The abscess was prepped and draped to maintain a sterile field. A local anesthetic was used to anesthetize the abscess. An incision was made to keep the abscess open so it will continue to drain copious amounts of sebum and pus. It was copiously irrigated with its loculations broken down. There were no complications during the procedure. The ED course and plan were reviewed and results discussed with the patient    The patient understood and agreed with the Discharge/transfer planning. CLINICAL IMPRESSION and DISPOSITION    Emma Velásquez was stable and diagnosed with left shoulder abscess    Patient was treated with incision and drainage      I spoke to the  and the patient and explained to them that the packing that we put in there needed to be removed in 2 days so on Monday explained to the  how to remove the packing and then the cover it up with the Band-Aid or a bandage at which point it would probably heal on its own.                  Asaf Pollock MD  02/13/21 2529

## 2021-02-19 ENCOUNTER — OFFICE VISIT (OUTPATIENT)
Dept: FAMILY MEDICINE CLINIC | Age: 76
End: 2021-02-19
Payer: MEDICARE

## 2021-02-19 VITALS
TEMPERATURE: 97.8 F | OXYGEN SATURATION: 96 % | DIASTOLIC BLOOD PRESSURE: 86 MMHG | HEIGHT: 68 IN | BODY MASS INDEX: 30.92 KG/M2 | WEIGHT: 204 LBS | SYSTOLIC BLOOD PRESSURE: 128 MMHG | HEART RATE: 86 BPM

## 2021-02-19 DIAGNOSIS — H02.006 ENTROPION OF BOTH EYES: ICD-10-CM

## 2021-02-19 DIAGNOSIS — H02.003 ENTROPION OF BOTH EYES: ICD-10-CM

## 2021-02-19 DIAGNOSIS — Z09 ENCOUNTER FOR EXAMINATION FOLLOWING TREATMENT AT HOSPITAL: Primary | ICD-10-CM

## 2021-02-19 DIAGNOSIS — L02.91 ABSCESS: ICD-10-CM

## 2021-02-19 DIAGNOSIS — F41.9 ANXIETY: ICD-10-CM

## 2021-02-19 PROCEDURE — G8400 PT W/DXA NO RESULTS DOC: HCPCS | Performed by: NURSE PRACTITIONER

## 2021-02-19 PROCEDURE — G8427 DOCREV CUR MEDS BY ELIG CLIN: HCPCS | Performed by: NURSE PRACTITIONER

## 2021-02-19 PROCEDURE — 1090F PRES/ABSN URINE INCON ASSESS: CPT | Performed by: NURSE PRACTITIONER

## 2021-02-19 PROCEDURE — 1123F ACP DISCUSS/DSCN MKR DOCD: CPT | Performed by: NURSE PRACTITIONER

## 2021-02-19 PROCEDURE — 1036F TOBACCO NON-USER: CPT | Performed by: NURSE PRACTITIONER

## 2021-02-19 PROCEDURE — 99214 OFFICE O/P EST MOD 30 MIN: CPT | Performed by: NURSE PRACTITIONER

## 2021-02-19 PROCEDURE — G8482 FLU IMMUNIZE ORDER/ADMIN: HCPCS | Performed by: NURSE PRACTITIONER

## 2021-02-19 PROCEDURE — 4040F PNEUMOC VAC/ADMIN/RCVD: CPT | Performed by: NURSE PRACTITIONER

## 2021-02-19 PROCEDURE — G8417 CALC BMI ABV UP PARAM F/U: HCPCS | Performed by: NURSE PRACTITIONER

## 2021-02-19 RX ORDER — MIRTAZAPINE 30 MG/1
30 TABLET, FILM COATED ORAL NIGHTLY
Qty: 90 TABLET | Refills: 0
Start: 2021-02-19 | End: 2021-03-22 | Stop reason: SDUPTHER

## 2021-02-19 RX ORDER — DICYCLOMINE HCL 20 MG
TABLET ORAL
COMMUNITY
Start: 2021-02-16 | End: 2021-02-19 | Stop reason: SDUPTHER

## 2021-02-19 ASSESSMENT — ENCOUNTER SYMPTOMS
CONSTIPATION: 0
EYE DISCHARGE: 1
GASTROINTESTINAL NEGATIVE: 1
APNEA: 0
NAUSEA: 0
ALLERGIC/IMMUNOLOGIC NEGATIVE: 1
SINUS PRESSURE: 0
EYE ITCHING: 1
ABDOMINAL PAIN: 0
EYE PAIN: 1
COUGH: 0
COLOR CHANGE: 0
CHOKING: 0
WHEEZING: 0
TROUBLE SWALLOWING: 0
RHINORRHEA: 0
SORE THROAT: 0
STRIDOR: 0
SHORTNESS OF BREATH: 0
DIARRHEA: 0
VOMITING: 0
EYE REDNESS: 1
RESPIRATORY NEGATIVE: 1
BLOOD IN STOOL: 0
CHEST TIGHTNESS: 0
BACK PAIN: 0
PHOTOPHOBIA: 0

## 2021-02-19 NOTE — PATIENT INSTRUCTIONS
Patient Education        Learning About Entropion  What is entropion? Entropion is an eye problem in which the eyelid and the eyelashes turn inward. This can cause the eyelashes to rub against the eye. It happens most often in the lower eyelid, but it can also occur in the upper eyelid. Entropion is often caused by aging. As people get older, the skin can sag. The muscles that control the eyelids weaken. Other causes include infection or an injury to the nerves that control the muscles in the eyelids. How it diagnosed? Your doctor can usually diagnose entropion by examining your eyelids and your eye. He or she will check the health of the surface of the eye (cornea) and the lining of the eyelids. What are the symptoms? When you have entropion, you may have:  · Trouble closing your eye completely. · Eye pain. · Dry eye. · Watery eye or tears that may run down your face. · A feeling like there is something in your eye. How is entropion treated? At home, you can try artificial tears to relieve the symptoms and keep the surface of the eye moist. You can buy artificial tears without a prescription. To put in eyedrops or ointment:  · Tilt your head back, and pull your lower eyelid down with one finger. · Drop or squirt the medicine inside the lower lid. · Close your eye for 30 to 60 seconds to let the drops or ointment move around. · Do not touch the ointment or dropper tip to your eyelashes or any other surface. If entropion is painful or if it causes other eye problems, your doctor may talk to you about surgery. There are several surgeries that may help give you relief. When should you call for help? Watch closely for changes in your health, and be sure to contact your doctor if:  · You have new or worse eye pain. · You have new or worse redness in your eye. · You have symptoms of an eye infection, such as:  ? Pus or thick discharge coming from the eye.  ? Redness or swelling around the eye.

## 2021-02-19 NOTE — PROGRESS NOTES
[facility]  [facility]  [address] [place] [address]  [phone] [id number]  Dept: [phone]  Dept Fax: [phone]  Loc: Lb Mclain is a 67 y.o. female who presents today for her medical conditions/complaints as noted below. Emma Velásquez is c/o of Other (pt is here to have wound check on her back. pt had a spot that she was concerned about and went to the ER and had the spot lanced. )        HPI:     Chief Complaint   Patient presents with    Other     pt is here to have wound check on her back. pt had a spot that she was concerned about and went to the ER and had the spot lanced. HPI    Kavya Cardoza presents to the office to follow up on her ER visit from 02/13/2021. She went to the ER for abscess on her left upper back. She had the abscessed drained. Since then, she has had little drainage from the area. She is covering the site with gauze and tape. She states it is not painful. She denies any fever or chills. She denies any increase in size wound and states it seems to be doing better. Kavya Cardoza presents with her  today. Her  states neurology told her she does not have any dementia or any neurological disorder and needs to go see psychiatry. Kavya Cardoza states she is seeing counseling currently and feels like it is helping with the death of her grandson and daughter. Kavya Cardoza states her feels on edge all the time and cries constantly. She states she is anxious all the time and wakes up throughout the night. She is asking for something for anxiety. Her  states they have an appt with psychiatry on 03/04/2021. She is going to be seeing Dr. Dayron Valdez. Kavya Cardoza admits to still having bilateral eye drainage and crusting of her eyes for the last several weeks. She states her eyes water and dex still sensitive to light. Her  has been placing antibiotic eye ointment into her eyes bilaterally two times a day without any improvement. Emergency room record, imaging and labs reviewed    Past Medical History:   Diagnosis Date    Anxiety     Benign essential hypertension 2017    Bowel obstruction (HCC)     CAD (coronary artery disease)     [facility] 2012    colon    Depression     GERD (gastroesophageal reflux disease)     Hyperlipidemia     Hypertension     Irritable bowel syndrome (IBS)     Obesity     Osteoarthritis       Past Surgical History:   Procedure Laterality Date    APPENDECTOMY      CHOLECYSTECTOMY      COLON SURGERY      COLONOSCOPY      mass    COLONOSCOPY  3/29/16    normal       Family History   Problem Relation Age of Onset    Cancer Mother         colon    Cancer Brother         colon    Heart Disease Sister     Heart Disease Brother        Social History     Tobacco Use    Smoking status: Former Smoker     Packs/day: 1.00     Years: 10.00     Pack years: 10.00     Types: Cigarettes     Start date:      Quit date: 1983     Years since quittin.4    Smokeless tobacco: Never Used   Substance Use Topics    Alcohol use: No      Current Outpatient Medications   Medication Sig Dispense Refill    mirtazapine (REMERON) 30 MG tablet Take 1 tablet by mouth nightly 90 tablet 0    levothyroxine (SYNTHROID) 75 MCG tablet Take 1 tablet by mouth daily 90 tablet 0    furosemide (LASIX) 20 MG tablet Take 1 tablet by mouth daily 90 tablet 0    amLODIPine (NORVASC) 2.5 MG tablet 1 tablet daily (Patient taking differently: 2.5 mg daily Pt is taking half tablet daily) 30 tablet 3    fludrocortisone (FLORINEF) 0.1 MG tablet Take 1 tablet by mouth 2 times daily 30 tablet 0    potassium chloride (KLOR-CON M) 20 MEQ extended release tablet Take 1 tablet by mouth two times a day 180 tablet 0    rosuvastatin (CRESTOR) 20 MG tablet TAKE 1 TABLET EVERY DAY 90 tablet 2    busPIRone (BUSPAR) 15 MG tablet       citalopram (CELEXA) 40 MG tablet Take 40 mg by mouth daily  Omega-3 Fatty Acids (FISH OIL) 1000 MG CPDR Take 3,000 mg by mouth      Calcium Carbonate-Vitamin D (CALCIUM 500 + D) 500-125 MG-UNIT TABS Take by mouth      Coenzyme Q10 (CO Q 10 PO) Take by mouth      aspirin EC 81 MG EC tablet Take 1 tablet by mouth daily 30 tablet 3    dicyclomine (BENTYL) 10 MG capsule Take 20 mg by mouth 4 times daily (before meals and nightly)       polyethylene glycol (GLYCOLAX) packet Take 17 g by mouth daily        No current facility-administered medications for this visit. Allergies   Allergen Reactions    Bactrim [Sulfamethoxazole-Trimethoprim] Anaphylaxis    Ultram [Tramadol Hcl] Anaphylaxis     stridor    Dilaudid [Hydromorphone] Other (See Comments)     Hallucinations    Phenergan [Promethazine] Nausea Only    Lipitor [Atorvastatin]      Nausea    Penicillins        :      Review of Systems   Constitutional: Positive for fatigue. Negative for activity change, appetite change, chills, diaphoresis, fever and unexpected weight change. HENT: Negative. Negative for ear pain, rhinorrhea, sinus pressure, sneezing, sore throat and trouble swallowing. Eyes: Positive for pain, discharge, redness and itching. Negative for photophobia and visual disturbance. Respiratory: Negative. Negative for apnea, cough, choking, chest tightness, shortness of breath, wheezing and stridor. Cardiovascular: Negative for chest pain, palpitations and leg swelling. Gastrointestinal: Negative. Negative for abdominal pain, blood in stool, constipation, diarrhea, nausea and vomiting. Genitourinary: Negative. Negative for decreased urine volume, difficulty urinating, dysuria, enuresis, flank pain, frequency, genital sores, hematuria and urgency. Musculoskeletal: Negative. Negative for arthralgias, back pain, gait problem, joint swelling, myalgias, neck pain and neck stiffness. Skin: Positive for wound. Negative for color change, pallor and rash. Musculoskeletal: Normal range of motion and neck supple. No neck rigidity or muscular tenderness. Vascular: No carotid bruit. Trachea: No tracheal deviation. Cardiovascular:      Rate and Rhythm: Normal rate and regular rhythm. Pulses: Normal pulses. Heart sounds: Normal heart sounds. No murmur. No friction rub. No gallop. Pulmonary:      Effort: Pulmonary effort is normal. No respiratory distress. Breath sounds: Normal breath sounds. No stridor. No wheezing, rhonchi or rales. Chest:      Chest wall: No tenderness. Abdominal:      General: Bowel sounds are normal. There is no distension. Palpations: Abdomen is soft. There is no mass. Tenderness: There is no abdominal tenderness. There is no guarding or rebound. Hernia: No hernia is present. Musculoskeletal: Normal range of motion. General: No swelling, tenderness, deformity or signs of injury. Right lower leg: No edema. Left lower leg: No edema. Lymphadenopathy:      Cervical: No cervical adenopathy. Skin:     General: Skin is warm and dry. Capillary Refill: Capillary refill takes less than 2 seconds. Coloration: Skin is not jaundiced or pale. Findings: Erythema and wound present. No bruising, lesion or rash. Neurological:      General: No focal deficit present. Mental Status: She is alert and oriented to person, place, and time. Mental status is at baseline. Cranial Nerves: No cranial nerve deficit. Sensory: No sensory deficit. Motor: No weakness or abnormal muscle tone. Coordination: Coordination normal.      Gait: Gait normal.      Deep Tendon Reflexes: Reflexes are normal and symmetric. Reflexes normal.   Psychiatric:         Mood and Affect: Mood normal.         Behavior: Behavior normal.         Thought Content:  Thought content normal.         Judgment: Judgment normal.         Nurse Only on 01/25/2021 Component Date Value Ref Range Status    TSH 01/25/2021 7.27* 0.27 - 4.20 uIU/mL Final    Sodium 01/25/2021 140  136 - 145 mmol/L Final    Potassium 01/25/2021 3.4* 3.5 - 5.1 mmol/L Final    Chloride 01/25/2021 99  99 - 110 mmol/L Final    CO2 01/25/2021 30  21 - 32 mmol/L Final    Anion Gap 01/25/2021 11  3 - 16 Final    Glucose 01/25/2021 117* 70 - 99 mg/dL Final    BUN 01/25/2021 14  7 - 20 mg/dL Final    CREATININE 01/25/2021 0.9  0.6 - 1.2 mg/dL Final    GFR Non- 01/25/2021 >60  >60 Final    Comment: >60 mL/min/1.73m2 EGFR, calc. for ages 25 and older using the  MDRD formula (not corrected for weight), is valid for stable  renal function.  GFR  01/25/2021 >60  >60 Final    Comment: Chronic Kidney Disease: less than 60 ml/min/1.73 sq.m. Kidney Failure: less than 15 ml/min/1.73 sq.m. Results valid for patients 18 years and older.  Calcium 01/25/2021 9.7  8.3 - 10.6 mg/dL Final    T4 Free 01/25/2021 1.3  0.9 - 1.8 ng/dL Final           Assessment & Plan: The following diagnoses and conditions are stable with no further orders unless indicated:  1. Encounter for examination following treatment at hospital    2. Abscess    3. Anxiety    4. Entropion of both eyes        Kassie Manrique was seen today for other. Abscess healing well. She may continue to use warm compresses on site 3 times a day for 15 minutes at a time. She is to keep site covered if draining. Recommend cleansing site with warm water and soap two times daily until completely healed. Remeron increased from 15 mg to 30 mg. Patient requesting Valium or Ativan. Do not feel these are good choices for her anxiety related to fall risk. Educated on signs and symptoms of serotonin syndrome and when to go to the ER. She is to continue to see psychiatry and counseling. Will get records from [address] [place] [address] [id number] Neurology as patient is claiming she does not have any dementia. polyethylene glycol (GLYCOLAX) packet Take 17 g by mouth daily  Yes Historical Provider, MD     Orders Placed This Encounter   Medications    mirtazapine (REMERON) 30 MG tablet     Sig: Take 1 tablet by mouth nightly     Dispense:  90 tablet     Refill:  0         Return if symptoms worsen or fail to improve. Patient should call the office immediately with new or ongoing signs or symptoms or worsening, or proceedto the emergency room. No changes in past medical history, past surgical history, social history, or family history were noted during the patient encounter unless specifically listed above. All updates of past medicalhistory, past surgical history, social history, or family history were reviewed personally by me during the office visit. All problems listed in the assessment are stable unless noted otherwise. Medication profilereviewed personally by me during the office visit. Medication side effects and possible impairments from medications were discussed as applicable. Call if pattern of symptoms change or persists for an extended time. This document was prepared by a combination of typing and transcription through a voice recognition software. All medications have the potential for adverse effects. All medications effect each person differently. Please read and review provided information related to medication. If the medication that you have been prescribed has the potential to cause sedation, do not drive or operate car, truck, or heavy machinery until you know how the medication will effect you. If you experience any adverse effects from the medication, please call the office or report to the emergency department. See someone right away if you want to hurt or kill yourself!  If you ever feel like you might hurt yourself or someone else, do one of these things:  ?Call your doctor or nurse and tell them it is urgent  ? Call for an ambulance (in the [address] [facility] and [facility] [address] 9[address]) ?Go to the emergency room at your local hospital  ?Call the [address] [place] [address]:  [phone]    I've explained to her that drugs of the SSRI class can have side effects such as weight gain, sexual dysfunction, insomnia, headache, nausea. These medications are generally effective at alleviating symptoms of anxiety and/or depression. Let me know if significant side effects do occur. I've explained to her about serotonin syndrome and when to go to the ER if she develops any symptoms of serotonin syndrome.

## 2021-02-25 ENCOUNTER — NURSE ONLY (OUTPATIENT)
Dept: FAMILY MEDICINE CLINIC | Age: 76
End: 2021-02-25
Payer: MEDICARE

## 2021-02-25 ENCOUNTER — TELEPHONE (OUTPATIENT)
Dept: FAMILY MEDICINE CLINIC | Age: 76
End: 2021-02-25

## 2021-02-25 DIAGNOSIS — E03.9 ACQUIRED HYPOTHYROIDISM: ICD-10-CM

## 2021-02-25 DIAGNOSIS — E87.6 LOW BLOOD POTASSIUM: ICD-10-CM

## 2021-02-25 DIAGNOSIS — E87.6 HYPOKALEMIA: ICD-10-CM

## 2021-02-25 DIAGNOSIS — K21.9 GASTROESOPHAGEAL REFLUX DISEASE WITHOUT ESOPHAGITIS: Primary | ICD-10-CM

## 2021-02-25 DIAGNOSIS — E87.6 LOW BLOOD POTASSIUM: Primary | ICD-10-CM

## 2021-02-25 PROCEDURE — 36415 COLL VENOUS BLD VENIPUNCTURE: CPT | Performed by: NURSE PRACTITIONER

## 2021-02-25 RX ORDER — FAMOTIDINE 40 MG/1
40 TABLET, FILM COATED ORAL EVERY EVENING
Qty: 30 TABLET | Refills: 3 | Status: SHIPPED | OUTPATIENT
Start: 2021-02-25 | End: 2021-03-01 | Stop reason: ALTCHOICE

## 2021-02-25 RX ORDER — POTASSIUM CHLORIDE 20 MEQ/1
TABLET, EXTENDED RELEASE ORAL
Qty: 180 TABLET | Refills: 1 | Status: SHIPPED | OUTPATIENT
Start: 2021-02-25 | End: 2021-02-26

## 2021-02-25 NOTE — TELEPHONE ENCOUNTER
Pt states no constipation, heartburn, cramping, or diarrhea. States the only symptom is the nausea that lasts about 3-4 hours when she first gets up in the morning. States she tries to eat a little something, but it's really hard when she's nauseous.

## 2021-02-25 NOTE — TELEPHONE ENCOUNTER
Pt states that when she wakes up in the mornings she is sick for 2 to 3 hrs. Was wanting to see if any of her medications could be causing this.

## 2021-02-25 NOTE — TELEPHONE ENCOUNTER
Is she having constipation? Heartburn? Cramping? Diarrhea? What other symptoms besides nausea is she having making her think this is IBS?

## 2021-02-25 NOTE — TELEPHONE ENCOUNTER
Pt said she has IBS   Pt said she is now getting worse in the morning   Pt said within the month to six week is when noticed the pain

## 2021-02-25 NOTE — TELEPHONE ENCOUNTER
Last potassium was low. Recommend recheck of potassium when she gets her TSH checked in the next couple of weeks.

## 2021-02-26 DIAGNOSIS — E87.6 HYPOKALEMIA: ICD-10-CM

## 2021-02-26 LAB
POTASSIUM SERPL-SCNC: 3.4 MMOL/L (ref 3.5–5.1)
TSH SERPL DL<=0.05 MIU/L-ACNC: 1.95 UIU/ML (ref 0.27–4.2)

## 2021-02-26 RX ORDER — POTASSIUM CHLORIDE 20 MEQ/1
TABLET, EXTENDED RELEASE ORAL
Qty: 180 TABLET | Refills: 1
Start: 2021-02-26 | End: 2021-08-05

## 2021-03-01 ENCOUNTER — TELEPHONE (OUTPATIENT)
Dept: FAMILY MEDICINE CLINIC | Age: 76
End: 2021-03-01

## 2021-03-01 ENCOUNTER — HOSPITAL ENCOUNTER (OUTPATIENT)
Age: 76
Discharge: HOME | End: 2021-03-01
Payer: MEDICARE

## 2021-03-01 DIAGNOSIS — K21.9 GASTROESOPHAGEAL REFLUX DISEASE WITHOUT ESOPHAGITIS: Primary | ICD-10-CM

## 2021-03-01 DIAGNOSIS — K21.9 GASTROESOPHAGEAL REFLUX DISEASE WITHOUT ESOPHAGITIS: ICD-10-CM

## 2021-03-01 DIAGNOSIS — R10.13 EPIGASTRIC ABDOMINAL PAIN: ICD-10-CM

## 2021-03-01 LAB
A/G RATIO: 1.1 (ref 1.1–2.2)
ALBUMIN SERPL-MCNC: 4.1 G/DL (ref 3.4–5)
ALP BLD-CCNC: 105 U/L (ref 40–129)
ALT SERPL-CCNC: 11 U/L (ref 10–40)
AMYLASE: 50 U/L (ref 25–115)
ANION GAP SERPL CALCULATED.3IONS-SCNC: 9 MMOL/L (ref 3–16)
AST SERPL-CCNC: 19 U/L (ref 15–37)
BASOPHILS ABSOLUTE: 0 K/UL (ref 0–0.2)
BASOPHILS RELATIVE PERCENT: 0.5 %
BILIRUB SERPL-MCNC: 0.5 MG/DL (ref 0–1)
BUN BLDV-MCNC: 16 MG/DL (ref 7–20)
CALCIUM SERPL-MCNC: 9.6 MG/DL (ref 8.3–10.6)
CHLORIDE BLD-SCNC: 102 MMOL/L (ref 99–110)
CO2: 29 MMOL/L (ref 21–32)
CREAT SERPL-MCNC: 0.9 MG/DL (ref 0.6–1.2)
EOSINOPHILS ABSOLUTE: 0.1 K/UL (ref 0–0.6)
EOSINOPHILS RELATIVE PERCENT: 1.3 %
GFR AFRICAN AMERICAN: >60
GFR NON-AFRICAN AMERICAN: >60
GLOBULIN: 3.6 G/DL
GLUCOSE BLD-MCNC: 105 MG/DL (ref 70–99)
HCT VFR BLD CALC: 39.6 % (ref 36–48)
HEMOGLOBIN: 13.3 G/DL (ref 12–16)
LIPASE: 21 U/L (ref 13–60)
LYMPHOCYTES ABSOLUTE: 1.7 K/UL (ref 1–5.1)
LYMPHOCYTES RELATIVE PERCENT: 20.4 %
MCH RBC QN AUTO: 29.2 PG (ref 26–34)
MCHC RBC AUTO-ENTMCNC: 33.4 G/DL (ref 31–36)
MCV RBC AUTO: 87.4 FL (ref 80–100)
MONOCYTES ABSOLUTE: 0.7 K/UL (ref 0–1.3)
MONOCYTES RELATIVE PERCENT: 9 %
NEUTROPHILS ABSOLUTE: 5.6 K/UL (ref 1.7–7.7)
NEUTROPHILS RELATIVE PERCENT: 68.8 %
PDW BLD-RTO: 13.5 % (ref 12.4–15.4)
PLATELET # BLD: 198 K/UL (ref 135–450)
PMV BLD AUTO: 7.5 FL (ref 5–10.5)
POTASSIUM SERPL-SCNC: 3.7 MMOL/L (ref 3.5–5.1)
RBC # BLD: 4.54 M/UL (ref 4–5.2)
SODIUM BLD-SCNC: 140 MMOL/L (ref 136–145)
TOTAL PROTEIN: 7.7 G/DL (ref 6.4–8.2)
WBC # BLD: 8.2 K/UL (ref 4–11)

## 2021-03-01 PROCEDURE — 36415 COLL VENOUS BLD VENIPUNCTURE: CPT

## 2021-03-01 PROCEDURE — 85025 COMPLETE CBC W/AUTO DIFF WBC: CPT

## 2021-03-01 PROCEDURE — 83690 ASSAY OF LIPASE: CPT

## 2021-03-01 PROCEDURE — 80053 COMPREHEN METABOLIC PANEL: CPT

## 2021-03-01 PROCEDURE — 82150 ASSAY OF AMYLASE: CPT

## 2021-03-01 RX ORDER — OMEPRAZOLE 40 MG/1
40 CAPSULE, DELAYED RELEASE ORAL
Qty: 30 CAPSULE | Refills: 0 | Status: SHIPPED | OUTPATIENT
Start: 2021-03-01 | End: 2021-03-22 | Stop reason: SDUPTHER

## 2021-03-01 NOTE — TELEPHONE ENCOUNTER
Recommend lipase, amylase, CBC, CMP to be drawn related to epigastric pain.   Recommend lab to be drawn today or tomorrow AM

## 2021-03-01 NOTE — TELEPHONE ENCOUNTER
Pt does not want to schedule at this time. Pt wants to see the psychiatrist first and see if this helps resolve some of her stomach issues. Pt will call back if she needs any help or assistance in getting an appt.

## 2021-03-01 NOTE — TELEPHONE ENCOUNTER
Pt called stating that for the last 4 days she's been having some stomach pains in her upper stomach that come and go, no nausea, no vomiting, no fever, does feel a little dizzy when standing up, and sometimes her whole body feels like it's on fire. States that sometimes her hands/arms get shaky and has a \"panic\" like feeling. Pt was requesting an appt. Pt uses [facility].  Call back with recommendations [phone]

## 2021-03-01 NOTE — TELEPHONE ENCOUNTER
Stop Pepcid 40 mg daily and start Omeprazole 40 mg daily. Recommend following up with GI for evaluation in the next couple of days. Does she have a GI provider?   If not - [facility] (Dr. Kary Edmond or Dr. Maricruz Mendoza)

## 2021-03-05 ENCOUNTER — IMMUNIZATION (OUTPATIENT)
Dept: PRIMARY CARE CLINIC | Age: 76
End: 2021-03-05
Payer: MEDICARE

## 2021-03-05 PROCEDURE — 0012A COVID-19, MODERNA VACCINE 100MCG/0.5ML DOSE: CPT | Performed by: FAMILY MEDICINE

## 2021-03-05 PROCEDURE — 91301 COVID-19, MODERNA VACCINE 100MCG/0.5ML DOSE: CPT | Performed by: FAMILY MEDICINE

## 2021-03-11 ENCOUNTER — TELEPHONE (OUTPATIENT)
Dept: ENDOCRINOLOGY | Age: 76
End: 2021-03-11

## 2021-03-16 ENCOUNTER — HOSPITAL ENCOUNTER (EMERGENCY)
Age: 76
Discharge: HOME | End: 2021-03-16
Attending: EMERGENCY MEDICINE
Payer: MEDICARE

## 2021-03-16 ENCOUNTER — APPOINTMENT (OUTPATIENT)
Dept: GENERAL RADIOLOGY | Age: 76
End: 2021-03-16
Payer: MEDICARE

## 2021-03-16 VITALS
BODY MASS INDEX: 30.92 KG/M2 | HEIGHT: 68 IN | DIASTOLIC BLOOD PRESSURE: 93 MMHG | RESPIRATION RATE: 19 BRPM | TEMPERATURE: 98 F | HEART RATE: 83 BPM | WEIGHT: 204 LBS | OXYGEN SATURATION: 98 % | SYSTOLIC BLOOD PRESSURE: 161 MMHG

## 2021-03-16 DIAGNOSIS — R07.9 CHEST PAIN, UNSPECIFIED TYPE: ICD-10-CM

## 2021-03-16 DIAGNOSIS — R00.2 PALPITATIONS: ICD-10-CM

## 2021-03-16 DIAGNOSIS — R42 LIGHTHEADEDNESS: Primary | ICD-10-CM

## 2021-03-16 LAB
ANION GAP SERPL CALCULATED.3IONS-SCNC: 10 MMOL/L (ref 3–16)
BASOPHILS ABSOLUTE: 0 K/UL (ref 0–0.2)
BASOPHILS RELATIVE PERCENT: 0.4 %
BILIRUBIN URINE: NEGATIVE
BLOOD, URINE: ABNORMAL
BUN BLDV-MCNC: 16 MG/DL (ref 7–20)
CALCIUM SERPL-MCNC: 9.5 MG/DL (ref 8.3–10.6)
CHLORIDE BLD-SCNC: 104 MMOL/L (ref 99–110)
CLARITY: CLEAR
CO2: 26 MMOL/L (ref 21–32)
COLOR: YELLOW
CREAT SERPL-MCNC: 1 MG/DL (ref 0.6–1.2)
EKG ATRIAL RATE: 95 BPM
EKG DIAGNOSIS: NORMAL
EKG P AXIS: 28 DEGREES
EKG P-R INTERVAL: 178 MS
EKG Q-T INTERVAL: 358 MS
EKG QRS DURATION: 80 MS
EKG QTC CALCULATION (BAZETT): 449 MS
EKG R AXIS: -18 DEGREES
EKG T AXIS: 46 DEGREES
EKG VENTRICULAR RATE: 95 BPM
EOSINOPHILS ABSOLUTE: 0.1 K/UL (ref 0–0.6)
EOSINOPHILS RELATIVE PERCENT: 1.2 %
GFR AFRICAN AMERICAN: >60
GFR NON-AFRICAN AMERICAN: 54
GLUCOSE BLD-MCNC: 140 MG/DL (ref 70–99)
GLUCOSE URINE: NEGATIVE MG/DL
HCT VFR BLD CALC: 38.2 % (ref 36–48)
HEMOGLOBIN: 12.8 G/DL (ref 12–16)
KETONES, URINE: NEGATIVE MG/DL
LEUKOCYTE ESTERASE, URINE: NEGATIVE
LYMPHOCYTES ABSOLUTE: 1.3 K/UL (ref 1–5.1)
LYMPHOCYTES RELATIVE PERCENT: 14.1 %
MAGNESIUM: 1.9 MG/DL (ref 1.8–2.4)
MCH RBC QN AUTO: 29.6 PG (ref 26–34)
MCHC RBC AUTO-ENTMCNC: 33.5 G/DL (ref 31–36)
MCV RBC AUTO: 88.3 FL (ref 80–100)
MICROSCOPIC EXAMINATION: YES
MONOCYTES ABSOLUTE: 0.5 K/UL (ref 0–1.3)
MONOCYTES RELATIVE PERCENT: 6 %
NEUTROPHILS ABSOLUTE: 7 K/UL (ref 1.7–7.7)
NEUTROPHILS RELATIVE PERCENT: 78.3 %
NITRITE, URINE: NEGATIVE
PDW BLD-RTO: 13.4 % (ref 12.4–15.4)
PH UA: 5.5 (ref 5–8)
PLATELET # BLD: 200 K/UL (ref 135–450)
PMV BLD AUTO: 7.9 FL (ref 5–10.5)
POTASSIUM SERPL-SCNC: 4 MMOL/L (ref 3.5–5.1)
PROTEIN UA: NEGATIVE MG/DL
RBC # BLD: 4.32 M/UL (ref 4–5.2)
RBC UA: NORMAL /HPF (ref 0–4)
SODIUM BLD-SCNC: 140 MMOL/L (ref 136–145)
SPECIFIC GRAVITY UA: 1.01 (ref 1–1.03)
TROPONIN: <0.01 NG/ML
TROPONIN: <0.01 NG/ML
URINE TYPE: ABNORMAL
UROBILINOGEN, URINE: 0.2 E.U./DL
WBC # BLD: 9 K/UL (ref 4–11)
WBC UA: NORMAL /HPF (ref 0–5)

## 2021-03-16 PROCEDURE — 93005 ELECTROCARDIOGRAM TRACING: CPT | Performed by: EMERGENCY MEDICINE

## 2021-03-16 PROCEDURE — 93010 ELECTROCARDIOGRAM REPORT: CPT | Performed by: INTERNAL MEDICINE

## 2021-03-16 PROCEDURE — 99284 EMERGENCY DEPT VISIT MOD MDM: CPT

## 2021-03-16 PROCEDURE — 81001 URINALYSIS AUTO W/SCOPE: CPT

## 2021-03-16 PROCEDURE — 36415 COLL VENOUS BLD VENIPUNCTURE: CPT

## 2021-03-16 PROCEDURE — 71046 X-RAY EXAM CHEST 2 VIEWS: CPT

## 2021-03-16 PROCEDURE — 85025 COMPLETE CBC W/AUTO DIFF WBC: CPT

## 2021-03-16 PROCEDURE — 80048 BASIC METABOLIC PNL TOTAL CA: CPT

## 2021-03-16 PROCEDURE — 83735 ASSAY OF MAGNESIUM: CPT

## 2021-03-16 PROCEDURE — 84484 ASSAY OF TROPONIN QUANT: CPT

## 2021-03-16 PROCEDURE — 2580000003 HC RX 258: Performed by: EMERGENCY MEDICINE

## 2021-03-16 RX ORDER — 0.9 % SODIUM CHLORIDE 0.9 %
500 INTRAVENOUS SOLUTION INTRAVENOUS ONCE
Status: COMPLETED | OUTPATIENT
Start: 2021-03-16 | End: 2021-03-16

## 2021-03-16 RX ORDER — RISPERIDONE 0.25 MG/1
1 TABLET, FILM COATED ORAL NIGHTLY
COMMUNITY
Start: 2021-03-02 | End: 2022-01-28 | Stop reason: ALTCHOICE

## 2021-03-16 RX ADMIN — SODIUM CHLORIDE 500 ML: 9 INJECTION, SOLUTION INTRAVENOUS at 18:12

## 2021-03-16 ASSESSMENT — PAIN DESCRIPTION - DESCRIPTORS: DESCRIPTORS: DISCOMFORT

## 2021-03-16 ASSESSMENT — PAIN DESCRIPTION - PAIN TYPE: TYPE: ACUTE PAIN

## 2021-03-16 ASSESSMENT — PAIN DESCRIPTION - LOCATION: LOCATION: CHEST

## 2021-03-16 NOTE — ED NOTES
Patient ambulated in the hallway. SpO2 decreased from 98% to 90 % on room air and heart rate remained 88-91.       Philipp Menjivar RN  03/16/21 4620

## 2021-03-16 NOTE — ED PROVIDER NOTES
[address] [facility] Name: Mookie Oates  MRN: [id number]  [place] 1945  Date of evaluation: 3/16/2021  Provider: Josefa Alanis MD  PCP: Radha Posey, MOHINDER  [facility]       Chief Complaint   Patient presents with    Palpitations     States \"heart is fluttering\" and shortness of breath, primarily with exertion, x1 week. HISTORY OFPRESENT ILLNESS   (Location/Symptom, Timing/Onset, Context/Setting, Quality, Duration, Modifying Factors,Severity)  Note limiting factors. Mookie Oates is a 67 y.o. female presenting today due to concern for developing palpitations along with fluttering in her chest today but having increased shortness of breath with exertion over the last week. She also states that she had a chest discomfort while she was having the palpitations at an intensity of 4 out of 10. She does have a history of coronary artery disease based on a catheterization in 2018 with one lesion being as much as 50 to 60% in the LAD. She denies any history of atrial fibrillation. She is on aspirin but no other blood thinners. She denies any abnormal numbness or weakness in the arms or legs. She does sometimes feel lightheaded with the fluttering. Due to the abnormal discomfort in her chest, she came to the ED for further evaluation. She denies any syncope. No fevers or chills. No concern with Covid. She did receive her Covid vaccine 3 weeks ago. She also has been having some urinary frequency over the last 1 to 2 weeks per her . She denies any headache. She does wonder if her anxiety is causing her discomfort. REVIEW OF SYSTEMS    (2-9 systems for level 4, 10 or more for level 5)     Review of Systems   Constitutional: Positive for fatigue. Negative for chills, diaphoresis and fever. HENT: Negative for congestion.     Respiratory: Positive for chest tightness and shortness of breath (with exertion). Negative for cough. Cardiovascular: Positive for chest pain and palpitations. Negative for leg swelling. Gastrointestinal: Negative for abdominal pain, diarrhea and vomiting. Genitourinary: Positive for frequency. Negative for difficulty urinating, dysuria and flank pain. Musculoskeletal: Negative for back pain, gait problem and neck pain. Skin: Negative for color change. Neurological: Positive for light-headedness. Negative for syncope, weakness, numbness and headaches. Psychiatric/Behavioral: Negative for confusion. The patient is nervous/anxious. Positives and Pertinent negatives as per HPI.       PASTMEDICAL HISTORY     Past Medical History:   Diagnosis Date    Anxiety     Benign essential hypertension 1/17/2017    Bowel obstruction (HCC)     CAD (coronary artery disease)     [facility]    colon    Depression     GERD (gastroesophageal reflux disease)     Hyperlipidemia     Hypertension     Irritable bowel syndrome (IBS)     Obesity     Osteoarthritis          SURGICAL HISTORY       Past Surgical History:   Procedure Laterality Date    APPENDECTOMY      CHOLECYSTECTOMY      COLON SURGERY      COLONOSCOPY  2014    mass    COLONOSCOPY  3/29/16    normal         CURRENT MEDICATIONS       Discharge Medication List as of 3/16/2021  6:14 PM      CONTINUE these medications which have NOT CHANGED    Details   risperiDONE (RISPERDAL) 0.25 MG tablet Take 3 tablets by mouth nightlyHistorical Med      omeprazole (PRILOSEC) 40 MG delayed release capsule Take 1 capsule by mouth every morning (before breakfast), Disp-30 capsule, R-0Normal      potassium chloride (KLOR-CON M) 20 MEQ extended release tablet Take 2 tablets in am 1 pm, Disp-180 tablet, R-1Adjust Sig      mirtazapine (REMERON) 30 MG tablet Take 1 tablet by mouth nightly, Disp-90 tablet, R-0Adjust Sig      levothyroxine (SYNTHROID) 75 MCG tablet Take 1 tablet by mouth daily, Disp-90 tablet, R-0Normal 1.00     Years: 10.00     Pack years: 10.00     Types: Cigarettes     Start date:      Quit date: 1983     Years since quittin.5    Smokeless tobacco: Never Used   Substance and Sexual Activity    Alcohol use: No    Drug use: No    Sexual activity: Not on file   Lifestyle    Physical activity     Days per week: Not on file     Minutes per session: Not on file    Stress: Not on file   Relationships    Social connections     Talks on phone: Not on file     Gets together: Not on file     Attends Faith service: Not on file     Active member of club or organization: Not on file     Attends meetings of clubs or organizations: Not on file     Relationship status: Not on file    Intimate partner violence     Fear of current or ex partner: Not on file     Emotionally abused: Not on file     Physically abused: Not on file     Forced sexual activity: Not on file   Other Topics Concern    Not on file   Social History Narrative    Not on file       SCREENINGS    [place] Coma Scale  Eye Opening: Spontaneous  Best Verbal Response: Oriented  Best Motor Response: Obeys commands  [place] Coma Scale Score: 15 Heart Score for chest pain patients  History: Slightly Suspicious  ECG: Non-Specifc repolarization disturbance/LBTB/PM  Patient Age: > 65 years  Risk Factors: > 3 Risk factors or history of atherosclerotic disease*  Troponin: < 1X normal limit  Heart Score Total: 5    History: 0  EC  Patient Age: 1  Risk Factors: 2  Troponin: 0  Heart Score Total: 5      PHYSICAL EXAM    (up to 7 for level 4, 8 or more for level 5)     ED Triage Vitals [21 1438]   BP Temp Temp Source Pulse Resp SpO2 Height Weight   (!) 141/89 98 °F (36.7 °C) Oral 90 17 96 % 5' 8\" (1.727 m) 204 lb (92.5 kg)       Physical Exam  Vitals signs and nursing note reviewed. Constitutional:       General: She is awake. She is not in acute distress. Appearance: Normal appearance. She is well-developed and well-groomed.  She is obese. She is not ill-appearing, toxic-appearing or diaphoretic. Interventions: She is not intubated. HENT:      Head: Normocephalic and atraumatic. Right Ear: External ear normal.      Left Ear: External ear normal.      Nose: Nose normal.   Eyes:      General:         Right eye: No discharge. Left eye: No discharge. Pupils: Pupils are equal, round, and reactive to light. Neck:      Musculoskeletal: Full passive range of motion without pain, normal range of motion and neck supple. Normal range of motion. No edema, erythema, neck rigidity, crepitus, injury, pain with movement, torticollis, spinous process tenderness or muscular tenderness. Trachea: Trachea and phonation normal. No tracheal deviation. Cardiovascular:      Rate and Rhythm: Normal rate and regular rhythm. Pulses: Normal pulses. Radial pulses are 2+ on the right side and 2+ on the left side. Pulmonary:      Effort: Pulmonary effort is normal. No tachypnea, bradypnea, accessory muscle usage, prolonged expiration, respiratory distress or retractions. She is not intubated. Breath sounds: Normal breath sounds and air entry. No stridor, decreased air movement or transmitted upper airway sounds. No decreased breath sounds, wheezing, rhonchi or rales. Chest:      Chest wall: No tenderness. Abdominal:      General: Bowel sounds are normal. There is no distension. Palpations: Abdomen is soft. Abdomen is not rigid. Tenderness: There is no abdominal tenderness. There is no guarding or rebound. Negative signs include Tejeda's sign and McBurney's sign. Musculoskeletal: Normal range of motion. General: No swelling, tenderness, deformity or signs of injury. Right lower leg: No edema. Left lower leg: No edema. Skin:     General: Skin is warm and dry. Coloration: Skin is not jaundiced or pale. Findings: No bruising, erythema, lesion or rash.    Neurological:      General: No focal deficit present. Mental Status: She is alert and oriented to person, place, and time. Mental status is at baseline. GCS: GCS eye subscore is 4. GCS verbal subscore is 5. GCS motor subscore is 6. Cranial Nerves: Cranial nerves are intact. No dysarthria. Sensory: Sensation is intact. No sensory deficit. Motor: Motor function is intact. No weakness, tremor, atrophy, abnormal muscle tone or seizure activity. Psychiatric:         Attention and Perception: Attention normal.         Mood and Affect: Affect normal. Mood is anxious. Speech: Speech is delayed (occasionally delayed). Speech is not slurred. Behavior: Behavior normal. Behavior is cooperative. DIAGNOSTIC RESULTS   :    Labs Reviewed   BASIC METABOLIC PANEL - Abnormal; Notable for the following components:       Result Value    Glucose 140 (*)     GFR Non- 54 (*)     All other components within normal limits    Narrative:     Performed at:  [facility]. [facility]  [facility],  [address] [facility]. [place]   Phone [phone]   URINALYSIS - Abnormal; Notable for the following components:    Blood, Urine TRACE-INTACT (*)     All other components within normal limits    Narrative:     Performed at:  [facility]. [facility]  [address] [place], [place] [address] [facility]  [address] [address] [place]   Phone [phone]   TROPONIN    Narrative:     Performed at:  [facility]. [facility]  [address] [place], [place] [address]  [address] [address] [place]   Phone [phone]   CBC WITH AUTO DIFFERENTIAL    Narrative:     Performed at:  [facility]. [facility]  [facility],  [address] [address] [place]   Phone [phone]   MAGNESIUM    Narrative:     Performed at:  [facility]. [facility]  [facility] [address]  [place]   Phone [phone]   TROPONIN    Narrative: Performed at:  [facility]. [facility]  [address] [facility],  [facility] [address]. [facility], [address]    Phone [phone]   MICROSCOPIC URINALYSIS    Narrative:     Performed at:  [facility]. [facility]  [address] [facility],  [facility] [address]. [facility], [address]    Phone [phone]       All other labs were within normal range or not returned asof this dictation. EKG: All EKG's are interpreted by the Emergency Department Physician who either signs or Co-signs this chart in the absence of a cardiologist.    The Ekg interpreted by me shows  normal sinus rhythm with a rate of 95  Axis is   Normal  QTc is  within an acceptable range  Intervals and Durations are unremarkable. ST Segments: no acute change and nonspecific changes  No significant change from prior EKG dated - 5/28/20  No STEMI           RADIOLOGY:   Non-plain film images such as CT, Ultrasound and MRI are read by the radiologist. Alessandro Grapes images are visualized and preliminarily interpreted by the  ED Provider with the belowfindings:        Interpretation per the Radiologist below, if available at the time of this note:    XR CHEST (2 VW)   Final Result   No acute cardiopulmonary disease.                PROCEDURES   Unless otherwise noted below, none     Procedures    CRITICAL CARE TIME   N/A    CONSULTS:  None    EMERGENCY DEPARTMENT COURSE and DIFFERENTIAL DIAGNOSIS/MDM:   Vitals:    Vitals:    03/16/21 1800 03/16/21 1815 03/16/21 1830 03/16/21 1845   BP: (!) 151/82 (!) 163/96 (!) 146/69 (!) 161/93   Pulse: 84 87 89 83   Resp: 23 18 25 19   Temp:       TempSrc:       SpO2: 91% 95% 97% 98%   Weight:       Height:           Patient was given the following medications:  Medications   0.9 % sodium chloride bolus (0 mLs Intravenous Stopped 3/16/21 1850)     Patient was evaluated due to concern for intermittent chest discomfort over the last week but with more persistent palpitations today along with discomfort in her chest which was new along with dyspnea on exertion over the last week. She does have a history of coronary artery disease. Initial troponin was negative. Her discomfort improved while in the emergency department. She does have a heart score of 5 and with increasing fatigue along with dyspnea on exertion with her chest pain today, I did recommend admission under observation for possible stress test to ensure there is nothing else going on with her heart. The patient had full mental capacity to make her own medical decisions and was also with her  and at this time she is aware that if it was related to her heart instead of anxiety causing the chest discomfort, that if she were to have a heart attack, this could cause severe disability or death. Based on her heart score, she has a 14% chance of serious cardiac event over the next 6 weeks and again therefore I did  recommend admission. Since she was pain-free upon repeat evaluation after repeat troponin came back negative, she was willing to accept this risk and would rather call her cardiologist tomorrow to schedule urgent follow-up rather than be admitted. She is aware that if she changes her mind or develops any return of pain, then return immediately to the emergency department for admission, but otherwise follow-up urgently as discussed. She was well-appearing at time of discharge and the patient along with her  felt comfortable with this plan. I do believe this complaint could have been related to anxiety but with her significant history of cardiac disease, she knows that it is better to be safe than sorry and get further testing urgently. She denied any chest pain with breathing and story not suggestive of pulmonary embolism. She denied any migratory chest pain and story not suggestive of aortic dissection. The patient tolerated their visit well.    The patient and / or the family were informed of the results of any tests, a time was given to answer questions. FINAL IMPRESSION      1. Lightheadedness    2. Chest pain, unspecified type    3.  Palpitations          DISPOSITION/PLAN   DISPOSITION Decision To Discharge 03/16/2021 06:54:40 PM      PATIENT REFERRED TO:  [facility] Emergency Department  [address] [facility]  [address] [facility] [address]  Call   As needed    Anali Sena, APRN - CNP  [address] [facility]  [phone]    Call in 1 day      [facility] Cardiology  [address]  [address] [facility]  [phone]    Call in 1 day  For urgent evaluation      DISCHARGEMEDICATIONS:  Discharge Medication List as of 3/16/2021  6:14 PM          DISCONTINUED MEDICATIONS:  Discharge Medication List as of 3/16/2021  6:14 PM                 (Please note that portions of this note were completed with a voicerecognition program.  Efforts were made to edit the dictations but occasionally words are mis-transcribed.)    Leatha Turcios MD (electronically signed)            Leatha Turcios MD  03/17/21 2155

## 2021-03-16 NOTE — ED NOTES
AVS provided and reviewed with the patient. The patient verbalized understanding of care at home, follow up care, and emergent symptoms to return for. No questions or concerns verbalized at this time. The patient is alert, oriented, stable, and ambulatory out of the department at the time of discharge.        Yesenia Field RN  03/16/21 0959

## 2021-03-16 NOTE — ED NOTES
Patient ambulated to the restroom and returned to room. Urine specimen sent to lab.      Anna Peng RN  03/16/21 1600

## 2021-03-17 ASSESSMENT — ENCOUNTER SYMPTOMS
DIARRHEA: 0
VOMITING: 0
BACK PAIN: 0
SHORTNESS OF BREATH: 1
COLOR CHANGE: 0
ABDOMINAL PAIN: 0
CHEST TIGHTNESS: 1
COUGH: 0

## 2021-03-22 ENCOUNTER — TELEPHONE (OUTPATIENT)
Dept: ENDOCRINOLOGY | Age: 76
End: 2021-03-22

## 2021-03-22 ENCOUNTER — TELEPHONE (OUTPATIENT)
Dept: FAMILY MEDICINE CLINIC | Age: 76
End: 2021-03-22

## 2021-03-22 DIAGNOSIS — F41.9 ANXIETY: ICD-10-CM

## 2021-03-22 DIAGNOSIS — K21.9 GASTROESOPHAGEAL REFLUX DISEASE WITHOUT ESOPHAGITIS: ICD-10-CM

## 2021-03-22 RX ORDER — LEVOTHYROXINE SODIUM 0.07 MG/1
75 TABLET ORAL DAILY
Qty: 90 TABLET | Refills: 1 | Status: SHIPPED | OUTPATIENT
Start: 2021-03-22 | End: 2021-09-21 | Stop reason: SDUPTHER

## 2021-03-22 RX ORDER — OMEPRAZOLE 40 MG/1
40 CAPSULE, DELAYED RELEASE ORAL
Qty: 90 CAPSULE | Refills: 1 | Status: SHIPPED | OUTPATIENT
Start: 2021-03-22 | End: 2021-04-06 | Stop reason: ALTCHOICE

## 2021-03-22 RX ORDER — MIRTAZAPINE 30 MG/1
30 TABLET, FILM COATED ORAL NIGHTLY
Qty: 90 TABLET | Refills: 1 | Status: SHIPPED | OUTPATIENT
Start: 2021-03-22 | End: 2021-05-24 | Stop reason: DRUGHIGH

## 2021-03-22 NOTE — TELEPHONE ENCOUNTER
Called and gave PT MD message  Please advise patient that the levothyroxine should not cause the lump in that location. They do need to have it furthur evaluated. Should see the PCP or ENT.

## 2021-03-22 NOTE — TELEPHONE ENCOUNTER
Pts  called and is on levothyroxine since January she now has a lump under her ear under jaw bone they want to know if this should be of concerned they think it could be a lymph node but not sure. Its about quarter size and sore to the touch.

## 2021-03-22 NOTE — TELEPHONE ENCOUNTER
Please advise patient that the levothyroxine should not cause the lump in that location. They do need to have it furthur evaluated. Should see the PCP or ENT.

## 2021-03-22 NOTE — TELEPHONE ENCOUNTER
----- Message from Monique Ospina sent at 3/22/2021  9:54 AM EDT -----  Subject: Appointment Request    Reason for Call: Routine (Patient Request) No Script    QUESTIONS  Type of Appointment? Established Patient  Reason for appointment request? No appointments available during search  Additional Information for Provider? pt wants an appt this week but no   availability. pt has knot under jaw in throat   pain when she moves her jaw   thinks it might be thyroid   screened green. Please advise   ---------------------------------------------------------------------------  --------------  CALL BACK INFO  What is the best way for the office to contact you? OK to leave message on   voicemail  Preferred Call Back Phone Number? [phone]  ---------------------------------------------------------------------------  --------------  SCRIPT ANSWERS  Relationship to Patient? Guardian  Representative Name? Jamil Ricketts  Additional information verified (besides Name and Date of Birth)? Address  Appointment reason? Symptomatic  Select script based on patient symptoms? Adult No Script   (Is the patient requesting to see the provider for a procedure?)? No  (Is the patient requesting to see the provider urgently  today or   tomorrow. )? No  Have you been diagnosed with   tested for   or told that you are suspected of having COVID-19 (Coronavirus)? No  Have you had a fever or taken medication to treat a fever within the past   3 days? No  Have you had a cough   shortness of breath or flu-like symptoms within the past 3 days? No  Do you currently have flu-like symptoms including fever or chills   cough   shortness of breath   or difficulty breathing   or new loss of taste or smell? No  (Service Expert  click yes below to proceed with [employer] As Usual   Scheduling)?  Yes

## 2021-03-25 ENCOUNTER — INITIAL CONSULT (OUTPATIENT)
Dept: GASTROENTEROLOGY | Age: 76
End: 2021-03-25
Payer: MEDICARE

## 2021-03-25 VITALS
DIASTOLIC BLOOD PRESSURE: 58 MMHG | TEMPERATURE: 96.6 F | WEIGHT: 209.8 LBS | HEIGHT: 68 IN | HEART RATE: 95 BPM | SYSTOLIC BLOOD PRESSURE: 90 MMHG | BODY MASS INDEX: 31.8 KG/M2

## 2021-03-25 DIAGNOSIS — R10.13 EPIGASTRIC PAIN: Primary | ICD-10-CM

## 2021-03-25 DIAGNOSIS — R14.0 BLOATING: ICD-10-CM

## 2021-03-25 DIAGNOSIS — R59.0 SUBMANDIBULAR LYMPHADENOPATHY: ICD-10-CM

## 2021-03-25 DIAGNOSIS — K58.9 IRRITABLE BOWEL SYNDROME WITHOUT DIARRHEA: ICD-10-CM

## 2021-03-25 PROCEDURE — G8427 DOCREV CUR MEDS BY ELIG CLIN: HCPCS | Performed by: INTERNAL MEDICINE

## 2021-03-25 PROCEDURE — 1090F PRES/ABSN URINE INCON ASSESS: CPT | Performed by: INTERNAL MEDICINE

## 2021-03-25 PROCEDURE — 99204 OFFICE O/P NEW MOD 45 MIN: CPT | Performed by: INTERNAL MEDICINE

## 2021-03-25 PROCEDURE — 1123F ACP DISCUSS/DSCN MKR DOCD: CPT | Performed by: INTERNAL MEDICINE

## 2021-03-25 PROCEDURE — G8482 FLU IMMUNIZE ORDER/ADMIN: HCPCS | Performed by: INTERNAL MEDICINE

## 2021-03-25 PROCEDURE — G8400 PT W/DXA NO RESULTS DOC: HCPCS | Performed by: INTERNAL MEDICINE

## 2021-03-25 PROCEDURE — 4040F PNEUMOC VAC/ADMIN/RCVD: CPT | Performed by: INTERNAL MEDICINE

## 2021-03-25 PROCEDURE — G8417 CALC BMI ABV UP PARAM F/U: HCPCS | Performed by: INTERNAL MEDICINE

## 2021-03-25 PROCEDURE — 1036F TOBACCO NON-USER: CPT | Performed by: INTERNAL MEDICINE

## 2021-03-25 RX ORDER — ERYTHROMYCIN 5 MG/G
OINTMENT OPHTHALMIC
COMMUNITY
Start: 2021-03-10 | End: 2021-07-21

## 2021-03-25 NOTE — PROGRESS NOTES
[address] [facility],  [address] [place] [address] Ave  [place], [address] [place] [address]  Phone: [phone] [address] She is a  [2] White [1] 67 y.o. Lori [place] female      Main Problems/Chief Complaint   Epigastric pain. A swelling near the angle of jaw    HPI    The patient has had non-radiating epigastric dull pain for years at times she has sharp pain. The pain is located high in the epigastrium. It is worse after eating and before a bowel movement. It is not associated with any overt GI blood loss. She has had anorexia. The patient has had entropion and has had pus in the eyes including the right eye. She is planning to have surgery for this in a short while. However she has noticed a swelling in the neck just below the right angle of the jaw. She has not had any fever difficulty swallowing.     PAST MEDICAL HISTORY     Past Medical History:   Diagnosis Date    Anxiety     Benign essential hypertension 1/17/2017    [facility]el obstruction ([facility] [address] [facility]     CAD (coronary artery disease)     Cancer ([facility] [address] [facility] 2012    colon    Depression     GERD (gastroesophageal reflux disease)     Hyperlipidemia     Hypertension     Irritable bowel syndrome (IBS)     Obesity     Osteoarthritis      FAMILY HISTORY     Family History   Problem Relation Age of Onset    Cancer Mother         colon    Cancer Brother         colon    Heart Disease Sister     Heart Disease Brother      SOCIAL HISTORY     Social History     Socioeconomic History    Marital status:      Spouse name: Not on file    Number of children: Not on file    Years of education: Not on file    Highest education level: Not on file   Occupational History    Not on file   Social Needs    Financial resource strain: Not on file    Food insecurity     Worry: Not on file     Inability: Not on file    Transportation needs     Medical: Not on file     Non-medical: Not on file   Tobacco Use    Smoking status: Former Smoker Packs/day: 1.00     Years: 10.00     Pack years: 10.00     Types: Cigarettes     Start date: 79     Quit date: 1983     Years since quittin.5    Smokeless tobacco: Never Used   Substance and Sexual Activity    Alcohol use: No    Drug use: No    Sexual activity: Not on file   Lifestyle    Physical activity     Days per week: Not on file     Minutes per session: Not on file    Stress: Not on file   Relationships    Social connections     Talks on phone: Not on file     Gets together: Not on file     Attends [employer] service: Not on file     Active member of club or organization: Not on file     Attends meetings of clubs or organizations: Not on file     Relationship status: Not on file    Intimate partner violence     Fear of current or ex partner: Not on file     Emotionally abused: Not on file     Physically abused: Not on file     Forced sexual activity: Not on file   Other Topics Concern    Not on file   Social History Narrative    Not on file     SURGICAL HISTORY     Past Surgical History:   Procedure Laterality Date    APPENDECTOMY      CHOLECYSTECTOMY      COLON SURGERY      COLONOSCOPY  2014    mass    COLONOSCOPY  3/29/16    normal     CURRENT MEDICATIONS   (This list may include medications prescribed during this encounter as epic can not insert only the list prior to this encounter.)  Current Outpatient Rx   Medication Sig Dispense Refill    levothyroxine (SYNTHROID) 75 MCG tablet Take 1 tablet by mouth daily 90 tablet 1    mirtazapine (REMERON) 30 MG tablet Take 1 tablet by mouth nightly 90 tablet 1    risperiDONE (RISPERDAL) 0.25 MG tablet Take 3 tablets by mouth nightly      potassium chloride (KLOR-CON M) 20 MEQ extended release tablet Take 2 tablets in am 1 pm 180 tablet 1    furosemide (LASIX) 20 MG tablet Take 1 tablet by mouth daily 90 tablet 0    amLODIPine (NORVASC) 2.5 MG tablet 1 tablet daily (Patient taking differently: 2.5 mg daily Pt is taking half tablet daily) 30 tablet 3    fludrocortisone (FLORINEF) 0.1 MG tablet Take 1 tablet by mouth 2 times daily 30 tablet 0    rosuvastatin (CRESTOR) 20 MG tablet TAKE 1 TABLET EVERY DAY 90 tablet 2    busPIRone (BUSPAR) 15 MG tablet       citalopram (CELEXA) 40 MG tablet Take 40 mg by mouth daily      Omega-3 Fatty Acids (FISH OIL) 1000 MG CPDR Take 3,000 mg by mouth      Calcium Carbonate-Vitamin D (CALCIUM 500 + D) 500-125 MG-UNIT TABS Take by mouth      Coenzyme Q10 (CO Q 10 PO) Take by mouth      aspirin EC 81 MG EC tablet Take 1 tablet by mouth daily 30 tablet 3    dicyclomine (BENTYL) 10 MG capsule Take 20 mg by mouth 4 times daily (before meals and nightly)       polyethylene glycol (GLYCOLAX) packet Take 17 g by mouth daily       erythromycin (ROMYCIN) 5 MG/GM ophthalmic ointment       omeprazole (PRILOSEC) 40 MG delayed release capsule Take 1 capsule by mouth every morning (before breakfast) (Patient not taking: Reported on 3/25/2021) 90 capsule 1       ALLERGIES     Allergies   Allergen Reactions    Bactrim [Sulfamethoxazole-Trimethoprim] Anaphylaxis    Ultram [Tramadol Hcl] Anaphylaxis     stridor    Dilaudid [Hydromorphone] Other (See Comments)     Hallucinations    Phenergan [Promethazine] Nausea Only    Lipitor [Atorvastatin]      Nausea    Penicillins        REVIEW OF SYSTEMS   No chest pain suspicious for cardiac origin  SOB on exertion. PHYSICAL EXAM   VITAL SIGNS: BP (!) 90/58 (Site: Left Wrist, Position: Sitting, Cuff Size: Medium Adult)   Pulse 95   Temp 96.6 °F (35.9 °C) (Temporal)   Ht 5' 8\" (1.727 m)   Wt 209 lb 12.8 oz (95.2 kg)   BMI 31.90 kg/m²   Wt Readings from Last 1 Encounters:   03/25/21 209 lb 12.8 oz (95.2 kg)     Constitutional: Well developed, Well nourished, No acute distress, Non-toxic appearance. Right submandibular gland is palpable. It is about 8mm in size. It is nontender. A few other smaller lymph nodes are palpable in similar position. Heart:  WNL Lungs: Clear to A&P  Abd: soft, non-tender, no masses are felt. Neurologic: Alert & oriented x 3. Psych: Good mood and memory. Pt is able to make appropriate decisions. FINAL IMPRESSION AND RECOMMENDATIONS     The patient does not give a clear-cut history of GERD, however this possibility cannot be ruled out at this time she has been on omeprazole and recently famotidine was added. I have reassured her that She can take OMP and famotidine on the same day. She has taken omeprazole for only a few months and continuing it at this time before a follow-up is probably the best course of action. The patient is advised to have an EGD, but she wants to wait. I counseled her extensively about how important it is to have an EGD done to evaluate her stomach. She is taking multiple medications which can cause gastritis or even peptic ulcer disease. Risks of not having an EGD were explained. The patient still wants to put the EGD off at this time. She has eye surgery coming up. I have asked her to check with the ophthalmologist to find out if with eye problems she could have such lymph nodes enlarged. I also advised her to check with her primary care provider about palpable lymph nodes. The patient was counseled about the diet for both possible peptic ulcer disease and GERD. Lifestyle changes were discussed. I have advised is to follow-up with me in about a month or two. The total time spent face-to-face during this visit and before and after the visit was 45 minutes with more than 50% of the time spent in coordination of care and counseling the patient about the medical conditions and their management. Rosalio Osborn 3/25/21 1:14 PM EDT    CC:  MOHINDER Arechiga - CNP      IMPORTANT: Please note that some portions of this note may have been created using Dragon voice recognition software.  Some \"sound-alike\" and totally wrong word substitutions may have taken place due to known inherent limitations of any such software, including this voice recognition software. In spite of efforts to eliminate such errors, some may not have been corrected. So please read the note with this in mind and recognize such mistakes and understand the correct version using the  context. Thanks.

## 2021-04-06 ENCOUNTER — OFFICE VISIT (OUTPATIENT)
Dept: FAMILY MEDICINE CLINIC | Age: 76
End: 2021-04-06
Payer: MEDICARE

## 2021-04-06 VITALS
BODY MASS INDEX: 32.13 KG/M2 | WEIGHT: 212 LBS | OXYGEN SATURATION: 96 % | HEART RATE: 90 BPM | SYSTOLIC BLOOD PRESSURE: 108 MMHG | DIASTOLIC BLOOD PRESSURE: 78 MMHG | HEIGHT: 68 IN | TEMPERATURE: 97.3 F

## 2021-04-06 DIAGNOSIS — I10 ESSENTIAL HYPERTENSION: ICD-10-CM

## 2021-04-06 DIAGNOSIS — F41.9 ANXIETY: ICD-10-CM

## 2021-04-06 DIAGNOSIS — L98.9 SKIN LESION: ICD-10-CM

## 2021-04-06 DIAGNOSIS — Z01.818 PREOP EXAMINATION: Primary | ICD-10-CM

## 2021-04-06 DIAGNOSIS — I95.1 ORTHOSTATIC HYPOTENSION: ICD-10-CM

## 2021-04-06 PROCEDURE — G8427 DOCREV CUR MEDS BY ELIG CLIN: HCPCS | Performed by: NURSE PRACTITIONER

## 2021-04-06 PROCEDURE — G8400 PT W/DXA NO RESULTS DOC: HCPCS | Performed by: NURSE PRACTITIONER

## 2021-04-06 PROCEDURE — 4040F PNEUMOC VAC/ADMIN/RCVD: CPT | Performed by: NURSE PRACTITIONER

## 2021-04-06 PROCEDURE — 1123F ACP DISCUSS/DSCN MKR DOCD: CPT | Performed by: NURSE PRACTITIONER

## 2021-04-06 PROCEDURE — 1090F PRES/ABSN URINE INCON ASSESS: CPT | Performed by: NURSE PRACTITIONER

## 2021-04-06 PROCEDURE — 99214 OFFICE O/P EST MOD 30 MIN: CPT | Performed by: NURSE PRACTITIONER

## 2021-04-06 PROCEDURE — G8417 CALC BMI ABV UP PARAM F/U: HCPCS | Performed by: NURSE PRACTITIONER

## 2021-04-06 PROCEDURE — 1036F TOBACCO NON-USER: CPT | Performed by: NURSE PRACTITIONER

## 2021-04-06 NOTE — PROGRESS NOTES
[address] [facility] SITE                             Preoperative Evaluation        Mark Nagel  YOB: 1945    Date of Service:  4/6/2021    Vitals:    04/06/21 1356   BP: 108/78   Site: Right Upper Arm   Position: Sitting   Cuff Size: Large Adult   Pulse: 90   Temp: 97.3 °F (36.3 °C)   SpO2: 96%   Weight: 212 lb (96.2 kg)   Height: 5' 8\" (1.727 m)      Wt Readings from Last 2 Encounters:   04/06/21 212 lb (96.2 kg)   03/25/21 209 lb 12.8 oz (95.2 kg)     BP Readings from Last 3 Encounters:   04/06/21 108/78   03/25/21 (!) 90/58   03/16/21 (!) 161/93        Chief Complaint   Patient presents with   Herberth Jeter Pre-op Exam     pt is here for pre op for eye lid surgery. Surgery is being done by Dr Jaswinder Guidry at [address] in [facility].       Allergies   Allergen Reactions    Bactrim [Sulfamethoxazole-Trimethoprim] Anaphylaxis    Ultram [Tramadol Hcl] Anaphylaxis     stridor    Dilaudid [Hydromorphone] Other (See Comments)     Hallucinations    Phenergan [Promethazine] Nausea Only    Lipitor [Atorvastatin]      Nausea    Penicillins      Outpatient Medications Marked as Taking for the 4/6/21 encounter (Office Visit) with MOHINDER Andersen CNP   Medication Sig Dispense Refill    erythromycin (ROMYCIN) 5 MG/GM ophthalmic ointment       levothyroxine (SYNTHROID) 75 MCG tablet Take 1 tablet by mouth daily 90 tablet 1    mirtazapine (REMERON) 30 MG tablet Take 1 tablet by mouth nightly 90 tablet 1    risperiDONE (RISPERDAL) 0.25 MG tablet Take 3 tablets by mouth nightly      potassium chloride (KLOR-CON M) 20 MEQ extended release tablet Take 2 tablets in am 1 pm 180 tablet 1    furosemide (LASIX) 20 MG tablet Take 1 tablet by mouth daily 90 tablet 0    amLODIPine (NORVASC) 2.5 MG tablet 1 tablet daily (Patient taking differently: 2.5 mg daily Pt is taking half tablet daily) 30 tablet 3    fludrocortisone (FLORINEF) 0.1 MG tablet Take 1 tablet by mouth 2 times daily 30 tablet 0    rosuvastatin (CRESTOR) 20 MG tablet TAKE 1 TABLET EVERY DAY 90 tablet 2    busPIRone (BUSPAR) 15 MG tablet       citalopram (CELEXA) 40 MG tablet Take 40 mg by mouth daily      Omega-3 Fatty Acids (FISH OIL) 1000 MG CPDR Take 3,000 mg by mouth      Calcium Carbonate-Vitamin D (CALCIUM 500 + D) 500-125 MG-UNIT TABS Take by mouth      Coenzyme Q10 (CO Q 10 PO) Take by mouth      aspirin EC 81 MG EC tablet Take 1 tablet by mouth daily 30 tablet 3    dicyclomine (BENTYL) 10 MG capsule Take 20 mg by mouth 4 times daily (before meals and nightly)       polyethylene glycol (GLYCOLAX) packet Take 17 g by mouth daily          This patient presents to the office today for a preoperative consultation at the request of surgeon, Dr. Rita Brizuela, who plans on performing entropion repair to bilateral lower lids on April 13 at [facility] ambulatory surgery center [facility]. The current problem began several months ago, and symptoms have been worsening with time. Conservative therapy: Yes: ointments, artifical tears, which has been not very effective. .    Planned anesthesia: Local/MAC   Known anesthesia problems: None   Bleeding risk: No recent or remote history of abnormal bleeding  Personal or FH of DVT/PE: No      Patient Active Problem List   Diagnosis    GERD (gastroesophageal reflux disease)    Irritable bowel syndrome (IBS)    Chest pain    Near syncope    Shortness of breath    Dizziness    Cardiac microvascular disease    Coronary artery disease of native artery of native heart with stable angina pectoris ([facility] [address]    Mixed hyperlipidemia    MCI (mild cognitive impairment)    HTN (hypertension), benign    Dysthymia    Dyslipidemia    AMS (altered mental status)    Acute encephalopathy    Acute respiratory failure with hypoxemia (HCC)    Low grade fever    Hyponatremia    Acute respiratory failure (HCC)    Unsteady gait    Pre-syncope    Acute UTI    Orthostatic hypotension    Abnormal chest x-ray    Acute pain of right shoulder    Right wrist pain    Rotator cuff strain, right, initial encounter    Right wrist sprain, initial encounter       Past Medical History:   Diagnosis Date    Anxiety     Benign essential hypertension 2017    Bowel obstruction (HCC)     CAD (coronary artery disease)     Cancer ([facility] [address] 2012    colon    Depression     GERD (gastroesophageal reflux disease)     Hyperlipidemia     Hypertension     Irritable bowel syndrome (IBS)     Obesity     Osteoarthritis      Past Surgical History:   Procedure Laterality Date    APPENDECTOMY      CHOLECYSTECTOMY      COLON SURGERY      COLONOSCOPY  2014    mass    COLONOSCOPY  3/29/16    normal     Family History   Problem Relation Age of Onset    Cancer Mother         colon    Cancer Brother         colon    Heart Disease Sister     Heart Disease Brother      Social History     Socioeconomic History    Marital status:      Spouse name: Not on file    Number of children: Not on file    Years of education: Not on file    Highest education level: Not on file   Occupational History    Not on file   Social Needs    Financial resource strain: Not on file    Food insecurity     Worry: Not on file     Inability: Not on file    Transportation needs     Medical: Not on file     Non-medical: Not on file   Tobacco Use    Smoking status: Former Smoker     Packs/day: 1.00     Years: 10.00     Pack years: 10.00     Types: Cigarettes     Start date:      Quit date: 1983     Years since quittin.5    Smokeless tobacco: Never Used   Substance and Sexual Activity    Alcohol use: No    Drug use: No    Sexual activity: Not on file   Lifestyle    Physical activity     Days per week: Not on file     Minutes per session: Not on file    Stress: Not on file   Relationships    Social connections     Talks on phone: Not on file     Gets together: Not on file Attends Jain service: Not on file     Active member of club or organization: Not on file     Attends meetings of clubs or organizations: Not on file     Relationship status: Not on file    Intimate partner violence     Fear of current or ex partner: Not on file     Emotionally abused: Not on file     Physically abused: Not on file     Forced sexual activity: Not on file   Other Topics Concern    Not on file   Social History Narrative    Not on file       Review of Systems  A comprehensive review of systems was negative except for: anxiety, shortness of breath with panic attacks, chronic back pain and chronic knee pain     Physical Exam   Constitutional: She is oriented to person, place, and time. She appears well-developed and well-nourished. No distress. HENT:   Head: Normocephalic and atraumatic. Mouth/Throat: Uvula is midline, oropharynx is clear and moist and mucous membranes are normal.   Eyes: Conjunctivae and EOM are normal. Pupils are equal, round, and reactive to light. Neck: Trachea normal and normal range of motion. Neck supple. No JVD present. Carotid bruit is not present. No mass and no thyromegaly present. Cardiovascular: Normal rate, regular rhythm, normal heart sounds and intact distal pulses. Exam reveals no gallop and no friction rub. No murmur heard. Pulmonary/Chest: Effort normal and breath sounds normal. No respiratory distress. She has no wheezes. She has no rales. Abdominal: Soft. Normal aorta and bowel sounds are normal. She exhibits no distension and no mass. There is no hepatosplenomegaly. No tenderness. Musculoskeletal: She exhibits no edema and no tenderness. Neurological: She is alert and oriented to person, place, and time. She has normal strength. No cranial nerve deficit or sensory deficit. Coordination and gait normal.   Skin: Skin is warm and dry. No rash noted. No erythema. Psychiatric: She has a normal mood and affect.  Her behavior is normal.     Lab Review   Admission on 03/16/2021, Discharged on 03/16/2021   Component Date Value    Ventricular Rate 03/16/2021 95     Atrial Rate 03/16/2021 95     P-R Interval 03/16/2021 178     QRS Duration 03/16/2021 80     Q-T Interval 03/16/2021 358     QTc Calculation (Bazett) 03/16/2021 449     P Axis 03/16/2021 28     R Axis 03/16/2021 -18     T Axis 03/16/2021 46     Diagnosis 03/16/2021 Normal sinus rhythmMinimal voltage criteria for LVH, may be normal variantBorderline ECGConfirmed by Cydney Claude MD, SPRING ([id number]) on 3/16/2021 5:50:38 PM     Sodium 03/16/2021 140     Potassium 03/16/2021 4.0     Chloride 03/16/2021 104     CO2 03/16/2021 26     Anion Gap 03/16/2021 10     Glucose 03/16/2021 140*    BUN 03/16/2021 16     CREATININE 03/16/2021 1.0     GFR Non- 03/16/2021 54*    GFR  03/16/2021 >60     Calcium 03/16/2021 9.5     Troponin 03/16/2021 <0.01     WBC 03/16/2021 9.0     RBC 03/16/2021 4.32     Hemoglobin 03/16/2021 12.8     Hematocrit 03/16/2021 38.2     MCV 03/16/2021 88.3     MCH 03/16/2021 29.6     MCHC 03/16/2021 33.5     RDW 03/16/2021 13.4     Platelets 18/13/5316 200     MPV 03/16/2021 7.9     Neutrophils % 03/16/2021 78.3     Lymphocytes % 03/16/2021 14.1     Monocytes % 03/16/2021 6.0     Eosinophils % 03/16/2021 1.2     Basophils % 03/16/2021 0.4     Neutrophils Absolute 03/16/2021 7.0     Lymphocytes Absolute 03/16/2021 1.3     Monocytes Absolute 03/16/2021 0.5     Eosinophils Absolute 03/16/2021 0.1     Basophils Absolute 03/16/2021 0.0     Magnesium 03/16/2021 1.90     Troponin 03/16/2021 <0.01     Color, UA 03/16/2021 Yellow     Clarity, UA 03/16/2021 Clear     Glucose, Ur 03/16/2021 Negative     Bilirubin Urine 03/16/2021 Negative     Ketones, Urine 03/16/2021 Negative     Specific Gravity, UA 03/16/2021 1.010     Blood, Urine 03/16/2021 TRACE-INTACT*    pH, UA 03/16/2021 5.5     Protein, UA 03/16/2021 Negative  Urobilinogen, Urine 03/16/2021 0.2     Nitrite, Urine 03/16/2021 Negative     Leukocyte Esterase, Urine 03/16/2021 Negative     Microscopic Examination 03/16/2021 YES     Urine Type 03/16/2021 NotGiven     WBC, UA 03/16/2021 None seen     RBC, UA 03/16/2021 None seen    Hospital Outpatient Visit on 03/01/2021   Component Date Value    Sodium 03/01/2021 140     Potassium 03/01/2021 3.7     Chloride 03/01/2021 102     CO2 03/01/2021 29     Anion Gap 03/01/2021 9     Glucose 03/01/2021 105*    BUN 03/01/2021 16     CREATININE 03/01/2021 0.9     GFR Non- 03/01/2021 >60     GFR  03/01/2021 >60     Calcium 03/01/2021 9.6     Total Protein 03/01/2021 7.7     Albumin 03/01/2021 4.1     Albumin/Globulin Ratio 03/01/2021 1.1     Total Bilirubin 03/01/2021 0.5     Alkaline Phosphatase 03/01/2021 105     ALT 03/01/2021 11     AST 03/01/2021 19     Globulin 03/01/2021 3.6     WBC 03/01/2021 8.2     RBC 03/01/2021 4.54     Hemoglobin 03/01/2021 13.3     Hematocrit 03/01/2021 39.6     MCV 03/01/2021 87.4     MCH 03/01/2021 29.2     MCHC 03/01/2021 33.4     RDW 03/01/2021 13.5     Platelets 36/35/9802 198     MPV 03/01/2021 7.5     Neutrophils % 03/01/2021 68.8     Lymphocytes % 03/01/2021 20.4     Monocytes % 03/01/2021 9.0     Eosinophils % 03/01/2021 1.3     Basophils % 03/01/2021 0.5     Neutrophils Absolute 03/01/2021 5.6     Lymphocytes Absolute 03/01/2021 1.7     Monocytes Absolute 03/01/2021 0.7     Eosinophils Absolute 03/01/2021 0.1     Basophils Absolute 03/01/2021 0.0     Amylase 03/01/2021 50     Lipase 03/01/2021 21.0    Nurse Only on 02/25/2021   Component Date Value    Potassium 02/25/2021 3.4*    TSH 02/25/2021 1.95    Nurse Only on 01/25/2021   Component Date Value    TSH 01/25/2021 7.27*    Sodium 01/25/2021 140     Potassium 01/25/2021 3.4*    Chloride 01/25/2021 99     CO2 01/25/2021 30     Anion Gap 01/25/2021 11     Glucose 01/25/2021 117*    BUN 01/25/2021 14     CREATININE 01/25/2021 0.9     GFR Non- 01/25/2021 >60     GFR  01/25/2021 >60     Calcium 01/25/2021 9.7     T4 Free 01/25/2021 1.3    Nurse Only on 12/15/2020   Component Date Value    Color, UA 12/15/2020 yellow     Clarity, UA 12/15/2020 slightly cloudy     Glucose, UA POC 12/15/2020 negative     Bilirubin, UA 12/15/2020 negative     Ketones, UA 12/15/2020 negative     Spec Grav, UA 12/15/2020 1.025     Blood, UA POC 12/15/2020 negative     pH, UA 12/15/2020 7.0     Protein, UA POC 12/15/2020 negative     Urobilinogen, UA 12/15/2020 0.2     Leukocytes, UA 12/15/2020 small     Nitrite, UA 12/15/2020 negative    Office Visit on 12/08/2020   Component Date Value    Sodium 12/08/2020 139     Potassium 12/08/2020 4.5     Chloride 12/08/2020 101     CO2 12/08/2020 26     Anion Gap 12/08/2020 12     Glucose 12/08/2020 100*    BUN 12/08/2020 22*    CREATININE 12/08/2020 1.0     GFR Non- 12/08/2020 54*    GFR  12/08/2020 >60     Calcium 12/08/2020 9.6     Magnesium 12/08/2020 2.30     TSH 12/08/2020 6.27*    T4 Free 12/08/2020 1.2    Nurse Only on 11/11/2020   Component Date Value    Potassium 11/11/2020 4.1     Color, UA 11/11/2020 yellow     Clarity, UA 11/11/2020 hazy     Glucose, UA POC 11/11/2020 negative     Bilirubin, UA 11/11/2020 negative     Ketones, UA 11/11/2020 negative     Spec Grav, UA 11/11/2020 1.025     Blood, UA POC 11/11/2020 trace-intact     pH, UA 11/11/2020 7.0     Protein, UA POC 11/11/2020 negative     Urobilinogen, UA 11/11/2020 0.2     Leukocytes, UA 11/11/2020 LARGE     Nitrite, UA 11/11/2020 negative     Urine Culture, Routine 11/12/2020 <50,000 CFU/ml mixed skin/urogenital iker.  No further workup    Hospital Outpatient Visit on 10/24/2020   Component Date Value    Cortisol - AM 10/24/2020 16.0     Anti-Thyroglob Abs 10/24/2020 15  THYROID PEROXIDASE (TPO)* 10/24/2020 304*    T4 Free 10/24/2020 1.0     TSH 10/24/2020 6.77*    ACTH 10/24/2020 79*   Office Visit on 10/13/2020   Component Date Value    Sodium 10/13/2020 140     Potassium 10/13/2020 4.4     Chloride 10/13/2020 102     CO2 10/13/2020 28     Anion Gap 10/13/2020 10     Glucose 10/13/2020 101*    BUN 10/13/2020 15     CREATININE 10/13/2020 0.8     GFR Non- 10/13/2020 >60     GFR  10/13/2020 >60     Calcium 10/13/2020 9.6     Magnesium 10/13/2020 2.00    Orders Only on 10/08/2020   Component Date Value    Iron 10/06/2020 80     TIBC 10/06/2020 363     Iron Saturation 10/06/2020 22    There may be more visits with results that are not included. Assessment:       67 y.o. patient with planned surgery as above. Known risk factors for perioperative complications: Anxiety, HTN, orthostatic hypotension  Current medications which may produce withdrawal symptoms if withheld perioperatively: none     1. Preop examination    2. Anxiety    3. Essential hypertension    4. Orthostatic hypotension    5. Skin lesion         Plan:     1. Preoperative workup as follows: History and physical  2. Further recommendations from consultants:No    3. Change in medication regimen before surgery: Take Levothyroxine and Amlodipine on morning of surgery with sip of water, and hold all other medications until after surgery. Stop NSAIDS (Motrin, Aleve, Ibuprofen), vitamin E, aspirin, fish oil 7-10 days prior to surgery unless instructed otherwise by surgeon.   4. Prophylaxis for cardiac events with perioperative beta-blockers: Not indicated  ACC/AHA indications for pre-operative beta-blocker use:    · Vascular surgery with history of postitive stress test  · Intermediate or high risk surgery with history of CAD   · Intermediate or high risk surgery with multiple clinical predictors of CAD- 2 of the following: history of compensated or prior heart failure, history of cerebrovascular disease, DM, or renal insufficiency    Routine administration of higher-dose, long-acting metoprolol in beta-blockernaïve patients on the day of surgery, and in the absence of dose titration is associated with an overall increase in mortality. Beta-blockers should be started days to weeks prior to surgery and titrated to pulse < 70.  5. Deep vein thrombosis prophylaxis: regimen to be chosen by surgical team  6. No contraindications to planned surgery      If you have questions, please do not hesitate to call me ([phone]).      Sincerely,                Vida Anderson, CNP

## 2021-04-06 NOTE — PATIENT INSTRUCTIONS
Take Levothyroxine and Amlodipine on morning of surgery with sip of water, and hold all other medications until after surgery. Stop NSAIDS (Motrin, Aleve, Ibuprofen), vitamin E, Co-enzyme 10aspirin, fish oil 7-10 days prior to surgery unless instructed otherwise by surgeon.

## 2021-04-09 DIAGNOSIS — M79.89 SWELLING OF BOTH LOWER EXTREMITIES: ICD-10-CM

## 2021-04-11 RX ORDER — FUROSEMIDE 20 MG/1
TABLET ORAL
Qty: 90 TABLET | Refills: 0 | Status: SHIPPED | OUTPATIENT
Start: 2021-04-11 | End: 2021-07-15

## 2021-04-26 DIAGNOSIS — I10 ESSENTIAL HYPERTENSION: ICD-10-CM

## 2021-04-26 RX ORDER — AMLODIPINE BESYLATE 2.5 MG/1
2.5 TABLET ORAL DAILY
Qty: 90 TABLET | Refills: 3 | Status: SHIPPED | OUTPATIENT
Start: 2021-04-26 | End: 2021-04-28 | Stop reason: SDUPTHER

## 2021-04-28 DIAGNOSIS — I10 ESSENTIAL HYPERTENSION: ICD-10-CM

## 2021-04-28 RX ORDER — AMLODIPINE BESYLATE 2.5 MG/1
2.5 TABLET ORAL DAILY
Qty: 90 TABLET | Refills: 3 | Status: SHIPPED | OUTPATIENT
Start: 2021-04-28 | End: 2021-08-06 | Stop reason: SDUPTHER

## 2021-04-28 NOTE — TELEPHONE ENCOUNTER
Spouse called in stating that the mail order pharm is not receiving rx for Amlodipine.       Called with a new  Fax # [phone]    Last office visit 4/6/2021     Last written 4/26/2021 (rx not received per spouse)    Next office visit scheduled Visit date not found    Requested Prescriptions     Pending Prescriptions Disp Refills    amLODIPine (NORVASC) 2.5 MG tablet 90 tablet 3     Sig: Take 1 tablet by mouth daily

## 2021-05-10 ENCOUNTER — OFFICE VISIT (OUTPATIENT)
Dept: FAMILY MEDICINE CLINIC | Age: 76
End: 2021-05-10
Payer: MEDICARE

## 2021-05-10 VITALS
DIASTOLIC BLOOD PRESSURE: 68 MMHG | OXYGEN SATURATION: 96 % | BODY MASS INDEX: 32.28 KG/M2 | HEIGHT: 68 IN | HEART RATE: 94 BPM | SYSTOLIC BLOOD PRESSURE: 102 MMHG | WEIGHT: 213 LBS

## 2021-05-10 DIAGNOSIS — E03.8 HYPOTHYROIDISM DUE TO HASHIMOTO'S THYROIDITIS: ICD-10-CM

## 2021-05-10 DIAGNOSIS — I87.2 VENOUS INSUFFICIENCY OF BOTH LOWER EXTREMITIES: Primary | ICD-10-CM

## 2021-05-10 DIAGNOSIS — E66.09 CLASS 1 OBESITY DUE TO EXCESS CALORIES WITH SERIOUS COMORBIDITY AND BODY MASS INDEX (BMI) OF 32.0 TO 32.9 IN ADULT: ICD-10-CM

## 2021-05-10 DIAGNOSIS — F41.9 ANXIETY: ICD-10-CM

## 2021-05-10 DIAGNOSIS — E06.3 HYPOTHYROIDISM DUE TO HASHIMOTO'S THYROIDITIS: ICD-10-CM

## 2021-05-10 DIAGNOSIS — R29.898 LEG WEAKNESS, BILATERAL: ICD-10-CM

## 2021-05-10 PROCEDURE — 4040F PNEUMOC VAC/ADMIN/RCVD: CPT | Performed by: NURSE PRACTITIONER

## 2021-05-10 PROCEDURE — 1036F TOBACCO NON-USER: CPT | Performed by: NURSE PRACTITIONER

## 2021-05-10 PROCEDURE — G8427 DOCREV CUR MEDS BY ELIG CLIN: HCPCS | Performed by: NURSE PRACTITIONER

## 2021-05-10 PROCEDURE — 99214 OFFICE O/P EST MOD 30 MIN: CPT | Performed by: NURSE PRACTITIONER

## 2021-05-10 PROCEDURE — 1090F PRES/ABSN URINE INCON ASSESS: CPT | Performed by: NURSE PRACTITIONER

## 2021-05-10 PROCEDURE — G8417 CALC BMI ABV UP PARAM F/U: HCPCS | Performed by: NURSE PRACTITIONER

## 2021-05-10 PROCEDURE — G8400 PT W/DXA NO RESULTS DOC: HCPCS | Performed by: NURSE PRACTITIONER

## 2021-05-10 PROCEDURE — 1123F ACP DISCUSS/DSCN MKR DOCD: CPT | Performed by: NURSE PRACTITIONER

## 2021-05-10 ASSESSMENT — ENCOUNTER SYMPTOMS
RESPIRATORY NEGATIVE: 1
GASTROINTESTINAL NEGATIVE: 1

## 2021-05-10 NOTE — PATIENT INSTRUCTIONS
Patient Education        Learning About Venous Insufficiency  What is it? Venous insufficiency is a problem with the flow of blood from the veins of the legs back to the heart. It's also called chronic venous insufficiency or chronic venous stasis. Your veins bring blood back to the heart after it flows through your body. Veins have valves that keep the blood moving in one directiontoward the heart. But with venous insufficiency, the veins of the legs might not work as they should. This can allow blood to leak backward. Fluid can pool in the legs. This can lead to problems that include varicose veins. What causes it? Venous insufficiency is sometimes caused by deep vein thrombosis and high blood pressure inside leg veins. You are more likely to have venous insufficiency if you:  · Are older. · Are female. · Are overweight. · Don't get enough physical activity. · Smoke. · Have a family history of varicose veins. What are the symptoms? Symptoms of venous insufficiency affect the legs. They may include:  · Swelling, often in the ankles. · A rash. · Varicose veins. · Itching. · Cramping. · Skin sores (ulcers). · Aching or a feeling of heaviness. · Changes in skin color. How is it diagnosed? Your doctor can diagnose venous insufficiency by examining your legs and by using a type of ultrasound test (duplex Doppler) to find out how well blood is flowing in your legs. How is it treated? To reduce swelling and relieve pain caused by venous insufficiency, you can wear compression stockings. They are tighter at the ankles than at the top of the legs. They also can help venous skin ulcers heal. But there are different types of stockings, and they need to fit right. So your doctor will recommend what you need. You also can try to:  · Get more exercise, especially walking. It can increase blood flow. · Avoid standing still or sitting for a long time, which can make the fluid pool in your legs.   · Try not to sit with your legs crossed at the knee. · Keep your legs raised above your heart when you're lying down. This reduces swelling. If these treatments don't work, you may need medicine or a procedure to help relieve symptoms. Procedures might be done to close the vein, to remove the vein, or to improve blood flow. Follow-up care is a key part of your treatment and safety. Be sure to make and go to all appointments, and call your doctor if you are having problems. It's also a good idea to know your test results and keep a list of the medicines you take. Current as of: March 4, 2020               Content Version: 12.8  © 2006-2021 [employer]. Care instructions adapted under license by [facility] ([facility]). If you have questions about a medical condition or this instruction, always ask your healthcare professional. [place] [address] any warranty or liability for your use of this information. Patient Education        Leg and Ankle Edema: Care Instructions  Your Care Instructions  Swelling in the legs, ankles, and feet is called edema. It is common after you sit or stand for a while. Long plane flights or car rides often cause swelling in the legs and feet. You may also have swelling if you have to stand for long periods of time at your job. Problems with the veins in the legs (varicose veins) and changes in hormones can also cause swelling. Sometimes the swelling in the ankles and feet is caused by a more serious problem, such as heart failure, infection, blood clots, or liver or kidney disease. Follow-up care is a key part of your treatment and safety. Be sure to make and go to all appointments, and call your doctor if you are having problems. It's also a good idea to know your test results and keep a list of the medicines you take. How can you care for yourself at home? · If your doctor gave you medicine, take it as prescribed.  Call your doctor if you think you are having a problem with your medicine. · Whenever you are resting, raise your legs up. Try to keep the swollen area higher than the level of your heart. · Take breaks from standing or sitting in one position. ? Walk around to increase the blood flow in your lower legs. ? Move your feet and ankles often while you stand, or tighten and relax your leg muscles. · Wear support stockings. Put them on in the morning, before swelling gets worse. · Eat a balanced diet. Lose weight if you need to. · Limit the amount of salt (sodium) in your diet. Salt holds fluid in the body and may increase swelling. When should you call for help? Call 911 anytime you think you may need emergency care. For example, call if:    · You have symptoms of a blood clot in your lung (called a pulmonary embolism). These may include:  ? Sudden chest pain. ? Trouble breathing. ? Coughing up blood. Call your doctor now or seek immediate medical care if:    · You have signs of a blood clot, such as:  ? Pain in your calf, back of the knee, thigh, or groin. ? Redness and swelling in your leg or groin.     · You have symptoms of infection, such as:  ? Increased pain, swelling, warmth, or redness. ? Red streaks or pus. ? A fever. Watch closely for changes in your health, and be sure to contact your doctor if:    · Your swelling is getting worse.     · You have new or worsening pain in your legs.     · You do not get better as expected. Where can you learn more? Go to [email or web address]://[email or web address].[email or web address] org and sign in to your [employer]uber account. Enter J231 in the [facility] box to learn more about \"Leg and Ankle Edema: Care Instructions. \"     If you do not have an account, please click on the \"Sign Up Now\" link. Current as of: February 26, 2020               Content Version: 12.8  © 5329-4402 [employer]. Care instructions adapted under license by [facility] ([facility]).  If you have questions about a medical condition or this instruction, always ask your healthcare professional. [place] [address] any warranty or liability for your use of this information.

## 2021-05-10 NOTE — PROGRESS NOTES
rebound. Hernia: No hernia is present. Musculoskeletal: Normal range of motion. General: No swelling, tenderness, deformity or signs of injury. Right lower le+ Pitting Edema present. Left lower le+ Pitting Edema present. Lymphadenopathy:      Cervical: No cervical adenopathy. Skin:     General: Skin is warm and dry. Capillary Refill: Capillary refill takes less than 2 seconds. Coloration: Skin is not jaundiced or pale. Findings: No bruising, erythema, lesion or rash. Neurological:      General: No focal deficit present. Mental Status: She is alert and oriented to person, place, and time. Mental status is at baseline. Cranial Nerves: No cranial nerve deficit. Sensory: No sensory deficit. Motor: No weakness or abnormal muscle tone. Coordination: Coordination normal.      Gait: Gait normal.      Deep Tendon Reflexes: Reflexes are normal and symmetric. Reflexes normal.   Psychiatric:         Mood and Affect: Mood normal.         Behavior: Behavior normal.         Thought Content:  Thought content normal.         Judgment: Judgment normal.         Admission on 2021, Discharged on 2021   Component Date Value Ref Range Status    Ventricular Rate 2021 95  BPM Final    Atrial Rate 2021 95  BPM Final    P-R Interval 2021 178  ms Final    QRS Duration 2021 80  ms Final    Q-T Interval 2021 358  ms Final    QTc Calculation (Bazett) 2021 449  ms Final    P Axis 2021 28  degrees Final    R Axis 2021 -18  degrees Final    T Axis 2021 46  degrees Final    Diagnosis 2021 Normal sinus rhythmMinimal voltage criteria for LVH, may be normal variantBorderline ECGConfirmed by Elana Day MD, Sariah Devine ([id number]) on 3/16/2021 5:50:38 PM   Final    Sodium 2021 140  136 - 145 mmol/L Final    Potassium 2021 4.0  3.5 - 5.1 mmol/L Final    Chloride 2021 104  99 - 110 mmol/L Final    CO2 03/16/2021 26  21 - 32 mmol/L Final    Anion Gap 03/16/2021 10  3 - 16 Final    Glucose 03/16/2021 140* 70 - 99 mg/dL Final    BUN 03/16/2021 16  7 - 20 mg/dL Final    CREATININE 03/16/2021 1.0  0.6 - 1.2 mg/dL Final    GFR Non- 03/16/2021 54* >60 Final    Comment: >60 mL/min/1.73m2 EGFR, calc. for ages 25 and older using the  MDRD formula (not corrected for weight), is valid for stable  renal function.  GFR  03/16/2021 >60  >60 Final    Comment: Chronic Kidney Disease: less than 60 ml/min/1.73 sq.m. Kidney Failure: less than 15 ml/min/1.73 sq.m. Results valid for patients 18 years and older.       Calcium 03/16/2021 9.5  8.3 - 10.6 mg/dL Final    Troponin 03/16/2021 <0.01  <0.01 ng/mL Final    Methodology by Troponin T    WBC 03/16/2021 9.0  4.0 - 11.0 K/uL Final    RBC 03/16/2021 4.32  4.00 - 5.20 M/uL Final    Hemoglobin 03/16/2021 12.8  12.0 - 16.0 g/dL Final    Hematocrit 03/16/2021 38.2  36.0 - 48.0 % Final    MCV 03/16/2021 88.3  80.0 - 100.0 fL Final    MCH 03/16/2021 29.6  26.0 - 34.0 pg Final    MCHC 03/16/2021 33.5  31.0 - 36.0 g/dL Final    RDW 03/16/2021 13.4  12.4 - 15.4 % Final    Platelets 09/65/0057 200  135 - 450 K/uL Final    MPV 03/16/2021 7.9  5.0 - 10.5 fL Final    Neutrophils % 03/16/2021 78.3  % Final    Lymphocytes % 03/16/2021 14.1  % Final    Monocytes % 03/16/2021 6.0  % Final    Eosinophils % 03/16/2021 1.2  % Final    Basophils % 03/16/2021 0.4  % Final    Neutrophils Absolute 03/16/2021 7.0  1.7 - 7.7 K/uL Final    Lymphocytes Absolute 03/16/2021 1.3  1.0 - 5.1 K/uL Final    Monocytes Absolute 03/16/2021 0.5  0.0 - 1.3 K/uL Final    Eosinophils Absolute 03/16/2021 0.1  0.0 - 0.6 K/uL Final    Basophils Absolute 03/16/2021 0.0  0.0 - 0.2 K/uL Final    Magnesium 03/16/2021 1.90  1.80 - 2.40 mg/dL Final    Troponin 03/16/2021 <0.01  <0.01 ng/mL Final    Methodology by Troponin T    Color, UA 03/16/2021 Yellow  Straw/Yellow Final    Clarity, UA 03/16/2021 Clear  Clear Final    Glucose, Ur 03/16/2021 Negative  Negative mg/dL Final    Bilirubin Urine 03/16/2021 Negative  Negative Final    Ketones, Urine 03/16/2021 Negative  Negative mg/dL Final    Specific Gravity, UA 03/16/2021 1.010  1.005 - 1.030 Final    Blood, Urine 03/16/2021 TRACE-INTACT* Negative Final    pH, UA 03/16/2021 5.5  5.0 - 8.0 Final    Protein, UA 03/16/2021 Negative  Negative mg/dL Final    Urobilinogen, Urine 03/16/2021 0.2  <2.0 E.U./dL Final    Nitrite, Urine 03/16/2021 Negative  Negative Final    Leukocyte Esterase, Urine 03/16/2021 Negative  Negative Final    Microscopic Examination 03/16/2021 YES   Final    Urine Type 03/16/2021 NotGiven   Final    WBC, UA 03/16/2021 None seen  0 - 5 /HPF Final    RBC, UA 03/16/2021 None seen  0 - 4 /HPF Final           Assessment & Plan: The following diagnoses and conditions are stable with no further orders unless indicated:  1. Venous insufficiency of both lower extremities    2. Leg weakness, bilateral    3. Class 1 obesity due to excess calories with serious comorbidity and body mass index (BMI) of 32.0 to 32.9 in adult    4. Hypothyroidism due to Hashimoto's thyroiditis    5. Anxiety        René Whitt was seen today for edema. Lower leg swelling noted in bilateral lower legs - educated [place] that leg swelling seems to be from venous insufficiency. Would recommend compression hose, elevate legs above heart, alternate between sitting and standing. Also would recommend eating a no salt added diet. Would recommend increasing physical activity. Weight loss will also help with the leg swelling. Leg weakness most likely from deconditioning. Recommend PT/OT. Referral for home health placed. Will send to [facility]. She is home bound related to her leg weakness and mental health. Hypothyroid symptoms stable - continue with Levothyroxine. Anxiety improved. Continue with medication and follow up with Dr. Jane Pearce as scheduled. Diagnoses and all orders for this visit:    Venous insufficiency of both lower extremities    Leg weakness, bilateral  -     External Referral To Home Health    Class 1 obesity due to excess calories with serious comorbidity and body mass index (BMI) of 32.0 to 32.9 in adult    Hypothyroidism due to Hashimoto's thyroiditis    Anxiety      Prior to Visit Medications    Medication Sig Taking?  Authorizing Provider   amLODIPine (NORVASC) 2.5 MG tablet Take 1 tablet by mouth daily Yes MOHINDER Coffey CNP   furosemide (LASIX) 20 MG tablet TAKE 1 TABLET EVERY DAY Yes MOHINDER Coffey CNP   erythromycin (ROMYCIN) 5 MG/GM ophthalmic ointment  Yes Historical Provider, MD   levothyroxine (SYNTHROID) 75 MCG tablet Take 1 tablet by mouth daily Yes MOHINDER Coffey CNP   mirtazapine (REMERON) 30 MG tablet Take 1 tablet by mouth nightly Yes MOHINDER Coffey CNP   risperiDONE (RISPERDAL) 0.25 MG tablet Take 3 tablets by mouth nightly Yes Historical Provider, MD   potassium chloride (KLOR-CON M) 20 MEQ extended release tablet Take 2 tablets in am 1 pm Yes MOHINDER Coffey CNP   fludrocortisone (FLORINEF) 0.1 MG tablet Take 1 tablet by mouth 2 times daily Yes MOHINDER Coffey CNP   rosuvastatin (CRESTOR) 20 MG tablet TAKE 1 TABLET EVERY DAY Yes Mike Neal MD   busPIRone (BUSPAR) 15 MG tablet  Yes Historical Provider, MD   citalopram (CELEXA) 40 MG tablet Take 40 mg by mouth daily Yes Historical Provider, MD   Omega-3 Fatty Acids (FISH OIL) 1000 MG CPDR Take 3,000 mg by mouth Yes Historical Provider, MD   Calcium Carbonate-Vitamin D (CALCIUM 500 + D) 500-125 MG-UNIT TABS Take by mouth Yes Historical Provider, MD   Coenzyme Q10 (CO Q 10 PO) Take by mouth Yes Historical Provider, MD   aspirin EC 81 MG EC tablet Take 1 tablet by mouth daily Yes Jaclyn Garvey MD   dicyclomine (BENTYL) 10 MG capsule Take 20 mg by mouth 4 times daily (before meals and nightly)  Yes Historical Provider, MD   polyethylene glycol (GLYCOLAX) packet Take 17 g by mouth daily  Yes Historical Provider, MD     No orders of the defined types were placed in this encounter. Return if symptoms worsen or fail to improve, for AMV - telephone 2 weeks . Patient should call the office immediately with new or ongoing signs or symptoms or worsening, or proceedto the emergency room. No changes in past medical history, past surgical history, social history, or family history were noted during the patient encounter unless specifically listed above. All updates of past medicalhistory, past surgical history, social history, or family history were reviewed personally by me during the office visit. All problems listed in the assessment are stable unless noted otherwise. Medication profilereviewed personally by me during the office visit. Medication side effects and possible impairments from medications were discussed as applicable. Call if pattern of symptoms change or persists for an extended time. This document was prepared by a combination of typing and transcription through a voice recognition software. All medications have the potential for adverse effects. All medications effect each person differently. Please read and review provided information related to medication. If the medication that you have been prescribed has the potential to cause sedation, do not drive or operate car, truck, or heavy machinery until you know how the medication will effect you. If you experience any adverse effects from the medication, please call the office or report to the emergency department.

## 2021-05-11 ENCOUNTER — TELEPHONE (OUTPATIENT)
Dept: FAMILY MEDICINE CLINIC | Age: 76
End: 2021-05-11

## 2021-05-12 ENCOUNTER — TELEPHONE (OUTPATIENT)
Dept: FAMILY MEDICINE CLINIC | Age: 76
End: 2021-05-12

## 2021-05-12 NOTE — TELEPHONE ENCOUNTER
Shorty with [address] called requesting verbal order for PT 2x week for 4 weeks.  Call back [place] [phone]

## 2021-05-13 DIAGNOSIS — I95.1 ORTHOSTATIC HYPOTENSION: ICD-10-CM

## 2021-05-13 RX ORDER — FLUDROCORTISONE ACETATE 0.1 MG/1
0.1 TABLET ORAL 2 TIMES DAILY
Qty: 30 TABLET | Refills: 2 | Status: SHIPPED | OUTPATIENT
Start: 2021-05-13 | End: 2021-08-04

## 2021-05-14 DIAGNOSIS — F41.9 ANXIETY: ICD-10-CM

## 2021-05-14 RX ORDER — CITALOPRAM 40 MG/1
40 TABLET ORAL DAILY
Qty: 90 TABLET | Refills: 3 | Status: CANCELLED | OUTPATIENT
Start: 2021-05-14

## 2021-05-14 RX ORDER — BUSPIRONE HYDROCHLORIDE 15 MG/1
15 TABLET ORAL
Status: CANCELLED | OUTPATIENT
Start: 2021-05-14

## 2021-05-14 RX ORDER — DICYCLOMINE HYDROCHLORIDE 10 MG/1
20 CAPSULE ORAL
Qty: 120 CAPSULE | Status: CANCELLED | OUTPATIENT
Start: 2021-05-14

## 2021-05-18 ENCOUNTER — TELEPHONE (OUTPATIENT)
Dept: FAMILY MEDICINE CLINIC | Age: 76
End: 2021-05-18

## 2021-05-18 NOTE — TELEPHONE ENCOUNTER
BP in office was on the softer side at the last office appointment and there was no medication changes made.       Recommend recheck BP later today and call with BP and HR

## 2021-05-18 NOTE — TELEPHONE ENCOUNTER
Fareed Saini with [facility] called stating that pt's BP is 160/105. States pt took her BP medications about 10am this morning.  Call back pt with recommendations [phone]

## 2021-05-19 RX ORDER — CITALOPRAM 40 MG/1
40 TABLET ORAL DAILY
Qty: 90 TABLET | Refills: 1 | Status: SHIPPED | OUTPATIENT
Start: 2021-05-19 | End: 2021-10-04

## 2021-05-21 DIAGNOSIS — F41.9 ANXIETY: ICD-10-CM

## 2021-05-21 RX ORDER — DICYCLOMINE HYDROCHLORIDE 10 MG/1
CAPSULE ORAL
Qty: 180 CAPSULE | Refills: 1 | Status: SHIPPED | OUTPATIENT
Start: 2021-05-21 | End: 2022-01-28 | Stop reason: ALTCHOICE

## 2021-05-21 NOTE — TELEPHONE ENCOUNTER
Please call patient's  - the Bentyl she is on is a high dose. Any reason that she is on such a high dose? Who placed her on this?   Previous PCP or GI?

## 2021-05-24 ENCOUNTER — VIRTUAL VISIT (OUTPATIENT)
Dept: FAMILY MEDICINE CLINIC | Age: 76
End: 2021-05-24
Payer: MEDICARE

## 2021-05-24 DIAGNOSIS — Z78.0 ASYMPTOMATIC MENOPAUSAL STATE: ICD-10-CM

## 2021-05-24 DIAGNOSIS — F41.9 ANXIETY: ICD-10-CM

## 2021-05-24 DIAGNOSIS — Z13.31 POSITIVE DEPRESSION SCREENING: ICD-10-CM

## 2021-05-24 DIAGNOSIS — Z00.00 ROUTINE GENERAL MEDICAL EXAMINATION AT A HEALTH CARE FACILITY: Primary | ICD-10-CM

## 2021-05-24 PROCEDURE — G8431 POS CLIN DEPRES SCRN F/U DOC: HCPCS | Performed by: NURSE PRACTITIONER

## 2021-05-24 PROCEDURE — 4040F PNEUMOC VAC/ADMIN/RCVD: CPT | Performed by: NURSE PRACTITIONER

## 2021-05-24 PROCEDURE — G0439 PPPS, SUBSEQ VISIT: HCPCS | Performed by: NURSE PRACTITIONER

## 2021-05-24 PROCEDURE — 1123F ACP DISCUSS/DSCN MKR DOCD: CPT | Performed by: NURSE PRACTITIONER

## 2021-05-24 RX ORDER — MIRTAZAPINE 30 MG/1
15 TABLET, FILM COATED ORAL NIGHTLY
Qty: 90 TABLET | Refills: 1 | Status: SHIPPED
Start: 2021-05-24 | End: 2021-11-24 | Stop reason: ALTCHOICE

## 2021-05-24 ASSESSMENT — PATIENT HEALTH QUESTIONNAIRE - PHQ9
7. TROUBLE CONCENTRATING ON THINGS, SUCH AS READING THE NEWSPAPER OR WATCHING TELEVISION: 1
SUM OF ALL RESPONSES TO PHQ9 QUESTIONS 1 & 2: 6
SUM OF ALL RESPONSES TO PHQ QUESTIONS 1-9: 13
9. THOUGHTS THAT YOU WOULD BE BETTER OFF DEAD, OR OF HURTING YOURSELF: 0
5. POOR APPETITE OR OVEREATING: 1
SUM OF ALL RESPONSES TO PHQ QUESTIONS 1-9: 13

## 2021-05-24 ASSESSMENT — COLUMBIA-SUICIDE SEVERITY RATING SCALE - C-SSRS
6. HAVE YOU EVER DONE ANYTHING, STARTED TO DO ANYTHING, OR PREPARED TO DO ANYTHING TO END YOUR LIFE?: NO
2. HAVE YOU ACTUALLY HAD ANY THOUGHTS OF KILLING YOURSELF?: NO

## 2021-05-24 ASSESSMENT — LIFESTYLE VARIABLES: HOW OFTEN DO YOU HAVE A DRINK CONTAINING ALCOHOL: 0

## 2021-05-24 NOTE — PATIENT INSTRUCTIONS
Personalized Preventive Plan for Nadia Muñoz - 5/24/2021  Medicare offers a range of preventive health benefits. Some of the tests and screenings are paid in full while other may be subject to a deductible, co-insurance, and/or copay. Some of these benefits include a comprehensive review of your medical history including lifestyle, illnesses that may run in your family, and various assessments and screenings as appropriate. After reviewing your medical record and screening and assessments performed today your provider may have ordered immunizations, labs, imaging, and/or referrals for you. A list of these orders (if applicable) as well as your Preventive Care list are included within your After Visit Summary for your review. Other Preventive Recommendations:    · A preventive eye exam performed by an eye specialist is recommended every 1-2 years to screen for glaucoma; cataracts, macular degeneration, and other eye disorders. · A preventive dental visit is recommended every 6 months. · Try to get at least 150 minutes of exercise per week or 10,000 steps per day on a pedometer . · Order or download the FREE \"Exercise & Physical Activity: Your Everyday Guide\" from [employer]. Call 3-827.786.3624 or search [employer] online. · You need 3800-8382 mg of calcium and 0426-3183 IU of vitamin D per day. It is possible to meet your calcium requirement with diet alone, but a vitamin D supplement is usually necessary to meet this goal.  · When exposed to the sun, use a sunscreen that protects against both UVA and UVB radiation with an SPF of 30 or greater. Reapply every 2 to 3 hours or after sweating, drying off with a towel, or swimming. · Always wear a seat belt when traveling in a car. Always wear a helmet when riding a bicycle or motorcycle.

## 2021-05-24 NOTE — PROGRESS NOTES
Medicare Annual Wellness Visit  Are Name: Man Logan Date: 2021   MRN: <[id number]> Sex: Female   Age: 67 y.o. Ethnicity: Non-/Non    : 1945 Race: Shemar Botello is here for Medicare AWV    Screenings for behavioral, psychosocial and functional/safety risks, and cognitive dysfunction are all negative except as indicated below. These results, as well as other patient data from the [address] form, are documented in Flowsheets linked to this Encounter. Allergies   Allergen Reactions    Bactrim [Sulfamethoxazole-Trimethoprim] Anaphylaxis    Ultram [Tramadol Hcl] Anaphylaxis     stridor    Dilaudid [Hydromorphone] Other (See Comments)     Hallucinations    Phenergan [Promethazine] Nausea Only    Lipitor [Atorvastatin]      Nausea    Penicillins          Prior to Visit Medications    Medication Sig Taking?  Authorizing Provider   dicyclomine (BENTYL) 10 MG capsule Take one capsule twice daily with meals Yes Climmie Heads, APRN - CNP   citalopram (CELEXA) 40 MG tablet Take 1 tablet by mouth daily Yes Climmie Heads, APRN - CNP   fludrocortisone (FLORINEF) 0.1 MG tablet Take 1 tablet by mouth 2 times daily Yes Climmie Heads, APRN - CNP   amLODIPine (NORVASC) 2.5 MG tablet Take 1 tablet by mouth daily Yes Climmie Heads, APRN - CNP   furosemide (LASIX) 20 MG tablet TAKE 1 TABLET EVERY DAY Yes Climmie Heads, APRN - CNP   erythromycin (ROMYCIN) 5 MG/GM ophthalmic ointment  Yes Historical Provider, MD   levothyroxine (SYNTHROID) 75 MCG tablet Take 1 tablet by mouth daily Yes Climmie Heads, APRN - CNP   mirtazapine (REMERON) 30 MG tablet Take 1 tablet by mouth nightly Yes Climmie Heads, APRN - CNP   risperiDONE (RISPERDAL) 0.25 MG tablet Take 3 tablets by mouth nightly Yes Historical Provider, MD   potassium chloride (KLOR-CON M) 20 MEQ extended release tablet Take 2 tablets in am 1 pm Yes Climmie Heads, APRN - CNP   rosuvastatin (CRESTOR) 20 MG tablet TAKE 1 TABLET EVERY DAY Yes Mike Neal MD   busPIRone (BUSPAR) 15 MG tablet  Yes Historical Provider, MD   Omega-3 Fatty Acids (FISH OIL) 1000 MG CPDR Take 3,000 mg by mouth Yes Historical Provider, MD   Calcium Carbonate-Vitamin D (CALCIUM 500 + D) 500-125 MG-UNIT TABS Take by mouth Yes Historical Provider, MD   Coenzyme Q10 (CO Q 10 PO) Take by mouth Yes Historical Provider, MD   aspirin EC 81 MG EC tablet Take 1 tablet by mouth daily Yes Jaclyn Garvey MD   polyethylene glycol (GLYCOLAX) packet Take 17 g by mouth daily  Yes Historical Provider, MD         Past Medical History:   Diagnosis Date    Anxiety     Benign essential hypertension 1/17/2017    [facility] [address]     CAD (coronary artery disease)     Cancer ([facility] [address] 2012    colon    Depression     GERD (gastroesophageal reflux disease)     Hyperlipidemia     Hypertension     Irritable bowel syndrome (IBS)     Obesity     Osteoarthritis        Past Surgical History:   Procedure Laterality Date    APPENDECTOMY      CHOLECYSTECTOMY      COLON SURGERY      COLONOSCOPY  2014    mass    COLONOSCOPY  3/29/16    normal         Family History   Problem Relation Age of Onset    Cancer Mother         colon    Cancer Brother         colon    Heart Disease Sister     Heart Disease Brother        CareTeam (Including outside providers/suppliers regularly involved in providing care):   Patient Care Team:  MOHINDER Coffey CNP as PCP - General (Nurse Practitioner)  MOHINDER Coffey CNP as PCP - [facility] Empaneled Provider  Kris Joshua MD (Orthopedic Surgery)  Hakan Pantoja MD as Consulting Physician (Endocrinology)    Wt Readings from Last 3 Encounters:   05/10/21 213 lb (96.6 kg)   04/06/21 212 lb (96.2 kg)   03/25/21 209 lb 12.8 oz (95.2 kg)      Patient-Reported Vitals 1/29/2021   Patient-Reported Weight 205lbs      There is no height or weight on file to calculate BMI. Based upon direct observation of the patient, evaluation of cognition reveals remote memory intact, recent memory impaired. Patient's complete Health Risk Assessment and screening values have been reviewed and are found in Flowsheets. The following problems were reviewed today and where indicated follow up appointments were made and/or referrals ordered. Positive Risk Factor Screenings with Interventions:      Cognitive: Words recalled: 2 Words Recalled  Total Score Interpretation: Positive Mini-Cog  Did the patient refuse to take the cognition test?: No  Cognitive Impairment Interventions:  · Patient to follow up with neurology    Depression:  PHQ-2 Score: 6  PHQ-9 Total Score: 13    Severity:1-4 = minimal depression, 5-9 = mild depression, 10-14 = moderate depression, 15-19 = moderately severe depression, 20-27 = severe depression  Depression Interventions:  · Patient is seeing psychiatry and counseling         General Health and ACP:  General  In general, how would you say your health is?: (!) Poor  In the past 7 days, have you experienced any of the following?  New or Increased Pain, New or Increased Fatigue, Loneliness, Social Isolation, Stress or Anger?: None of These  Do you get the social and emotional support that you need?: Yes  Do you have a Living Will?: Yes  Advance Directives     Power of  Living Will ACP-Advance Directive ACP-Power of     Not on File Filed on 01/18/17 Filed Not on File      General Health Risk Interventions:  · No Living Will: ACP documents already completed- patient asked to provide copy to the office    Health Habits/Nutrition:  Health Habits/Nutrition  Do you exercise for at least 20 minutes 2-3 times per week?: Yes  Have you lost any weight without trying in the past 3 months?: No  Do you eat only one meal per day?: No  Have you seen the dentist within the past year?: (!) No     Health Habits/Nutrition Interventions:  · Dental exam overdue: patient encouraged to make appointment with his/her dentist      ADL:  ADLs  In the past 7 days, did you need help from others to perform any of the following everyday activities? Eating, dressing, grooming, bathing, toileting, or walking/balance?: (!) Toileting, Dressing  In the past 7 days, did you need help from others to take care of any of the following?  Laundry, housekeeping, banking/finances, shopping, telephone use, food preparation, transportation, or taking medications?: None  ADL Interventions:  · Patient declines any further evaluation/treatment for this issue    Personalized Preventive Plan   Current Health Maintenance Status  Immunization History   Administered Date(s) Administered    COVID-19, Moderna, PF, 100mcg/0.5mL 02/05/2021, 03/05/2021    Influenza Vaccine, unspecified formulation 12/22/2016    Influenza Virus Vaccine 11/12/2014, 12/22/2016    Influenza, High Dose (Fluzone 65 yrs and older) 10/20/2016, 12/17/2019    Influenza, Quadv, IM, PF (6 mo and older Fluzone, Flulaval, Fluarix, and 3 yrs and older Afluria) 11/28/2018, 10/13/2020    Pneumococcal Conjugate 13-valent ([id number]) 10/20/2016    Pneumococcal Polysaccharide ([id number]) 02/23/2015, 12/22/2016    Tdap (Boostrix, Adacel) 07/30/2012        Health Maintenance   Topic Date Due    DEXA (modify frequency per FRAX score)  Never done    Annual Wellness Visit (AWV)  Never done    Shingles Vaccine (1 of 2) 01/15/2022 (Originally 5/1/1994)    Lipid screen  10/06/2021    Potassium monitoring  03/16/2022    Creatinine monitoring  03/16/2022    DTaP/Tdap/Td vaccine (2 - Td) 07/30/2022    Flu vaccine  Completed    Pneumococcal 65+ years Vaccine  Completed    COVID-19 Vaccine  Completed    Hepatitis A vaccine  Aged Out    Hepatitis B vaccine  Aged Out    Hib vaccine  Aged Out    Meningococcal (ACWY) vaccine  Aged Out    Hepatitis C screen  Discontinued     Recommendations for SNAPCARD Due: see orders and patient she is to follow up with her counseling and psychiatrist.  Patient will follow-up in 3 month(s) with PCP. Advance Care Planning   Advanced Care Planning: Discussed the patients choices for care and treatment in case of a health event that adversely affects decision-making abilities. Also discussed the patients long-term treatment options. Reviewed with the patient the [address] [place] [address] of [address] [place] [address] Declaration forms  Reviewed the process of designating a competent adult as an Agent (or -in-fact) that could take make health care decisions for the patient if incompetent. Patient was asked to complete the declaration forms, either acknowledge the forms by a public notary or an eligible witness and provide a signed copy to the practice office.   Time spent (minutes): 4 minutes

## 2021-05-27 ENCOUNTER — HOSPITAL ENCOUNTER (OUTPATIENT)
Dept: MAMMOGRAPHY | Age: 76
Discharge: HOME | End: 2021-06-01
Payer: MEDICARE

## 2021-05-27 VITALS — HEIGHT: 66 IN | WEIGHT: 190 LBS | BODY MASS INDEX: 30.53 KG/M2

## 2021-05-27 DIAGNOSIS — Z12.31 VISIT FOR SCREENING MAMMOGRAM: ICD-10-CM

## 2021-05-27 PROCEDURE — 77063 BREAST TOMOSYNTHESIS BI: CPT

## 2021-06-01 DIAGNOSIS — R92.8 ABNORMAL MAMMOGRAM: Primary | ICD-10-CM

## 2021-06-01 DIAGNOSIS — F41.9 ANXIETY: ICD-10-CM

## 2021-06-01 DIAGNOSIS — Z12.31 ENCOUNTER FOR SCREENING MAMMOGRAM FOR MALIGNANT NEOPLASM OF BREAST: ICD-10-CM

## 2021-06-01 RX ORDER — BUSPIRONE HYDROCHLORIDE 15 MG/1
15 TABLET ORAL 3 TIMES DAILY PRN
Qty: 30 TABLET | Refills: 0 | Status: SHIPPED | OUTPATIENT
Start: 2021-06-01 | End: 2021-07-19 | Stop reason: SDUPTHER

## 2021-06-01 NOTE — TELEPHONE ENCOUNTER
Patient states that she also needs her buspar refilled.  Asked that a small quantity be sent to her [facility] pharmacy to help get her through and then have the regular amount sent to her [place] mail in pharmacy 8

## 2021-06-02 ENCOUNTER — TELEPHONE (OUTPATIENT)
Dept: FAMILY MEDICINE CLINIC | Age: 76
End: 2021-06-02

## 2021-06-02 DIAGNOSIS — R92.8 ABNORMAL MAMMOGRAM: Primary | ICD-10-CM

## 2021-06-02 NOTE — TELEPHONE ENCOUNTER
Joaquín  with [address] called stating that the order for pt's mammogram might need to be for a diagnostic since pt just had an abnormal mammogram on 5/27/2021.  Call Nina(FLORI) when order updated [phone]

## 2021-06-03 ENCOUNTER — NURSE ONLY (OUTPATIENT)
Dept: FAMILY MEDICINE CLINIC | Age: 76
End: 2021-06-03
Payer: MEDICARE

## 2021-06-03 ENCOUNTER — TELEPHONE (OUTPATIENT)
Dept: FAMILY MEDICINE CLINIC | Age: 76
End: 2021-06-03

## 2021-06-03 DIAGNOSIS — R30.0 DYSURIA: ICD-10-CM

## 2021-06-03 DIAGNOSIS — R35.0 URINARY FREQUENCY: Primary | ICD-10-CM

## 2021-06-03 DIAGNOSIS — F41.9 ANXIETY: ICD-10-CM

## 2021-06-03 DIAGNOSIS — R39.9 UTI SYMPTOMS: Primary | ICD-10-CM

## 2021-06-03 LAB
BILIRUBIN, POC: NEGATIVE
BLOOD URINE, POC: NEGATIVE
CLARITY, POC: CLEAR
COLOR, POC: YELLOW
GLUCOSE URINE, POC: NEGATIVE
KETONES, POC: NEGATIVE
LEUKOCYTE EST, POC: NORMAL
NITRITE, POC: NEGATIVE
PH, POC: 5.5
PROTEIN, POC: NEGATIVE
SPECIFIC GRAVITY, POC: 1.02
UROBILINOGEN, POC: 0.2

## 2021-06-03 PROCEDURE — 81002 URINALYSIS NONAUTO W/O SCOPE: CPT | Performed by: NURSE PRACTITIONER

## 2021-06-03 RX ORDER — BUSPIRONE HYDROCHLORIDE 15 MG/1
15 TABLET ORAL 3 TIMES DAILY PRN
Qty: 90 TABLET | Refills: 2 | Status: SHIPPED | OUTPATIENT
Start: 2021-06-03 | End: 2021-08-09

## 2021-06-03 NOTE — TELEPHONE ENCOUNTER
Last visit was 4/6/21  Future Appointments   Date Time Provider Corey Kanika   6/9/2021 10:00 AM [facility] MAMMO [facility]   6/9/2021 10:30 AM [facility] US [facility]   8/30/2021  1:00 PM Joanne Azar, APRN - CNP [facility] [place] [facility]

## 2021-06-03 NOTE — TELEPHONE ENCOUNTER
What are your symptoms? Frequency, burning    Do you see any blood in your urine? no    Do you have a fever? No    When did symptoms begin? 3 days ago    Can you come in for an appointment? If none available can you come in to the office and give a urine specimen? Are you allergic to any antibiotics? What pharmacy do you use?   [facility]    Pt was wanting to see if you wanted her to come in and leave a specimen or if you wanted to see her

## 2021-06-04 LAB — URINE CULTURE, ROUTINE: NORMAL

## 2021-06-09 ENCOUNTER — HOSPITAL ENCOUNTER (OUTPATIENT)
Dept: WOMENS IMAGING | Age: 76
Discharge: HOME | End: 2021-06-09
Payer: MEDICARE

## 2021-06-09 DIAGNOSIS — R92.8 ABNORMAL MAMMOGRAM: ICD-10-CM

## 2021-06-09 PROCEDURE — 76642 ULTRASOUND BREAST LIMITED: CPT

## 2021-06-09 PROCEDURE — G0279 TOMOSYNTHESIS, MAMMO: HCPCS

## 2021-06-09 NOTE — PROGRESS NOTES
[address]   [address] [place] [address], [address] [place] [address] [address]   Phone: [phone]         ULTRASOUND BIOPSY EDUCATION    NAME:  aRmona Manzanares OF BIRTH:  1945   MEDICAL RECORD NUMBER:  [id number]   TODAY'S DATE:  6/9/2021    Referring Physician: Dr. Jazmin Martinez    Procedure: U/S Core Bx    Right breast and Left Breast    Date of biopsy: 6/17/21    Patient taking blood thinners: yes - holding asa on 6/15    Medicine allergies: yes - see chart    Special Instructions: explained procedure with , sister and brother in law. Biopsy order form faxed to referring MD.          What is an Ultrasound Guided Breast Biopsy? Ultrasound guided breast biopsy is a test that uses ultrasound to find an area of your breast where a tissue sample will be taken. The sample is then looked at under a microscope to check for signs of breast cancer. Why is it done? An Ultrasound biopsy is usually done to check for cancer in a lump or cyst found during a mammogram or ultrasound. Preparing for the test?     * Take your medications as prescribed    You may eat and drink fluids before the test    Take a shower the evening or morning before the biopsy. What happens before the test?  Images are taken to find the exact site to be biopsied.   Your skin is washed with an alcohol prep.   You will be given an injection of medication to numb your breast.   What happens during the test?     Once your breast is numb, a small cut (incision) is made.   Using the imaging, the doctor will guide the needle into the biopsy area.   A sample of breast tissue is taken through the needle.   A small \"Clip\" or Marker is inserted into your breast to maritza the biopsy site.   The needle is removed and pressure put on the needle site to stop any bleeding.   A bandage will be placed over the site.      A post mammogram picture will be taken to document the clip placement. How long does the test take? Approximately 60 minutes. Most of the time is spent finding the area for the biopsy. What are the risks?   Bleeding: You may have some bleeding which can cause bruising, swelling,    or a bleeding under your skin.   Infection:  Signs of infection are redness, swelling, heat, or increasing pain    at the biopsy Site.   Sample size not adequate: This would require repeating the biopsy. What happens after the test?    The nurse will review your post biopsy instructions which include:         Placing an ice pack in your bra.    Wearing a firm fitting bra.    You may use Tylenol (Acetaminiphen) for discomfort. Lab results take about 2-3 business days. The Nurse Navigator or your doctor will call you with the results. Ultrasound Breast Biopsy:     (Please arrive 15 minutes early)                                                 [x] Blood thinner history reviewed with patient    [x] Take all your other medications on your normal routine schedule. [x] You may eat and drink as normal before your biopsy. [x] You may drive yourself. [x] No heavy lifting or exercise for 48 hours after the biopsy. [x] Printed Pre Ultrasound Biopsy Instructions were provided, reviewed with the patient and all questions were answered. The Breast Center's Information:   Should you experience any significant changes in your health or have questions about your care, please contact:  Edmundo Nagel or Mukund Murillo, Breast Navigator's at [facility]:  [phone]  Monday-Friday. If you need help with your care outside these hours and cannot wait until we are again available, contact your Physician or go to the hospital emergency room. [x] Patient/POA/Caregiver verbalized understanding of instructions.        Electronically signed by Edmundo Nagel RN on 6/9/2021 at 12:04 PM

## 2021-06-15 ENCOUNTER — CLINICAL DOCUMENTATION (OUTPATIENT)
Dept: OTHER | Age: 76
End: 2021-06-15

## 2021-06-17 ENCOUNTER — HOSPITAL ENCOUNTER (OUTPATIENT)
Dept: WOMENS IMAGING | Age: 76
Discharge: HOME | End: 2021-06-17
Payer: MEDICARE

## 2021-06-17 DIAGNOSIS — R92.8 ABNORMAL MAMMOGRAM OF RIGHT BREAST: ICD-10-CM

## 2021-06-17 DIAGNOSIS — R92.8 ABNORMAL MAMMOGRAM: ICD-10-CM

## 2021-06-17 DIAGNOSIS — R92.8 ABNORMAL MAMMOGRAM OF BOTH BREASTS: ICD-10-CM

## 2021-06-17 PROCEDURE — 2709999900 US BREAST BIOPSY W LOC DEVICE 1ST LESION RIGHT

## 2021-06-17 PROCEDURE — 88360 TUMOR IMMUNOHISTOCHEM/MANUAL: CPT

## 2021-06-17 PROCEDURE — 2709999900 US BREAST BIOPSY W LOC DEVICE 1ST LESION LEFT

## 2021-06-17 PROCEDURE — 88342 IMHCHEM/IMCYTCHM 1ST ANTB: CPT

## 2021-06-17 PROCEDURE — 88305 TISSUE EXAM BY PATHOLOGIST: CPT

## 2021-06-17 PROCEDURE — G0279 TOMOSYNTHESIS, MAMMO: HCPCS

## 2021-06-17 PROCEDURE — 88341 IMHCHEM/IMCYTCHM EA ADD ANTB: CPT

## 2021-06-17 NOTE — PROGRESS NOTES
Patient in [address] [place] [address] for breast biopsy. Radiologist reviewed procedure with patient, consent signed. Patient tolerated procedure well. Compression held. Site cleansed with chloraprep, steri strips and dry dressing applied. Ice pack provided. Reviewed discharge instructions with patient. Patient verbalized understanding and agreed to contact the Breast Navigator with any questions. Patient was A&Ox3 and steady on feet and discharged to waiting area. The [address]   Discharge Instructions  [facility]  [address]  [address]  Telephone: [phone]    NAME:  Mary Daniel OF BIRTH:  1945  GENDER: female  MEDICAL RECORD NUMBER:  [id number]  TODAY'S DATE:  6/17/2021    Discharge Instructions Breast Center:    Post Breast Biopsy Instructions     [x] You may remove your outer dressing in 24 hours and you may shower. \"Steri-strips\" tape will stay on for 5-7 days. [x] Place a cold pack inside your bra on top of the dressing for at least 3 hours, removing it every 15 minutes for 15 minutes after your biopsy. [x] Wear a firm fitting bra for at least 24 hours after your biopsy, including while you sleep. [x] Place an ice pack inside your bra on top of the dressing for at least 3 hours, removing it every 15 minutes for 15 minutes after your biopsy. [x] You may resume your held medications tomorrow, unless otherwise directed by your physician. [x] Your physician has instructed you to take Tylenol (Acetaminophen) the day of your biopsy for any discomfort. [x] Watch for excessive bleeding. If bleeding occurs apply pressure to site. Watch for signs of infection, increased pain, redness, swelling and heat. If this occurs call your physician. [x] Do not participate in any strenuous exercise for 48 hours after your biopsy and do not lift, pull or push anything over 5-10 lbs.       [x] Results will be available in 2-3 working days.  Your referring physician or the Nurse Navigator will call you with results. The Breast Center Information:   Should you experience any significant changes in your health or have questions about your care, please contact:  Inder Thomas or Pau Hunt, Breast Navigators with [facility]  [phone]  Monday-Friday. If you need help with your care outside these hours and cannot wait until we are again available, contact your Physician or go to the hospital emergency room.         Electronically signed by Inder Thomas RN on 6/17/2021 at 2:05 PM

## 2021-06-21 ENCOUNTER — TELEPHONE (OUTPATIENT)
Dept: WOMENS IMAGING | Age: 76
End: 2021-06-21

## 2021-06-21 RX ORDER — ROSUVASTATIN CALCIUM 20 MG/1
TABLET, COATED ORAL
Qty: 90 TABLET | Refills: 1 | Status: SHIPPED | OUTPATIENT
Start: 2021-06-21 | End: 2022-01-28 | Stop reason: ALTCHOICE

## 2021-06-21 NOTE — TELEPHONE ENCOUNTER
Pathology for breast biopsy complete, radiologist confirms concordance. Reviewed breast biopsy results with patient and answered all questions. The radiologist is recommending that the patient see a breast surgeon regarding biopsy results. Per patient request, referral made to [facility] Surgeon, Dr. Juan Valenzuela. MOHINDER Garcia - CNP aware of breast pathology results. Breast Navigator will remain available for any questions. Pt verbalized understanding.       Art Schulz RN

## 2021-06-22 ENCOUNTER — TELEPHONE (OUTPATIENT)
Dept: FAMILY MEDICINE CLINIC | Age: 76
End: 2021-06-22

## 2021-06-22 NOTE — TELEPHONE ENCOUNTER
I do not have any openings. May place on schedule for tomorrow for a 40 min appt.   If no availability, recommend urgent care (not ER, but urgent care such as [address])

## 2021-06-22 NOTE — TELEPHONE ENCOUNTER
Pt's  called and said that it looks like pt has shingles on her leg. Said that it feels warm, blotchy, and reddish. Was wanting to see if she could get in to be seen.

## 2021-06-22 NOTE — PROGRESS NOTES
Gynecologic History  Menarche at age 16    She delivered her first child at age 24, and did not breastfeed  Postmenopausal at age 41's  No hysterectomy  Oral contraceptive use: yes for 1 years and is not currently taking  Hormone use: no and is not currently taking    Bra Size: 38 C

## 2021-06-25 ENCOUNTER — OFFICE VISIT (OUTPATIENT)
Dept: SURGERY | Age: 76
End: 2021-06-25
Payer: MEDICARE

## 2021-06-25 VITALS
DIASTOLIC BLOOD PRESSURE: 89 MMHG | OXYGEN SATURATION: 96 % | HEIGHT: 66 IN | WEIGHT: 205 LBS | RESPIRATION RATE: 18 BRPM | SYSTOLIC BLOOD PRESSURE: 145 MMHG | HEART RATE: 92 BPM | BODY MASS INDEX: 32.95 KG/M2

## 2021-06-25 DIAGNOSIS — Z17.0 MALIGNANT NEOPLASM OF LOWER-OUTER QUADRANT OF LEFT BREAST OF FEMALE, ESTROGEN RECEPTOR POSITIVE (HCC): ICD-10-CM

## 2021-06-25 DIAGNOSIS — C50.311 MALIGNANT NEOPLASM OF LOWER-INNER QUADRANT OF RIGHT BREAST OF FEMALE, ESTROGEN RECEPTOR POSITIVE (HCC): ICD-10-CM

## 2021-06-25 DIAGNOSIS — Z17.0 MALIGNANT NEOPLASM OF LOWER-INNER QUADRANT OF RIGHT BREAST OF FEMALE, ESTROGEN RECEPTOR POSITIVE (HCC): ICD-10-CM

## 2021-06-25 DIAGNOSIS — R92.8 ABNORMAL MAMMOGRAM: ICD-10-CM

## 2021-06-25 DIAGNOSIS — Z17.0 MALIGNANT NEOPLASM OF UPPER-OUTER QUADRANT OF RIGHT BREAST IN FEMALE, ESTROGEN RECEPTOR POSITIVE (HCC): Primary | ICD-10-CM

## 2021-06-25 DIAGNOSIS — C50.512 MALIGNANT NEOPLASM OF LOWER-OUTER QUADRANT OF LEFT BREAST OF FEMALE, ESTROGEN RECEPTOR POSITIVE (HCC): ICD-10-CM

## 2021-06-25 DIAGNOSIS — C50.411 MALIGNANT NEOPLASM OF UPPER-OUTER QUADRANT OF RIGHT BREAST IN FEMALE, ESTROGEN RECEPTOR POSITIVE (HCC): Primary | ICD-10-CM

## 2021-06-25 PROCEDURE — G8400 PT W/DXA NO RESULTS DOC: HCPCS | Performed by: SURGERY

## 2021-06-25 PROCEDURE — 4040F PNEUMOC VAC/ADMIN/RCVD: CPT | Performed by: SURGERY

## 2021-06-25 PROCEDURE — 99205 OFFICE O/P NEW HI 60 MIN: CPT | Performed by: SURGERY

## 2021-06-25 PROCEDURE — G8417 CALC BMI ABV UP PARAM F/U: HCPCS | Performed by: SURGERY

## 2021-06-25 PROCEDURE — G8427 DOCREV CUR MEDS BY ELIG CLIN: HCPCS | Performed by: SURGERY

## 2021-06-25 PROCEDURE — 1036F TOBACCO NON-USER: CPT | Performed by: SURGERY

## 2021-06-25 PROCEDURE — 1090F PRES/ABSN URINE INCON ASSESS: CPT | Performed by: SURGERY

## 2021-06-25 PROCEDURE — 1123F ACP DISCUSS/DSCN MKR DOCD: CPT | Performed by: SURGERY

## 2021-06-25 ASSESSMENT — ENCOUNTER SYMPTOMS
ABDOMINAL PAIN: 0
COUGH: 0
SHORTNESS OF BREATH: 0

## 2021-06-25 NOTE — PROGRESS NOTES
21    CHIEF COMPLAINT:  New diagnosis of bilateral breast cancer. HISTORY OF PRESENT ILLNESS:  Jeimy Gan is a 67 y.o. woman who requested that I evaluate her for her new diagnosis of bilateral breast cancer. The patient states that she was undergoing her new baseline mammogram on 2021 when she was alerted to an abnormality in the bilateral breasts. She underwent additional imaging and ultimately a core biopsy in each breast, which confirmed an invasive breast cancer on the left and a non-invasive breast cancer on the right. She presents today to discuss further management. The patient states that she herself has not noticed any abnormal masses in either breast.  She denies any change in the appearance of her breasts or the skin of her breasts. She denies bilateral nipple discharge. She has no other systemic complaints and otherwise feels well today. Pertinent Family History: No family history of breast or ovarian cancer. Her mother was diagnosed with colon cancer at 71 and her brother was diagnosed with colon cancer in his 45s.     GYNECOLOGIC HISTORY:  Menarche at age 16    She delivered her first child at age 24, and did not breastfeed  Postmenopausal at age 41's  No hysterectomy  Oral contraceptive use: yes for 1 years and is not currently taking  Hormone use: no and is not currently taking    Past Medical History:   Diagnosis Date    Anxiety     Benign essential hypertension 2017    Bowel obstruction ([address]     CAD (coronary artery disease)     Cancer ([address] 2012    colon    Depression     GERD (gastroesophageal reflux disease)     Hyperlipidemia     Hypertension     Irritable bowel syndrome (IBS)     Obesity     Osteoarthritis      Past Surgical History:   Procedure Laterality Date    APPENDECTOMY      CHOLECYSTECTOMY      COLON SURGERY      COLONOSCOPY  2014    mass    COLONOSCOPY  3/29/16    normal    US BREAST NEEDLE BIOPSY LEFT Left 2021    US BREAST NEEDLE BIOPSY LEFT 6/17/2021 Chris Huynh MD [facility]    US BREAST NEEDLE BIOPSY RIGHT Right 6/17/2021     BREAST NEEDLE BIOPSY RIGHT 6/17/2021 Chris Huynh MD [facility]     Current Outpatient Medications   Medication Sig Dispense Refill    famotidine (PEPCID) 40 MG tablet TAKE 1 TABLET EVERY EVENING 90 tablet 2    rosuvastatin (CRESTOR) 20 MG tablet TAKE 1 TABLET EVERY DAY 90 tablet 1    busPIRone (BUSPAR) 15 MG tablet Take 15 mg by mouth 3 times daily as needed (anxiety) 90 tablet 2    busPIRone (BUSPAR) 15 MG tablet Take 15 mg by mouth 3 times daily as needed (anxiety) 30 tablet 0    mirtazapine (REMERON) 30 MG tablet Take 0.5 tablets by mouth nightly 90 tablet 1    dicyclomine (BENTYL) 10 MG capsule Take one capsule twice daily with meals 180 capsule 1    citalopram (CELEXA) 40 MG tablet Take 1 tablet by mouth daily 90 tablet 1    fludrocortisone (FLORINEF) 0.1 MG tablet Take 1 tablet by mouth 2 times daily 30 tablet 2    amLODIPine (NORVASC) 2.5 MG tablet Take 1 tablet by mouth daily 90 tablet 3    furosemide (LASIX) 20 MG tablet TAKE 1 TABLET EVERY DAY 90 tablet 0    erythromycin (ROMYCIN) 5 MG/GM ophthalmic ointment       levothyroxine (SYNTHROID) 75 MCG tablet Take 1 tablet by mouth daily 90 tablet 1    risperiDONE (RISPERDAL) 0.25 MG tablet Take 3 tablets by mouth nightly      potassium chloride (KLOR-CON M) 20 MEQ extended release tablet Take 2 tablets in am 1 pm 180 tablet 1    Omega-3 Fatty Acids (FISH OIL) 1000 MG CPDR Take 3,000 mg by mouth      Calcium Carbonate-Vitamin D (CALCIUM 500 + D) 500-125 MG-UNIT TABS Take by mouth      Coenzyme Q10 (CO Q 10 PO) Take by mouth      aspirin EC 81 MG EC tablet Take 1 tablet by mouth daily 30 tablet 3    polyethylene glycol (GLYCOLAX) packet Take 17 g by mouth daily        No current facility-administered medications for this visit.      Allergies   Allergen Reactions    Bactrim Cancer Brother         colon    Heart Disease Sister     Heart Disease Brother      REVIEW OF SYSTEMS:   Constitutional: Negative for unexpected weight change. Eyes: Negative for visual disturbance. Respiratory: Negative for cough and shortness of breath. Cardiovascular: Negative for chest pain and palpitations. Gastrointestinal: Negative for abdominal pain. Musculoskeletal: Negative for arthralgias and myalgias. Neurological: Negative for headaches. Hematological: Negative for adenopathy. Bruises/bleeds easily (takes a low dose aspirin). Psychiatric/Behavioral: Negative for dysphoric mood. The patient is not nervous/anxious. I personally reviewed and agree with the above ROS as documented by my medical assistant. PHYSICAL EXAMINATION:   Vitals: BP (!) 145/89 (Site: Left Upper Arm, Position: Sitting, Cuff Size: Large Adult)   Pulse 92   Resp 18   Ht 5' 6\" (1.676 m)   Wt 205 lb (93 kg)   SpO2 96%   BMI 33.09 kg/m²   General: Well-developed, well-nourished, in no apparent distress. Eyes:  Conjunctivae appear normal. The sclerae are not injected and show no jaundice. Nose, Mouth and Throat:  Patient is wearing a mask. Neck: Supple, without thyromegaly or adenopathy. Respiratory: Normal respiratory effort. Cardiovascular: Regular rate and rhythm. +Bilateral lower extremity edema. Musculoskeletal: Normal gait and range of motion in all 4 extremities. Psychiatric: Alert and oriented x 3. Appropriate affect and behavior for today's visit. Skin: No visible concerning rashes, lesions, nodules or other skin changes. Lymphatic System:  No concerning cervical, supraclavicular or axillary lymphadenopathy. Breast Exam:  The breasts are normal in contour. Bra size 38C. Right Breast: Examination of the right breast in the upright and supine positions reveal no obvious masses, skin changes, dimpling, or retraction. The nipple and areola are without erosion, edema or ulceration.  There is no obvious nipple discharge. There is no concerning axillary adenopathy. Left Breast: Examination of the left breast in the upright and supine positions reveal no obvious masses, skin changes, dimpling, or retraction. The nipple and areola are without erosion, edema or ulceration. There is no obvious nipple discharge. There is no concerning axillary adenopathy. IMAGING: I personally reviewed the breast imaging performed from June 2021 and discussed this with the radiologist and the patient. In the left breast, there is a suspicious 1.6 cm mass at 4-5:00 10 CFN. In the right breast, there is a indeterminate 1.6 cm mass at 12:00 4 CFN. There are additional small indeterminate masses in the right breast at 3:00 7 CFN and 10:00 4 CFN. She was given a BI-RADS 4. Breast density is described as fibroglandular densities. PATHOLOGY:  I reviewed the bilateral breast biopsy pathology from 6/17/2021 with her today as well. This demonstrated the following:    Left breast mass, 5:00 10 CFN: invasive ductal carcinoma, grade 2, ER positive, DC positive, HER2 negative    Right breast mass 11-12:00 4 CFN: papillary carcinoma, grade 2, ER positive    IMPRESSION/RECOMMENDATION:  Cancer Staging  Malignant neoplasm of lower-outer quadrant of left breast of female, estrogen receptor positive ([facility]  Staging form: Breast, AJCC 8th Edition  - Clinical stage from 6/25/2021: Stage IA (cT1c, cN0, cM0, G2, ER+, DC+, HER2-) - Signed by Porfirio Briceño MD on 6/25/2021    Malignant neoplasm of upper-outer quadrant of right breast in female, estrogen receptor positive ([facility] [address]  Staging form: Breast, AJCC 8th Edition  - Clinical stage from 6/25/2021: Stage 0 (cTis (DCIS), cN0, cM0, G2, ER+) - Signed by Porfirio Briceño MD on 6/25/2021    Jasmyne Epps is a 67 y.o. woman who presents today for a consultation regarding her new diagnosis of bilateral breast cancer.   I reviewed the clinical, imaging, and pathology findings with her today. We discussed the treatment of breast cancer, which includes a combination of surgical therapy, systemic therapy, and radiation. We discussed her surgical therapy in great detail, and I explained the difference between breast conservation and mastectomy. Based on the clinical and imaging findings, I would consider her an acceptable candidate for attempted breast conservation, for which she is highly motivated. I explained the risks, benefits, and alternatives of this procedure which include, but are not limited to: anesthetic risk, bleeding, infection, wound complications, sensation changes, unappealing cosmetics, and the need to return to the operating room for a second procedure based on final pathology. We discussed the fact that there are two additional indeterminate nodules in the right breast and we would recommend a tissue diagnosis before proceeding with breast conservation. Based on the needle biopsy results, she may or may not require additional intervention. I explained the rationale for a needle biopsy and how it is performed. I have made arrangements for a needle biopsy in our Radiology Department on 6/29/2021. I should have the final pathology approximately 24-48 hours later, and I will contact her with this. We reviewed the option of omitting a sentinel lymph node biopsy as an alternative, including the details of the Choosing Wisely campaign. She is over the age of 78, her cancer is hormonally responsive and she has a clinically negative axilla. She understands that avoiding a sentinel lymph node biopsy should not affect mortality or local regional recurrence. She verbalizes understanding and is in agreement to proceed without a sentinel lymph node biopsy. The patient was counseled at length about the risks of boyd Covid-19 during their perioperative period and any recovery window from their procedure.   The patient was made aware that boyd Covid-19  may worsen their prognosis for recovering from their procedure  and lend to a higher morbidity and/or mortality risk. All material risks, benefits, and reasonable alternatives including postponing the procedure were discussed. The patient does wish to proceed with the procedure at this time. We also discussed adjuvant radiation therapy. I explained to her that radiation therapy is recommended in patients undergoing breast conservation surgery and at times following mastectomy to reduce the risk of local recurrence. I will make arrangements for her to meet with a radiation oncologist post-operatively. Systemic therapy including chemotherapy and endocrine therapy were reviewed. She will certainly be a candidate for endocrine therapy. I will arrange for a medical oncology consultation post-operatively to discuss this further. Finally, we also discussed her personal and family history, the risk of a hereditary cancer syndrome, and the role of genetic counseling and testing. Based on her risk, she would qualify for genetic testing, but declines at this time. I answered all of her and her family's questions thoroughly, and they do seem pleased with this plan of approach. I encouraged her to contact me in the interim if any new questions or concerns arise. Summary:  - right breast biopsy x 2 on 6/29/2021  - will plan bilateral radiofrequency identification tag localized partial mastectomies (pending results of additional biopsies)  - patient will need to see her PCP for surgical clearance within 30 days of surgery  - patient has received a full series of the covid-19 vaccine, the last injection of which was over 14 days ago.   Therefore, we will not obtain pre-operative covid testing as long as she remains asymptomatic  - I presented her case at our multi-disciplinary tumor board this morning    Wong Jeter MD      Addendum:    Pathology results reviewed with patient and her  over the phone.  She has an additional area of DCIS in the right breast at 3:00. There area at 10:00 4 CFN was benign and concordant. She is still very motivated for breast conservation over mastectomy, so will plan right radiofrequency identification tag localized partial mastectomy x 2 and left radiofrequency identification tag localized partial mastectomy on 7/27/2021. I called the patient and discussed this with her and her . She understands and wishes to proceed.       Cancer Staging  Malignant neoplasm of lower-inner quadrant of right breast of female, estrogen receptor positive ([address]  Staging form: Breast, AJCC 8th Edition  - Clinical stage from 7/1/2021: Stage 0 (cTis (DCIS), cN0, cM0, G2, ER+) - Signed by Prudence Canas MD on 7/1/2021    Malignant neoplasm of lower-outer quadrant of left breast of female, estrogen receptor positive ([address]  Staging form: Breast, AJCC 8th Edition  - Clinical stage from 6/25/2021: Stage IA (cT1c, cN0, cM0, G2, ER+, PA+, HER2-) - Signed by Prudence Canas MD on 6/25/2021    Malignant neoplasm of upper-outer quadrant of right breast in female, estrogen receptor positive ([address]  Staging form: Breast, AJCC 8th Edition  - Clinical stage from 6/25/2021: Stage 0 (cTis (DCIS), cN0, cM0, G2, ER+) - Signed by Prudenec Canas MD on 6/25/2021    Pathology:  Left breast mass (1.6 cm) 5:00 10 CFN: invasive ductal carcinoma, grade 2, ER positive, PA positive, HER2 negative    Right breast mass (1.6 cm) 11-12:00 4 CFN: papillary carcinoma, grade 2, ER positive    Right breast mass (9 mm) at 3:00 7 CFN: ductal carcinoma in situ, grade 2, ER positive    Jocelyn Patrick MD 7/1/2021 12:40 PM

## 2021-06-25 NOTE — PROGRESS NOTES
Review of Systems   Constitutional: Negative for unexpected weight change. Eyes: Negative for visual disturbance. Respiratory: Negative for cough and shortness of breath. Cardiovascular: Negative for chest pain and palpitations. Gastrointestinal: Negative for abdominal pain. Musculoskeletal: Negative for arthralgias and myalgias. Neurological: Negative for headaches. Hematological: Positive for adenopathy (has swelling in bilater legs). Bruises/bleeds easily (takes a low dose aspirin). Psychiatric/Behavioral: Negative for dysphoric mood. The patient is not nervous/anxious.

## 2021-06-29 ENCOUNTER — HOSPITAL ENCOUNTER (OUTPATIENT)
Dept: WOMENS IMAGING | Age: 76
Discharge: HOME | End: 2021-06-29
Payer: MEDICARE

## 2021-06-29 DIAGNOSIS — R92.8 ABNORMAL MAMMOGRAM: ICD-10-CM

## 2021-06-29 DIAGNOSIS — Z17.0 MALIGNANT NEOPLASM OF UPPER-OUTER QUADRANT OF RIGHT BREAST IN FEMALE, ESTROGEN RECEPTOR POSITIVE (HCC): ICD-10-CM

## 2021-06-29 DIAGNOSIS — Z98.890 STATUS POST BREAST BIOPSY: ICD-10-CM

## 2021-06-29 DIAGNOSIS — C50.411 MALIGNANT NEOPLASM OF UPPER-OUTER QUADRANT OF RIGHT BREAST IN FEMALE, ESTROGEN RECEPTOR POSITIVE (HCC): ICD-10-CM

## 2021-06-29 PROCEDURE — 88305 TISSUE EXAM BY PATHOLOGIST: CPT

## 2021-06-29 PROCEDURE — 88360 TUMOR IMMUNOHISTOCHEM/MANUAL: CPT

## 2021-06-29 PROCEDURE — 88342 IMHCHEM/IMCYTCHM 1ST ANTB: CPT

## 2021-06-29 PROCEDURE — 2709999900 US BREAST BIOPSY W LOC DEVICE EACH ADDL LESION RIGHT

## 2021-06-29 PROCEDURE — 77065 DX MAMMO INCL CAD UNI: CPT

## 2021-06-29 PROCEDURE — 88341 IMHCHEM/IMCYTCHM EA ADD ANTB: CPT

## 2021-06-29 PROCEDURE — A4648 IMPLANTABLE TISSUE MARKER: HCPCS

## 2021-06-29 NOTE — PROGRESS NOTES
available in 2-3 working days. Your referring physician or the Nurse Navigator will call you with results. The [facility] Center Information:   Should you experience any significant changes in your health or have questions about your care, please contact:  Ace Mayes or Adrienne Neville, Breast Navigators with [facility]  [phone]  Monday-Friday. If you need help with your care outside these hours and cannot wait until we are again available, contact your Physician or go to the hospital emergency room.         Electronically signed by Arturo Plata on 6/29/2021 at 12:57 PM

## 2021-07-01 PROBLEM — Z17.0 MALIGNANT NEOPLASM OF LOWER-INNER QUADRANT OF RIGHT BREAST OF FEMALE, ESTROGEN RECEPTOR POSITIVE (HCC): Status: ACTIVE | Noted: 2021-07-01

## 2021-07-01 PROBLEM — C50.311 MALIGNANT NEOPLASM OF LOWER-INNER QUADRANT OF RIGHT BREAST OF FEMALE, ESTROGEN RECEPTOR POSITIVE (HCC): Status: ACTIVE | Noted: 2021-07-01

## 2021-07-01 NOTE — RESULT ENCOUNTER NOTE
Called patient to review pathology results. No answer. LVM to call office back. If I am not available when she calls back, please let her know that I will try again at lunch or later this afternoon and ask what the best number to call is.

## 2021-07-02 ENCOUNTER — TELEPHONE (OUTPATIENT)
Dept: WOMENS IMAGING | Age: 76
End: 2021-07-02

## 2021-07-02 NOTE — PROGRESS NOTES
Martha Johnnie    Age 67 y.o.    female    1945    MRN [id number]    7/27/2021  Arrival Time_____________  OR Time____________111 Min     Procedure(s):  RIGHT RADIO FREQUENCY IDENTIFICATION TAG LOCALIZED PARTIAL MASTECTOMY TIMES TWO; LEFT RADIO FREQUENCY IDENTIFICATION TAG LOCALIZED PARTIAL MASTECTOMY                      General     Surgeon(s):  Allison Barreto, MD      DAY ADMIT ___  SDS/OP ___  OUTPT IN BED ___         Phone [phone] (home) [phone] (work)   PCP _____________________ Phone_________________ [address] ( ) Epic CE ( ) Appt ________    ADDITIONAL INFO __________________________________ Cardio/Consult _____________    NOTES _____________________________________________________________________    ____________________________________________________________________________    PAT APPT DATE:________ TIME: ________  FAXED QAD: _______  (__) H&P w/ hospitalist  ____________________________________________________________________________    COVID TEST: Date/Location______________        NURSING HISTORY COMPLETE: _______  (__) CBC       (__) W/ DIFF ___________  (__)  ECHO    __________  (__) Hgb A1C    ___________  (__) CHEST X RAY   __________  (__) LIPID PROFILE  ___________  (__) EKG   __________  (__) PT/PTT   ___________  (__) PFT's   __________  (__) BMP   ___________  (__) CAROTIDS  __________  (__) CMP   ___________  (__) VEIN MAPPING  __________  (__) U/A   ___________  (__) HISTORY & PHYSICAL __________  (__) URINE C & S  ___________  (__) CARDIAC CLEARANCE __________  (__) U/A W/ FLEX  ___________  (__) PULM.  CLEARANCE __________  (__) SERUM PREGNANCY ___________  (__) Check Epic DOS orders __________  (__) TYPE & SCREEN ________ repeat ( ) (__)  __________________ __________  (__) ALBUMIN   ___________  (__)  __________________ __________  (__) TRANSFERRIN  ___________  (__)  __________________ __________  (__) LIVER PROFILE  ___________  (__)  __________________ __________  (__) CARBOXY HGB  ___________  (__) URINE PREG DOS __________  (__) NICOTINE & MET.  ___________  (__) BLOOD SUGAR DOS __________  (__) PREALBUMIN  ___________    (__) MRSA NASAL SWAB ___________  (__) BLOOD THINNERS __________  (__) ACE/ ARBS: _____________________    (__) BETABLOCKERS ___________________

## 2021-07-06 ENCOUNTER — TELEPHONE (OUTPATIENT)
Dept: WOMENS IMAGING | Age: 76
End: 2021-07-06

## 2021-07-06 NOTE — TELEPHONE ENCOUNTER
Reviewed RFID placement with patient and her . All questions answered.  Patient to arrive at [facility] on 7/20/21 at 1:45pm. Roscoe Murray RN

## 2021-07-14 DIAGNOSIS — M79.89 SWELLING OF BOTH LOWER EXTREMITIES: ICD-10-CM

## 2021-07-15 RX ORDER — FUROSEMIDE 20 MG/1
TABLET ORAL
Qty: 90 TABLET | Refills: 0 | Status: SHIPPED | OUTPATIENT
Start: 2021-07-15 | End: 2021-10-08

## 2021-07-15 NOTE — TELEPHONE ENCOUNTER
Last OV 5/24/21  Future Appointments   Date Time Provider Corey Kanika   7/19/2021 11:20 AM MOHINDER Crawford CNP, [facility] [place] [facility]   7/20/2021  2:00 PM [facility]   7/20/2021  2:45 PM [facility] EG WC US [facility]   7/27/2021  2:20 PM [facility] RM 1 [facility]   8/30/2021  1:00 PM MOHINDER Crawford CNP, [facility] [place] [facility]

## 2021-07-19 ENCOUNTER — TELEPHONE (OUTPATIENT)
Dept: SURGERY | Age: 76
End: 2021-07-19

## 2021-07-19 ENCOUNTER — TELEPHONE (OUTPATIENT)
Dept: CARDIOLOGY CLINIC | Age: 76
End: 2021-07-19

## 2021-07-19 ENCOUNTER — OFFICE VISIT (OUTPATIENT)
Dept: FAMILY MEDICINE CLINIC | Age: 76
End: 2021-07-19
Payer: MEDICARE

## 2021-07-19 VITALS
HEART RATE: 90 BPM | WEIGHT: 211 LBS | SYSTOLIC BLOOD PRESSURE: 104 MMHG | HEIGHT: 66 IN | BODY MASS INDEX: 33.91 KG/M2 | OXYGEN SATURATION: 94 % | DIASTOLIC BLOOD PRESSURE: 60 MMHG

## 2021-07-19 DIAGNOSIS — Z17.0 MALIGNANT NEOPLASM OF LOWER-INNER QUADRANT OF RIGHT BREAST OF FEMALE, ESTROGEN RECEPTOR POSITIVE (HCC): ICD-10-CM

## 2021-07-19 DIAGNOSIS — C50.411 MALIGNANT NEOPLASM OF UPPER-OUTER QUADRANT OF RIGHT BREAST IN FEMALE, ESTROGEN RECEPTOR POSITIVE (HCC): ICD-10-CM

## 2021-07-19 DIAGNOSIS — Z17.0 MALIGNANT NEOPLASM OF LOWER-OUTER QUADRANT OF LEFT BREAST OF FEMALE, ESTROGEN RECEPTOR POSITIVE (HCC): ICD-10-CM

## 2021-07-19 DIAGNOSIS — Z17.0 MALIGNANT NEOPLASM OF UPPER-OUTER QUADRANT OF RIGHT BREAST IN FEMALE, ESTROGEN RECEPTOR POSITIVE (HCC): ICD-10-CM

## 2021-07-19 DIAGNOSIS — C50.311 MALIGNANT NEOPLASM OF LOWER-INNER QUADRANT OF RIGHT BREAST OF FEMALE, ESTROGEN RECEPTOR POSITIVE (HCC): ICD-10-CM

## 2021-07-19 DIAGNOSIS — Z17.0 MALIGNANT NEOPLASM OF UPPER-OUTER QUADRANT OF RIGHT BREAST IN FEMALE, ESTROGEN RECEPTOR POSITIVE (HCC): Primary | ICD-10-CM

## 2021-07-19 DIAGNOSIS — Z01.818 PREOP EXAMINATION: Primary | ICD-10-CM

## 2021-07-19 DIAGNOSIS — C50.512 MALIGNANT NEOPLASM OF LOWER-OUTER QUADRANT OF LEFT BREAST OF FEMALE, ESTROGEN RECEPTOR POSITIVE (HCC): ICD-10-CM

## 2021-07-19 DIAGNOSIS — E03.8 HYPOTHYROIDISM DUE TO HASHIMOTO'S THYROIDITIS: ICD-10-CM

## 2021-07-19 DIAGNOSIS — K04.7 INFECTED TOOTH: ICD-10-CM

## 2021-07-19 DIAGNOSIS — C50.411 MALIGNANT NEOPLASM OF UPPER-OUTER QUADRANT OF RIGHT BREAST IN FEMALE, ESTROGEN RECEPTOR POSITIVE (HCC): Primary | ICD-10-CM

## 2021-07-19 DIAGNOSIS — E06.3 HYPOTHYROIDISM DUE TO HASHIMOTO'S THYROIDITIS: ICD-10-CM

## 2021-07-19 LAB
BASOPHILS ABSOLUTE: 0.1 K/UL (ref 0–0.2)
BASOPHILS RELATIVE PERCENT: 0.6 %
EOSINOPHILS ABSOLUTE: 0.3 K/UL (ref 0–0.6)
EOSINOPHILS RELATIVE PERCENT: 3.2 %
HCT VFR BLD CALC: 36.4 % (ref 36–48)
HEMOGLOBIN: 12.3 G/DL (ref 12–16)
LYMPHOCYTES ABSOLUTE: 1.3 K/UL (ref 1–5.1)
LYMPHOCYTES RELATIVE PERCENT: 15 %
MCH RBC QN AUTO: 30.7 PG (ref 26–34)
MCHC RBC AUTO-ENTMCNC: 33.9 G/DL (ref 31–36)
MCV RBC AUTO: 90.7 FL (ref 80–100)
MONOCYTES ABSOLUTE: 0.7 K/UL (ref 0–1.3)
MONOCYTES RELATIVE PERCENT: 8.5 %
NEUTROPHILS ABSOLUTE: 6.2 K/UL (ref 1.7–7.7)
NEUTROPHILS RELATIVE PERCENT: 72.7 %
PDW BLD-RTO: 13.7 % (ref 12.4–15.4)
PLATELET # BLD: 180 K/UL (ref 135–450)
PMV BLD AUTO: 8.3 FL (ref 5–10.5)
RBC # BLD: 4.01 M/UL (ref 4–5.2)
WBC # BLD: 8.5 K/UL (ref 4–11)

## 2021-07-19 PROCEDURE — G8417 CALC BMI ABV UP PARAM F/U: HCPCS | Performed by: NURSE PRACTITIONER

## 2021-07-19 PROCEDURE — 36415 COLL VENOUS BLD VENIPUNCTURE: CPT | Performed by: NURSE PRACTITIONER

## 2021-07-19 PROCEDURE — G8427 DOCREV CUR MEDS BY ELIG CLIN: HCPCS | Performed by: NURSE PRACTITIONER

## 2021-07-19 PROCEDURE — G8400 PT W/DXA NO RESULTS DOC: HCPCS | Performed by: NURSE PRACTITIONER

## 2021-07-19 PROCEDURE — 1090F PRES/ABSN URINE INCON ASSESS: CPT | Performed by: NURSE PRACTITIONER

## 2021-07-19 PROCEDURE — 1036F TOBACCO NON-USER: CPT | Performed by: NURSE PRACTITIONER

## 2021-07-19 PROCEDURE — 1123F ACP DISCUSS/DSCN MKR DOCD: CPT | Performed by: NURSE PRACTITIONER

## 2021-07-19 PROCEDURE — 99214 OFFICE O/P EST MOD 30 MIN: CPT | Performed by: NURSE PRACTITIONER

## 2021-07-19 PROCEDURE — 4040F PNEUMOC VAC/ADMIN/RCVD: CPT | Performed by: NURSE PRACTITIONER

## 2021-07-19 PROCEDURE — 81002 URINALYSIS NONAUTO W/O SCOPE: CPT | Performed by: NURSE PRACTITIONER

## 2021-07-19 RX ORDER — CLINDAMYCIN HYDROCHLORIDE 300 MG/1
300 CAPSULE ORAL 3 TIMES DAILY
Qty: 21 CAPSULE | Refills: 0 | Status: SHIPPED | OUTPATIENT
Start: 2021-07-19 | End: 2021-07-26

## 2021-07-19 NOTE — TELEPHONE ENCOUNTER
I have read and agree with the student documentation.     Elvis Lyn RN Liudmila Perez, 1945    Cardiac Risk Assessment    What type of procedure are you having? RIGHT RADIO FREQUENCY IDENTIFICATION TAG LOCALIZED PARTIAL MASTECTOMY TIMES TWO; LEFT RADIO FREQUENCY IDENTIFICATION TAG LOCALIZED PARTIAL MASTECTOMY    When is your procedure scheduled for?  7.27.21    Medications to be stopped. None stated-pt is on ASA    What physician is performing your procedure? Dr.Abigail Castelan    Pt's PCP Tena Rojo is requesting the Cardiac Clearance   [place] [id number] # [phone]. They will view in Epic. Do not need to fax. Last ov 7.17.20    Assessment       1. Coronary artery disease of native artery of native heart with stable angina pectoris ([facility] [address]    2. Dyslipidemia    3. Essential hypertension    4. Mixed hyperlipidemia    5. Cardiac microvascular disease    6. HTN (hypertension), benign    7. Orthostatic hypotension           Plan   1. Recommend ankle and leg exercises for episodes of orthostatic hypotension with standing  2. Continue current medications  3.  Follow up in 1 year

## 2021-07-19 NOTE — PATIENT INSTRUCTIONS
Take Pepcid, Amlodipine, Levothyroxine on morning of surgery with sip of water, and hold all other medications until after surgery. Stop NSAIDS (Motrin, Aleve, Ibuprofen), vitamin E, aspirin, fish oil 7-10 days prior to surgery unless instructed otherwise by surgeon.

## 2021-07-19 NOTE — TELEPHONE ENCOUNTER
Received call from Kai Hinds who is asking if it is okay if this patient be started on Clindamycin 300mg PO TID x7 days for a dental infection prior to surgery scheduled for 7/26/21. Writer spoke with  who states that due to this patient not having cardiac clearance, it is likely surgery will need to be rescheduled. Writer called Venecia back, and left message with Shen marcus. Notified office that it is okay for this patient to take Clindamycin as ordered.

## 2021-07-19 NOTE — TELEPHONE ENCOUNTER
Lets reach out to pt to find out if there has been any change in her cardiac status, new cardiac sx. If that is stable and if pt gets a f/u ekg, then we can better assess risk profile. If needed, she can do OV to facilitate things, otherwise, or she can get ekg elsewhere if that is more convenient. Thank you.

## 2021-07-19 NOTE — PROGRESS NOTES
 amLODIPine (NORVASC) 2.5 MG tablet Take 1 tablet by mouth daily 90 tablet 3    erythromycin (ROMYCIN) 5 MG/GM ophthalmic ointment       levothyroxine (SYNTHROID) 75 MCG tablet Take 1 tablet by mouth daily 90 tablet 1    risperiDONE (RISPERDAL) 0.25 MG tablet Take 3 tablets by mouth nightly      potassium chloride (KLOR-CON M) 20 MEQ extended release tablet Take 2 tablets in am 1 pm 180 tablet 1    Omega-3 Fatty Acids (FISH OIL) 1000 MG CPDR Take 3,000 mg by mouth       Calcium Carbonate-Vitamin D (CALCIUM 500 + D) 500-125 MG-UNIT TABS Take by mouth      Coenzyme Q10 (CO Q 10 PO) Take by mouth      aspirin EC 81 MG EC tablet Take 1 tablet by mouth daily 30 tablet 3    polyethylene glycol (GLYCOLAX) packet Take 17 g by mouth daily          This patient presents to the office today for a preoperative consultation at the request of surgeon, Dr. Marysol Caballero, who plans on performing right radio frequency identification tag localized partial mastectomy times two; left radio frequency identification tag localized partial mastectomy on July 27 at [facility].  The current problem began several months ago, and symptoms have been unchanged with time. Conservative therapy: N/A. Planned anesthesia: General   Known anesthesia problems: None   Bleeding risk: No recent or remote history of abnormal bleeding  Personal or FH of DVT/PE: No    Patient objection to receiving blood products: No, patient does not want blood. Against [employer].      Patient Active Problem List   Diagnosis    GERD (gastroesophageal reflux disease)    Irritable bowel syndrome (IBS)    Chest pain    Near syncope    Shortness of breath    Dizziness    Cardiac microvascular disease    Coronary artery disease of native artery of native heart with stable angina pectoris ([facility] [address]    Mixed hyperlipidemia    MCI (mild cognitive impairment)    HTN (hypertension), benign    Dysthymia    Dyslipidemia    AMS (altered mental status)    Acute encephalopathy    Acute respiratory failure with hypoxemia (HCC)    Low grade fever    Hyponatremia    Acute respiratory failure (HCC)    Unsteady gait    Pre-syncope    Acute UTI    Orthostatic hypotension    Abnormal chest x-ray    Acute pain of right shoulder    Right wrist pain    Rotator cuff strain, right, initial encounter    Right wrist sprain, initial encounter    Malignant neoplasm of upper-outer quadrant of right breast in female, estrogen receptor positive ([address]    Malignant neoplasm of lower-outer quadrant of left breast of female, estrogen receptor positive ([address]    Malignant neoplasm of lower-inner quadrant of right breast of female, estrogen receptor positive ([address]       Past Medical History:   Diagnosis Date    Anxiety     Benign essential hypertension 1/17/2017    Bowel obstruction (HCC)     CAD (coronary artery disease)     Cancer ([address] 2012    colon    Depression     GERD (gastroesophageal reflux disease)     Hyperlipidemia     Hypertension     Irritable bowel syndrome (IBS)     Obesity     Osteoarthritis      Past Surgical History:   Procedure Laterality Date    APPENDECTOMY      CHOLECYSTECTOMY      COLON SURGERY      COLONOSCOPY  2014    mass    COLONOSCOPY  3/29/16    normal    US BREAST BIOPSY NEEDLE ADDITIONAL RIGHT Right 6/29/2021    US BREAST BIOPSY NEEDLE ADDITIONAL RIGHT 6/29/2021 Joshua Rivas MD [facility]    US BREAST NEEDLE BIOPSY LEFT Left 6/17/2021    US BREAST NEEDLE BIOPSY LEFT 6/17/2021 Naty Aaron MD [facility]    US BREAST NEEDLE BIOPSY RIGHT Right 6/17/2021    US BREAST NEEDLE BIOPSY RIGHT 6/17/2021 Naty Aaron MD [facility]    US BREAST NEEDLE BIOPSY RIGHT Right 6/29/2021    US BREAST NEEDLE BIOPSY RIGHT 6/29/2021 Joshua Rivas MD [facility]     Family History   Problem Relation Age of Onset    Cancer Mother         colon    Cancer Brother colon    Heart Disease Sister     Heart Disease Brother      Social History     Socioeconomic History    Marital status:      Spouse name: Not on file    Number of children: Not on file    Years of education: Not on file    Highest education level: Not on file   Occupational History    Not on file   Tobacco Use    Smoking status: Former Smoker     Packs/day: 1.00     Years: 10.00     Pack years: 10.00     Types: Cigarettes     Start date:      Quit date: 1983     Years since quittin.8    Smokeless tobacco: Never Used   Vaping Use    Vaping Use: Never used   Substance and Sexual Activity    Alcohol use: No    Drug use: No    Sexual activity: Not on file   Other Topics Concern    Not on file   Social History Narrative    Not on file     Social Determinants of Health     Financial Resource Strain:     Difficulty of Paying Living Expenses:    Food Insecurity:     Worried About Running Out of Food in the Last Year:     [address] in the Last Year:    Transportation Needs:     Lack of Transportation (Medical):  Lack of Transportation (Non-Medical):    Physical Activity:     Days of Exercise per Week:     Minutes of Exercise per Session:    Stress:     Feeling of Stress :    Social Connections:     Frequency of Communication with Friends and Family:     Frequency of Social Gatherings with Friends and Family:     Attends Voodoo Services:     Active Member of Clubs or Organizations:     Attends Club or Organization Meetings:     Marital Status:    Intimate Partner Violence:     Fear of Current or Ex-Partner:     Emotionally Abused:     Physically Abused:     Sexually Abused:        Review of Systems  A comprehensive review of systems was negative except for: Ears, nose, mouth, throat, and face: positive for Sore jaw with infected tooth, chronic leg swelling     Physical Exam   Constitutional: She is oriented to person, place, and time.  She appears well-developed and well-nourished. No distress. HENT:   Head: Normocephalic and atraumatic. Mouth/Throat: Uvula is midline, oropharynx is clear and moist and mucous membranes are normal. Right mandible swollen gland with infected tooth   Eyes: Conjunctivae and EOM are normal. Pupils are equal, round, and reactive to light. Neck: Trachea normal and normal range of motion. Neck supple. No JVD present. Carotid bruit is not present. No mass and no thyromegaly present. Cardiovascular: Normal rate, regular rhythm, normal heart sounds and intact distal pulses. Exam reveals no gallop and no friction rub. No murmur heard. Pulmonary/Chest: Effort normal and breath sounds normal. No respiratory distress. She has no wheezes. She has no rales. Abdominal: Soft. Normal aorta and bowel sounds are normal. She exhibits no distension and no mass. There is no hepatosplenomegaly. No tenderness. Musculoskeletal: She exhibits no edema and no tenderness. Neurological: She is alert and oriented to person, place, and time. She has normal strength. No cranial nerve deficit or sensory deficit. Coordination and gait normal.   Skin: Skin is warm and dry. No rash noted. No erythema. Psychiatric: She has a normal mood and affect.  Her behavior is normal.     EKG Interpretation:  EKG performed 03/2021 normal sinus rhythm    Lab Review   Nurse Only on 06/03/2021   Component Date Value    Color, UA 06/03/2021 yellow     Clarity, UA 06/03/2021 clear     Glucose, UA POC 06/03/2021 negative     Bilirubin, UA 06/03/2021 negative     Ketones, UA 06/03/2021 negative     Spec Grav, UA 06/03/2021 1.020     Blood, UA POC 06/03/2021 negative     pH, UA 06/03/2021 5.5     Protein, UA POC 06/03/2021 negative     Urobilinogen, UA 06/03/2021 0.2     Leukocytes, UA 06/03/2021 small     Nitrite, UA 06/03/2021 negative     Urine Culture, Routine 06/03/2021 No growth at 18 to 36 hours    Admission on 03/16/2021, Discharged on 03/16/2021 Component Date Value    Ventricular Rate 03/16/2021 95     Atrial Rate 03/16/2021 95     P-R Interval 03/16/2021 178     QRS Duration 03/16/2021 80     Q-T Interval 03/16/2021 358     QTc Calculation (Bazett) 03/16/2021 449     P Axis 03/16/2021 28     R Axis 03/16/2021 -25     T Axis 03/16/2021 46     Diagnosis 03/16/2021 Normal sinus rhythmMinimal voltage criteria for LVH, may be normal variantBorderline ECGConfirmed by Yanira Sandy MD, SPR[place] ([id number]) on 3/16/2021 5:50:38 PM     Sodium 03/16/2021 140     Potassium 03/16/2021 4.0     Chloride 03/16/2021 104     CO2 03/16/2021 26     Anion Gap 03/16/2021 10     Glucose 03/16/2021 140*    BUN 03/16/2021 16     CREATININE 03/16/2021 1.0     GFR Non- 03/16/2021 54*    GFR  03/16/2021 >60     Calcium 03/16/2021 9.5     Troponin 03/16/2021 <0.01     WBC 03/16/2021 9.0     RBC 03/16/2021 4.32     Hemoglobin 03/16/2021 12.8     Hematocrit 03/16/2021 38.2     MCV 03/16/2021 88.3     MCH 03/16/2021 29.6     MCHC 03/16/2021 33.5     RDW 03/16/2021 13.4     Platelets 99/27/1559 200     MPV 03/16/2021 7.9     Neutrophils % 03/16/2021 78.3     Lymphocytes % 03/16/2021 14.1     Monocytes % 03/16/2021 6.0     Eosinophils % 03/16/2021 1.2     Basophils % 03/16/2021 0.4     Neutrophils Absolute 03/16/2021 7.0     Lymphocytes Absolute 03/16/2021 1.3     Monocytes Absolute 03/16/2021 0.5     Eosinophils Absolute 03/16/2021 0.1     Basophils Absolute 03/16/2021 0.0     Magnesium 03/16/2021 1.90     Troponin 03/16/2021 <0.01     Color, UA 03/16/2021 Yellow     Clarity, UA 03/16/2021 Clear     Glucose, Ur 03/16/2021 Negative     Bilirubin Urine 03/16/2021 Negative     Ketones, Urine 03/16/2021 Negative     Specific Gravity, UA 03/16/2021 1.010     Blood, Urine 03/16/2021 TRACE-INTACT*    pH, UA 03/16/2021 5.5     Protein, UA 03/16/2021 Negative     Urobilinogen, Urine 03/16/2021 0.2     Nitrite, Urine 03/16/2021 Negative     Leukocyte Esterase, Urine 03/16/2021 Negative     Microscopic Examination 03/16/2021 YES     Urine Type 03/16/2021 NotGiven     WBC, UA 03/16/2021 None seen     RBC, UA 03/16/2021 None seen    Hospital Outpatient Visit on 03/01/2021   Component Date Value    Sodium 03/01/2021 140     Potassium 03/01/2021 3.7     Chloride 03/01/2021 102     CO2 03/01/2021 29     Anion Gap 03/01/2021 9     Glucose 03/01/2021 105*    BUN 03/01/2021 16     CREATININE 03/01/2021 0.9     GFR Non- 03/01/2021 >60     GFR  03/01/2021 >60     Calcium 03/01/2021 9.6     Total Protein 03/01/2021 7.7     Albumin 03/01/2021 4.1     Albumin/Globulin Ratio 03/01/2021 1.1     Total Bilirubin 03/01/2021 0.5     Alkaline Phosphatase 03/01/2021 105     ALT 03/01/2021 11     AST 03/01/2021 19     Globulin 03/01/2021 3.6     WBC 03/01/2021 8.2     RBC 03/01/2021 4.54     Hemoglobin 03/01/2021 13.3     Hematocrit 03/01/2021 39.6     MCV 03/01/2021 87.4     MCH 03/01/2021 29.2     MCHC 03/01/2021 33.4     RDW 03/01/2021 13.5     Platelets 73/60/5059 198     MPV 03/01/2021 7.5     Neutrophils % 03/01/2021 68.8     Lymphocytes % 03/01/2021 20.4     Monocytes % 03/01/2021 9.0     Eosinophils % 03/01/2021 1.3     Basophils % 03/01/2021 0.5     Neutrophils Absolute 03/01/2021 5.6     Lymphocytes Absolute 03/01/2021 1.7     Monocytes Absolute 03/01/2021 0.7     Eosinophils Absolute 03/01/2021 0.1     Basophils Absolute 03/01/2021 0.0     Amylase 03/01/2021 50     Lipase 03/01/2021 21.0    Nurse Only on 02/25/2021   Component Date Value    Potassium 02/25/2021 3.4*    TSH 02/25/2021 1.95    Nurse Only on 01/25/2021   Component Date Value    TSH 01/25/2021 7.27*    Sodium 01/25/2021 140     Potassium 01/25/2021 3.4*    Chloride 01/25/2021 99     CO2 01/25/2021 30     Anion Gap 01/25/2021 11     Glucose 01/25/2021 117*    BUN 01/25/2021 14     CREATININE 01/25/2021 0.9     GFR Non- 01/25/2021 >60     GFR  01/25/2021 >60     Calcium 01/25/2021 9.7     T4 Free 01/25/2021 1.3            Assessment:       67 y.o. patient with planned surgery as above. Known risk factors for perioperative complications: Coronary artery disease, Hypertension, GERD, anxiety  Current medications which may produce withdrawal symptoms if withheld perioperatively: None     1. Preop examination         Plan:     1. Preoperative workup as follows: hemoglobin, hematocrit, electrolytes, creatinine, liver function studies, urinalysis (urinary tract instrumentation planned)  2. Further recommendations from consultants: Yes - needs cardiac clearance    3. Change in medication regimen before surgery: Take Pepcid, Amlodipine, Levothyroxine on morning of surgery with sip of water, and hold all other medications until after surgery. Stop NSAIDS (Motrin, Aleve, Ibuprofen), vitamin E, aspirin, fish oil 7-10 days prior to surgery unless instructed otherwise by surgeon. 4. Prophylaxis for cardiac events with perioperative beta-blockers: Not indicated  ACC/AHA indications for pre-operative beta-blocker use:    · Vascular surgery with history of postitive stress test  · Intermediate or high risk surgery with history of CAD   · Intermediate or high risk surgery with multiple clinical predictors of CAD- 2 of the following: history of compensated or prior heart failure, history of cerebrovascular disease, DM, or renal insufficiency    Routine administration of higher-dose, long-acting metoprolol in beta-blockernaïve patients on the day of surgery, and in the absence of dose titration is associated with an overall increase in mortality. Beta-blockers should be started days to weeks prior to surgery and titrated to pulse < 70.  5. Deep vein thrombosis prophylaxis: regimen to be chosen by surgical team  6.  No contraindications to planned surgery pending appropriate labs and cardiac clearance; infected tooth noted. She was placed on antibiotics - may get surgery if infection resolves as long as anesthesia okay with her having surgery. If you have questions, please do not hesitate to call me ([phone]).      Sincerely,                Todd Vera, DNP, APRN, FNP-BC

## 2021-07-20 ENCOUNTER — HOSPITAL ENCOUNTER (OUTPATIENT)
Dept: WOMENS IMAGING | Age: 76
Discharge: HOME | End: 2021-07-20
Payer: MEDICARE

## 2021-07-20 ENCOUNTER — TELEPHONE (OUTPATIENT)
Dept: SURGERY | Age: 76
End: 2021-07-20

## 2021-07-20 DIAGNOSIS — Z98.890 STATUS POST BREAST BIOPSY: ICD-10-CM

## 2021-07-20 DIAGNOSIS — Z17.0 MALIGNANT NEOPLASM OF LOWER-OUTER QUADRANT OF LEFT BREAST OF FEMALE, ESTROGEN RECEPTOR POSITIVE (HCC): ICD-10-CM

## 2021-07-20 DIAGNOSIS — Z17.0 MALIGNANT NEOPLASM OF UPPER-OUTER QUADRANT OF RIGHT BREAST IN FEMALE, ESTROGEN RECEPTOR POSITIVE (HCC): ICD-10-CM

## 2021-07-20 DIAGNOSIS — C50.512 MALIGNANT NEOPLASM OF LOWER-OUTER QUADRANT OF LEFT BREAST OF FEMALE, ESTROGEN RECEPTOR POSITIVE (HCC): ICD-10-CM

## 2021-07-20 DIAGNOSIS — C50.311 MALIGNANT NEOPLASM OF LOWER-INNER QUADRANT OF RIGHT BREAST OF FEMALE, ESTROGEN RECEPTOR POSITIVE (HCC): ICD-10-CM

## 2021-07-20 DIAGNOSIS — C50.411 MALIGNANT NEOPLASM OF UPPER-OUTER QUADRANT OF RIGHT BREAST IN FEMALE, ESTROGEN RECEPTOR POSITIVE (HCC): ICD-10-CM

## 2021-07-20 DIAGNOSIS — Z17.0 MALIGNANT NEOPLASM OF LOWER-INNER QUADRANT OF RIGHT BREAST OF FEMALE, ESTROGEN RECEPTOR POSITIVE (HCC): ICD-10-CM

## 2021-07-20 LAB
A/G RATIO: 1.5 (ref 1.1–2.2)
ALBUMIN SERPL-MCNC: 4.1 G/DL (ref 3.4–5)
ALP BLD-CCNC: 95 U/L (ref 40–129)
ALT SERPL-CCNC: 9 U/L (ref 10–40)
ANION GAP SERPL CALCULATED.3IONS-SCNC: 12 MMOL/L (ref 3–16)
AST SERPL-CCNC: 15 U/L (ref 15–37)
BILIRUB SERPL-MCNC: 0.4 MG/DL (ref 0–1)
BUN BLDV-MCNC: 15 MG/DL (ref 7–20)
CALCIUM SERPL-MCNC: 9.6 MG/DL (ref 8.3–10.6)
CHLORIDE BLD-SCNC: 104 MMOL/L (ref 99–110)
CO2: 23 MMOL/L (ref 21–32)
CREAT SERPL-MCNC: 0.9 MG/DL (ref 0.6–1.2)
GFR AFRICAN AMERICAN: >60
GFR NON-AFRICAN AMERICAN: >60
GLOBULIN: 2.8 G/DL
GLUCOSE BLD-MCNC: 110 MG/DL (ref 70–99)
POTASSIUM SERPL-SCNC: 4.3 MMOL/L (ref 3.5–5.1)
SODIUM BLD-SCNC: 139 MMOL/L (ref 136–145)
TOTAL PROTEIN: 6.9 G/DL (ref 6.4–8.2)
TSH REFLEX: 2.36 UIU/ML (ref 0.27–4.2)

## 2021-07-20 PROCEDURE — G0279 TOMOSYNTHESIS, MAMMO: HCPCS

## 2021-07-20 PROCEDURE — 2709999900 US PLACE BREAST LOC DEVICE 1ST LESION LEFT

## 2021-07-20 PROCEDURE — A4648 IMPLANTABLE TISSUE MARKER: HCPCS

## 2021-07-20 PROCEDURE — 2709999900 US PLACE BREAST LOC DEVICE 1ST LESION RIGHT

## 2021-07-20 NOTE — TELEPHONE ENCOUNTER
Lets wait on the EKG, then we will be able to better give cardiac risk assessment at that time. Thank you.

## 2021-07-20 NOTE — TELEPHONE ENCOUNTER
Call placed to the office of Dr. Saintclair Murdoch, registrar states that there is no possible way that this patient can be seen sooner in the office. Reg states that the office called the patient this morning and advised patient to get an EKG. Reg states that they left patient a voicemail. Writer spoke with Nurse Navigator and advised of the above. Writer will speak with Dr. Amparo Villeda as well.

## 2021-07-20 NOTE — TELEPHONE ENCOUNTER
Spoke with patient and  over the phone last night. Will proceed with RFID tag placement today. Patient needs cardiac clearance, so will most likely need to postpone surgery which is scheduled for 7/27/21. We can move this to either 8/3 at 1245 (preferred) or 8/10 at 215 or 8/17 at 930. Obviously the sooner the better. Please reach out to cardiology office to see when they can see her so we can tell the OR.

## 2021-07-21 NOTE — PROGRESS NOTES
Preoperative Screening for Elective Surgery/Invasive Procedures While COVID-19 present in the community     Have you had any of the following symptoms?none  o Fever, chills  o Cough  o Shortness of breath  o Muscle aches/pain  o Diarrhea  o Abdominal pain, nausea, vomiting  o Loss or decrease in taste and / or smell   Risk of Exposure  o Have you recently been hospitalized for COVID-19 or flu-like illness, if so when?no  o Recently diagnosed with COVID-19, if so when?no  o Recently tested for COVID-19, if so when?no  o Have you been in close contact with a person or family member who currently has or recently had COVID-19? If yes, when and in what context?no  o Do you live with anybody who in the last 14 days has had fever, chills, shortness of breath, muscle aches, flu-like illness?no  o Do you have any close contacts or family members who are currently in the hospital for COVID-19 or flu-like illness? If yes, assess recent close contact with this person. no    Indicate if the patient has a positive screen by answering yes to one or more of the above questions. Patients who test positive or screen positive prior to surgery or on the day of surgery should be evaluated in conjunction with the surgeon/proceduralist/anesthesiologist to determine the urgency of the procedure.     2nd covid vaccine 3/5/21

## 2021-07-22 ENCOUNTER — NURSE ONLY (OUTPATIENT)
Dept: CARDIOLOGY CLINIC | Age: 76
End: 2021-07-22
Payer: MEDICARE

## 2021-07-22 ENCOUNTER — TELEPHONE (OUTPATIENT)
Dept: CARDIOLOGY CLINIC | Age: 76
End: 2021-07-22

## 2021-07-22 DIAGNOSIS — I49.9 IRREGULAR HEART RATE: Primary | ICD-10-CM

## 2021-07-22 PROCEDURE — 93000 ELECTROCARDIOGRAM COMPLETE: CPT | Performed by: INTERNAL MEDICINE

## 2021-07-22 NOTE — TELEPHONE ENCOUNTER
Pt came in today for cardiac clearance per [facility]. Pt got EKG Pt EKG resulted in NSR. Scanning EKG into pts chart and sending to [facility]. [facility] reviewed as well before pt left office.  Pt V/U  TY

## 2021-07-25 ENCOUNTER — PREP FOR PROCEDURE (OUTPATIENT)
Dept: SURGERY | Age: 76
End: 2021-07-25

## 2021-07-25 RX ORDER — SODIUM CHLORIDE 0.9 % (FLUSH) 0.9 %
10 SYRINGE (ML) INJECTION PRN
Status: CANCELLED | OUTPATIENT
Start: 2021-07-25

## 2021-07-25 RX ORDER — SODIUM CHLORIDE 0.9 % (FLUSH) 0.9 %
10 SYRINGE (ML) INJECTION EVERY 12 HOURS SCHEDULED
Status: CANCELLED | OUTPATIENT
Start: 2021-07-25

## 2021-07-25 RX ORDER — SODIUM CHLORIDE 9 MG/ML
25 INJECTION, SOLUTION INTRAVENOUS PRN
Status: CANCELLED | OUTPATIENT
Start: 2021-07-25

## 2021-07-27 ENCOUNTER — ANESTHESIA EVENT (OUTPATIENT)
Dept: OPERATING ROOM | Age: 76
End: 2021-07-27
Payer: MEDICARE

## 2021-07-27 ENCOUNTER — HOSPITAL ENCOUNTER (OUTPATIENT)
Dept: WOMENS IMAGING | Age: 76
Setting detail: OUTPATIENT SURGERY
Discharge: HOME | End: 2021-07-27
Attending: SURGERY
Payer: MEDICARE

## 2021-07-27 ENCOUNTER — HOSPITAL ENCOUNTER (OUTPATIENT)
Age: 76
Setting detail: OUTPATIENT SURGERY
Discharge: HOME | End: 2021-07-27
Attending: SURGERY | Admitting: SURGERY
Payer: MEDICARE

## 2021-07-27 ENCOUNTER — ANESTHESIA (OUTPATIENT)
Dept: OPERATING ROOM | Age: 76
End: 2021-07-27
Payer: MEDICARE

## 2021-07-27 VITALS
HEART RATE: 104 BPM | SYSTOLIC BLOOD PRESSURE: 122 MMHG | WEIGHT: 211 LBS | TEMPERATURE: 98.5 F | RESPIRATION RATE: 17 BRPM | BODY MASS INDEX: 33.91 KG/M2 | OXYGEN SATURATION: 91 % | HEIGHT: 66 IN | DIASTOLIC BLOOD PRESSURE: 77 MMHG

## 2021-07-27 VITALS
OXYGEN SATURATION: 97 % | TEMPERATURE: 96.1 F | DIASTOLIC BLOOD PRESSURE: 81 MMHG | RESPIRATION RATE: 12 BRPM | SYSTOLIC BLOOD PRESSURE: 171 MMHG

## 2021-07-27 DIAGNOSIS — C50.311 MALIGNANT NEOPLASM OF LOWER-INNER QUADRANT OF RIGHT FEMALE BREAST, UNSPECIFIED ESTROGEN RECEPTOR STATUS (HCC): ICD-10-CM

## 2021-07-27 DIAGNOSIS — C50.512 MALIGNANT NEOPLASM OF LOWER-OUTER QUADRANT OF LEFT FEMALE BREAST, UNSPECIFIED ESTROGEN RECEPTOR STATUS (HCC): ICD-10-CM

## 2021-07-27 DIAGNOSIS — C50.311 MALIGNANT NEOPLASM OF LOWER-INNER QUADRANT OF RIGHT BREAST OF FEMALE, ESTROGEN RECEPTOR POSITIVE (HCC): Primary | ICD-10-CM

## 2021-07-27 DIAGNOSIS — C50.411 MALIGNANT NEOPLASM OF UPPER-OUTER QUADRANT OF RIGHT FEMALE BREAST, UNSPECIFIED ESTROGEN RECEPTOR STATUS (HCC): ICD-10-CM

## 2021-07-27 DIAGNOSIS — Z17.0 MALIGNANT NEOPLASM OF LOWER-INNER QUADRANT OF RIGHT BREAST OF FEMALE, ESTROGEN RECEPTOR POSITIVE (HCC): Primary | ICD-10-CM

## 2021-07-27 DIAGNOSIS — C50.312 MALIGNANT NEOPLASM OF LOWER-INNER QUADRANT OF LEFT FEMALE BREAST, UNSPECIFIED ESTROGEN RECEPTOR STATUS (HCC): ICD-10-CM

## 2021-07-27 PROCEDURE — 3700000000 HC ANESTHESIA ATTENDED CARE: Performed by: SURGERY

## 2021-07-27 PROCEDURE — 6360000002 HC RX W HCPCS: Performed by: NURSE ANESTHETIST, CERTIFIED REGISTERED

## 2021-07-27 PROCEDURE — 2709999900 HC NON-CHARGEABLE SUPPLY: Performed by: SURGERY

## 2021-07-27 PROCEDURE — 3700000001 HC ADD 15 MINUTES (ANESTHESIA): Performed by: SURGERY

## 2021-07-27 PROCEDURE — 2500000003 HC RX 250 WO HCPCS: Performed by: SURGERY

## 2021-07-27 PROCEDURE — 19301 PARTIAL MASTECTOMY: CPT | Performed by: SURGERY

## 2021-07-27 PROCEDURE — 6360000002 HC RX W HCPCS: Performed by: ANESTHESIOLOGY

## 2021-07-27 PROCEDURE — 2500000003 HC RX 250 WO HCPCS: Performed by: NURSE ANESTHETIST, CERTIFIED REGISTERED

## 2021-07-27 PROCEDURE — 76098 X-RAY EXAM SURGICAL SPECIMEN: CPT

## 2021-07-27 PROCEDURE — 7100000000 HC PACU RECOVERY - FIRST 15 MIN: Performed by: SURGERY

## 2021-07-27 PROCEDURE — 7100000010 HC PHASE II RECOVERY - FIRST 15 MIN: Performed by: SURGERY

## 2021-07-27 PROCEDURE — 3600000004 HC SURGERY LEVEL 4 BASE: Performed by: SURGERY

## 2021-07-27 PROCEDURE — 6360000002 HC RX W HCPCS: Performed by: SURGERY

## 2021-07-27 PROCEDURE — 2580000003 HC RX 258: Performed by: SURGERY

## 2021-07-27 PROCEDURE — 3600000014 HC SURGERY LEVEL 4 ADDTL 15MIN: Performed by: SURGERY

## 2021-07-27 PROCEDURE — 7100000001 HC PACU RECOVERY - ADDTL 15 MIN: Performed by: SURGERY

## 2021-07-27 PROCEDURE — 88305 TISSUE EXAM BY PATHOLOGIST: CPT

## 2021-07-27 PROCEDURE — 2580000003 HC RX 258: Performed by: ANESTHESIOLOGY

## 2021-07-27 PROCEDURE — 7100000011 HC PHASE II RECOVERY - ADDTL 15 MIN: Performed by: SURGERY

## 2021-07-27 PROCEDURE — 88307 TISSUE EXAM BY PATHOLOGIST: CPT

## 2021-07-27 PROCEDURE — 2500000003 HC RX 250 WO HCPCS

## 2021-07-27 RX ORDER — MAGNESIUM HYDROXIDE 1200 MG/15ML
LIQUID ORAL CONTINUOUS PRN
Status: COMPLETED | OUTPATIENT
Start: 2021-07-27 | End: 2021-07-27

## 2021-07-27 RX ORDER — GLYCOPYRROLATE 0.2 MG/ML
0.2 INJECTION INTRAMUSCULAR; INTRAVENOUS ONCE
Status: CANCELLED | OUTPATIENT
Start: 2021-07-27 | End: 2021-07-27

## 2021-07-27 RX ORDER — SODIUM CHLORIDE 9 MG/ML
25 INJECTION, SOLUTION INTRAVENOUS PRN
Status: DISCONTINUED | OUTPATIENT
Start: 2021-07-27 | End: 2021-07-27 | Stop reason: HOSPADM

## 2021-07-27 RX ORDER — BUPIVACAINE HYDROCHLORIDE 5 MG/ML
INJECTION, SOLUTION EPIDURAL; INTRACAUDAL PRN
Status: DISCONTINUED | OUTPATIENT
Start: 2021-07-27 | End: 2021-07-27 | Stop reason: ALTCHOICE

## 2021-07-27 RX ORDER — ONDANSETRON 2 MG/ML
INJECTION INTRAMUSCULAR; INTRAVENOUS PRN
Status: DISCONTINUED | OUTPATIENT
Start: 2021-07-27 | End: 2021-07-27 | Stop reason: SDUPTHER

## 2021-07-27 RX ORDER — OXYCODONE HYDROCHLORIDE AND ACETAMINOPHEN 5; 325 MG/1; MG/1
1 TABLET ORAL PRN
Status: DISCONTINUED | OUTPATIENT
Start: 2021-07-27 | End: 2021-07-27 | Stop reason: HOSPADM

## 2021-07-27 RX ORDER — ROCURONIUM BROMIDE 10 MG/ML
INJECTION, SOLUTION INTRAVENOUS PRN
Status: DISCONTINUED | OUTPATIENT
Start: 2021-07-27 | End: 2021-07-27 | Stop reason: SDUPTHER

## 2021-07-27 RX ORDER — PROMETHAZINE HYDROCHLORIDE 25 MG/ML
6.25 INJECTION, SOLUTION INTRAMUSCULAR; INTRAVENOUS
Status: DISCONTINUED | OUTPATIENT
Start: 2021-07-27 | End: 2021-07-27 | Stop reason: HOSPADM

## 2021-07-27 RX ORDER — SODIUM CHLORIDE 0.9 % (FLUSH) 0.9 %
10 SYRINGE (ML) INJECTION PRN
Status: DISCONTINUED | OUTPATIENT
Start: 2021-07-27 | End: 2021-07-27 | Stop reason: HOSPADM

## 2021-07-27 RX ORDER — LABETALOL HYDROCHLORIDE 5 MG/ML
5 INJECTION, SOLUTION INTRAVENOUS EVERY 10 MIN PRN
Status: DISCONTINUED | OUTPATIENT
Start: 2021-07-27 | End: 2021-07-27 | Stop reason: HOSPADM

## 2021-07-27 RX ORDER — SODIUM CHLORIDE 0.9 % (FLUSH) 0.9 %
5-40 SYRINGE (ML) INJECTION EVERY 12 HOURS SCHEDULED
Status: DISCONTINUED | OUTPATIENT
Start: 2021-07-27 | End: 2021-07-27 | Stop reason: HOSPADM

## 2021-07-27 RX ORDER — OXYCODONE HYDROCHLORIDE AND ACETAMINOPHEN 5; 325 MG/1; MG/1
2 TABLET ORAL PRN
Status: DISCONTINUED | OUTPATIENT
Start: 2021-07-27 | End: 2021-07-27 | Stop reason: HOSPADM

## 2021-07-27 RX ORDER — LIDOCAINE HYDROCHLORIDE 20 MG/ML
INJECTION, SOLUTION EPIDURAL; INFILTRATION; INTRACAUDAL; PERINEURAL PRN
Status: DISCONTINUED | OUTPATIENT
Start: 2021-07-27 | End: 2021-07-27 | Stop reason: SDUPTHER

## 2021-07-27 RX ORDER — DEXAMETHASONE SODIUM PHOSPHATE 4 MG/ML
INJECTION, SOLUTION INTRA-ARTICULAR; INTRALESIONAL; INTRAMUSCULAR; INTRAVENOUS; SOFT TISSUE PRN
Status: DISCONTINUED | OUTPATIENT
Start: 2021-07-27 | End: 2021-07-27 | Stop reason: SDUPTHER

## 2021-07-27 RX ORDER — LIDOCAINE HYDROCHLORIDE 10 MG/ML
0.3 INJECTION, SOLUTION EPIDURAL; INFILTRATION; INTRACAUDAL; PERINEURAL
Status: DISCONTINUED | OUTPATIENT
Start: 2021-07-27 | End: 2021-07-27 | Stop reason: HOSPADM

## 2021-07-27 RX ORDER — HYDROCODONE BITARTRATE AND ACETAMINOPHEN 5; 325 MG/1; MG/1
1 TABLET ORAL EVERY 6 HOURS PRN
Qty: 5 TABLET | Refills: 0 | Status: SHIPPED | OUTPATIENT
Start: 2021-07-27 | End: 2021-07-30

## 2021-07-27 RX ORDER — SODIUM CHLORIDE 0.9 % (FLUSH) 0.9 %
5-40 SYRINGE (ML) INJECTION PRN
Status: DISCONTINUED | OUTPATIENT
Start: 2021-07-27 | End: 2021-07-27 | Stop reason: HOSPADM

## 2021-07-27 RX ORDER — HYDRALAZINE HYDROCHLORIDE 20 MG/ML
5 INJECTION INTRAMUSCULAR; INTRAVENOUS EVERY 10 MIN PRN
Status: DISCONTINUED | OUTPATIENT
Start: 2021-07-27 | End: 2021-07-27 | Stop reason: HOSPADM

## 2021-07-27 RX ORDER — FENTANYL CITRATE 50 UG/ML
INJECTION, SOLUTION INTRAMUSCULAR; INTRAVENOUS PRN
Status: DISCONTINUED | OUTPATIENT
Start: 2021-07-27 | End: 2021-07-27 | Stop reason: SDUPTHER

## 2021-07-27 RX ORDER — ONDANSETRON 2 MG/ML
4 INJECTION INTRAMUSCULAR; INTRAVENOUS PRN
Status: DISCONTINUED | OUTPATIENT
Start: 2021-07-27 | End: 2021-07-27 | Stop reason: HOSPADM

## 2021-07-27 RX ORDER — MORPHINE SULFATE 2 MG/ML
2 INJECTION, SOLUTION INTRAMUSCULAR; INTRAVENOUS EVERY 5 MIN PRN
Status: DISCONTINUED | OUTPATIENT
Start: 2021-07-27 | End: 2021-07-27 | Stop reason: HOSPADM

## 2021-07-27 RX ORDER — MORPHINE SULFATE 2 MG/ML
1 INJECTION, SOLUTION INTRAMUSCULAR; INTRAVENOUS EVERY 5 MIN PRN
Status: DISCONTINUED | OUTPATIENT
Start: 2021-07-27 | End: 2021-07-27 | Stop reason: HOSPADM

## 2021-07-27 RX ORDER — GLYCOPYRROLATE 0.2 MG/ML
INJECTION INTRAMUSCULAR; INTRAVENOUS
Status: COMPLETED
Start: 2021-07-27 | End: 2021-07-27

## 2021-07-27 RX ORDER — MEPERIDINE HYDROCHLORIDE 50 MG/ML
12.5 INJECTION INTRAMUSCULAR; INTRAVENOUS; SUBCUTANEOUS EVERY 5 MIN PRN
Status: DISCONTINUED | OUTPATIENT
Start: 2021-07-27 | End: 2021-07-27 | Stop reason: HOSPADM

## 2021-07-27 RX ORDER — DIPHENHYDRAMINE HYDROCHLORIDE 50 MG/ML
12.5 INJECTION INTRAMUSCULAR; INTRAVENOUS
Status: DISCONTINUED | OUTPATIENT
Start: 2021-07-27 | End: 2021-07-27 | Stop reason: HOSPADM

## 2021-07-27 RX ORDER — SODIUM CHLORIDE 0.9 % (FLUSH) 0.9 %
10 SYRINGE (ML) INJECTION EVERY 12 HOURS SCHEDULED
Status: DISCONTINUED | OUTPATIENT
Start: 2021-07-27 | End: 2021-07-27 | Stop reason: HOSPADM

## 2021-07-27 RX ORDER — PROPOFOL 10 MG/ML
INJECTION, EMULSION INTRAVENOUS PRN
Status: DISCONTINUED | OUTPATIENT
Start: 2021-07-27 | End: 2021-07-27 | Stop reason: SDUPTHER

## 2021-07-27 RX ORDER — SODIUM CHLORIDE, SODIUM LACTATE, POTASSIUM CHLORIDE, CALCIUM CHLORIDE 600; 310; 30; 20 MG/100ML; MG/100ML; MG/100ML; MG/100ML
INJECTION, SOLUTION INTRAVENOUS CONTINUOUS
Status: DISCONTINUED | OUTPATIENT
Start: 2021-07-27 | End: 2021-07-27 | Stop reason: HOSPADM

## 2021-07-27 RX ADMIN — FENTANYL CITRATE 50 MCG: 50 INJECTION, SOLUTION INTRAMUSCULAR; INTRAVENOUS at 11:56

## 2021-07-27 RX ADMIN — FENTANYL CITRATE 25 MCG: 50 INJECTION, SOLUTION INTRAMUSCULAR; INTRAVENOUS at 13:57

## 2021-07-27 RX ADMIN — CEFAZOLIN SODIUM 2 G: 10 INJECTION, POWDER, FOR SOLUTION INTRAVENOUS at 11:35

## 2021-07-27 RX ADMIN — SODIUM CHLORIDE, POTASSIUM CHLORIDE, SODIUM LACTATE AND CALCIUM CHLORIDE: 600; 310; 30; 20 INJECTION, SOLUTION INTRAVENOUS at 09:57

## 2021-07-27 RX ADMIN — SUGAMMADEX 200 MG: 100 INJECTION, SOLUTION INTRAVENOUS at 13:46

## 2021-07-27 RX ADMIN — FENTANYL CITRATE 25 MCG: 50 INJECTION, SOLUTION INTRAMUSCULAR; INTRAVENOUS at 13:05

## 2021-07-27 RX ADMIN — ONDANSETRON 4 MG: 2 INJECTION INTRAMUSCULAR; INTRAVENOUS at 11:10

## 2021-07-27 RX ADMIN — ROCURONIUM BROMIDE 50 MG: 10 SOLUTION INTRAVENOUS at 11:40

## 2021-07-27 RX ADMIN — FENTANYL CITRATE 50 MCG: 50 INJECTION, SOLUTION INTRAMUSCULAR; INTRAVENOUS at 11:35

## 2021-07-27 RX ADMIN — FENTANYL CITRATE 25 MCG: 50 INJECTION, SOLUTION INTRAMUSCULAR; INTRAVENOUS at 12:12

## 2021-07-27 RX ADMIN — FENTANYL CITRATE 25 MCG: 50 INJECTION, SOLUTION INTRAMUSCULAR; INTRAVENOUS at 13:44

## 2021-07-27 RX ADMIN — DEXAMETHASONE SODIUM PHOSPHATE 8 MG: 4 INJECTION, SOLUTION INTRAMUSCULAR; INTRAVENOUS at 11:49

## 2021-07-27 RX ADMIN — HYDRALAZINE HYDROCHLORIDE 5 MG: 20 INJECTION INTRAMUSCULAR; INTRAVENOUS at 14:32

## 2021-07-27 RX ADMIN — PROPOFOL 120 MG: 10 INJECTION, EMULSION INTRAVENOUS at 11:39

## 2021-07-27 RX ADMIN — GLYCOPYRROLATE 0.2 MG: 0.2 INJECTION, SOLUTION INTRAMUSCULAR; INTRAVENOUS at 14:43

## 2021-07-27 RX ADMIN — LIDOCAINE HYDROCHLORIDE 80 MG: 20 INJECTION, SOLUTION EPIDURAL; INFILTRATION; INTRACAUDAL; PERINEURAL at 11:39

## 2021-07-27 ASSESSMENT — PULMONARY FUNCTION TESTS
PIF_VALUE: 17
PIF_VALUE: 17
PIF_VALUE: 2
PIF_VALUE: 2
PIF_VALUE: 17
PIF_VALUE: 19
PIF_VALUE: 18
PIF_VALUE: 15
PIF_VALUE: 18
PIF_VALUE: 17
PIF_VALUE: 2
PIF_VALUE: 17
PIF_VALUE: 18
PIF_VALUE: 2
PIF_VALUE: 2
PIF_VALUE: 15
PIF_VALUE: 1
PIF_VALUE: 17
PIF_VALUE: 17
PIF_VALUE: 3
PIF_VALUE: 18
PIF_VALUE: 17
PIF_VALUE: 16
PIF_VALUE: 4
PIF_VALUE: 17
PIF_VALUE: 18
PIF_VALUE: 17
PIF_VALUE: 20
PIF_VALUE: 18
PIF_VALUE: 4
PIF_VALUE: 17
PIF_VALUE: 18
PIF_VALUE: 19
PIF_VALUE: 18
PIF_VALUE: 16
PIF_VALUE: 16
PIF_VALUE: 18
PIF_VALUE: 17
PIF_VALUE: 17
PIF_VALUE: 7
PIF_VALUE: 18
PIF_VALUE: 17
PIF_VALUE: 17
PIF_VALUE: 18
PIF_VALUE: 0
PIF_VALUE: 17
PIF_VALUE: 18
PIF_VALUE: 17
PIF_VALUE: 18
PIF_VALUE: 18
PIF_VALUE: 17
PIF_VALUE: 18
PIF_VALUE: 3
PIF_VALUE: 17
PIF_VALUE: 18
PIF_VALUE: 16
PIF_VALUE: 18
PIF_VALUE: 18
PIF_VALUE: 17
PIF_VALUE: 10
PIF_VALUE: 17
PIF_VALUE: 23
PIF_VALUE: 17
PIF_VALUE: 16
PIF_VALUE: 18
PIF_VALUE: 18
PIF_VALUE: 3
PIF_VALUE: 18
PIF_VALUE: 3
PIF_VALUE: 17
PIF_VALUE: 12
PIF_VALUE: 17
PIF_VALUE: 1
PIF_VALUE: 18
PIF_VALUE: 17
PIF_VALUE: 16
PIF_VALUE: 18
PIF_VALUE: 16
PIF_VALUE: 18
PIF_VALUE: 12
PIF_VALUE: 0
PIF_VALUE: 20
PIF_VALUE: 17
PIF_VALUE: 18
PIF_VALUE: 11
PIF_VALUE: 17
PIF_VALUE: 17
PIF_VALUE: 18
PIF_VALUE: 18
PIF_VALUE: 19
PIF_VALUE: 17
PIF_VALUE: 17
PIF_VALUE: 13
PIF_VALUE: 19
PIF_VALUE: 18
PIF_VALUE: 18
PIF_VALUE: 20
PIF_VALUE: 18
PIF_VALUE: 16
PIF_VALUE: 1
PIF_VALUE: 2
PIF_VALUE: 18
PIF_VALUE: 17
PIF_VALUE: 18
PIF_VALUE: 17
PIF_VALUE: 17
PIF_VALUE: 18
PIF_VALUE: 16
PIF_VALUE: 17
PIF_VALUE: 17
PIF_VALUE: 19
PIF_VALUE: 3
PIF_VALUE: 1
PIF_VALUE: 18
PIF_VALUE: 18
PIF_VALUE: 17
PIF_VALUE: 5
PIF_VALUE: 18
PIF_VALUE: 19
PIF_VALUE: 17
PIF_VALUE: 17
PIF_VALUE: 11
PIF_VALUE: 18
PIF_VALUE: 18
PIF_VALUE: 17
PIF_VALUE: 1

## 2021-07-27 ASSESSMENT — PAIN DESCRIPTION - PROGRESSION: CLINICAL_PROGRESSION: OTHER (COMMENT)

## 2021-07-27 ASSESSMENT — PAIN DESCRIPTION - ORIENTATION: ORIENTATION: OTHER (COMMENT)

## 2021-07-27 ASSESSMENT — PAIN DESCRIPTION - PAIN TYPE: TYPE: OTHER (COMMENT)

## 2021-07-27 ASSESSMENT — PAIN DESCRIPTION - FREQUENCY: FREQUENCY: CONTINUOUS

## 2021-07-27 ASSESSMENT — PAIN DESCRIPTION - ONSET: ONSET: ON-GOING

## 2021-07-27 ASSESSMENT — PAIN - FUNCTIONAL ASSESSMENT: PAIN_FUNCTIONAL_ASSESSMENT: 0-10

## 2021-07-27 ASSESSMENT — PAIN SCALES - GENERAL: PAINLEVEL_OUTOF10: 0

## 2021-07-27 ASSESSMENT — ENCOUNTER SYMPTOMS: SHORTNESS OF BREATH: 1

## 2021-07-27 ASSESSMENT — PAIN DESCRIPTION - LOCATION: LOCATION: OTHER (COMMENT)

## 2021-07-27 NOTE — OP NOTE
Operative Note    Abhishek Recinos  YOB: 1945  MRN: [id number]    PREOPERATIVE DIAGNOSIS  bilateral breast cancer     POSTOPERATIVE DIAGNOSIS  bilateral breast cancer    PROCEDURE  Right radiofrequency identification tag localized localized partial mastectomy x 2  Left radiofrequency identification tag localized localized partial mastectomy    ANESTHESIA  General anesthesia    SURGEON  Viet Ames MD     ASSISTANT  Nohemi Hancock    ESTIMATED BLOOD LOSS  less than 50  ml    COMPLICATIONS  None apparent by completion of procedure    SPECIMENS REMOVED  1. left breast tissue which was marked with a short stitch on the superior margin and a long stitch on the lateral margin. 2. left breast tissue, new inferior margin which was marked with a stitch on the new true margin  3. left breast tissue, new anterior margin which was marked with a stitch on the new true margin    4. right breast tissue at 3:00 which was marked with a short stitch on the superior margin and a long stitch on the lateral margin. 5. right breast tissue at 3:00, new anterior margin which was marked with a stitch on the new true margin    6. right breast tissue at 12:00 which was marked with a short stitch on the superior margin and a long stitch on the lateral margin. 7. right breast tissue at 12:00, new medial margin which was marked with a stitch on the new true margin  8. right breast tissue at 12:00, new anterior-superior margin which was marked with a stitch on the new true margin    FINDINGS  The left breast tissue was excised using radiofrequency identification tag localization. Specimen radiograph demonstrated that the lesion and clip and tag were located within the specimen. The right breast tissue at 3:00 was excised using radiofrequency identification tag localization. Specimen radiograph demonstrated that the lesion and clip and tag were located within the specimen.     The right breast tissue at 12:00 was was not carried to the chest wall. The specimen was then marked with a short stitch on the superior margin and a long stitch on the lateral margin and was sent back to the radiology department. Specimen radiograph demonstrated that the lesion and clip and tag were located within the specimen with adequate radiographic margins. An additional margin was obtained anteriorly. Attention was then turned to the wound. Aggressive hemostasis was obtained with electrocautery. The wound was irrigated with copious amounts of sterile saline. Attention was then turned to the 12:00 lesion in the right breast.  The same periareolar incision was extended laterally based on the location of the greatest radiofrequency identification tag signal at 12:00. This was inferior to the signal.  The incision was carried down through the dermis with electrocautery. The radiofrequency identification tag signal was then used to create flaps and excise tissue anterior, superior, inferior, medial, and lateral to the signal.  The tissue was then transected from the posterior attachments. Dissection was not carried to the chest wall. The specimen was then marked with a short stitch on the superior margin and a long stitch on the lateral margin and was sent back to the radiology department. Specimen radiograph demonstrated that the lesion and clip and tag were located within the specimen with adequate radiographic margins. Additional margins were obtained medially and anterior-superiorly. Attention was then turned to the wound. Aggressive hemostasis was obtained with electrocautery. The wound was irrigated with copious amounts of sterile saline. 0.5% bupivacaine was infiltrated into the soft tissues surrounding both cavities and hemostasis was again confirmed. Clips were placed in each lumpectomy cavity in the area where the tumor had been located. Hemostasis was again confirmed.  The deep dermal layer was then reapproximated with

## 2021-07-27 NOTE — H&P
CHIEF COMPLAINT:  New diagnosis of bilateral breast cancer.     HISTORY OF PRESENT ILLNESS:  Peter Inman is a 67 y.o. woman who requested that I evaluate her for her new diagnosis of bilateral breast cancer. The patient states that she was undergoing her new baseline mammogram on 2021 when she was alerted to an abnormality in the bilateral breasts.  She underwent additional imaging and ultimately a core biopsy in each breast, which confirmed bilateral breast cancer. The patient states that she herself has not noticed any abnormal masses in either breast.  She denies any change in the appearance of her breasts or the skin of her breasts. She denies bilateral nipple discharge. She has no other systemic complaints and otherwise feels well today.      Pertinent Family History: No family history of breast or ovarian cancer.   Her mother was diagnosed with colon cancer at 71 and her brother was diagnosed with colon cancer in his 45s.     GYNECOLOGIC HISTORY:  Menarche at age 16    She delivered her first child at age 21, and did not breastfeed  Postmenopausal at age 39's  No hysterectomy  Oral contraceptive use: yes for 1 years and is not currently taking  Hormone use: no and is not currently taking     Past Medical History        Past Medical History:   Diagnosis Date    Anxiety      Benign essential hypertension 2017    Bowel obstruction ([address]      CAD (coronary artery disease)      Cancer ([address]      colon    Depression      GERD (gastroesophageal reflux disease)      Hyperlipidemia      Hypertension      Irritable bowel syndrome (IBS)      Obesity      Osteoarthritis           Past Surgical History         Past Surgical History:   Procedure Laterality Date    APPENDECTOMY        CHOLECYSTECTOMY        COLON SURGERY        COLONOSCOPY        mass    COLONOSCOPY   3/29/16     normal    US BREAST NEEDLE BIOPSY LEFT Left 2021     US BREAST NEEDLE BIOPSY LEFT 2021 Corina Slaughter MD [facility]    US BREAST NEEDLE BIOPSY RIGHT Right 6/17/2021     US BREAST NEEDLE BIOPSY RIGHT 6/17/2021 Corina Slaughter MD [facility]         Current Facility-Administered Medications          Current Outpatient Medications   Medication Sig Dispense Refill    famotidine (PEPCID) 40 MG tablet TAKE 1 TABLET EVERY EVENING 90 tablet 2    rosuvastatin (CRESTOR) 20 MG tablet TAKE 1 TABLET EVERY DAY 90 tablet 1    busPIRone (BUSPAR) 15 MG tablet Take 15 mg by mouth 3 times daily as needed (anxiety) 90 tablet 2    busPIRone (BUSPAR) 15 MG tablet Take 15 mg by mouth 3 times daily as needed (anxiety) 30 tablet 0    mirtazapine (REMERON) 30 MG tablet Take 0.5 tablets by mouth nightly 90 tablet 1    dicyclomine (BENTYL) 10 MG capsule Take one capsule twice daily with meals 180 capsule 1    citalopram (CELEXA) 40 MG tablet Take 1 tablet by mouth daily 90 tablet 1    fludrocortisone (FLORINEF) 0.1 MG tablet Take 1 tablet by mouth 2 times daily 30 tablet 2    amLODIPine (NORVASC) 2.5 MG tablet Take 1 tablet by mouth daily 90 tablet 3    furosemide (LASIX) 20 MG tablet TAKE 1 TABLET EVERY DAY 90 tablet 0    erythromycin (ROMYCIN) 5 MG/GM ophthalmic ointment          levothyroxine (SYNTHROID) 75 MCG tablet Take 1 tablet by mouth daily 90 tablet 1    risperiDONE (RISPERDAL) 0.25 MG tablet Take 3 tablets by mouth nightly        potassium chloride (KLOR-CON M) 20 MEQ extended release tablet Take 2 tablets in am 1 pm 180 tablet 1    Omega-3 Fatty Acids (FISH OIL) 1000 MG CPDR Take 3,000 mg by mouth        Calcium Carbonate-Vitamin D (CALCIUM 500 + D) 500-125 MG-UNIT TABS Take by mouth        Coenzyme Q10 (CO Q 10 PO) Take by mouth        aspirin EC 81 MG EC tablet Take 1 tablet by mouth daily 30 tablet 3    polyethylene glycol (GLYCOLAX) packet Take 17 g by mouth daily           No current facility-administered medications for this visit.               Allergies   Allergen Reactions    Bactrim [Sulfamethoxazole-Trimethoprim] Anaphylaxis    Ultram [Tramadol Hcl] Anaphylaxis       stridor    Dilaudid [Hydromorphone] Other (See Comments)       Hallucinations    Phenergan [Promethazine] Nausea Only    Lipitor [Atorvastatin]         Nausea    Penicillins        Social History               Socioeconomic History    Marital status:        Spouse name: Not on file    Number of children: Not on file    Years of education: Not on file    Highest education level: Not on file   Occupational History    Not on file   Tobacco Use    Smoking status: Former Smoker       Packs/day: 1.00       Years: 10.00       Pack years: 10.00       Types: Cigarettes       Start date:        Quit date: 1983       Years since quittin.5    Smokeless tobacco: Never Used   Vaping Use    Vaping Use: Never used   Substance and Sexual Activity    Alcohol use: No    Drug use: No    Sexual activity: Not on file   Other Topics Concern    Not on file   Social History Narrative    Not on file      Social Determinants of Health      Financial Resource Strain:     Difficulty of Paying Living Expenses:    Food Insecurity:     Worried About Running Out of Food in the Last Year:     Ran Out of Food in the Last Year:    Transportation Needs:     Lack of Transportation (Medical):      Lack of Transportation (Non-Medical):    Physical Activity:     Days of Exercise per Week:     Minutes of Exercise per Session:    Stress:     Feeling of Stress :    Social Connections:     Frequency of Communication with Friends and Family:     Frequency of Social Gatherings with Friends and Family:     Attends Yaz[employer] Services:     Active Member of Clubs or Organizations:     Attends Club or Organization Meetings:     Marital Status:    Intimate Partner Violence:     Fear of Current or Ex-Partner:     Emotionally Abused:     Physically Abused:     Sexually Abused:          Family History Family History   Problem Relation Age of Onset    Cancer Mother           colon    Cancer Brother           colon    Heart Disease Sister      Heart Disease Brother           REVIEW OF SYSTEMS:   Constitutional: Negative for unexpected weight change. Eyes: Negative for visual disturbance. Respiratory: Negative for cough and shortness of breath.    Cardiovascular: Negative for chest pain and palpitations. Gastrointestinal: Negative for abdominal pain. Musculoskeletal: Negative for arthralgias and myalgias. Neurological: Negative for headaches. Hematological: Negative for adenopathy. Bruises/bleeds easily (takes a low dose aspirin). Psychiatric/Behavioral: Negative for dysphoric mood. The patient is not nervous/anxious.       PHYSICAL EXAMINATION:   Vitals: BP (!) 145/89 (Site: Left Upper Arm, Position: Sitting, Cuff Size: Large Adult)   Pulse 92   Resp 18   Ht 5' 6\" (1.676 m)   Wt 205 lb (93 kg)   SpO2 96%   BMI 33.09 kg/m²   General: Well-developed, well-nourished, in no apparent distress. Eyes:  Conjunctivae appear normal. The sclerae are not injected and show no jaundice. Nose, Mouth and Throat:  Patient is wearing a mask. Neck: Supple, without thyromegaly or adenopathy. Respiratory: Normal respiratory effort. Cardiovascular: Regular rate and rhythm. +Bilateral lower extremity edema. Musculoskeletal: Normal gait and range of motion in all 4 extremities. Psychiatric: Alert and oriented x 3. Appropriate affect and behavior for today's visit. Skin: No visible concerning rashes, lesions, nodules or other skin changes. Lymphatic System:  No concerning cervical, supraclavicular or axillary lymphadenopathy. Breast Exam:  The breasts are normal in contour. Bra size 38C.    Right Breast: Examination of the right breast in the upright and supine positions reveal no obvious masses, skin changes, dimpling, or retraction. The nipple and areola are without erosion, edema or ulceration. There is no obvious nipple discharge. There is no concerning axillary adenopathy.     Left Breast: Examination of the left breast in the upright and supine positions reveal no obvious masses, skin changes, dimpling, or retraction. The nipple and areola are without erosion, edema or ulceration. There is no obvious nipple discharge. There is no concerning axillary adenopathy.     IMAGING: Breast imaging:      In the left breast, there is a suspicious 1.6 cm mass at 4-5:00 10 CFN.    In the right breast, there is a indeterminate 1.6 cm mass at 12:00 4 CFN. There are additional small indeterminate masses in the right breast at 3:00 7 CFN and 10:00 4 CFN.     She was given a BI-RADS 4.  Breast density is described as fibroglandular densities.     PATHOLOGY[de-identified]  Left breast mass (1.6 cm) 5:00 10 CFN: invasive ductal carcinoma, grade 2, ER positive, VA positive, HER2 negative     Right breast mass (1.6 cm) 11-12:00 4 CFN: papillary carcinoma, grade 2, ER positive     Right breast mass (9 mm) at 3:00 7 CFN: ductal carcinoma in situ, grade 2, ER positive     IMPRESSION/RECOMMENDATION:  Cancer Staging  Malignant neoplasm of lower-inner quadrant of right breast of female, estrogen receptor positive ([address]  Staging form: Breast, AJCC 8th Edition  - Clinical stage from 7/1/2021: Stage 0 (cTis (DCIS), cN0, cM0, G2, ER+) - Signed by Abhi Medina MD on 7/1/2021     Malignant neoplasm of lower-outer quadrant of left breast of female, estrogen receptor positive ([address]  Staging form: Breast, AJCC 8th Edition  - Clinical stage from 6/25/2021: Stage IA (cT1c, cN0, cM0, G2, ER+, VA+, HER2-) - Signed by Abhi Medina MD on 6/25/2021     Malignant neoplasm of upper-outer quadrant of right breast in female, estrogen receptor positive ([address]  Staging form: Breast, AJCC 8th Edition  - Clinical stage from 6/25/2021: Stage 0 (cTis (DCIS), cN0, cM0, G2, ER+) - Signed by Abhi Medina MD on 6/25/2021     right radiofrequency identification tag localized partial mastectomy x 2 and left radiofrequency identification tag localized partial mastectomy      Mendy Soto MD

## 2021-07-27 NOTE — ANESTHESIA PRE PROCEDURE
Department of Anesthesiology  Preprocedure Note       Name:  Arsenio Thomas   Age:  67 y.o.  :  1945                                          MRN:  [id number]         Date:  2021      Surgeon: Leena Hand):  Tonja Spurling, MD    Procedure: Procedure(s):  RIGHT RADIO FREQUENCY IDENTIFICATION TAG LOCALIZED PARTIAL MASTECTOMY TIMES TWO; LEFT RADIO FREQUENCY IDENTIFICATION TAG LOCALIZED PARTIAL MASTECTOMY    Medications prior to admission:   Prior to Admission medications    Medication Sig Start Date End Date Taking?  Authorizing Provider   furosemide (LASIX) 20 MG tablet TAKE 1 TABLET EVERY DAY  Patient taking differently: Take 20 mg by mouth daily  7/15/21  Yes MOHINDER Crawford CNP   rosuvastatin (CRESTOR) 20 MG tablet TAKE 1 TABLET EVERY DAY  Patient taking differently: Take 20 mg by mouth nightly  21  Yes Phuong Medrano MD   busPIRone (BUSPAR) 15 MG tablet Take 15 mg by mouth 3 times daily as needed (anxiety) 6/3/21  Yes MOHINDER Crawford CNP   mirtazapine (REMERON) 30 MG tablet Take 0.5 tablets by mouth nightly 21  Yes MOHINDER Crawford CNP   dicyclomine (BENTYL) 10 MG capsule Take one capsule twice daily with meals  Patient taking differently: Take 10 mg by mouth Take one capsule twice daily with meals 21  Yes MOHINDER Crawford CNP   citalopram (CELEXA) 40 MG tablet Take 1 tablet by mouth daily 21  Yes MOHINDER Crawford CNP   fludrocortisone (FLORINEF) 0.1 MG tablet Take 1 tablet by mouth 2 times daily 21  Yes MOHINDER Crawford CNP   amLODIPine (NORVASC) 2.5 MG tablet Take 1 tablet by mouth daily 21  Yes MOHINDER Crawford CNP   levothyroxine (SYNTHROID) 75 MCG tablet Take 1 tablet by mouth daily 3/22/21  Yes MOHINDER Crawford CNP   risperiDONE (RISPERDAL) 0.25 MG tablet Take 3 tablets by mouth nightly 3/2/21  Yes Historical Provider, MD   potassium chloride (KLOR-CON M) 20 MEQ extended release tablet Take 2 tablets in am 1 pm 2/26/21  Yes Jasmin Hendricksonpool, APRN - CNP   Omega-3 Fatty Acids (FISH OIL) 1000 MG CPDR Take 3,000 mg by mouth    Yes Historical Provider, MD   Calcium Carbonate-Vitamin D (CALCIUM 500 + D) 500-125 MG-UNIT TABS Take by mouth   Yes Historical Provider, MD   Coenzyme Q10 (CO Q 10 PO) Take 1 tablet by mouth daily    Yes Historical Provider, MD   aspirin EC 81 MG EC tablet Take 1 tablet by mouth daily 3/2/18  Yes Shravan Mujica MD   polyethylene glycol [facility]) packet Take 17 g by mouth daily    Yes Historical Provider, MD       Current medications:    Current Facility-Administered Medications   Medication Dose Route Frequency Provider Last Rate Last Admin    lactated ringers infusion   Intravenous Continuous Carly Edwards  mL/hr at 07/27/21 0957 New Bag at 07/27/21 0957    sodium chloride flush 0.9 % injection 5-40 mL  5-40 mL Intravenous 2 times per day Carly Edwards MD        sodium chloride flush 0.9 % injection 5-40 mL  5-40 mL Intravenous PRN Carly Edwards MD        0.9 % sodium chloride infusion  25 mL Intravenous PRN Carly Edwards MD        lidocaine PF 1 % injection 0.3 mL  0.3 mL Intradermal Once PRN Carly Edwards MD        0.9 % sodium chloride infusion  25 mL Intravenous PRN Allison Barreto MD        ceFAZolin (ANCEF) 2000 mg in dextrose 5 % 100 mL IVPB  2,000 mg Intravenous On Call to 1709 Joe Solis MD        sodium chloride flush 0.9 % injection 10 mL  10 mL Intravenous 2 times per day Allison Barreto MD        sodium chloride flush 0.9 % injection 10 mL  10 mL Intravenous PRN Allison Barreto MD           Allergies:     Allergies   Allergen Reactions    Bactrim [Sulfamethoxazole-Trimethoprim] Anaphylaxis    Ultram [Tramadol Hcl] Anaphylaxis     stridor    Dilaudid [Hydromorphone] Other (See Comments)     Hallucinations    Phenergan [Promethazine] Nausea Only    Lipitor [Atorvastatin]      Nausea    Penicillins        Problem List:    Patient Active Problem List   Diagnosis Code    GERD (gastroesophageal reflux disease) K21.9    Irritable bowel syndrome (IBS) K58.9    Chest pain R07.9    Near syncope R55    Shortness of breath R06.02    Dizziness R42    Cardiac microvascular disease I25.89    Coronary artery disease of native 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    Mixed hyperlipidemia E78.2    MCI (mild cognitive impairment) G31.84    HTN (hypertension), benign I10    Dysthymia F34.1    Dyslipidemia E78.5    AMS (altered mental status) R41.82    Acute encephalopathy G93.40    Acute respiratory failure with hypoxemia (HCC) J96.01    Low grade fever R50.9    Hyponatremia E87.1    Acute respiratory failure (HCC) J96.00    Unsteady gait R26.81    Pre-syncope R55    Acute UTI N39.0    Orthostatic hypotension I95.1    Abnormal chest x-ray R93.89    Acute pain of right shoulder M25.511    Right wrist pain M25.531    Rotator cuff strain, right, initial encounter S46.011A    Right wrist sprain, initial encounter S63.501A    Malignant neoplasm of upper-outer quadrant of right breast in female, estrogen receptor positive (HCC) C50.411, Z17.0    Malignant neoplasm of lower-outer quadrant of left breast of female, estrogen receptor positive (HCC) C50.512, Z17.0    Malignant neoplasm of lower-inner quadrant of right breast of female, estrogen receptor positive ([facility] C50.311, Z17.0       Past Medical History:        Diagnosis Date    Anxiety     Benign essential hypertension 1/17/2017    Bowel obstruction ([facility]     CAD (coronary artery disease)     Cancer ([facility] 2012    colon    Depression     GERD (gastroesophageal reflux disease)     Hyperlipidemia     Hypertension     Irritable bowel syndrome (IBS)     Obesity     Osteoarthritis        Past Surgical History:        Procedure Laterality Date    APPENDECTOMY      CHOLECYSTECTOMY      COLON SURGERY  COLONOSCOPY  2014    mass    COLONOSCOPY  3/29/16    normal    US BREAST BIOPSY NEEDLE ADDITIONAL RIGHT Right 2021    US BREAST BIOPSY NEEDLE ADDITIONAL RIGHT 2021 [address] BREAST NEEDLE BIOPSY LEFT Left 2021    US BREAST NEEDLE BIOPSY LEFT 2021 Chavez Wright MD [facility]    US BREAST NEEDLE BIOPSY RIGHT Right 2021    US BREAST NEEDLE BIOPSY RIGHT 2021 Chavez Wright MD [facility]    US BREAST NEEDLE BIOPSY RIGHT Right 2021    US BREAST NEEDLE BIOPSY RIGHT 2021 Swapna Slater MD [facility]    US GUIDED NEEDLE LOC BREAST ADDL RIGHT Right 2021    US GUIDED NEEDLE LOC BREAST ADDL RIGHT 2021 [facility]    US GUIDED NEEDLE LOC OF LEFT BREAST Left 2021    US GUIDED NEEDLE LOC OF LEFT BREAST 2021 [facility]    US GUIDED NEEDLE LOC OF RIGHT BREAST Right 2021    US GUIDED NEEDLE LOC OF RIGHT BREAST 2021 [facility]       Social History:    Social History     Tobacco Use    Smoking status: Former Smoker     Packs/day: 1.00     Years: 10.00     Pack years: 10.00     Types: Cigarettes     Start date:      Quit date: 1983     Years since quittin.8    Smokeless tobacco: Never Used   Substance Use Topics    Alcohol use:  No                                Counseling given: Not Answered      Vital Signs (Current):   Vitals:    21 1133 21 0947   BP:  (!) 149/90   Pulse:  92   Resp:  16   Temp:  98.2 °F (36.8 °C)   TempSrc:  Temporal   SpO2:  96%   Weight: 211 lb (95.7 kg)    Height: 5' 6\" (1.676 m)                                               BP Readings from Last 3 Encounters:   21 (!) 149/90   21 104/60   21 (!) 145/89       NPO Status: Time of last liquid consumption: 1800                        Time of last solid consumption: 1800                        Date of last liquid consumption: 07/26/21                        Date of last solid food consumption: 07/26/21    BMI:   Wt Readings from Last 3 Encounters:   07/21/21 211 lb (95.7 kg)   07/19/21 211 lb (95.7 kg)   06/25/21 205 lb (93 kg)     Body mass index is 34.06 kg/m². CBC:   Lab Results   Component Value Date    WBC 8.5 07/19/2021    RBC 4.01 07/19/2021    HGB 12.3 07/19/2021    HCT 36.4 07/19/2021    MCV 90.7 07/19/2021    RDW 13.7 07/19/2021     07/19/2021       CMP:   Lab Results   Component Value Date     07/19/2021    K 4.3 07/19/2021    K 3.6 05/31/2020     07/19/2021    CO2 23 07/19/2021    BUN 15 07/19/2021    CREATININE 0.9 07/19/2021    GFRAA >60 07/19/2021    AGRATIO 1.5 07/19/2021    LABGLOM >60 07/19/2021    GLUCOSE 110 07/19/2021    PROT 6.9 07/19/2021    CALCIUM 9.6 07/19/2021    BILITOT 0.4 07/19/2021    ALKPHOS 95 07/19/2021    AST 15 07/19/2021    ALT 9 07/19/2021       POC Tests: No results for input(s): POCGLU, POCNA, POCK, POCCL, POCBUN, POCHEMO, POCHCT in the last 72 hours.     Coags:   Lab Results   Component Value Date    PROTIME 12.6 03/07/2018    INR 1.09 03/07/2018    APTT 37.5 01/16/2017       HCG (If Applicable): No results found for: PREGTESTUR, PREGSERUM, HCG, HCGQUANT     ABGs:   Lab Results   Component Value Date    PHART 7.352 05/18/2020    PO2ART 85.2 05/18/2020    UTY8VXZ 37.2 05/18/2020    UEA0KFS 20.2 05/18/2020    BEART -4.9 05/18/2020    D2JLUURG 96.1 05/18/2020        Type & Screen (If Applicable):  No results found for: LABABO, LABRH    Drug/Infectious Status (If Applicable):  No results found for: HIV, HEPCAB    COVID-19 Screening (If Applicable):   Lab Results   Component Value Date    COVID19 Not Detected 05/18/2020           Anesthesia Evaluation  Patient summary reviewed and Nursing notes reviewed  Airway: Mallampati: IV  TM distance: <3 FB   Neck ROM: limited  Mouth opening: < 3 FB Dental: normal exam         Pulmonary:normal exam  breath sounds clear to auscultation  (+) shortness of breath:                             Cardiovascular:    (+) hypertension:, angina: no interval change, CAD:,         Rhythm: regular  Rate: normal                    Neuro/Psych:   (+) psychiatric history:            GI/Hepatic/Renal:   (+) GERD:,           Endo/Other: Negative Endo/Other ROS                    Abdominal:             Vascular: negative vascular ROS. Other Findings:             Anesthesia Plan      general     ASA 3       Induction: intravenous. MIPS: Postoperative opioids intended and Prophylactic antiemetics administered. Anesthetic plan and risks discussed with patient. Plan discussed with CRNA.                   Daniel Lazaro MD   7/27/2021

## 2021-07-27 NOTE — ANESTHESIA POSTPROCEDURE EVALUATION
Department of Anesthesiology  Postprocedure Note    Patient: Mihai Noland  MRN: [id number]  YOB: 1945  Date of evaluation: 7/27/2021  Time:  3:35 PM     Procedure Summary     Date: 07/27/21 Room / Location: [facility]    Anesthesia Start: 1132 Anesthesia Stop: 7355    Procedure: RIGHT RADIO FREQUENCY IDENTIFICATION TAG LOCALIZED PARTIAL MASTECTOMY TIMES TWO; LEFT RADIO FREQUENCY IDENTIFICATION TAG LOCALIZED PARTIAL MASTECTOMY (Bilateral Breast) Diagnosis:       Malignant neoplasm of upper-outer quadrant of right female breast, unspecified estrogen receptor status ([address]      Malignant neoplasm of lower-outer quadrant of left female breast, unspecified estrogen receptor status ([address]      Malignant neoplasm of lower-inner quadrant of right female breast, unspecified estrogen receptor status ([address]      (BREAST CANCER)    Surgeons: Bree Schmitt MD Responsible Provider: Murali Davis MD    Anesthesia Type: general ASA Status: 3          Anesthesia Type: general    Neela Phase I: Neela Score: 9    Neela Phase II:      Last vitals: Reviewed and per EMR flowsheets.        Anesthesia Post Evaluation    Patient location during evaluation: PACU  Patient participation: complete - patient participated  Level of consciousness: awake and alert  Pain score: 0  Airway patency: patent  Nausea & Vomiting: no nausea and no vomiting  Complications: no  Cardiovascular status: blood pressure returned to baseline  Respiratory status: acceptable  Hydration status: stable

## 2021-07-28 ENCOUNTER — TELEPHONE (OUTPATIENT)
Dept: SURGERY | Age: 76
End: 2021-07-28

## 2021-07-29 ENCOUNTER — TELEPHONE (OUTPATIENT)
Dept: FAMILY MEDICINE CLINIC | Age: 76
End: 2021-07-29

## 2021-07-29 NOTE — TELEPHONE ENCOUNTER
[facility] Transitions Initial Follow Up Call    Outreach made within 2 business days of discharge: Yes    Patient: Lucille Young Patient : 1945   MRN: <[id number]>  Reason for Admission: There are no discharge diagnoses documented for the most recent discharge. Discharge Date: 21       Spoke with: patient    Discharge department/facility: [facility]    TCM Interactive Patient Contact:  Was patient able to fill all prescriptions:   Was patient instructed to bring all medications to the follow-up visit:   Is patient taking all medications as directed in the discharge summary?  Yes  Does patient understand their discharge instructions: Yes  Does patient have questions or concerns that need addressed prior to 7-14 day follow up office visit: no    Scheduled appointment with PCP within 7-14 days    Follow Up  Future Appointments   Date Time Provider Corey Boudreaux   2021  2:30 PM MD Maggie James [facility]   2021  1:30 PM David Sierra MD [facility] SURG [facility]   2021  1:00 PM MOHINDER [facility] - [facility]       [place], [place]

## 2021-08-03 DIAGNOSIS — I95.1 ORTHOSTATIC HYPOTENSION: ICD-10-CM

## 2021-08-04 DIAGNOSIS — E87.6 HYPOKALEMIA: ICD-10-CM

## 2021-08-04 RX ORDER — FLUDROCORTISONE ACETATE 0.1 MG/1
TABLET ORAL
Qty: 90 TABLET | Refills: 0 | Status: SHIPPED | OUTPATIENT
Start: 2021-08-04 | End: 2021-09-07

## 2021-08-05 RX ORDER — POTASSIUM CHLORIDE 20 MEQ/1
TABLET, EXTENDED RELEASE ORAL
Qty: 180 TABLET | Refills: 1 | Status: SHIPPED | OUTPATIENT
Start: 2021-08-05 | End: 2021-08-06 | Stop reason: SDUPTHER

## 2021-08-06 ENCOUNTER — OFFICE VISIT (OUTPATIENT)
Dept: CARDIOLOGY CLINIC | Age: 76
End: 2021-08-06
Payer: MEDICARE

## 2021-08-06 VITALS
OXYGEN SATURATION: 96 % | BODY MASS INDEX: 33.75 KG/M2 | HEIGHT: 66 IN | WEIGHT: 210 LBS | HEART RATE: 106 BPM | SYSTOLIC BLOOD PRESSURE: 118 MMHG | DIASTOLIC BLOOD PRESSURE: 82 MMHG

## 2021-08-06 DIAGNOSIS — I25.118 CORONARY ARTERY DISEASE OF NATIVE ARTERY OF NATIVE HEART WITH STABLE ANGINA PECTORIS (HCC): Primary | ICD-10-CM

## 2021-08-06 DIAGNOSIS — E87.6 HYPOKALEMIA: ICD-10-CM

## 2021-08-06 DIAGNOSIS — E78.5 DYSLIPIDEMIA: ICD-10-CM

## 2021-08-06 DIAGNOSIS — I10 HTN (HYPERTENSION), BENIGN: ICD-10-CM

## 2021-08-06 DIAGNOSIS — I10 ESSENTIAL HYPERTENSION: ICD-10-CM

## 2021-08-06 PROCEDURE — G8427 DOCREV CUR MEDS BY ELIG CLIN: HCPCS | Performed by: INTERNAL MEDICINE

## 2021-08-06 PROCEDURE — 1036F TOBACCO NON-USER: CPT | Performed by: INTERNAL MEDICINE

## 2021-08-06 PROCEDURE — 4040F PNEUMOC VAC/ADMIN/RCVD: CPT | Performed by: INTERNAL MEDICINE

## 2021-08-06 PROCEDURE — 1123F ACP DISCUSS/DSCN MKR DOCD: CPT | Performed by: INTERNAL MEDICINE

## 2021-08-06 PROCEDURE — G8400 PT W/DXA NO RESULTS DOC: HCPCS | Performed by: INTERNAL MEDICINE

## 2021-08-06 PROCEDURE — 1090F PRES/ABSN URINE INCON ASSESS: CPT | Performed by: INTERNAL MEDICINE

## 2021-08-06 PROCEDURE — 99214 OFFICE O/P EST MOD 30 MIN: CPT | Performed by: INTERNAL MEDICINE

## 2021-08-06 PROCEDURE — G8417 CALC BMI ABV UP PARAM F/U: HCPCS | Performed by: INTERNAL MEDICINE

## 2021-08-06 RX ORDER — AMLODIPINE BESYLATE 2.5 MG/1
2.5 TABLET ORAL DAILY
Qty: 90 TABLET | Refills: 3 | Status: SHIPPED | OUTPATIENT
Start: 2021-08-06 | End: 2021-10-29 | Stop reason: ALTCHOICE

## 2021-08-06 RX ORDER — POTASSIUM CHLORIDE 20 MEQ/1
20 TABLET, EXTENDED RELEASE ORAL DAILY
Qty: 270 TABLET | Refills: 1 | Status: SHIPPED | OUTPATIENT
Start: 2021-08-06

## 2021-08-06 NOTE — PROGRESS NOTES
[facility]   CARDIAC EVALUATION NOTE  [phone]      PCP:  MOHINDER Emery CNP    Reason for Consultation/Chief Complaint: follow up for CAD    Subjective   History of Present Illness:  Kristi Dunham is a 75 y.o. patient with a history of CAD, HT and HLD who presents for follow up. Her echo from 01/17/17 showed her EF was 55% with grade I DD. Her stress test from 07/17/17 showed no evidence of ischemia. On 03/02/18 she presented for chest pain, VELASQUEZ, dizziness, syncope and HTN. The pain is located on left side of her chest. She has been awaken from sleep with this pain. It occurs more often with activity. She has had the pain for a few months but it has worsened recently. She also admits to being short of breath with exertion. She has been getting dizzy. This occurrs most often when she gets up from sitting position. He sister states patient has had syncopal episodes. Last episode was in 09/17/17. She has dizziness prior to her syncopal episodes. Her cardiac cath from 03/07/18 showed moderate CAD of mid LAD, mild distal LAD, prox circumflex and mid RCA. She was started on Ranexa following the cardiac cath for anginal pain. She reports resolution of her chest pain since then. She is still short of breath with exertion. She presented to the ER on 10/09/18 with CP. His trop was negative x2. She was admitted 05/17-05/20/20 with AMS and UTI. She seemed to have an allergic reaction on bactrim that was started as outpatient for UTI. She was given IV steroids, benadryl and pepcid. She also had tachycardia during admission. She received IV fluids slowly to help with hyponatremia and UTI. She was admitted 05/28-05/31/20 for pre-syncope due to [place]. Her norvasc was decreased and the lisinopril was discontinued. Her echo from 05/28/20 showed her EF was 55% with grade I DD.     Today she had a bilateral partial mastectomy on 07/27/21 for bilateral breast cancer in which she was told all cancer was removed. She has some weakness and edema. She denies CP, SOB, dizziness or syncope. Past Medical History:   has a past medical history of Anxiety, Benign essential hypertension, Bowel obstruction ([facility] [address] CAD (coronary artery disease), Cancer ([facility] [address] Depression, GERD (gastroesophageal reflux disease), Hyperlipidemia, Hypertension, Irritable bowel syndrome (IBS), Obesity, and Osteoarthritis. Surgical History:   has a past surgical history that includes Colon surgery; Cholecystectomy; Appendectomy; Colonoscopy (2014); Colonoscopy (3/29/16); US BREAST BIOPSY W LOC DEVICE 1ST LESION LEFT (Left, 6/17/2021); US BREAST BIOPSY W LOC DEVICE 1ST LESION RIGHT (Right, 6/17/2021); US BREAST BIOPSY W LOC DEVICE 1ST LESION RIGHT (Right, 6/29/2021); US BREAST BIOPSY W LOC DEVICE EACH ADDL LESION RIGHT (Right, 6/29/2021); US PLACE BREAST LOC DEVICE 1ST LESION LEFT (Left, 7/20/2021); US PLACE BREAST LOC DEVICE 1ST LESION RIGHT (Right, 7/20/2021); US PLACE BREAST LOC DEVICE EACH ADDL RIGHT (Right, 7/20/2021); and Breast lumpectomy (Bilateral, 7/27/2021). Social History: Kristi Vu    reports that she quit smoking about 37 years ago. Her smoking use included cigarettes. She started smoking about 48 years ago. She has a 10.00 pack-year smoking history. She has never used smokeless tobacco. She reports that she does not drink alcohol and does not use drugs. Family History:  family history includes Cancer in her brother and mother; Heart Disease in her brother and sister. Home Medications:  Were reviewed and are listed in nursing record and/or below  Prior to Admission medications    Medication Sig Start Date End Date Taking?  Authorizing Provider   potassium chloride (KLOR-CON M) 20 MEQ extended release tablet TAKE 1 TABLET TWICE DAILY  Patient taking differently: Take 20 mEq by mouth 3 times daily TAKE 1 TABLET TWICE DAILY 8/5/21  Yes MOHINDER Quesada - CORA   fludrocortisone (FLORINEF) 0.1 MG tablet TAKE 1 TABLET TWICE DAILY 8/4/21  Yes MOHINDER Carty CNP   furosemide (LASIX) 20 MG tablet TAKE 1 TABLET EVERY DAY  Patient taking differently: Take 20 mg by mouth daily Takes every other day 7/15/21  Yes MOHINDER Carty CNP   rosuvastatin (CRESTOR) 20 MG tablet TAKE 1 TABLET EVERY DAY  Patient taking differently: Take 20 mg by mouth nightly  6/21/21  Yes Penny Cloud MD   busPIRone (BUSPAR) 15 MG tablet Take 15 mg by mouth 3 times daily as needed (anxiety) 6/3/21  Yes MOHINDER Carty CNP   mirtazapine (REMERON) 30 MG tablet Take 0.5 tablets by mouth nightly 5/24/21  Yes MOHINDER Carty CNP   dicyclomine (BENTYL) 10 MG capsule Take one capsule twice daily with meals  Patient taking differently: Take 10 mg by mouth Take one capsule twice daily with meals 5/21/21  Yes MOHINDER Carty CNP   citalopram (CELEXA) 40 MG tablet Take 1 tablet by mouth daily 5/19/21  Yes MOHINDER Carty CNP   amLODIPine (NORVASC) 2.5 MG tablet Take 1 tablet by mouth daily 4/28/21  Yes MOHINDER Carty CNP   levothyroxine (SYNTHROID) 75 MCG tablet Take 1 tablet by mouth daily 3/22/21  Yes MOHINDER Carty CNP   risperiDONE (RISPERDAL) 0.25 MG tablet Take 1 mg by mouth nightly  3/2/21  Yes Historical Provider, MD   Calcium Carbonate-Vitamin D (CALCIUM 500 + D) 500-125 MG-UNIT TABS Take by mouth   Yes Historical Provider, MD   aspirin EC 81 MG EC tablet Take 1 tablet by mouth daily 3/2/18  Yes Jacquelyn Woody MD   polyethylene glycol (GLYCOLAX) packet Take 17 g by mouth daily    Yes Historical Provider, MD   Omega-3 Fatty Acids (FISH OIL) 1000 MG CPDR Take 3,000 mg by mouth   Patient not taking: Reported on 8/6/2021    Historical Provider, MD          Allergies:  Bactrim [sulfamethoxazole-trimethoprim], Ultram [tramadol hcl], Dilaudid [hydromorphone], Phenergan [promethazine], Lipitor [atorvastatin], and Penicillins     Review of Systems:   A 14 point review of symptoms completed. Pertinent positives identified in the HPI, all other review of symptoms negative as below.       Objective   PHYSICAL EXAM:    Vitals:    08/06/21 1440   BP: 118/82   Pulse: 106   SpO2: 96%    Weight: 210 lb (95.3 kg)         General Appearance:  Alert, cooperative, no distress, appears stated age, sitting in wheelchair   Head:  Normocephalic, without obvious abnormality, atraumatic   Eyes:  PERRL, conjunctiva/corneas clear   Nose: Nares normal, no drainage or sinus tenderness   Throat: Lips, mucosa, and tongue normal   Neck: Supple, symmetrical, trachea midline, no adenopathy, thyroid: not enlarged, symmetric, no tenderness/mass/nodules, no carotid bruit or JVD   Lungs:   Clear to auscultation bilaterally, respirations unlabored   Chest Wall:  No deformity or tenderness   Heart:  Regular rate and rhythm, S1, S2 normal, no murmur, rub or gallop   Abdomen:   Soft, non-tender, bowel sounds active all four quadrants,  no masses, no organomegaly   Extremities: Extremities normal, atraumatic, no cyanosis, TRACE edema    Pulses: 2+ and symmetric   Skin: Skin color, texture, turgor normal, no rashes or lesions   Pysch: Normal mood and affect   Neurologic: Normal gross motor and sensory exam.         Labs   CBC:   Lab Results   Component Value Date    WBC 8.5 07/19/2021    RBC 4.01 07/19/2021    HGB 12.3 07/19/2021    HCT 36.4 07/19/2021    MCV 90.7 07/19/2021    RDW 13.7 07/19/2021     07/19/2021     CMP:  Lab Results   Component Value Date     07/19/2021    K 4.3 07/19/2021    K 3.6 05/31/2020     07/19/2021    CO2 23 07/19/2021    BUN 15 07/19/2021    CREATININE 0.9 07/19/2021    GFRAA >60 07/19/2021    AGRATIO 1.5 07/19/2021    LABGLOM >60 07/19/2021    GLUCOSE 110 07/19/2021    PROT 6.9 07/19/2021    CALCIUM 9.6 07/19/2021    BILITOT 0.4 07/19/2021    ALKPHOS 95 07/19/2021    AST 15 07/19/2021    ALT 9 07/19/2021     PT/INR: No results found for: PTINR  HgBA1c:No results found for: LABA1C  Lab Results   Component Value Date    TROPONINI <0.01 2021         Cardiac Data     Last EK20  SR HR 82 PACs      Echo: 17  Normal left ventricular systolic function with an estimated ejection   fraction of 55%.   Grade I diastolic dysfunction with normal filling pressure.   Systolic pulmonary artery pressure (SPAP) is normal and estimated at 32 mmHg   (RA pressure 3 mmHg).    Echo: 20   Summary   LV systolic function is normal with EF estimated at 55%. No regional wall motion abnormalities. There is mild concentric left ventricular hypertrophy. Grade I diastolic dysfunction with normal LV filling pressure. Aortic valve appears sclerotic but opens adequately. Systolic pulmonary artery pressure (SPAP) is normal estimated at 25mmHg (RAP   of 8mmHg).    Stress Test: 17  Summary  There is normal isotope uptake at stress and rest. There is no evidence of  myocardial ischemia or scar. Hyperdynamic LV systolic function with OA>46%  with uniform wall motion. Low risk study.         Cath: 18  IMPRESSION:  1.  Moderate CAD of mid LAD as well as mild CAD of distal LAD, proximal  circumflex and mid RCA. 2.  Normal left ventricular systolic function. 3.  Normal left ventricular filling pressure. RECOMMENDATION:  The patient has moderate disease of mid LAD. There was no  high-grade disease noted.  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  Will start her on Magdaline Cutting will  avoid oral nitrates as she does have a history of vasodepressor syncope and  the nitrates can make it worse.  She is already on Lisinopril, which she  will continue as ACE inhibitors have shown to be benfical in microvascular  angina.  I will also start her on moderate-dose of Lipitor for the coronary  artery disease.  She will continue with low-dose of aspirin.       Studies:       I have reviewed labs and imaging/xray/diagnostic testing in this note. Assessment      1. Coronary artery disease of native artery of native heart with stable angina pectoris ([address]    2. Dyslipidemia    3. HTN (hypertension), benign    4. Essential hypertension    5. Hypokalemia              Plan   1. Continue current medications, pt indicates she is taking k supplements at 60meq daily, will need f/u bmp as may need to reduce that dose, hypokalemia may be due to diuretics/florinef. 2. Follow up in 1 year        Scribe's attestation: This note was scribed in the presence of Dr. Fallon Rivers by Cody Herron RN      Thank you for allowing us to participate in the care of Leopoldo Mustafa. Please call me with any questions [phone]. Fallon Rivers MD, [facility]   Interventional Cardiologist  Art   [phone] [facility]  [phone] [address] [place] [address]  8/6/2021 3:01 PM    I will address the patient's cardiac risk factors and adjusted pharmacologic treatment as needed. In addition, I have reinforced the need for patient directed risk factor modification. Tobacco use was discussed with the patient and educated on the negative effects and was asked not to use. All questions and concerns were addressed to the patient/family. Alternatives to my treatment were discussed. I, Dr Fallon Rivers, personally performed the services described in this documentation, as scribed by the above signed scribe in my presence. It is both accurate and complete to my knowledge. I agree with the details independently gathered by the clinical support staff and the scribed note accurately describes my personal service to the patient.

## 2021-08-09 ENCOUNTER — TELEPHONE (OUTPATIENT)
Dept: CARDIOLOGY CLINIC | Age: 76
End: 2021-08-09

## 2021-08-09 DIAGNOSIS — F41.9 ANXIETY: ICD-10-CM

## 2021-08-09 RX ORDER — BUSPIRONE HYDROCHLORIDE 15 MG/1
TABLET ORAL
Qty: 90 TABLET | Refills: 2 | Status: SHIPPED | OUTPATIENT
Start: 2021-08-09 | Stop reason: SDUPTHER

## 2021-08-09 NOTE — TELEPHONE ENCOUNTER
Last visit was 2/19/21  Future Appointments   Date Time Provider Corey Hernandezi   8/10/2021 11:00 AM SCHEDULE, [facility] [place] [facility]   8/12/2021  1:30 PM Marvin Hou MD [facility]   8/30/2021  1:00 PM MOHINDER Venegas - CNP [facility]  [place] [facility]

## 2021-08-09 NOTE — TELEPHONE ENCOUNTER
Please clarify potassium instructions. Directions that were given to [facility] are Take 1 tablet by mouth daily TAKE 3 TABLETS DAILY. Do you want pt to take one tablet daily or three tablets once daily? Will need to send updated Rx to [facility]

## 2021-08-10 ENCOUNTER — NURSE ONLY (OUTPATIENT)
Dept: FAMILY MEDICINE CLINIC | Age: 76
End: 2021-08-10

## 2021-08-10 DIAGNOSIS — E87.6 HYPOKALEMIA: ICD-10-CM

## 2021-08-10 DIAGNOSIS — E78.5 DYSLIPIDEMIA: ICD-10-CM

## 2021-08-10 DIAGNOSIS — I10 HTN (HYPERTENSION), BENIGN: ICD-10-CM

## 2021-08-10 NOTE — TELEPHONE ENCOUNTER
Spoke with spouse she is taking 20 meq tid. She is having blood work done today at "[facility] I let spouse know to continue same dose of potassium per SRJ note.

## 2021-08-11 ENCOUNTER — TELEPHONE (OUTPATIENT)
Dept: CARDIOLOGY CLINIC | Age: 76
End: 2021-08-11

## 2021-08-11 LAB
A/G RATIO: 1.6 (ref 1.1–2.2)
ALBUMIN SERPL-MCNC: 4.4 G/DL (ref 3.4–5)
ALP BLD-CCNC: 95 U/L (ref 40–129)
ALT SERPL-CCNC: 11 U/L (ref 10–40)
ANION GAP SERPL CALCULATED.3IONS-SCNC: 11 MMOL/L (ref 3–16)
AST SERPL-CCNC: 17 U/L (ref 15–37)
BILIRUB SERPL-MCNC: 0.6 MG/DL (ref 0–1)
BUN BLDV-MCNC: 13 MG/DL (ref 7–20)
CALCIUM SERPL-MCNC: 10 MG/DL (ref 8.3–10.6)
CHLORIDE BLD-SCNC: 102 MMOL/L (ref 99–110)
CHOLESTEROL, TOTAL: 123 MG/DL (ref 0–199)
CO2: 25 MMOL/L (ref 21–32)
CREAT SERPL-MCNC: 0.9 MG/DL (ref 0.6–1.2)
GFR AFRICAN AMERICAN: >60
GFR NON-AFRICAN AMERICAN: >60
GLOBULIN: 2.7 G/DL
GLUCOSE BLD-MCNC: 114 MG/DL (ref 70–99)
HDLC SERPL-MCNC: 63 MG/DL (ref 40–60)
LDL CHOLESTEROL CALCULATED: 44 MG/DL
POTASSIUM SERPL-SCNC: 4 MMOL/L (ref 3.5–5.1)
SODIUM BLD-SCNC: 138 MMOL/L (ref 136–145)
TOTAL PROTEIN: 7.1 G/DL (ref 6.4–8.2)
TRIGL SERPL-MCNC: 81 MG/DL (ref 0–150)
VLDLC SERPL CALC-MCNC: 16 MG/DL

## 2021-08-11 NOTE — TELEPHONE ENCOUNTER
08/11-Pt's  called into office stating that he had missed a phone oleg from our office. I relayed SRJ message in regards to test results. Pt's  verbalized understanding and had no further questions. Listed below is the message that was relayed to pt's  per [facility] note. \"----- Message from Rowena Villarreal MD sent at 8/11/2021  7:35 AM EDT -----  Let patient know their lab test is stable/similar to prev values, continue current meds. Thanks. \"

## 2021-08-11 NOTE — TELEPHONE ENCOUNTER
----- Message from Gretchen Devine MD sent at 8/11/2021  7:35 AM EDT -----  Let patient know their lab test is stable/similar to prev values, continue current meds. Thanks.

## 2021-08-12 ENCOUNTER — TELEPHONE (OUTPATIENT)
Dept: CARDIOLOGY CLINIC | Age: 76
End: 2021-08-12

## 2021-08-12 ENCOUNTER — OFFICE VISIT (OUTPATIENT)
Dept: SURGERY | Age: 76
End: 2021-08-12

## 2021-08-12 DIAGNOSIS — Z17.0 MALIGNANT NEOPLASM OF UPPER-OUTER QUADRANT OF RIGHT BREAST IN FEMALE, ESTROGEN RECEPTOR POSITIVE (HCC): Primary | ICD-10-CM

## 2021-08-12 DIAGNOSIS — Z17.0 MALIGNANT NEOPLASM OF LOWER-INNER QUADRANT OF RIGHT BREAST OF FEMALE, ESTROGEN RECEPTOR POSITIVE (HCC): ICD-10-CM

## 2021-08-12 DIAGNOSIS — Z17.0 MALIGNANT NEOPLASM OF LOWER-OUTER QUADRANT OF LEFT BREAST OF FEMALE, ESTROGEN RECEPTOR POSITIVE (HCC): ICD-10-CM

## 2021-08-12 DIAGNOSIS — C50.411 MALIGNANT NEOPLASM OF UPPER-OUTER QUADRANT OF RIGHT BREAST IN FEMALE, ESTROGEN RECEPTOR POSITIVE (HCC): Primary | ICD-10-CM

## 2021-08-12 DIAGNOSIS — C50.512 MALIGNANT NEOPLASM OF LOWER-OUTER QUADRANT OF LEFT BREAST OF FEMALE, ESTROGEN RECEPTOR POSITIVE (HCC): ICD-10-CM

## 2021-08-12 DIAGNOSIS — C50.311 MALIGNANT NEOPLASM OF LOWER-INNER QUADRANT OF RIGHT BREAST OF FEMALE, ESTROGEN RECEPTOR POSITIVE (HCC): ICD-10-CM

## 2021-08-12 PROCEDURE — 99024 POSTOP FOLLOW-UP VISIT: CPT | Performed by: SURGERY

## 2021-08-12 NOTE — Clinical Note
Medicare Wellness Visit  Plan for Preventive Care    A good way for you to stay healthy is to use preventive care.  Medicare covers many services that can help you stay healthy.* The goal of these services is to find any health problems as quickly as possible. Finding problems early can help make them easier to treat.  Your personal plan below lists the services you may need and when they are due.     Health Maintenance Summary     Topic Due On Due Status Completed On    MAMMOGRAM - BREAST CANCER SCREENING Feb 6, 2019 Not Due Feb 6, 2017    Colorectal Cancer Screening - Colonoscopy Jan 1, 2024 Not Due Jan 1, 2014    Osteoporosis Screening  Completed Jul 29, 2010    Immunization - Pneumococcal Jan 30, 2018 Overdue Jan 30, 2017    Immunization - TDAP Pregnancy  Hidden     Medicare Wellness Visit Jan 30, 2018 Due On Jan 30, 2017    IMMUNIZATION - DTaP/Tdap/Td Jun 12, 2022 Not Due Jun 12, 2012    Immunization-Influenza  Completed Oct 13, 2017    Depression Screening Jan 30, 2018 Due On Jan 30, 2017    Hepatitis C Screening  Completed Jan 20, 2015           Preventive Care for Women and Men    Heart Screenings (Cardiovascular):  · Blood tests are used to check your cholesterol, lipid and triglyceride levels. High levels can increase your risk for heart disease and stroke. High levels can be treated with medications, diet and exercise. Lowering your levels can help keep your heart and blood vessels healthy.  Your provider will order these tests if they are needed.    · An ultrasound is done to see if you have an abdominal aortic aneurysm (AAA).  This is an enlargement of one of the main blood vessels that delivers blood to the body.   In the [place], 9,000 deaths are caused by AAA.  You may not even know you have this problem and as many as 1 in 3 people will have a serious problem if it is not treated.  Early diagnosis allows for more effective treatment and cure.  If you have a family history of AAA or are a  Mrs. Spenser Middleton is recovering well from surgery.  I have referred her to radiation oncology and medical oncology for the next steps in her treatment. Jim Gil will plan to see her annually with her mammogram going forward.      Luz Marina male age 65-75 who has smoked, you are at higher risk of an AAA.  Your provider can order this test, if needed.    Colorectal Screening:  · There are many tests that are used to check for cancer of your colon and rectum. You and your provider should discuss what test is best for you and when to have it done.  Options include:  · Screening Colonoscopy: exam of the entire colon, seen through a flexible lighted tube.  · Flexible Sigmoidoscopy: exam of the last third (sigmoid portion) of the colon and rectum, seen through a flexible lighted tube.  · Cologuard DNA stool test: a sample of your stool is used to screen for cancer and unseen blood in your stool.  · Fecal Occult Blood Test: a sample of your stool is studied to find any unseen blood    Flu Shot:  · An immunization that helps to prevent influenza (the flu). You should get this every year. The best time to get the shot is in the fall.    Pneumococcal Shot:  • Vaccines are available that can help prevent pneumococcal disease, which is any type of infection caused by Streptococcus pneumoniae bacteria.   Their use can prevent some cases of pneumonia, meningitis, and sepsis. There are two types of pneumococcal vaccines:   o Conjugate vaccines (PCV-13 or Prevnar 13®) - helps protect against the 13 types of pneumococcal bacteria that are the most common causes of serious infections in children and adults.    o Polysaccharide vaccine (PPSV23 or Pyajzaxxt76®) - helps protect against 23 types of pneumococcal bacteria for patients who are recommended to get it.  These vaccines should be given at least 12 months apart.  A booster is usually not needed.     Hepatitis B Shot:  · An immunization that helps to protect people from getting Hepatitis B. Hepatitis B is a virus that spreads through contact with infected blood or body fluids. Many people with the virus do not have symptoms.  The virus can lead to serious problems, such as liver disease. Some people are at higher  risk than others. Your doctor will tell you if you need this shot.     Diabetes Screening:  · A test to measure sugar (glucose) in your blood is called a fasting blood sugar. Fasting means you cannot have food or drink for at least 8 hours before the test. This test can detect diabetes long before you may notice symptoms.    Glaucoma Screening:  · Glaucoma screening is performed by your eye doctor. The test measures the fluid pressure inside your eyes to determine if you have glaucoma.     Hepatitis C Screening:  · A blood test to see if you have the hepatitis C virus.  Hepatitis C attacks the liver and is a major cause of chronic liver disease.  Medicare will cover a single screening for all adults born between 1945 & 1965, or high risk patients (people who have injected illegal drugs or people who have had blood transfusions).  High risk patients who continue to inject illegal drugs can be screened for Hepatitis C every year.    Smoking and Tobacco-Use Cessation Counseling:  · Tobacco is the single greatest cause of disease and early death in our country today. Medication and counseling together can increase a person’s chance of quitting for good.   · Medicare covers two quitting attempts per year, with four counseling sessions per attempt (eight sessions in a 12 month period)    Preventive Screening tests for Women    Screening Mammograms and Breast Exams:  · An x-ray of your breasts to check for breast cancer before you or your doctor may be able to feel it.  If breast cancer is found early it can usually be treated with success.    Pelvic Exams and Pap Tests:  · An exam to check for cervical and vaginal cancer. A Pap test is a lab test in which cells are taken from your cervix and sent to the lab to look for signs of cervical cancer. If cancer of the cervix is found early, chances for a cure are good. Testing can generally end at age 65, or if a woman has a hysterectomy for a benign condition. Your provider may  recommend more frequent testing if certain abnormal results are found.    Bone Mass Measurements:  · A painless x-ray of your bone density to see if you are at risk for a broken bone. Bone density refers to the thickness of bones or how tightly the bone tissue is packed.    Preventive Screening tests for Men    Prostate Screening:  · PSA - Prostate Cancer blood test.  Experts do not recommend routine screening of healthy men with no signs or symptoms of prostate disease.  However, men should not ignore urinary symptoms, and should discuss their family history with their doctor.    *Medicare pays for many preventive services to keep you healthy. For some of these services, you might have to pay a deductible, coinsurance, and / or copayment.  The amounts vary depending on the type of services you need and the kind of Medicare health plan you have.

## 2021-08-12 NOTE — PROGRESS NOTES
08/12/21     CHIEF COMPLAINT:  Post-operative visit. HISTORY OF PRESENT ILLNESS:  Edith Santiago is a 67 y.o. woman who presented to my office for evaluation of her new diagnosis of bilateral breast cancer. The patient was undergoing her new baseline mammogram on 5/27/2021 when she was alerted to an abnormality. She underwent additional imaging and ultimately a core biopsy, which confirmed an invasive breast cancer. She presented to discuss further management. Based on the clinical and imaging findings, I did feel that she was an acceptable candidate for attempted breast conservation, for which she was highly motivated. She underwent a left partial mastectomy and partial mastectomy at 3:00 and 12:00 on 7/27/2021 and returns today for her post-operative visit. The patient states that she has done well post-operatively. She denies any problems with the incisions. She denies any redness around the incisions or drainage from the wounds. She has no other systemic complaints and has not had any fevers. PHYSICAL EXAMINATION:     BREAST EXAMINATION: On examination, the incisions are clean, dry, and intact and healing well. There is no sign of any infection or drainage from the wounds.       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, 14 mm, grade 2, negative margins, ER positive     IMPRESSION/RECOMMENDATION:    Cancer Staging  Malignant neoplasm of lower-inner quadrant of right breast of female, estrogen receptor positive ([facility] [address]  Staging form: Breast, AJCC 8th Edition  - Clinical stage from 7/1/2021: Stage 0 (cTis (DCIS), cN0, cM0, G2, ER+) - Signed by Kassidy Appiah MD on 7/1/2021  - Pathologic stage from 8/12/2021: Stage 0 (pTis (DCIS), pN0, cM0, G2, ER+) - Signed by Kassidy Appiah MD on 8/12/2021    Malignant neoplasm of lower-outer quadrant of left breast of female, estrogen receptor positive ([facility] [address]  Staging form: Breast, AJCC 8th Edition  - Clinical stage from 6/25/2021: Stage IA (cT1c, cN0, cM0, G2, ER+, UT+, HER2-) - Signed by Kassidy Appiah MD on 6/25/2021  - Pathologic stage from 8/12/2021: Stage IA (pT1c, pN0, cM0, G2, ER+, UT+, HER2-) - Signed by Kassidy Appiah MD on 8/12/2021    Malignant neoplasm of upper-outer quadrant of right breast in female, estrogen receptor positive ([facility] [address]  Staging form: Breast, AJCC 8th Edition  - Clinical stage from 6/25/2021: Stage 0 (cTis (DCIS), cN0, cM0, G2, ER+) - Signed by Kassidy Appiah MD on 6/25/2021  - Pathologic stage from 8/12/2021: Stage 0 (pTis (DCIS), pN0, cM0, G2, ER+) - Signed by Kassidy Appiah MD on 8/12/2021    S/p left partial mastectomy and right partial mastectomy at 3:00 and 12:00 on 7/27/2021    Alan Mittal is a 67 y.o. woman who is now status-post left partial mastectomy for a 1.7 cm invasive ductal carcinoma and right partial mastectomy at 3:00 and 12:00 for a ductal carcinoma in situ at each site. I reviewed the pathology with her today and explained that I do consider her surgical therapy to be complete as we were able to achieve negative margins. She was given a copy of her pathology report. We also discussed adjuvant therapy. We reviewed that radiation therapy is recommended in patients undergoing breast conservation therapy to reduce the risk of local recurrence. I will arrange for a radiation oncology consultation to discuss this further. Systemic therapy including chemotherapy and endocrine therapy were reviewed. She will certainly be a candidate for endocrine therapy.   I will arrange for a medical oncology consultation to discuss this further. I would like to see her back in six months, for a clinical breast exam, and then annually with her mammograms. In the interim, I encouraged her to resume self examinations on a monthly basis and to alert me of any changes. I answered all of her questions thoroughly, and she does seem pleased with this plan of approach. I encouraged her to contact me in the interim if any new questions or concerns arise.     Summary:  - referral to radiation oncology and medical oncology  - f/u in 6 months for clinical exam  - she will be due for bilateral mammogams on 6/10/2022    Loy Goodpasture, MD, MD

## 2021-08-12 NOTE — TELEPHONE ENCOUNTER
----- Message from Stephanie Ling MD sent at 8/11/2021  7:35 AM EDT -----  Let patient know their lab test is stable/similar to prev values, continue current meds. Thanks.

## 2021-08-17 ENCOUNTER — TELEPHONE (OUTPATIENT)
Dept: CARDIOLOGY CLINIC | Age: 76
End: 2021-08-17

## 2021-08-17 NOTE — TELEPHONE ENCOUNTER
Pharmacy sts day before [facility] wrote for potassium chloride (KLOR-CON M) 20 MEQ extended release tablet   Take 1 tablet by mouth daily TAKE 3 TABLETS DAILY, Dr Andrews Jensen wrote for pt to only take 2 Klor-con. Pharmacy wants clarification as to what pt should be taking. Please advise.

## 2021-08-17 NOTE — TELEPHONE ENCOUNTER
Pt stated in telephone encounter 8/9/2021 That she takes 20 MeQ TID. Per RN Analy December PCP and [facility] say TID, ordered as TID. Spoke with [address] ordered.

## 2021-08-20 RX ORDER — FAMOTIDINE 40 MG/1
TABLET, FILM COATED ORAL
Qty: 90 TABLET | Refills: 1 | Status: SHIPPED | OUTPATIENT
Start: 2021-08-20 | End: 2021-10-08

## 2021-08-26 ENCOUNTER — APPOINTMENT (OUTPATIENT)
Dept: GENERAL RADIOLOGY | Age: 76
End: 2021-08-26
Payer: MEDICARE

## 2021-08-26 ENCOUNTER — TELEPHONE (OUTPATIENT)
Dept: FAMILY MEDICINE CLINIC | Age: 76
End: 2021-08-26

## 2021-08-26 ENCOUNTER — HOSPITAL ENCOUNTER (EMERGENCY)
Age: 76
Discharge: LEFT BEFORE DISCHARGE | End: 2021-08-26
Attending: EMERGENCY MEDICINE
Payer: MEDICARE

## 2021-08-26 ENCOUNTER — APPOINTMENT (OUTPATIENT)
Dept: CT IMAGING | Age: 76
End: 2021-08-26
Payer: MEDICARE

## 2021-08-26 ENCOUNTER — OFFICE VISIT (OUTPATIENT)
Dept: FAMILY MEDICINE CLINIC | Age: 76
End: 2021-08-26
Payer: MEDICARE

## 2021-08-26 ENCOUNTER — APPOINTMENT (OUTPATIENT)
Dept: VASCULAR LAB | Age: 76
End: 2021-08-26
Payer: MEDICARE

## 2021-08-26 VITALS
OXYGEN SATURATION: 96 % | RESPIRATION RATE: 18 BRPM | BODY MASS INDEX: 31.39 KG/M2 | TEMPERATURE: 97.4 F | HEART RATE: 90 BPM | DIASTOLIC BLOOD PRESSURE: 92 MMHG | SYSTOLIC BLOOD PRESSURE: 154 MMHG | WEIGHT: 200 LBS | HEIGHT: 67 IN

## 2021-08-26 VITALS
DIASTOLIC BLOOD PRESSURE: 53 MMHG | SYSTOLIC BLOOD PRESSURE: 77 MMHG | HEART RATE: 97 BPM | BODY MASS INDEX: 33.27 KG/M2 | OXYGEN SATURATION: 96 % | WEIGHT: 207 LBS | HEIGHT: 66 IN

## 2021-08-26 DIAGNOSIS — R06.09 DYSPNEA ON EXERTION: ICD-10-CM

## 2021-08-26 DIAGNOSIS — I95.1 ORTHOSTATIC HYPOTENSION: ICD-10-CM

## 2021-08-26 DIAGNOSIS — M79.89 PAIN AND SWELLING OF LEFT LOWER LEG: Primary | ICD-10-CM

## 2021-08-26 DIAGNOSIS — L03.116 CELLULITIS OF LEFT LOWER EXTREMITY: Primary | ICD-10-CM

## 2021-08-26 DIAGNOSIS — M79.662 PAIN AND SWELLING OF LEFT LOWER LEG: Primary | ICD-10-CM

## 2021-08-26 LAB
A/G RATIO: 1.6 (ref 1.1–2.2)
ALBUMIN SERPL-MCNC: 4.2 G/DL (ref 3.4–5)
ALP BLD-CCNC: 102 U/L (ref 40–129)
ALT SERPL-CCNC: 9 U/L (ref 10–40)
ANION GAP SERPL CALCULATED.3IONS-SCNC: 12 MMOL/L (ref 3–16)
AST SERPL-CCNC: 17 U/L (ref 15–37)
BASOPHILS ABSOLUTE: 0 K/UL (ref 0–0.2)
BASOPHILS RELATIVE PERCENT: 0.3 %
BILIRUB SERPL-MCNC: 0.4 MG/DL (ref 0–1)
BUN BLDV-MCNC: 19 MG/DL (ref 7–20)
CALCIUM SERPL-MCNC: 9.5 MG/DL (ref 8.3–10.6)
CHLORIDE BLD-SCNC: 103 MMOL/L (ref 99–110)
CO2: 22 MMOL/L (ref 21–32)
CREAT SERPL-MCNC: 1.2 MG/DL (ref 0.6–1.2)
EKG ATRIAL RATE: 87 BPM
EKG DIAGNOSIS: NORMAL
EKG P AXIS: 25 DEGREES
EKG P-R INTERVAL: 190 MS
EKG Q-T INTERVAL: 392 MS
EKG QRS DURATION: 86 MS
EKG QTC CALCULATION (BAZETT): 471 MS
EKG R AXIS: -16 DEGREES
EKG T AXIS: 36 DEGREES
EKG VENTRICULAR RATE: 87 BPM
EOSINOPHILS ABSOLUTE: 0.2 K/UL (ref 0–0.6)
EOSINOPHILS RELATIVE PERCENT: 2.1 %
GFR AFRICAN AMERICAN: 53
GFR NON-AFRICAN AMERICAN: 44
GLOBULIN: 2.6 G/DL
GLUCOSE BLD-MCNC: 114 MG/DL (ref 70–99)
HCT VFR BLD CALC: 36 % (ref 36–48)
HEMOGLOBIN: 11.9 G/DL (ref 12–16)
LACTIC ACID, SEPSIS: 1.1 MMOL/L (ref 0.4–1.9)
LACTIC ACID, SEPSIS: 1.2 MMOL/L (ref 0.4–1.9)
LYMPHOCYTES ABSOLUTE: 1.3 K/UL (ref 1–5.1)
LYMPHOCYTES RELATIVE PERCENT: 13.1 %
MCH RBC QN AUTO: 30.1 PG (ref 26–34)
MCHC RBC AUTO-ENTMCNC: 32.9 G/DL (ref 31–36)
MCV RBC AUTO: 91.5 FL (ref 80–100)
MONOCYTES ABSOLUTE: 1.1 K/UL (ref 0–1.3)
MONOCYTES RELATIVE PERCENT: 11.9 %
NEUTROPHILS ABSOLUTE: 7 K/UL (ref 1.7–7.7)
NEUTROPHILS RELATIVE PERCENT: 72.6 %
PDW BLD-RTO: 13.3 % (ref 12.4–15.4)
PLATELET # BLD: 179 K/UL (ref 135–450)
PMV BLD AUTO: 8.2 FL (ref 5–10.5)
POTASSIUM REFLEX MAGNESIUM: 4.4 MMOL/L (ref 3.5–5.1)
PRO-BNP: 288 PG/ML (ref 0–449)
RBC # BLD: 3.93 M/UL (ref 4–5.2)
SODIUM BLD-SCNC: 137 MMOL/L (ref 136–145)
TOTAL PROTEIN: 6.8 G/DL (ref 6.4–8.2)
TROPONIN: <0.01 NG/ML
WBC # BLD: 9.6 K/UL (ref 4–11)

## 2021-08-26 PROCEDURE — 71260 CT THORAX DX C+: CPT

## 2021-08-26 PROCEDURE — 85025 COMPLETE CBC W/AUTO DIFF WBC: CPT

## 2021-08-26 PROCEDURE — 80053 COMPREHEN METABOLIC PANEL: CPT

## 2021-08-26 PROCEDURE — G8417 CALC BMI ABV UP PARAM F/U: HCPCS | Performed by: NURSE PRACTITIONER

## 2021-08-26 PROCEDURE — 99214 OFFICE O/P EST MOD 30 MIN: CPT | Performed by: NURSE PRACTITIONER

## 2021-08-26 PROCEDURE — 83605 ASSAY OF LACTIC ACID: CPT

## 2021-08-26 PROCEDURE — G8427 DOCREV CUR MEDS BY ELIG CLIN: HCPCS | Performed by: NURSE PRACTITIONER

## 2021-08-26 PROCEDURE — 93010 ELECTROCARDIOGRAM REPORT: CPT | Performed by: INTERNAL MEDICINE

## 2021-08-26 PROCEDURE — 2580000003 HC RX 258: Performed by: EMERGENCY MEDICINE

## 2021-08-26 PROCEDURE — 1036F TOBACCO NON-USER: CPT | Performed by: NURSE PRACTITIONER

## 2021-08-26 PROCEDURE — 99285 EMERGENCY DEPT VISIT HI MDM: CPT

## 2021-08-26 PROCEDURE — G8400 PT W/DXA NO RESULTS DOC: HCPCS | Performed by: NURSE PRACTITIONER

## 2021-08-26 PROCEDURE — 83880 ASSAY OF NATRIURETIC PEPTIDE: CPT

## 2021-08-26 PROCEDURE — 71045 X-RAY EXAM CHEST 1 VIEW: CPT

## 2021-08-26 PROCEDURE — 1090F PRES/ABSN URINE INCON ASSESS: CPT | Performed by: NURSE PRACTITIONER

## 2021-08-26 PROCEDURE — 6360000004 HC RX CONTRAST MEDICATION: Performed by: EMERGENCY MEDICINE

## 2021-08-26 PROCEDURE — 4040F PNEUMOC VAC/ADMIN/RCVD: CPT | Performed by: NURSE PRACTITIONER

## 2021-08-26 PROCEDURE — 1123F ACP DISCUSS/DSCN MKR DOCD: CPT | Performed by: NURSE PRACTITIONER

## 2021-08-26 PROCEDURE — 93005 ELECTROCARDIOGRAM TRACING: CPT | Performed by: EMERGENCY MEDICINE

## 2021-08-26 PROCEDURE — 84484 ASSAY OF TROPONIN QUANT: CPT

## 2021-08-26 RX ORDER — 0.9 % SODIUM CHLORIDE 0.9 %
1000 INTRAVENOUS SOLUTION INTRAVENOUS ONCE
Status: COMPLETED | OUTPATIENT
Start: 2021-08-26 | End: 2021-08-26

## 2021-08-26 RX ORDER — CLINDAMYCIN HYDROCHLORIDE 150 MG/1
450 CAPSULE ORAL 3 TIMES DAILY
Qty: 63 CAPSULE | Refills: 0 | Status: SHIPPED | OUTPATIENT
Start: 2021-08-26 | End: 2021-09-02

## 2021-08-26 RX ADMIN — SODIUM CHLORIDE 1000 ML: 9 INJECTION, SOLUTION INTRAVENOUS at 19:46

## 2021-08-26 RX ADMIN — IOPAMIDOL 85 ML: 755 INJECTION, SOLUTION INTRAVENOUS at 19:20

## 2021-08-26 ASSESSMENT — PAIN DESCRIPTION - ORIENTATION: ORIENTATION: LEFT

## 2021-08-26 ASSESSMENT — PAIN SCALES - GENERAL: PAINLEVEL_OUTOF10: 4

## 2021-08-26 ASSESSMENT — PAIN DESCRIPTION - PAIN TYPE: TYPE: ACUTE PAIN

## 2021-08-26 ASSESSMENT — PAIN DESCRIPTION - LOCATION: LOCATION: LEG

## 2021-08-26 NOTE — TELEPHONE ENCOUNTER
Pt's sister called and said that pt's left foot is swollen pretty bad. Burning and really red and warm. States that she has an appointment with you on Monday but doesn't know if she should wait till then.

## 2021-08-26 NOTE — ED PROVIDER NOTES
[facility] emergency Department      CHIEF COMPLAINT  Shortness of Breath (/Increased SOB over the past week. Pt hypotensive during triage, feels very weak. BP 87/63. ) and Leg Swelling (Left leg swelling, PCP concerned for DVT)      HISTORY OF PRESENT ILLNESS  Isidoro Cockayne is a 67 y.o. female with a history of recent partial mastectomy bilaterally for new diagnosis of bilateral breast cancer the end of July presents with pain and swelling in her left lower leg and some shortness of breath on exertion. She states both of her legs were swollen several days ago but then they seem to improve but now he 2 days ago she noticed the left leg was swelling again and is worsened. It is very tender to touch and is red and warm. She also states she has been short of breath with exertion. That is not new and has been ongoing for weeks. With even minimal exertion she gets very winded and has to stop and rest and sit down. No chest pain. She denies history of any cardiac disease or breathing problems at baseline. She has no history of blood clots and is not anticoagulated. She also denies fevers. No other complaints, modifying factors or associated symptoms. I have reviewed the following from the nursing documentation.     Past Medical History:   Diagnosis Date    Anxiety     Benign essential hypertension 1/17/2017    Bowel obstruction (HCC)     CAD (coronary artery disease)     Cancer ([facility] [address] 2012    colon    Depression     GERD (gastroesophageal reflux disease)     Hyperlipidemia     Hypertension     Irritable bowel syndrome (IBS)     Obesity     Osteoarthritis      Past Surgical History:   Procedure Laterality Date    APPENDECTOMY      BREAST LUMPECTOMY Bilateral 7/27/2021    RIGHT RADIO FREQUENCY IDENTIFICATION TAG LOCALIZED PARTIAL MASTECTOMY TIMES TWO; LEFT RADIO FREQUENCY IDENTIFICATION TAG LOCALIZED PARTIAL MASTECTOMY performed [facility]r [facility] CHOLECYSTECTOMY      COLON SURGERY      COLONOSCOPY  2014    mass    COLONOSCOPY  3/29/16    normal    US BREAST BIOPSY NEEDLE ADDITIONAL RIGHT Right 2021    US BREAST BIOPSY NEEDLE ADDITIONAL RIGHT 2021 [address] BREAST NEEDLE BIOPSY LEFT Left 2021    US BREAST NEEDLE BIOPSY LEFT 2021 Afsaneh Chamorro MD [address] [facility] [address] BREAST NEEDLE BIOPSY RIGHT Right 2021    US BREAST NEEDLE BIOPSY RIGHT 2021 Afsaneh Chamorro MD [address] [facility]    US BREAST NEEDLE BIOPSY RIGHT Right 2021    US BREAST NEEDLE BIOPSY RIGHT 2021 Michelle Zamora MD [address] [facility]    US GUIDED NEEDLE LOC BREAST ADDL RIGHT Right 2021    US GUIDED NEEDLE LOC BREAST ADDL RIGHT 2021 [facility] [place]    US GUIDED NEEDLE LOC OF LEFT BREAST Left 2021    US GUIDED NEEDLE LOC OF LEFT BREAST 2021 [facility]    US GUIDED NEEDLE LOC OF RIGHT BREAST Right 2021    US GUIDED NEEDLE LOC OF RIGHT BREAST 2021 [facility]     Family History   Problem Relation Age of Onset    Cancer Mother         colon    Cancer Brother         colon    Heart Disease Sister     Heart Disease Brother      Social History     Socioeconomic History    Marital status:      Spouse name: Not on file    Number of children: Not on file    Years of education: Not on file    Highest education level: Not on file   Occupational History    Not on file   Tobacco Use    Smoking status: Former Smoker     Packs/day: 1.00     Years: 10.00     Pack years: 10.00     Types: Cigarettes     Start date: 79     Quit date: 1983     Years since quittin.9    Smokeless tobacco: Never Used   Vaping Use    Vaping Use: Never used   Substance and Sexual Activity    Alcohol use: No    Drug use: No    Sexual activity: Not on file   Other Topics Concern    Not on file   Social History Narrative    Not on file Social Determinants of Health     Financial Resource Strain:     Difficulty of Paying Living Expenses:    Food Insecurity:     Worried About Running Out of Food in the Last Year:     [address] in the Last Year:    Transportation Needs:     Lack of Transportation (Medical):  Lack of Transportation (Non-Medical):    Physical Activity:     Days of Exercise per Week:     Minutes of Exercise per Session:    Stress:     Feeling of Stress :    Social Connections:     Frequency of Communication with Friends and Family:     Frequency of Social Gatherings with Friends and Family:     Attends Nondenominational Services:     Active Member of Clubs or Organizations:     Attends Club or Organization Meetings:     Marital Status:    Intimate Partner Violence:     Fear of Current or Ex-Partner:     Emotionally Abused:     Physically Abused:     Sexually Abused:      No current facility-administered medications for this encounter.      Current Outpatient Medications   Medication Sig Dispense Refill    clindamycin (CLEOCIN) 150 MG capsule Take 3 capsules by mouth 3 times daily for 7 days 63 capsule 0    famotidine (PEPCID) 40 MG tablet TAKE 1 TABLET EVERY EVENING 90 tablet 1    busPIRone (BUSPAR) 15 MG tablet TAKE 1 TABLET BY MOUTH 3 TIMES DAILY AS NEEDED (ANXIETY) 90 tablet 2    amLODIPine (NORVASC) 2.5 MG tablet Take 1 tablet by mouth daily 90 tablet 3    potassium chloride (KLOR-CON M) 20 MEQ extended release tablet Take 1 tablet by mouth daily TAKE 3 TABLETS DAILY 270 tablet 1    fludrocortisone (FLORINEF) 0.1 MG tablet TAKE 1 TABLET TWICE DAILY 90 tablet 0    furosemide (LASIX) 20 MG tablet TAKE 1 TABLET EVERY DAY (Patient taking differently: Take 20 mg by mouth daily Takes every other day) 90 tablet 0    rosuvastatin (CRESTOR) 20 MG tablet TAKE 1 TABLET EVERY DAY (Patient taking differently: Take 20 mg by mouth nightly ) 90 tablet 1    mirtazapine (REMERON) 30 MG tablet Take 0.5 tablets by mouth nightly 90 tablet 1    dicyclomine (BENTYL) 10 MG capsule Take one capsule twice daily with meals (Patient taking differently: Take 10 mg by mouth Take one capsule twice daily with meals) 180 capsule 1    citalopram (CELEXA) 40 MG tablet Take 1 tablet by mouth daily 90 tablet 1    levothyroxine (SYNTHROID) 75 MCG tablet Take 1 tablet by mouth daily 90 tablet 1    risperiDONE (RISPERDAL) 0.25 MG tablet Take 1 mg by mouth nightly       Omega-3 Fatty Acids (FISH OIL) 1000 MG CPDR Take 3,000 mg by mouth       Calcium Carbonate-Vitamin D (CALCIUM 500 + D) 500-125 MG-UNIT TABS Take by mouth      aspirin EC 81 MG EC tablet Take 1 tablet by mouth daily 30 tablet 3    polyethylene glycol (GLYCOLAX) packet Take 17 g by mouth daily        Allergies   Allergen Reactions    Bactrim [Sulfamethoxazole-Trimethoprim] Anaphylaxis    Ultram [Tramadol Hcl] Anaphylaxis     stridor    Dilaudid [Hydromorphone] Other (See Comments)     Hallucinations    Phenergan [Promethazine] Nausea Only    Lipitor [Atorvastatin]      Nausea    Penicillins        REVIEW OF SYSTEMS  10 systems reviewed, pertinent positives per HPI otherwise noted to be negative. PHYSICAL EXAM  BP (!) 154/92   Pulse 90   Temp 97.4 °F (36.3 °C)   Resp 18   Ht 5' 7\" (1.702 m)   Wt 200 lb (90.7 kg)   SpO2 96%   BMI 31.32 kg/m²   GENERAL APPEARANCE: Awake and alert. Cooperative. No acute distress. HEAD: Normocephalic. Atraumatic. EYES: PERRL. EOM's grossly intact. ENT: Mucous membranes are moist.   NECK: Supple, trachea midline. HEART: RRR. LUNGS: Respirations unlabored. CTAB. Good air exchange. No wheezes, rales, or rhonchi. Speaking comfortably in full sentences. ABDOMEN: Soft. Non-distended. Non-tender. No guarding or rebound. EXTREMITIES: Erythema and warmth to the left ankle and foot with swelling noted. Minimal calf tenderness on the left. SKIN: Warm, dry and intact. No acute rashes. NEUROLOGICAL: Alert and oriented X 3.  CN II-XII Ventricular Rate 87 BPM    Atrial Rate 87 BPM    P-R Interval 190 ms    QRS Duration 86 ms    Q-T Interval 392 ms    QTc Calculation (Bazett) 471 ms    P Axis 25 degrees    R Axis -16 degrees    T Axis 36 degrees    Diagnosis       Normal sinus rhythmMinimal voltage criteria for LVH, may be normal variantNonspecific ST abnormalityWhen compared with ECG of 16-MAR-2021 14:29,No significant change was foundConfirmed by YOU Warren MD ([id number]) on 8/26/2021 5:40:55 PM       EKG  The Ekg interpreted by myself  normal sinus rhythm with a rate of 87  Axis is   Normal  QTc is  normal  Intervals and Durations are unremarkable. No specific ST-T wave changes appreciated. No evidence of acute ischemia. No significant change from prior EKG dated 3/16/21    Cardiac Monitoring: The cardiac monitor revealed normal sinus rhythm as interpreted by me. The cardiac monitor was ordered secondary to the patient's complaint of shortness of breath and to monitor the patient for dysrhythmia. RADIOLOGY  X-RAYS:  I have reviewed radiologic plain film image(s). ALL OTHER NON-PLAIN FILM IMAGES SUCH AS CT, ULTRASOUND AND MRI HAVE BEEN READ BY THE RADIOLOGIST. CT CHEST PULMONARY EMBOLISM W CONTRAST   Final Result   1. No acute pulmonary embolism. 2. Nonspecific small volume pericardial effusion. 3. Coronary artery calcific atherosclerosis. 4. Prominent common duct dilatation measuring 2.2 cm, status post   cholecystectomy. 5. Nonspecific air cysts in the lower lungs can be seen as sequela from   emphysema. VL Extremity Venous Left   Final Result      XR CHEST PORTABLE   Final Result   No acute cardiac or pulmonary disease. Rechecks: Physical assessment performed. She is resting comfortably. She has been updated on her CT results and vascular ultrasound results. She has been recommended for admission however she declines and will sign out [address]             ED COURSE/MDM  Patient seen and evaluated. Here the patient is afebrile with normal vital signs on my initial evaluation however in triage she was noted to be hypotensive and her  tells me she was hypotensive at the doctor's office. It sounds like she does have some history of orthostatic hypotension according to her . . Old records reviewed. On chart review it does appear that she has moderate coronary artery disease with her last cath in 2018 showing 50% stenosis of the mid LAD. She does not have any stents. On exam she does have what appears to be cellulitis of the left ankle and foot. I cannot exclude DVT. Vascular ultrasound has been ordered. I am also concerned given that she is recently about 1 month postop from breast surgery that she is at higher risk of DVT and PE. Chest x-ray, EKG, troponin and lab work are pending. I will likely order a CT of the chest as well but I will reassess first.      Vascular ultrasound shows a superficial clot but no DVT. Given her recent surgery I did perform a CT of the chest to evaluate for PE. There is no evidence of PE but she does have a small pericardial effusion. Given her prior coronary disease I am concerned with her exertional dyspnea. I do think her leg swelling and erythema can be explained as cellulitis however I am concerned with her exertional dyspnea. I am recommending admission for further cardiac work-up. She states she does not want to be admitted. She would prefer to follow-up with Dr. Marge Burks, her cardiologist as an outpatient. I have counseled her at length on the importance of prompt evaluation by cardiology which is why I am recommending admission. She declines and will sign out [address] I will prescribe antibiotics for her to take for her left leg cellulitis. Labs and imaging reviewed and results discussed with patient. Patient was reassessed as noted above . Plan of care discussed with patient.   Strict return precautions have been given. I have recommended admission to the hospital, but Pia Suárez refuses. The risks (including but not limited to suffering and death) as well as the benefits were explained to the patient. Questions were sought and answered, the patient voiced understanding and accepts these risks. I have encouraged the patient to return to have their evaluation completed as we are glad to do so. I have also instructed Pia Suárez on the importance of follow-up and to return for any worsening or worrisome concerns. Pia Suárez appears to have capacity to make medical decisions at this time. AMA form signed and placed on the chart. Patient was given scripts for the following medications. I counseled patient how to take these medications. Discharge Medication List as of 8/26/2021  9:39 PM      START taking these medications    Details   clindamycin (CLEOCIN) 150 MG capsule Take 3 capsules by mouth 3 times daily for 7 days, Disp-63 capsule, R-0Print             CLINICAL IMPRESSION  1. Cellulitis of left lower extremity    2. Dyspnea on exertion        Blood pressure (!) 154/92, pulse 90, temperature 97.4 °F (36.3 °C), resp. rate 18, height 5' 7\" (1.702 m), weight 200 lb (90.7 kg), SpO2 96 %, not currently breastfeeding. DISPOSITION  Pia Suárez signed out [address] .    (Please note this note was completed with a voice recognition program.  Efforts were made to edit the dictations but occasionally words are mis-transcribed.)       Mukesh Boles MD  08/28/21 [id number]

## 2021-08-26 NOTE — PROGRESS NOTES
[place]  PHYSICIAN PRACTICES  [facility] FAMILY MEDICINE  [address] [facility].  [address] [facility] [facility]  Dept: [phone]  Dept Fax: [phone]  Loc: Lb Tang is a 67 y.o. female who presents today for her medical conditions/complaints as noted below. Martha Blair is c/o of Other (pt has swollen left leg for the past week and is getting worse. pt states it is tight and painful when walking on it. )        HPI:     Chief Complaint   Patient presents with    Other     pt has swollen left leg for the past week and is getting worse. pt states it is tight and painful when walking on it. HPI    Abhishek Garcia presents with her  today as she is worried about her left lower leg being swollen, tight, red. She has noticed that over the last week that her left lower leg has becoming more swollen and tight. She states it hurts just to even walk on it. She states that she is also noted shortness of breath with exertion. She states that she has had the shortness of breath with exertion for several months, but states that she feels like it has been getting slightly worse. She states is hard for her to even walk from room to room without feeling very winded and having to stop and rest.  She denies any chest pain, shortness of breath, history of blood clots. She did have a partial mastectomy numbers of her breast on July 27, 2021. She has not been walking as much as she normally does since her surgery. She does admit to feeling dizzy. She states she feels dizzy especially when she is walking. She notes dizziness when she goes from sitting to standing. She has not been wearing compression hose.     Past Medical History:   Diagnosis Date    Anxiety     Benign essential hypertension 1/17/2017    Bowel obstruction (HCC)     CAD (coronary artery disease)     Cancer ([facility] [address] 2012    colon    Depression     GERD (gastroesophageal reflux disease)     Hyperlipidemia     Hypertension     Irritable bowel syndrome (IBS)     Obesity     Osteoarthritis       Past Surgical History:   Procedure Laterality Date    APPENDECTOMY      BREAST LUMPECTOMY Bilateral 2021    RIGHT RADIO FREQUENCY IDENTIFICATION TAG LOCALIZED PARTIAL MASTECTOMY TIMES TWO; LEFT RADIO FREQUENCY IDENTIFICATION TAG LOCALIZED PARTIAL MASTECTOMY performed by Kwasi Lobo MD at [address] COLONOSCOPY  2014    mass    COLONOSCOPY  3/29/16    normal    US BREAST BIOPSY NEEDLE ADDITIONAL RIGHT Right 2021    US BREAST BIOPSY NEEDLE ADDITIONAL RIGHT 2021 Rosemarie Alejo MD [facility] [address] [facility] BREAST NEEDLE BIOPSY LEFT Left 2021    US BREAST NEEDLE BIOPSY LEFT 2021 Salvador Connors MD [facility] [address] [facility] BREAST NEEDLE BIOPSY RIGHT Right 2021    US BREAST NEEDLE BIOPSY RIGHT 2021 Salvador Connors MD [facility]    US BREAST NEEDLE BIOPSY RIGHT Right 2021    US BREAST NEEDLE BIOPSY RIGHT 2021 Rosemarie Alejo MD [facility]    US GUIDED NEEDLE LOC BREAST ADDL RIGHT Right 2021    US GUIDED NEEDLE LOC BREAST ADDL RIGHT 2021 [facility]    US GUIDED NEEDLE LOC OF LEFT BREAST Left 2021    US GUIDED NEEDLE LOC OF LEFT BREAST 2021 [facility]    US GUIDED NEEDLE LOC OF RIGHT BREAST Right 2021    US GUIDED NEEDLE LOC OF RIGHT BREAST 2021 [facility]       Family History   Problem Relation Age of Onset    Cancer Mother         colon    Cancer Brother         colon    Heart Disease Sister     Heart Disease Brother        Social History     Tobacco Use    Smoking status: Former Smoker     Packs/day: 1.00     Years: 10.00     Pack years: 10.00     Types: Cigarettes     Start date: 79     Quit date: 1983     Years since quittin.9    Smokeless tobacco: Never Used   Substance Use Topics    Alcohol use: No      Current Outpatient Medications [Sulfamethoxazole-Trimethoprim] Anaphylaxis    Ultram [Tramadol Hcl] Anaphylaxis     stridor    Dilaudid [Hydromorphone] Other (See Comments)     Hallucinations    Phenergan [Promethazine] Nausea Only    Lipitor [Atorvastatin]      Nausea    Penicillins        :      Review of Systems   Constitutional: Positive for activity change (R/t left lower leg pain and fatigue) and fatigue. Negative for appetite change, chills, diaphoresis, fever and unexpected weight change. Respiratory: Positive for shortness of breath (With exertion). Negative for apnea, cough, choking, chest tightness, wheezing and stridor. Cardiovascular: Positive for leg swelling. Negative for chest pain and palpitations. Gastrointestinal: Negative. Genitourinary: Negative. Musculoskeletal: Negative. Skin: Positive for color change and rash (Left lower leg warm and red). Negative for pallor and wound. Neurological: Positive for dizziness and weakness (Generalized). Negative for tremors, seizures, syncope, facial asymmetry, speech difficulty, light-headedness, numbness and headaches. Psychiatric/Behavioral: Negative. Objective:     Vitals:    08/26/21 1452 08/26/21 1453   BP: (!) 71/52 (!) 77/53   Site: Left Upper Arm Left Upper Arm   Position: Sitting Sitting   Cuff Size: Medium Adult Large Adult   Pulse: 95 97   SpO2: 96%    Weight: 207 lb (93.9 kg)    Height: 5' 6\" (1.676 m)      Wt Readings from Last 3 Encounters:   08/30/21 208 lb (94.3 kg)   08/26/21 200 lb (90.7 kg)   08/26/21 207 lb (93.9 kg)     Temp Readings from Last 3 Encounters:   08/26/21 97.4 °F (36.3 °C)   07/27/21 98.5 °F (36.9 °C) (Temporal)   07/27/21 96.1 °F (35.6 °C)     BP Readings from Last 3 Encounters:   08/30/21 114/80   08/26/21 (!) 154/92   08/26/21 (!) 77/53     Pulse Readings from Last 3 Encounters:   08/30/21 86   08/26/21 90   08/26/21 97     Physical Exam  Vitals and nursing note reviewed.    Constitutional:       General: She is not in acute distress. Appearance: Normal appearance. She is well-developed. She is obese. She is ill-appearing (Chronic ). She is not diaphoretic. HENT:      Head: Normocephalic and atraumatic. Right Ear: Tympanic membrane, ear canal and external ear normal. There is no impacted cerumen. Left Ear: Tympanic membrane, ear canal and external ear normal. There is no impacted cerumen. Nose: Nose normal. No congestion or rhinorrhea. Mouth/Throat:      Mouth: Mucous membranes are moist.      Pharynx: Oropharynx is clear. No oropharyngeal exudate or posterior oropharyngeal erythema. Eyes:      General: No scleral icterus. Right eye: No discharge. Left eye: No discharge. Conjunctiva/sclera: Conjunctivae normal.   Neck:      Vascular: No carotid bruit. Trachea: No tracheal deviation. Cardiovascular:      Rate and Rhythm: Normal rate and regular rhythm. Pulses: Normal pulses. Heart sounds: Normal heart sounds. No murmur heard. No friction rub. No gallop. Pulmonary:      Effort: Pulmonary effort is normal. No respiratory distress. Breath sounds: No stridor. Examination of the left-lower field reveals rales. Rales present. No wheezing or rhonchi. Chest:      Chest wall: No tenderness. Abdominal:      General: Bowel sounds are normal. There is no distension. Palpations: Abdomen is soft. There is no mass. Tenderness: There is no abdominal tenderness. There is no guarding or rebound. Hernia: No hernia is present. Musculoskeletal:         General: No swelling, tenderness, deformity or signs of injury. Normal range of motion. Cervical back: Normal range of motion and neck supple. No rigidity. No muscular tenderness. Right lower leg: No edema (Trace). Left lower le+ Edema present. Lymphadenopathy:      Cervical: No cervical adenopathy. Skin:     General: Skin is warm and dry.       Capillary Refill: Capillary refill takes less than 2 seconds. Coloration: Skin is not jaundiced or pale. Findings: No bruising, erythema, lesion or rash. Neurological:      General: No focal deficit present. Mental Status: She is alert and oriented to person, place, and time. Mental status is at baseline. Cranial Nerves: No cranial nerve deficit. Sensory: No sensory deficit. Motor: No weakness or abnormal muscle tone. Coordination: Coordination normal.      Gait: Gait normal.      Deep Tendon Reflexes: Reflexes are normal and symmetric. Reflexes normal.   Psychiatric:         Mood and Affect: Mood normal.         Behavior: Behavior normal.         Thought Content: Thought content normal.         Judgment: Judgment normal.         Nurse Only on 08/10/2021   Component Date Value Ref Range Status    Sodium 08/10/2021 138  136 - 145 mmol/L Final    Potassium 08/10/2021 4.0  3.5 - 5.1 mmol/L Final    Chloride 08/10/2021 102  99 - 110 mmol/L Final    CO2 08/10/2021 25  21 - 32 mmol/L Final    Anion Gap 08/10/2021 11  3 - 16 Final    Glucose 08/10/2021 114* 70 - 99 mg/dL Final    BUN 08/10/2021 13  7 - 20 mg/dL Final    CREATININE 08/10/2021 0.9  0.6 - 1.2 mg/dL Final    GFR Non- 08/10/2021 >60  >60 Final    Comment: >60 mL/min/1.73m2 EGFR, calc. for ages 25 and older using the  MDRD formula (not corrected for weight), is valid for stable  renal function.  GFR  08/10/2021 >60  >60 Final    Comment: Chronic Kidney Disease: less than 60 ml/min/1.73 sq.m. Kidney Failure: less than 15 ml/min/1.73 sq.m. Results valid for patients 18 years and older.       Calcium 08/10/2021 10.0  8.3 - 10.6 mg/dL Final    Total Protein 08/10/2021 7.1  6.4 - 8.2 g/dL Final    Albumin 08/10/2021 4.4  3.4 - 5.0 g/dL Final    Albumin/Globulin Ratio 08/10/2021 1.6  1.1 - 2.2 Final    Total Bilirubin 08/10/2021 0.6  0.0 - 1.0 mg/dL Final    Alkaline Phosphatase 08/10/2021 95  01 - 908 U/L Final    ALT 08/10/2021 11  10 - 40 U/L Final    AST 08/10/2021 17  15 - 37 U/L Final    Globulin 08/10/2021 2.7  g/dL Final    Cholesterol, Total 08/10/2021 123  0 - 199 mg/dL Final    Triglycerides 08/10/2021 81  0 - 150 mg/dL Final    HDL 08/10/2021 63* 40 - 60 mg/dL Final    LDL Calculated 08/10/2021 44  <100 mg/dL Final    VLDL Cholesterol Calculated 08/10/2021 16  Not Established mg/dL Final           Assessment & Plan: The following diagnoses and conditions are stable with no further orders unless indicated:  1. Pain and swelling of left lower leg    2. Dyspnea on exertion    3. Orthostatic hypotension        Dat Martinez was seen today for other. Concern for potential DVT in her left lower leg as she did recently just have surgery and admits to not walking as much as she normally does. It looks like she does have some cellulitis in that left lower extremity, but again a DVT cannot be ruled out. She does admit to also having some shortness of breath on exertion that she feels like she is had for several weeks, but does now admit to feeling dizzy upon exertion. Also concern for possible PE. Blood pressure soft today at 77/53. Informed patient and her  that recommendation would be for her to go to [facility] for further evaluation. Patient and her  are in agreement with plan of care. Patient to schedule follow-up appointment after going to the ER for further evaluation assessment. She verbalizes understanding. Diagnoses and all orders for this visit:    Pain and swelling of left lower leg    Dyspnea on exertion    Orthostatic hypotension      Prior to Visit Medications    Medication Sig Taking?  Authorizing Provider   famotidine (PEPCID) 40 MG tablet TAKE 1 TABLET EVERY EVENING Yes Yvette Grewalar, APRN - CNP   busPIRone (BUSPAR) 15 MG tablet TAKE 1 TABLET BY MOUTH 3 TIMES DAILY AS NEEDED (ANXIETY) Yes Yvette Poplar, APRN - CORA   amLODIPine (NORVASC) 2.5 MG tablet Take 1 tablet by mouth daily Yes Thuy Martin MD   potassium chloride (KLOR-CON M) 20 MEQ extended release tablet Take 1 tablet by mouth daily TAKE 3 TABLETS DAILY Yes Thuy Martin MD   fludrocortisone (FLORINEF) 0.1 MG tablet TAKE 1 TABLET TWICE DAILY Yes MOHINDER Mojica CNP   furosemide (LASIX) 20 MG tablet TAKE 1 TABLET EVERY DAY  Patient taking differently: Take 20 mg by mouth daily Takes every other day Yes MOHINDER Mojica CNP   rosuvastatin (CRESTOR) 20 MG tablet TAKE 1 TABLET EVERY DAY  Patient taking differently: Take 20 mg by mouth nightly  Yes Thuy Martin MD   mirtazapine (REMERON) 30 MG tablet Take 0.5 tablets by mouth nightly Yes MOHINDER Mojiac CNP   dicyclomine (BENTYL) 10 MG capsule Take one capsule twice daily with meals  Patient taking differently: Take 10 mg by mouth Take one capsule twice daily with meals Yes MOHINDER Mojica CNP   citalopram (CELEXA) 40 MG tablet Take 1 tablet by mouth daily Yes MOHINDER Mojica CNP   levothyroxine (SYNTHROID) 75 MCG tablet Take 1 tablet by mouth daily Yes MOHINDER Mojica CNP   risperiDONE (RISPERDAL) 0.25 MG tablet Take 1 mg by mouth nightly  Yes Historical Provider, MD   Omega-3 Fatty Acids (FISH OIL) 1000 MG CPDR Take 3,000 mg by mouth  Yes Historical Provider, MD   Calcium Carbonate-Vitamin D (CALCIUM 500 + D) 500-125 MG-UNIT TABS Take by mouth Yes Historical Provider, MD   aspirin EC 81 MG EC tablet Take 1 tablet by mouth daily Yes Sussy Mixon MD   polyethylene glycol (GLYCOLAX) packet Take 17 g by mouth daily  Yes Historical Provider, MD   Compression Stockings MISC by Does not apply route Wear daily and take off prior to going to bed  MOHINDER Mojica CNP   clindamycin (CLEOCIN) 150 MG capsule Take 3 capsules by mouth 3 times daily for 7 days  Bayron Cline MD     No orders of the defined types were placed in this encounter.         Return if symptoms worsen or fail to improve. Patient should call the office immediately with new or ongoing signs or symptoms or worsening, or proceedto the emergency room. No changes in past medical history, past surgical history, social history, or family history were noted during the patient encounter unless specifically listed above. All updates of past medicalhistory, past surgical history, social history, or family history were reviewed personally by me during the office visit. All problems listed in the assessment are stable unless noted otherwise. Medication profilereviewed personally by me during the office visit. Medication side effects and possible impairments from medications were discussed as applicable. Call if pattern of symptoms change or persists for an extended time. This document was prepared by a combination of typing and transcription through a voice recognition software. All medications have the potential for adverse effects. All medications effect each person differently. Please read and review provided information related to medication. If the medication that you have been prescribed has the potential to cause sedation, do not drive or operate car, truck, or heavy machinery until you know how the medication will effect you. If you experience any adverse effects from the medication, please call the office or report to the emergency department.

## 2021-08-26 NOTE — TELEPHONE ENCOUNTER
Spoke with pt sister she said she isnt sure if she can get her here by 240 or not she is going to talk to zahida and call back

## 2021-08-30 ENCOUNTER — OFFICE VISIT (OUTPATIENT)
Dept: FAMILY MEDICINE CLINIC | Age: 76
End: 2021-08-30
Payer: MEDICARE

## 2021-08-30 ENCOUNTER — TELEPHONE (OUTPATIENT)
Dept: CARDIOLOGY CLINIC | Age: 76
End: 2021-08-30

## 2021-08-30 VITALS
OXYGEN SATURATION: 96 % | BODY MASS INDEX: 32.65 KG/M2 | SYSTOLIC BLOOD PRESSURE: 114 MMHG | HEIGHT: 67 IN | HEART RATE: 86 BPM | DIASTOLIC BLOOD PRESSURE: 80 MMHG | WEIGHT: 208 LBS

## 2021-08-30 DIAGNOSIS — R60.9 EDEMA, UNSPECIFIED TYPE: ICD-10-CM

## 2021-08-30 DIAGNOSIS — R53.81 MALAISE AND FATIGUE: ICD-10-CM

## 2021-08-30 DIAGNOSIS — R06.02 SHORTNESS OF BREATH: Primary | ICD-10-CM

## 2021-08-30 DIAGNOSIS — R79.89 ABNORMAL CBC: ICD-10-CM

## 2021-08-30 DIAGNOSIS — R06.09 DYSPNEA ON EXERTION: ICD-10-CM

## 2021-08-30 DIAGNOSIS — Z09 ENCOUNTER FOR EXAMINATION FOLLOWING TREATMENT AT HOSPITAL: Primary | ICD-10-CM

## 2021-08-30 DIAGNOSIS — R53.83 MALAISE AND FATIGUE: ICD-10-CM

## 2021-08-30 DIAGNOSIS — I95.1 ORTHOSTATIC HYPOTENSION: ICD-10-CM

## 2021-08-30 DIAGNOSIS — I25.118 CORONARY ARTERY DISEASE OF NATIVE ARTERY OF NATIVE HEART WITH STABLE ANGINA PECTORIS (HCC): ICD-10-CM

## 2021-08-30 DIAGNOSIS — L03.116 CELLULITIS OF LEFT LOWER LEG: ICD-10-CM

## 2021-08-30 DIAGNOSIS — R94.4 DECREASED GFR: ICD-10-CM

## 2021-08-30 DIAGNOSIS — R53.83 FATIGUE, UNSPECIFIED TYPE: ICD-10-CM

## 2021-08-30 LAB
BILIRUBIN, POC: NORMAL
BLOOD URINE, POC: NORMAL
CLARITY, POC: NORMAL
COLOR, POC: NORMAL
GLUCOSE URINE, POC: NORMAL
KETONES, POC: NORMAL
LEUKOCYTE EST, POC: NORMAL
NITRITE, POC: NORMAL
PH, POC: NORMAL
PROTEIN, POC: NORMAL
SPECIFIC GRAVITY, POC: NORMAL
UROBILINOGEN, POC: NORMAL

## 2021-08-30 PROCEDURE — 1036F TOBACCO NON-USER: CPT | Performed by: NURSE PRACTITIONER

## 2021-08-30 PROCEDURE — 81002 URINALYSIS NONAUTO W/O SCOPE: CPT | Performed by: NURSE PRACTITIONER

## 2021-08-30 PROCEDURE — G8400 PT W/DXA NO RESULTS DOC: HCPCS | Performed by: NURSE PRACTITIONER

## 2021-08-30 PROCEDURE — 36415 COLL VENOUS BLD VENIPUNCTURE: CPT | Performed by: NURSE PRACTITIONER

## 2021-08-30 PROCEDURE — G8417 CALC BMI ABV UP PARAM F/U: HCPCS | Performed by: NURSE PRACTITIONER

## 2021-08-30 PROCEDURE — G8427 DOCREV CUR MEDS BY ELIG CLIN: HCPCS | Performed by: NURSE PRACTITIONER

## 2021-08-30 PROCEDURE — 1123F ACP DISCUSS/DSCN MKR DOCD: CPT | Performed by: NURSE PRACTITIONER

## 2021-08-30 PROCEDURE — 4040F PNEUMOC VAC/ADMIN/RCVD: CPT | Performed by: NURSE PRACTITIONER

## 2021-08-30 PROCEDURE — 1090F PRES/ABSN URINE INCON ASSESS: CPT | Performed by: NURSE PRACTITIONER

## 2021-08-30 PROCEDURE — 99214 OFFICE O/P EST MOD 30 MIN: CPT | Performed by: NURSE PRACTITIONER

## 2021-08-30 ASSESSMENT — ENCOUNTER SYMPTOMS
NAUSEA: 0
SINUS PRESSURE: 0
EYE REDNESS: 0
BLOOD IN STOOL: 0
COUGH: 0
STRIDOR: 0
VOMITING: 0
ALLERGIC/IMMUNOLOGIC NEGATIVE: 1
DIARRHEA: 0
ABDOMINAL PAIN: 0
TROUBLE SWALLOWING: 0
RHINORRHEA: 0
COLOR CHANGE: 0
SORE THROAT: 0
SHORTNESS OF BREATH: 1
CONSTIPATION: 0
CHOKING: 0
GASTROINTESTINAL NEGATIVE: 1
EYE ITCHING: 0
EYE DISCHARGE: 0
APNEA: 0
PHOTOPHOBIA: 0
BACK PAIN: 0
EYE PAIN: 0
WHEEZING: 0
CHEST TIGHTNESS: 0

## 2021-08-30 NOTE — TELEPHONE ENCOUNTER
Last office visit 08/06/21. Would you like to see patient in office or order any cardiac testing? Please Advise.

## 2021-08-30 NOTE — PROGRESS NOTES
[place]  PHYSICIAN PRACTICES  [facility]  [address] [facility].  [address] [facility]  Dept: [phone]  Dept Fax: [phone]  Loc: Lb Rockwell is a 67 y.o. female who presents today for her medical conditions/complaints as noted below. Lila Joshua is c/o of Follow-Up from Hospital (pt is here to follow up on ER visit. pt is doing better and her swelling in her legs is going down. )        HPI:     Chief Complaint   Patient presents with    Follow-Up from Hospital     pt is here to follow up on ER visit. pt is doing better and her swelling in her legs is going down. HPI    Yudith Clay presents to the office today to follow up from her ER visit from 8/26/2021. She went to the ER from the office with pain and swelling in her left lower leg and shortness of breath with exertion. She was having tenderness in her left lower leg along with redness and warmth. She admitted to her shortness of breath occurring with minimal exertion and she has to rest and sit down, but she denies any chest pain. She admits to chronic dizziness when walking from her orthostatic hypotension. She has tried compression hose over-the-counter, but states majority do not fit her because of her calf size. Vascular ultrasound performed in ER and showed superficial clot but no DVT. CTA performed showing small pericardial effusion. She was recommended to be admitted for cardiac work up, but she left AMA. She was given antibiotics for her left leg cellulitis. She was given Clindamycin to take 3 times daily for 7 days. Today, Yudith Clay is with her . She is feeling better, but admits to feeling really tired and just weak. She has felt tired and weak since her surgery several weeks ago. She feels like the swelling in her leg has decreased. She is taking her antibiotic as prescribed. She admits to the redness and warmth improving.  She has called her cardiologist regarding her dyspnea on exertion and she was instructed to get an echocardiogram and stress test performed. She is going to get these scheduled.      Emergency room record, imaging and labs reviewed from ER visit from 08/26/2021 including imaging, labs, and progress notes     Past Medical History:   Diagnosis Date    Anxiety     Benign essential hypertension 1/17/2017    [facility]el obstruction ([facility] [address]     CAD (coronary artery disease)     Cancer ([facility] [address] 2012    colon    Depression     GERD (gastroesophageal reflux disease)     Hyperlipidemia     Hypertension     Irritable bowel syndrome (IBS)     Obesity     Osteoarthritis       Past Surgical History:   Procedure Laterality Date    APPENDECTOMY      BREAST LUMPECTOMY Bilateral 7/27/2021    RIGHT RADIO FREQUENCY IDENTIFICATION TAG LOCALIZED PARTIAL MASTECTOMY TIMES TWO; LEFT RADIO FREQUENCY IDENTIFICATION TAG LOCALIZED PARTIAL MASTECTOMY performed by Allison Barreto MD at [address] [facility]  2014    mass    COLONOSCOPY  3/29/16    normal    US BREAST BIOPSY NEEDLE ADDITIONAL RIGHT Right 6/29/2021    US BREAST BIOPSY NEEDLE ADDITIONAL RIGHT 6/29/2021 Caryle Nanny, MD [address] BREAST NEEDLE BIOPSY LEFT Left 6/17/2021    US BREAST NEEDLE BIOPSY LEFT 6/17/2021 [address] BREAST NEEDLE BIOPSY RIGHT Right 6/17/2021    US BREAST NEEDLE BIOPSY RIGHT 6/17/2021 Rom Juárez MD [address] [facility] BREAST NEEDLE BIOPSY RIGHT Right 6/29/2021    US BREAST NEEDLE BIOPSY RIGHT 6/29/2021 Caryle Nanny, MD [address] [facility] NEEDLE LOC BREAST ADDL RIGHT Right 7/20/2021    US GUIDED NEEDLE LOC BREAST ADDL RIGHT 7/20/2021 [address] [facility]    US GUIDED NEEDLE LOC OF LEFT BREAST Left 7/20/2021    US GUIDED NEEDLE LOC OF LEFT BREAST 7/20/2021 [address] [facility]    US GUIDED NEEDLE LOC OF RIGHT BREAST Right 7/20/2021    US GUIDED NEEDLE LOC OF RIGHT BREAST 2021 [facility]       Family History   Problem Relation Age of Onset    Cancer Mother         colon    Cancer Brother         colon    Heart Disease Sister     Heart Disease Brother        Social History     Tobacco Use    Smoking status: Former Smoker     Packs/day: 1.00     Years: 10.00     Pack years: 10.00     Types: Cigarettes     Start date:      Quit date: 1983     Years since quittin.9    Smokeless tobacco: Never Used   Substance Use Topics    Alcohol use: No      Current Outpatient Medications   Medication Sig Dispense Refill    Compression Stockings MISC by Does not apply route Wear daily and take off prior to going to bed 1 each 0    clindamycin (CLEOCIN) 150 MG capsule Take 3 capsules by mouth 3 times daily for 7 days 63 capsule 0    famotidine (PEPCID) 40 MG tablet TAKE 1 TABLET EVERY EVENING 90 tablet 1    busPIRone (BUSPAR) 15 MG tablet TAKE 1 TABLET BY MOUTH 3 TIMES DAILY AS NEEDED (ANXIETY) 90 tablet 2    amLODIPine (NORVASC) 2.5 MG tablet Take 1 tablet by mouth daily 90 tablet 3    potassium chloride (KLOR-CON M) 20 MEQ extended release tablet Take 1 tablet by mouth daily TAKE 3 TABLETS DAILY 270 tablet 1    fludrocortisone (FLORINEF) 0.1 MG tablet TAKE 1 TABLET TWICE DAILY 90 tablet 0    furosemide (LASIX) 20 MG tablet TAKE 1 TABLET EVERY DAY (Patient taking differently: Take 20 mg by mouth daily Takes every other day) 90 tablet 0    rosuvastatin (CRESTOR) 20 MG tablet TAKE 1 TABLET EVERY DAY (Patient taking differently: Take 20 mg by mouth nightly ) 90 tablet 1    mirtazapine (REMERON) 30 MG tablet Take 0.5 tablets by mouth nightly 90 tablet 1    dicyclomine (BENTYL) 10 MG capsule Take one capsule twice daily with meals (Patient taking differently: Take 10 mg by mouth Take one capsule twice daily with meals) 180 capsule 1    citalopram (CELEXA) 40 MG tablet Take 1 tablet by mouth daily 90 tablet 1    levothyroxine (SYNTHROID) 75 MCG tablet Take 1 tablet by mouth daily 90 tablet 1    risperiDONE (RISPERDAL) 0.25 MG tablet Take 1 mg by mouth nightly       Omega-3 Fatty Acids (FISH OIL) 1000 MG CPDR Take 3,000 mg by mouth       Calcium Carbonate-Vitamin D (CALCIUM 500 + D) 500-125 MG-UNIT TABS Take by mouth      aspirin EC 81 MG EC tablet Take 1 tablet by mouth daily 30 tablet 3    polyethylene glycol (GLYCOLAX) packet Take 17 g by mouth daily        No current facility-administered medications for this visit. Allergies   Allergen Reactions    Bactrim [Sulfamethoxazole-Trimethoprim] Anaphylaxis    Ultram [Tramadol Hcl] Anaphylaxis     stridor    Dilaudid [Hydromorphone] Other (See Comments)     Hallucinations    Phenergan [Promethazine] Nausea Only    Lipitor [Atorvastatin]      Nausea    Penicillins        :      Review of Systems   Constitutional: Positive for fatigue. Negative for activity change, appetite change, chills, diaphoresis, fever and unexpected weight change. HENT: Negative. Negative for ear pain, rhinorrhea, sinus pressure, sneezing, sore throat and trouble swallowing. Eyes: Negative for photophobia, pain, discharge, redness, itching and visual disturbance. Respiratory: Positive for shortness of breath. Negative for apnea, cough, choking, chest tightness, wheezing and stridor. Cardiovascular: Positive for leg swelling. Negative for chest pain and palpitations. Gastrointestinal: Negative. Negative for abdominal pain, blood in stool, constipation, diarrhea, nausea and vomiting. Genitourinary: Negative. Negative for decreased urine volume, difficulty urinating, dysuria, enuresis, flank pain, frequency, genital sores, hematuria and urgency. Musculoskeletal: Negative. Negative for arthralgias, back pain, gait problem, joint swelling, myalgias, neck pain and neck stiffness. Skin: Positive for rash (Left lower leg ). Negative for color change, pallor and wound. Allergic/Immunologic: Negative.     Neurological: Positive for dizziness (With exertion) and weakness (Generalized fatigue ). Negative for tremors, seizures, syncope, facial asymmetry, speech difficulty, light-headedness, numbness and headaches. Psychiatric/Behavioral: Negative for agitation, behavioral problems, confusion, decreased concentration, dysphoric mood, hallucinations, self-injury, sleep disturbance and suicidal ideas. The patient is not nervous/anxious and is not hyperactive. Objective:     Vitals:    08/30/21 1310   BP: 114/80   Site: Right Upper Arm   Position: Sitting   Cuff Size: Medium Adult   Pulse: 86   SpO2: 96%   Weight: 208 lb (94.3 kg)   Height: 5' 7\" (1.702 m)     Wt Readings from Last 3 Encounters:   08/30/21 208 lb (94.3 kg)   08/26/21 200 lb (90.7 kg)   08/26/21 207 lb (93.9 kg)     Temp Readings from Last 3 Encounters:   08/26/21 97.4 °F (36.3 °C)   07/27/21 98.5 °F (36.9 °C) (Temporal)   07/27/21 96.1 °F (35.6 °C)     BP Readings from Last 3 Encounters:   08/30/21 114/80   08/26/21 (!) 154/92   08/26/21 (!) 77/53     Pulse Readings from Last 3 Encounters:   08/30/21 86   08/26/21 90   08/26/21 97     Physical Exam  Vitals and nursing note reviewed. Constitutional:       General: She is not in acute distress. Appearance: Normal appearance. She is well-developed. She is ill-appearing (Chronically). She is not diaphoretic. HENT:      Head: Normocephalic and atraumatic. Right Ear: Tympanic membrane, ear canal and external ear normal. There is no impacted cerumen. Left Ear: Tympanic membrane, ear canal and external ear normal. There is no impacted cerumen. Nose: Nose normal. No congestion or rhinorrhea. Mouth/Throat:      Mouth: Mucous membranes are moist.      Pharynx: Oropharynx is clear. No oropharyngeal exudate or posterior oropharyngeal erythema. Eyes:      General: No scleral icterus. Right eye: No discharge. Left eye: No discharge.       Conjunctiva/sclera: Conjunctivae normal. Neck:      Vascular: No carotid bruit. Trachea: No tracheal deviation. Cardiovascular:      Rate and Rhythm: Normal rate and regular rhythm. Pulses: Normal pulses. Heart sounds: Normal heart sounds. No murmur heard. No friction rub. No gallop. Pulmonary:      Effort: Pulmonary effort is normal. No respiratory distress. Breath sounds: Normal breath sounds. No stridor. No wheezing, rhonchi or rales. Chest:      Chest wall: No tenderness. Abdominal:      General: Bowel sounds are normal. There is no distension. Palpations: Abdomen is soft. There is no mass. Tenderness: There is no abdominal tenderness. There is no guarding or rebound. Hernia: No hernia is present. Musculoskeletal:         General: No swelling, tenderness, deformity or signs of injury. Normal range of motion. Cervical back: Normal range of motion and neck supple. No rigidity. No muscular tenderness. Right lower leg: Edema (Nonpitting) present. Left lower le+ Edema present. Lymphadenopathy:      Cervical: No cervical adenopathy. Skin:     General: Skin is warm and dry. Capillary Refill: Capillary refill takes less than 2 seconds. Coloration: Skin is not jaundiced or pale. Findings: Erythema present. No bruising, lesion, rash or wound. Neurological:      General: No focal deficit present. Mental Status: She is alert and oriented to person, place, and time. Mental status is at baseline. Cranial Nerves: No cranial nerve deficit. Sensory: No sensory deficit. Motor: No weakness or abnormal muscle tone. Coordination: Coordination normal.      Gait: Gait normal.      Deep Tendon Reflexes: Reflexes are normal and symmetric. Reflexes normal.   Psychiatric:         Mood and Affect: Mood normal.         Behavior: Behavior normal.         Thought Content:  Thought content normal.         Judgment: Judgment normal.       Admission on 2021, Discharged on 08/26/2021   Component Date Value Ref Range Status    WBC 08/26/2021 9.6  4.0 - 11.0 K/uL Final    RBC 08/26/2021 3.93* 4.00 - 5.20 M/uL Final    Hemoglobin 08/26/2021 11.9* 12.0 - 16.0 g/dL Final    Hematocrit 08/26/2021 36.0  36.0 - 48.0 % Final    MCV 08/26/2021 91.5  80.0 - 100.0 fL Final    MCH 08/26/2021 30.1  26.0 - 34.0 pg Final    MCHC 08/26/2021 32.9  31.0 - 36.0 g/dL Final    RDW 08/26/2021 13.3  12.4 - 15.4 % Final    Platelets 30/91/5298 179  135 - 450 K/uL Final    MPV 08/26/2021 8.2  5.0 - 10.5 fL Final    Neutrophils % 08/26/2021 72.6  % Final    Lymphocytes % 08/26/2021 13.1  % Final    Monocytes % 08/26/2021 11.9  % Final    Eosinophils % 08/26/2021 2.1  % Final    Basophils % 08/26/2021 0.3  % Final    Neutrophils Absolute 08/26/2021 7.0  1.7 - 7.7 K/uL Final    Lymphocytes Absolute 08/26/2021 1.3  1.0 - 5.1 K/uL Final    Monocytes Absolute 08/26/2021 1.1  0.0 - 1.3 K/uL Final    Eosinophils Absolute 08/26/2021 0.2  0.0 - 0.6 K/uL Final    Basophils Absolute 08/26/2021 0.0  0.0 - 0.2 K/uL Final    Sodium 08/26/2021 137  136 - 145 mmol/L Final    Potassium reflex Magnesium 08/26/2021 4.4  3.5 - 5.1 mmol/L Final    Chloride 08/26/2021 103  99 - 110 mmol/L Final    CO2 08/26/2021 22  21 - 32 mmol/L Final    Anion Gap 08/26/2021 12  3 - 16 Final    Glucose 08/26/2021 114* 70 - 99 mg/dL Final    BUN 08/26/2021 19  7 - 20 mg/dL Final    CREATININE 08/26/2021 1.2  0.6 - 1.2 mg/dL Final    GFR Non- 08/26/2021 44* >60 Final    Comment: >60 mL/min/1.73m2 EGFR, calc. for ages 25 and older using the  MDRD formula (not corrected for weight), is valid for stable  renal function.  GFR  08/26/2021 53* >60 Final    Comment: Chronic Kidney Disease: less than 60 ml/min/1.73 sq.m. Kidney Failure: less than 15 ml/min/1.73 sq.m. Results valid for patients 18 years and older.       Calcium 08/26/2021 9.5  8.3 - 10.6 mg/dL Final    Total Protein 08/26/2021 6.8  6.4 - 8.2 g/dL Final    Albumin 08/26/2021 4.2  3.4 - 5.0 g/dL Final    Albumin/Globulin Ratio 08/26/2021 1.6  1.1 - 2.2 Final    Total Bilirubin 08/26/2021 0.4  0.0 - 1.0 mg/dL Final    Alkaline Phosphatase 08/26/2021 102  40 - 129 U/L Final    ALT 08/26/2021 9* 10 - 40 U/L Final    AST 08/26/2021 17  15 - 37 U/L Final    Globulin 08/26/2021 2.6  g/dL Final    Pro-BNP 08/26/2021 288  0 - 449 pg/mL Final    Comment: Methodology by NT-proBNP    An age-independent cutoff point of 300 pg/ml has a 98%  negative predictive value excluding acute heart failure. Values exceeding the age-related cutoff values (450 pg/mL if  age<50, 900 if 50-75 and 1800 if >75) has 90% sensitivity and  84% specificity for diagnosing acute HF. In patients with  renal compromise (eGFR<60) values greater than 1200pg/ml have  a diagnostic sensitivity and specificity of 89% and 72% for  acute HF.  Troponin 08/26/2021 <0.01  <0.01 ng/mL Final    Methodology by Troponin T    Ventricular Rate 08/26/2021 87  BPM Final    Atrial Rate 08/26/2021 87  BPM Final    P-R Interval 08/26/2021 190  ms Final    QRS Duration 08/26/2021 86  ms Final    Q-T Interval 08/26/2021 392  ms Final    QTc Calculation (Bazett) 08/26/2021 471  ms Final    P Axis 08/26/2021 25  degrees Final    R Axis 08/26/2021 -16  degrees Final    T Axis 08/26/2021 36  degrees Final    Diagnosis 08/26/2021 Normal sinus rhythmMinimal voltage criteria for LVH, may be normal variantNonspecific ST abnormalityWhen compared with ECG of 16-MAR-2021 14:29,No significant change was foundConfirmed by YOU Núñez MD ([id number]) on 8/26/2021 5:40:55 PM   Final    Lactic Acid, Sepsis 08/26/2021 1.2  0.4 - 1.9 mmol/L Final    Lactic Acid, Sepsis 08/26/2021 1.1  0.4 - 1.9 mmol/L Final           Assessment & Plan: The following diagnoses and conditions are stable with no further orders unless indicated:  1.  Encounter for examination following treatment at hospital    2. Cellulitis of left lower leg    3. Dyspnea on exertion    4. Malaise and fatigue    5. Abnormal CBC    6. Decreased GFR    7. Orthostatic hypotension        Katie Bonilla was seen today for follow-up from hospital.    Heriberto Lawrence cellulitis has improved since her ER visit. She is continue taking her clindamycin as prescribed. Recommend her taking a probiotic daily and  for 1 week after antibiotic or doing a yogurt daily while on antibiotic and for 1 week after. She states she likes yogurt and will try to do a yogurt daily while on the antibiotic and 1 week after her antibiotic. Recommend her try to keep her legs elevated above her heart. Recommend cleaning the site with warm water daily. Recommend her wearing compression hose with her venous insufficiency and orthostatic hypotension. She is unable to walk much related to her dizziness secondary to her orthostatic hypotension. Compression hose sent over to [facility]. She is to wear these daily upon awakening and take them off at night. She is to follow-up with cardiology as recommended for dyspnea on exertion. She continues complaining of malaise and fatigue. She did have a decreased GFR at the ER. Will recheck this today with blood work. She also had an abnormal CBC showing potential anemia. Will check iron studies today as well.     Diagnoses and all orders for this visit:    Encounter for examination following treatment at hospital    Cellulitis of left lower leg  -     Basic Metabolic Panel  -     CBC Auto Differential    Dyspnea on exertion    Malaise and fatigue  -     Basic Metabolic Panel    Abnormal CBC  -     Basic Metabolic Panel  -     CBC Auto Differential  -     Iron and TIBC  -     Ferritin    Decreased GFR  -     Basic Metabolic Panel  -     POCT Urinalysis no Micro    Orthostatic hypotension  -     Compression Stockings MISC; by Does not apply route Wear daily and take off prior to going to bed      Prior to Visit MG-UNIT TABS Take by mouth Yes Historical Provider, MD   aspirin EC 81 MG EC tablet Take 1 tablet by mouth daily Yes Deepti Lal MD   polyethylene glycol (GLYCOLAX) packet Take 17 g by mouth daily  Yes Historical Provider, MD     Orders Placed This Encounter   Medications    Compression Stockings MISC     Sig: by Does not apply route Wear daily and take off prior to going to bed     Dispense:  1 each     Refill:  0         Return in 4 months (on 12/30/2021), or if symptoms worsen or fail to improve, for [facility] - 40 min . Patient should call the office immediately with new or ongoing signs or symptoms or worsening, or proceedto the emergency room. No changes in past medical history, past surgical history, social history, or family history were noted during the patient encounter unless specifically listed above. All updates of past medicalhistory, past surgical history, social history, or family history were reviewed personally by me during the office visit. All problems listed in the assessment are stable unless noted otherwise. Medication profilereviewed personally by me during the office visit. Medication side effects and possible impairments from medications were discussed as applicable. Call if pattern of symptoms change or persists for an extended time. This document was prepared by a combination of typing and transcription through a voice recognition software. All medications have the potential for adverse effects. All medications effect each person differently. Please read and review provided information related to medication. If the medication that you have been prescribed has the potential to cause sedation, do not drive or operate car, truck, or heavy machinery until you know how the medication will effect you. If you experience any adverse effects from the medication, please call the office or report to the emergency department.

## 2021-08-30 NOTE — PATIENT INSTRUCTIONS
Patient Education        Cellulitis: Care Instructions  Your Care Instructions     Cellulitis is a skin infection caused by bacteria, most often strep or staph. It often occurs after a break in the skin from a scrape, cut, bite, or puncture, or after a rash. Cellulitis may be treated without doing tests to find out what caused it. But your doctor may do tests, if needed, to look for a specific bacteria, like methicillin-resistant Staphylococcus aureus (MRSA). The doctor has checked you carefully, but problems can develop later. If you notice any problems or new symptoms, get medical treatment right away. Follow-up care is a key part of your treatment and safety. Be sure to make and go to all appointments, and call your doctor if you are having problems. It's also a good idea to know your test results and keep a list of the medicines you take. How can you care for yourself at home? · Take your antibiotics as directed. Do not stop taking them just because you feel better. You need to take the full course of antibiotics. · Prop up the infected area on pillows to reduce pain and swelling. Try to keep the area above the level of your heart as often as you can. · If your doctor told you how to care for your wound, follow your doctor's instructions. If you did not get instructions, follow this general advice:  ? Wash the wound with clean water 2 times a day. Don't use hydrogen peroxide or alcohol, which can slow healing. ? You may cover the wound with a thin layer of petroleum jelly, such as Vaseline, and a nonstick bandage. ? Apply more petroleum jelly and replace the bandage as needed. · Be safe with medicines. Take pain medicines exactly as directed. ? If the doctor gave you a prescription medicine for pain, take it as prescribed. ? If you are not taking a prescription pain medicine, ask your doctor if you can take an over-the-counter medicine.   To prevent cellulitis in the future  · Try to prevent cuts, scrapes, or other injuries to your skin. Cellulitis most often occurs where there is a break in the skin. · If you get a scrape, cut, mild burn, or bite, wash the wound with clean water as soon as you can to help avoid infection. Don't use hydrogen peroxide or alcohol, which can slow healing. · If you have swelling in your legs (edema), support stockings and good skin care may help prevent leg sores and cellulitis. · Take care of your feet, especially if you have diabetes or other conditions that increase the risk of infection. Wear shoes and socks. Do not go barefoot. If you have athlete's foot or other skin problems on your feet, talk to your doctor about how to treat them. When should you call for help? Call your doctor now or seek immediate medical care if:    · You have signs that your infection is getting worse, such as:  ? Increased pain, swelling, warmth, or redness. ? Red streaks leading from the area. ? Pus draining from the area. ? A fever.     · You get a rash. Watch closely for changes in your health, and be sure to contact your doctor if:    · You do not get better as expected. Where can you learn more? Go to https://[employer]. org and sign in to your [employer] account. Enter R550 in the [facility] box to learn more about \"Cellulitis: Care Instructions. \"     If you do not have an account, please click on the \"Sign Up Now\" link. Current as of: March 3, 2021               Content Version: 12.9  © 2006-2021 [employer]. Care instructions adapted under license by [facility] ([facility]). If you have questions about a medical condition or this instruction, always ask your healthcare professional. [place] [address] any warranty or liability for your use of this information.

## 2021-08-30 NOTE — TELEPHONE ENCOUNTER
Pt was seen at ED for shortness of breath on exertion(That is not new and has been ongoing for weeks),feels very weak, and Leg Swelling. ED wanted to admit pt for further testing. Pt refused signed AMA, said would rather see Meeta Perry as out patient. Pt calling wanting to get scheduled for testing. Nothing on pt chart about what further testing ED was going to do. Please advise.

## 2021-08-30 NOTE — TELEPHONE ENCOUNTER
I would rec: obtain echo and lexiscan stress test as outpt to further assess sob. Can f/u w/ NP in 1-2mo as well. Thank you.

## 2021-08-31 DIAGNOSIS — R73.9 HYPERGLYCEMIA: ICD-10-CM

## 2021-08-31 LAB
ANION GAP SERPL CALCULATED.3IONS-SCNC: 15 MMOL/L (ref 3–16)
BASOPHILS ABSOLUTE: 0 K/UL (ref 0–0.2)
BASOPHILS RELATIVE PERCENT: 0.3 %
BUN BLDV-MCNC: 14 MG/DL (ref 7–20)
CALCIUM SERPL-MCNC: 9.3 MG/DL (ref 8.3–10.6)
CHLORIDE BLD-SCNC: 102 MMOL/L (ref 99–110)
CO2: 23 MMOL/L (ref 21–32)
CREAT SERPL-MCNC: 1.1 MG/DL (ref 0.6–1.2)
EOSINOPHILS ABSOLUTE: 0.1 K/UL (ref 0–0.6)
EOSINOPHILS RELATIVE PERCENT: 1.7 %
FERRITIN: 83.1 NG/ML (ref 15–150)
GFR AFRICAN AMERICAN: 58
GFR NON-AFRICAN AMERICAN: 48
GLUCOSE BLD-MCNC: 103 MG/DL (ref 70–99)
HCT VFR BLD CALC: 37.1 % (ref 36–48)
HEMOGLOBIN: 13 G/DL (ref 12–16)
IRON SATURATION: 28 % (ref 15–50)
IRON: 91 UG/DL (ref 37–145)
LYMPHOCYTES ABSOLUTE: 1.2 K/UL (ref 1–5.1)
LYMPHOCYTES RELATIVE PERCENT: 13.8 %
MCH RBC QN AUTO: 31.1 PG (ref 26–34)
MCHC RBC AUTO-ENTMCNC: 34.9 G/DL (ref 31–36)
MCV RBC AUTO: 89.1 FL (ref 80–100)
MONOCYTES ABSOLUTE: 0.7 K/UL (ref 0–1.3)
MONOCYTES RELATIVE PERCENT: 8.8 %
NEUTROPHILS ABSOLUTE: 6.4 K/UL (ref 1.7–7.7)
NEUTROPHILS RELATIVE PERCENT: 75.4 %
PDW BLD-RTO: 13.3 % (ref 12.4–15.4)
PLATELET # BLD: 198 K/UL (ref 135–450)
PMV BLD AUTO: 8.2 FL (ref 5–10.5)
POTASSIUM SERPL-SCNC: 3.7 MMOL/L (ref 3.5–5.1)
RBC # BLD: 4.17 M/UL (ref 4–5.2)
SODIUM BLD-SCNC: 140 MMOL/L (ref 136–145)
TOTAL IRON BINDING CAPACITY: 325 UG/DL (ref 260–445)
WBC # BLD: 8.5 K/UL (ref 4–11)

## 2021-09-01 ASSESSMENT — ENCOUNTER SYMPTOMS
APNEA: 0
WHEEZING: 0
SHORTNESS OF BREATH: 1
COUGH: 0
CHEST TIGHTNESS: 0
GASTROINTESTINAL NEGATIVE: 1
COLOR CHANGE: 1
STRIDOR: 0
CHOKING: 0

## 2021-09-01 NOTE — PATIENT INSTRUCTIONS
scrapes, or other injuries to your skin. Cellulitis most often occurs where there is a break in the skin. · If you get a scrape, cut, mild burn, or bite, wash the wound with clean water as soon as you can to help avoid infection. Don't use hydrogen peroxide or alcohol, which can slow healing. · If you have swelling in your legs (edema), support stockings and good skin care may help prevent leg sores and cellulitis. · Take care of your feet, especially if you have diabetes or other conditions that increase the risk of infection. Wear shoes and socks. Do not go barefoot. If you have athlete's foot or other skin problems on your feet, talk to your doctor about how to treat them. When should you call for help? Call your doctor now or seek immediate medical care if:    · You have signs that your infection is getting worse, such as:  ? Increased pain, swelling, warmth, or redness. ? Red streaks leading from the area. ? Pus draining from the area. ? A fever.     · You get a rash. Watch closely for changes in your health, and be sure to contact your doctor if:    · You do not get better as expected. Where can you learn more? Go to [email or web address] and sign in to your [employer] account. Enter N661 in the [facility] box to learn more about \"Cellulitis: Care Instructions. \"     If you do not have an account, please click on the \"Sign Up Now\" link. Current as of: March 3, 2021               Content Version: 12.9  © 2006-2021 Healthwise, Incorporated. Care instructions adapted under license by [facility] ([facility]). If you have questions about a medical condition or this instruction, always ask your healthcare professional. [place] [address] any warranty or liability for your use of this information.

## 2021-09-07 ENCOUNTER — TELEPHONE (OUTPATIENT)
Dept: FAMILY MEDICINE CLINIC | Age: 76
End: 2021-09-07

## 2021-09-07 DIAGNOSIS — Z20.822 EXPOSURE TO COVID-19 VIRUS: Primary | ICD-10-CM

## 2021-09-07 NOTE — TELEPHONE ENCOUNTER
Pt's  called and said that pt was exposed to the covid and was wanting to get an order sent to [facility] (hospital).

## 2021-09-09 RX ORDER — LEVOTHYROXINE SODIUM 0.07 MG/1
75 TABLET ORAL DAILY
Qty: 90 TABLET | Refills: 3 | Status: CANCELLED | OUTPATIENT
Start: 2021-09-09

## 2021-09-09 NOTE — TELEPHONE ENCOUNTER
Spoke with patient she is not due for the refill at this time she will contact endo for future refills.

## 2021-09-14 ENCOUNTER — TELEPHONE (OUTPATIENT)
Dept: FAMILY MEDICINE CLINIC | Age: 76
End: 2021-09-14

## 2021-09-14 DIAGNOSIS — L03.115 CELLULITIS OF RIGHT LOWER LEG: Primary | ICD-10-CM

## 2021-09-14 RX ORDER — DOXYCYCLINE HYCLATE 100 MG
100 TABLET ORAL 2 TIMES DAILY
Qty: 14 TABLET | Refills: 0 | Status: SHIPPED | OUTPATIENT
Start: 2021-09-14 | End: 2021-09-21

## 2021-09-14 NOTE — TELEPHONE ENCOUNTER
Patient called and informed of antibiotic to start and that it was sent to [facility]. Informed of the below recommendations which he states he has been doing and he has actually been wrapping the affected leg with an ace wrap for compression due to being unable to get compression stocking on this leg with the current swelling.  Patient acknowledged of need to call back for appointment if no improvement in 2-3 days

## 2021-09-14 NOTE — TELEPHONE ENCOUNTER
Doxycycline sent into pharmacy. Recommend keeping legs elevated above heart, alternating between sitting, standing, and walking frequently, and compression hose. Recommend a follow up appt if no improvement in the next 2-3 days.

## 2021-09-14 NOTE — TELEPHONE ENCOUNTER
Pt spouse [address] [place] [address] called and stated that this pt was just seen for a hospital follow up. (08/30/2021)  Spouse states pt was on antibiotic and finished 4-5 days ago, and yesterday pt rash came back on both feet. No itching, but some pain to walk. Pt also states she is having some swelling to B lower legs, ankles and feet. Please Advise.  Thank you

## 2021-09-17 ENCOUNTER — TELEPHONE (OUTPATIENT)
Dept: FAMILY MEDICINE CLINIC | Age: 76
End: 2021-09-17

## 2021-09-17 NOTE — TELEPHONE ENCOUNTER
Compression hose, increase protein intake, elevating legs above heart, routine exercise, low salt diet

## 2021-09-17 NOTE — TELEPHONE ENCOUNTER
Pt states that her feet are swelling and she was wanting to see what she needs to do. Said that they are pretty swollen.

## 2021-09-21 RX ORDER — LEVOTHYROXINE SODIUM 0.07 MG/1
75 TABLET ORAL DAILY
Qty: 90 TABLET | Refills: 3 | Status: SHIPPED | OUTPATIENT
Start: 2021-09-21

## 2021-09-21 RX ORDER — LEVOTHYROXINE SODIUM 0.07 MG/1
75 TABLET ORAL DAILY
Qty: 90 TABLET | Refills: 3 | OUTPATIENT
Start: 2021-09-21

## 2021-09-22 ENCOUNTER — TELEPHONE (OUTPATIENT)
Dept: SURGERY | Age: 76
End: 2021-09-22

## 2021-09-22 ENCOUNTER — TELEPHONE (OUTPATIENT)
Dept: FAMILY MEDICINE CLINIC | Age: 76
End: 2021-09-22

## 2021-09-22 NOTE — TELEPHONE ENCOUNTER
Pt's  called and said that pt's has swelling in her arm. Was wanting to see if they could get some orders for blood work to find out what is going on.

## 2021-09-22 NOTE — TELEPHONE ENCOUNTER
Patient will need an appointment with Dr. Yehuda Montgomery with bilateral ultrasound scheduled prior. At patients earliest convenience.

## 2021-09-22 NOTE — TELEPHONE ENCOUNTER
Mr. Tim Esteban called to say that Elena Lyle has lumps on both sides. The lumps are under both arms. They are also in the muscle part of her arms, extending down to her elbows. Mr. Tim Esteban stated these lumps are painful for Elena Lyle. He can be reached back at [phone].

## 2021-09-22 NOTE — TELEPHONE ENCOUNTER
Patient called and informed of all recommendations below from [facility] NP

## 2021-09-22 NOTE — TELEPHONE ENCOUNTER
Do not believe labs will explain the swelling her arm. This may be related to her breast surgery as many lymph nodes are in the axilla region. Would recommend contacting breast surgeon and see if this could be cause of arm swelling.   If breat surgeon does not believe arm swelling would be related, recommend VV to discuss further

## 2021-09-23 DIAGNOSIS — Z17.0 MALIGNANT NEOPLASM OF UPPER-OUTER QUADRANT OF RIGHT BREAST IN FEMALE, ESTROGEN RECEPTOR POSITIVE (HCC): ICD-10-CM

## 2021-09-23 DIAGNOSIS — C50.411 MALIGNANT NEOPLASM OF UPPER-OUTER QUADRANT OF RIGHT BREAST IN FEMALE, ESTROGEN RECEPTOR POSITIVE (HCC): ICD-10-CM

## 2021-09-23 DIAGNOSIS — C50.311 MALIGNANT NEOPLASM OF LOWER-INNER QUADRANT OF RIGHT BREAST OF FEMALE, ESTROGEN RECEPTOR POSITIVE (HCC): ICD-10-CM

## 2021-09-23 DIAGNOSIS — Z17.0 MALIGNANT NEOPLASM OF LOWER-OUTER QUADRANT OF LEFT BREAST OF FEMALE, ESTROGEN RECEPTOR POSITIVE (HCC): ICD-10-CM

## 2021-09-23 DIAGNOSIS — Z17.0 MALIGNANT NEOPLASM OF LOWER-INNER QUADRANT OF RIGHT BREAST OF FEMALE, ESTROGEN RECEPTOR POSITIVE (HCC): ICD-10-CM

## 2021-09-23 DIAGNOSIS — C50.512 MALIGNANT NEOPLASM OF LOWER-OUTER QUADRANT OF LEFT BREAST OF FEMALE, ESTROGEN RECEPTOR POSITIVE (HCC): ICD-10-CM

## 2021-09-23 DIAGNOSIS — R22.33 AXILLARY MASS, BILATERAL: Primary | ICD-10-CM

## 2021-09-24 ENCOUNTER — OFFICE VISIT (OUTPATIENT)
Dept: SURGERY | Age: 76
End: 2021-09-24

## 2021-09-24 ENCOUNTER — HOSPITAL ENCOUNTER (OUTPATIENT)
Dept: NON INVASIVE DIAGNOSTICS | Age: 76
Discharge: HOME | End: 2021-09-24
Payer: MEDICARE

## 2021-09-24 ENCOUNTER — HOSPITAL ENCOUNTER (OUTPATIENT)
Dept: NUCLEAR MEDICINE | Age: 76
Discharge: HOME | End: 2021-09-24
Payer: MEDICARE

## 2021-09-24 ENCOUNTER — TELEPHONE (OUTPATIENT)
Dept: CARDIOLOGY CLINIC | Age: 76
End: 2021-09-24

## 2021-09-24 VITALS
SYSTOLIC BLOOD PRESSURE: 106 MMHG | BODY MASS INDEX: 32.8 KG/M2 | WEIGHT: 209 LBS | HEART RATE: 92 BPM | HEIGHT: 67 IN | DIASTOLIC BLOOD PRESSURE: 70 MMHG

## 2021-09-24 DIAGNOSIS — M79.89 ARM SWELLING: Primary | ICD-10-CM

## 2021-09-24 LAB
LV EF: 58 %
LV EF: 69 %
LVEF MODALITY: NORMAL
LVEF MODALITY: NORMAL

## 2021-09-24 PROCEDURE — 6360000002 HC RX W HCPCS: Performed by: INTERNAL MEDICINE

## 2021-09-24 PROCEDURE — 93306 TTE W/DOPPLER COMPLETE: CPT

## 2021-09-24 PROCEDURE — A9502 TC99M TETROFOSMIN: HCPCS | Performed by: INTERNAL MEDICINE

## 2021-09-24 PROCEDURE — 3430000000 HC RX DIAGNOSTIC RADIOPHARMACEUTICAL: Performed by: INTERNAL MEDICINE

## 2021-09-24 PROCEDURE — 78452 HT MUSCLE IMAGE SPECT MULT: CPT

## 2021-09-24 PROCEDURE — 93017 CV STRESS TEST TRACING ONLY: CPT

## 2021-09-24 PROCEDURE — 99024 POSTOP FOLLOW-UP VISIT: CPT | Performed by: SURGERY

## 2021-09-24 RX ADMIN — TETROFOSMIN 31.1 MILLICURIE: 1.38 INJECTION, POWDER, LYOPHILIZED, FOR SOLUTION INTRAVENOUS at 12:09

## 2021-09-24 RX ADMIN — REGADENOSON 0.4 MG: 0.08 INJECTION, SOLUTION INTRAVENOUS at 12:09

## 2021-09-24 RX ADMIN — TETROFOSMIN 10.4 MILLICURIE: 1.38 INJECTION, POWDER, LYOPHILIZED, FOR SOLUTION INTRAVENOUS at 10:29

## 2021-09-24 NOTE — TELEPHONE ENCOUNTER
----- Message from Brooklyn Sylvester MD sent at 9/24/2021  2:07 PM EDT -----  Let patient know echo test shows normal heart function, no new orders or changes at this time. Thanks.       Let patient know their stress test is normal, continue current meds, no new orders or changes at this time.

## 2021-09-24 NOTE — PROGRESS NOTES
CHIEF COMPLAINT:  Post-operative visit, swelling of arms     HISTORY OF PRESENT ILLNESS:  Derrell Sousa is a 67 y.o. woman who is status-post left partial mastectomy and right partial mastectomy at 3:00 and 12:00 on 7/27/2021 for bilateral breast cancer. She presents today with complaints of bilateral arm swelling. Her  provides most of the history and states that she has had bilateral arm swelling over the past couple of weeks, most notably on the right. The swelling on the left has essentially resolved and the swelling on the right has significantly improved over the last several days. She has been on antibiotics for cellulitis of her left lower leg and thinks the arm swelling may have improved while on the antibiotics, like her leg did. She denies any problems with the breast incisions. She denies any redness around the incisions or drainage from the wounds. She has no other systemic complaints and has not had any fevers. PHYSICAL EXAMINATION:    /70 (Site: Left Upper Arm, Position: Sitting, Cuff Size: Large Adult)   Pulse 92   Ht 5' 7\" (1.702 m)   Wt 209 lb (94.8 kg)   BMI 32.73 kg/m²    BREAST EXAMINATION: On examination, the incisions are clean, dry, and intact and healing well. There is no sign of any infection or drainage from the wounds. EXTREMITIES: Left upper extremity without edema, erythema, or nodules. Right upper extremity with focal area of edema at the mid upper arm. No axillary lymphadenopathy. Bilateral lower extremity edema present. IMPRESSION/RECOMMENDATION:    Bilateral breast cancer s/p left partial mastectomy and right partial mastectomy at 3:00 and 12:00 on 7/27/2021    Her arm swelling does not appear to be related to her recent breast surgery. It is possible that she has a superficial phlebitis of the right upper extremity given the focal edema of that arm. She states that she has an appointment scheduled with her PCP on Monday.   They declined US of the right upper extremity since her symptoms are improving. I recommended elevation and warm compresses until she follows up with PCP. As the patient was leaving our office, she got up rather quickly and felt dizzy as she was walking out. She completed a cardiac stress test this morning and had not had anything to eat or drink since. After sitting down and drinking water, she reported the dizziness subsided and she and her  wanted to continue on their way home as she had a sandwich in the car to eat. I called their home an hour later and she reported feeling much better after having eaten. She assured me she would follow up with her PCP on Monday.     Enrique Damon MD, MD

## 2021-09-27 ENCOUNTER — VIRTUAL VISIT (OUTPATIENT)
Dept: FAMILY MEDICINE CLINIC | Age: 76
End: 2021-09-27
Payer: MEDICARE

## 2021-09-27 DIAGNOSIS — I87.2 VENOUS INSUFFICIENCY (CHRONIC) (PERIPHERAL): Primary | ICD-10-CM

## 2021-09-27 PROCEDURE — G8427 DOCREV CUR MEDS BY ELIG CLIN: HCPCS | Performed by: NURSE PRACTITIONER

## 2021-09-27 PROCEDURE — 4040F PNEUMOC VAC/ADMIN/RCVD: CPT | Performed by: NURSE PRACTITIONER

## 2021-09-27 PROCEDURE — 1090F PRES/ABSN URINE INCON ASSESS: CPT | Performed by: NURSE PRACTITIONER

## 2021-09-27 PROCEDURE — 99213 OFFICE O/P EST LOW 20 MIN: CPT | Performed by: NURSE PRACTITIONER

## 2021-09-27 PROCEDURE — G8400 PT W/DXA NO RESULTS DOC: HCPCS | Performed by: NURSE PRACTITIONER

## 2021-09-27 PROCEDURE — 1123F ACP DISCUSS/DSCN MKR DOCD: CPT | Performed by: NURSE PRACTITIONER

## 2021-09-27 SDOH — ECONOMIC STABILITY: FOOD INSECURITY: WITHIN THE PAST 12 MONTHS, THE FOOD YOU BOUGHT JUST DIDN'T LAST AND YOU DIDN'T HAVE MONEY TO GET MORE.: NEVER TRUE

## 2021-09-27 SDOH — ECONOMIC STABILITY: FOOD INSECURITY: WITHIN THE PAST 12 MONTHS, YOU WORRIED THAT YOUR FOOD WOULD RUN OUT BEFORE YOU GOT MONEY TO BUY MORE.: NEVER TRUE

## 2021-09-27 ASSESSMENT — SOCIAL DETERMINANTS OF HEALTH (SDOH): HOW HARD IS IT FOR YOU TO PAY FOR THE VERY BASICS LIKE FOOD, HOUSING, MEDICAL CARE, AND HEATING?: NOT HARD AT ALL

## 2021-09-27 ASSESSMENT — ENCOUNTER SYMPTOMS: RESPIRATORY NEGATIVE: 1

## 2021-09-27 NOTE — PROGRESS NOTES
2021    TELEHEALTH EVALUATION -- Audio/Visual (During WNZHE-50 public health emergency)    HPI:    Markell Locke (:  1945) has requested an audio/video evaluation for the following concern(s):    HPI    Richie Ceja presents for a virtual visit as she is concerned about her lower legs swelling. She has noticed this for several months. She states she is not having any leg pain or any rash on her legs. She is wearing compression hose at times and is taking Furosemide daily. She is trying to get up at times during the day and walk and keep legs elevated. She is having a hard time wearing compression hose because she states they are too tight on her. She puts on the compression hose when her legs are swollen. Review of Systems   Constitutional: Negative. Respiratory: Negative. Cardiovascular: Positive for leg swelling. Negative for chest pain and palpitations. Gastrointestinal: Negative. Skin: Negative. Neurological: Negative. Psychiatric/Behavioral: Negative. Prior to Visit Medications    Medication Sig Taking?  Authorizing Provider   levothyroxine (SYNTHROID) 75 MCG tablet Take 1 tablet by mouth daily Yes Manasa Pike MD   fludrocortisone (FLORINEF) 0.1 MG tablet TAKE 1 TABLET TWICE DAILY Yes MOHINDER Wright CNP   famotidine (PEPCID) 40 MG tablet TAKE 1 TABLET EVERY EVENING Yes MOHINDER Wright CNP   busPIRone (BUSPAR) 15 MG tablet TAKE 1 TABLET BY MOUTH 3 TIMES DAILY AS NEEDED (ANXIETY) Yes MOHINDER Wright CNP   amLODIPine (NORVASC) 2.5 MG tablet Take 1 tablet by mouth daily Yes Venancio Hermosillo MD   potassium chloride (KLOR-CON M) 20 MEQ extended release tablet Take 1 tablet by mouth daily TAKE 3 TABLETS DAILY Yes Venancio Hermosillo MD   furosemide (LASIX) 20 MG tablet TAKE 1 TABLET EVERY DAY  Patient taking differently: Take 20 mg by mouth daily Takes every other day Yes MOHINDER Wright CNP   rosuvastatin (CRESTOR) 20 MG tablet TAKE 1 TABLET EVERY DAY  Patient taking differently: Take 20 mg by mouth nightly  Yes Nita Rojsa MD   mirtazapine (REMERON) 30 MG tablet Take 0.5 tablets by mouth nightly Yes MOHINDER Jimenez CNP   dicyclomine (BENTYL) 10 MG capsule Take one capsule twice daily with meals  Patient taking differently: Take 10 mg by mouth Take one capsule twice daily with meals Yes MOHINDER Jimenez CNP   citalopram (CELEXA) 40 MG tablet Take 1 tablet by mouth daily Yes MOHINDER Jimenez CNP   risperiDONE (RISPERDAL) 0.25 MG tablet Take 1 mg by mouth nightly  Yes Historical Provider, MD   Omega-3 Fatty Acids (FISH OIL) 1000 MG CPDR Take 3,000 mg by mouth  Yes Historical Provider, MD   Calcium Carbonate-Vitamin D (CALCIUM 500 + D) 500-125 MG-UNIT TABS Take by mouth Yes Historical Provider, MD   aspirin EC 81 MG EC tablet Take 1 tablet by mouth daily Yes Liza Sen MD   polyethylene glycol (GLYCOLAX) packet Take 17 g by mouth daily  Yes Historical Provider, MD   Compression Stockings MISC by Does not apply route  MOHINDER Jimenez CNP       Social History     Tobacco Use    Smoking status: Former Smoker     Packs/day: 1.00     Years: 10.00     Pack years: 10.00     Types: Cigarettes     Start date:      Quit date: 1983     Years since quittin.0    Smokeless tobacco: Never Used   Vaping Use    Vaping Use: Never used   Substance Use Topics    Alcohol use: No    Drug use: No        Allergies   Allergen Reactions    Bactrim [Sulfamethoxazole-Trimethoprim] Anaphylaxis    Ultram [Tramadol Hcl] Anaphylaxis     stridor    Dilaudid [Hydromorphone] Other (See Comments)     Hallucinations    Phenergan [Promethazine] Nausea Only    Lipitor [Atorvastatin]      Nausea    Penicillins    ,   Past Medical History:   Diagnosis Date    Anxiety     Benign essential hypertension 2017    [facility]     CAD (coronary artery disease)     Cancer ([facility]     colon    Depression     GERD (gastroesophageal reflux disease)     Hyperlipidemia     Hypertension     Irritable bowel syndrome (IBS)     Obesity     Osteoarthritis    ,   Past Surgical History:   Procedure Laterality Date    APPENDECTOMY      BREAST LUMPECTOMY Bilateral 2021    RIGHT RADIO FREQUENCY IDENTIFICATION TAG LOCALIZED PARTIAL MASTECTOMY TIMES TWO; LEFT RADIO FREQUENCY IDENTIFICATION TAG LOCALIZED PARTIAL MASTECTOMY performed by Pancho Caputo MD at [address] COLONOSCOPY  2014    mass    COLONOSCOPY  3/29/16    normal    US BREAST BIOPSY NEEDLE ADDITIONAL RIGHT Right 2021    US BREAST BIOPSY NEEDLE ADDITIONAL RIGHT 2021 Tena Sanon MD [facility]    US BREAST NEEDLE BIOPSY LEFT Left 2021    US BREAST NEEDLE BIOPSY LEFT 2021 Sourav Richardson MD [facility]    US BREAST NEEDLE BIOPSY RIGHT Right 2021    US BREAST NEEDLE BIOPSY RIGHT 2021 Sourav Richardson MD [facility]    US BREAST NEEDLE BIOPSY RIGHT Right 2021    US BREAST NEEDLE BIOPSY RIGHT 2021 Tena Sanon MD [facility]    US GUIDED NEEDLE LOC BREAST ADDL RIGHT Right 2021    US GUIDED NEEDLE LOC BREAST ADDL RIGHT 2021 [facility]    US GUIDED NEEDLE LOC OF LEFT BREAST Left 2021    US GUIDED NEEDLE LOC OF LEFT BREAST 2021 [facility]    US GUIDED NEEDLE LOC OF RIGHT BREAST Right 2021    US GUIDED NEEDLE LOC OF RIGHT BREAST 2021 [facility]   ,   Social History     Tobacco Use    Smoking status: Former Smoker     Packs/day: 1.00     Years: 10.00     Pack years: 10.00     Types: Cigarettes     Start date:      Quit date: 1983     Years since quittin.0    Smokeless tobacco: Never Used   Vaping Use    Vaping Use: Never used   Substance Use Topics    Alcohol use: No    Drug use: No   ,   Family History Problem Relation Age of Onset    Cancer Mother         colon    Cancer Brother         colon    Heart Disease Sister     Heart Disease Brother    ,   Immunization History   Administered Date(s) Administered    COVID-19, Moderna, PF, 100mcg/0.5mL 02/05/2021, 03/05/2021    Influenza Vaccine, unspecified formulation 12/22/2016    Influenza Virus Vaccine 11/12/2014, 12/22/2016    Influenza, High Dose (Fluzone 65 yrs and older) 10/20/2016, 12/17/2019    Influenza, Quadv, IM, PF (6 mo and older Fluzone, Flulaval, Fluarix, and 3 yrs and older Afluria) 11/28/2018, 10/13/2020    Pneumococcal Conjugate 13-valent ([id number]) 10/20/2016    Pneumococcal Polysaccharide ([id number]) 02/23/2015, 12/22/2016    Tdap (Boostrix, Adacel) 07/30/2012   ,   Health Maintenance   Topic Date Due    DEXA (modify frequency per FRAX score)  Never done    Flu vaccine (1) 09/01/2021    Shingles Vaccine (1 of 2) 01/15/2022 (Originally 5/1/1994)    Annual Wellness Visit (AWV)  05/25/2022    DTaP/Tdap/Td vaccine (2 - Td or Tdap) 07/30/2022    Lipid screen  08/10/2022    Potassium monitoring  08/30/2022    Creatinine monitoring  08/30/2022    Pneumococcal 65+ years Vaccine  Completed    COVID-19 Vaccine  Completed    Hepatitis A vaccine  Aged Out    Hepatitis B vaccine  Aged Out    Hib vaccine  Aged Out    Meningococcal (ACWY) vaccine  Aged Out    Hepatitis C screen  Discontinued       PHYSICAL EXAMINATION:  [ INSTRUCTIONS:  \"[x]\" Indicates a positive item  \"[]\" Indicates a negative item      Vital Signs: (As obtained by patient/caregiver or practitioner observation)    Blood pressure-  Heart rate-    Respiratory rate-    Temperature-  Pulse oximetry-     Constitutional: [x] Appears well-developed and well-nourished [x] No apparent distress      [] Abnormal-   Mental status  [x] Alert and awake  [x] Oriented to person/place/time [x]Able to follow commands      Eyes:  EOM    [x]  Normal  [] Abnormal-  Sclera  [x]  Normal [] Abnormal -         Discharge [x]  None visible  [] Abnormal -    HENT:   [x] Normocephalic, atraumatic. [] Abnormal   [x] Mouth/Throat: Mucous membranes are moist.     External Ears [x] Normal  [] Abnormal-     Neck: [x] No visualized mass     Pulmonary/Chest: [x] Respiratory effort normal.  [x] No visualized signs of difficulty breathing or respiratory distress        [] Abnormal-      Musculoskeletal:   [x] Normal gait with no signs of ataxia         [x] Normal range of motion of neck        [] Abnormal-       Neurological:        [x] No Facial Asymmetry (Cranial nerve 7 motor function) (limited exam to video visit)          [x] No gaze palsy        [] Abnormal-         Skin:        [x] No significant exanthematous lesions or discoloration noted on facial skin         [] Abnormal-            Psychiatric:       [x] Normal Affect [x] No Hallucinations        [] Abnormal-     Other pertinent observable physical exam findings- mild bilateral lower leg swelling    ASSESSMENT/PLAN:  Daysi Hendrickson was seen today for foot swelling. Recommend putting compression hose on first thing in the AM.  Recommend routine exercise, weight loss, increase in protein intake, and low sodium diet. She may take Furosemide with potassium up to 3 to 5 time a week for leg swelling. Educated patient and her  on venous insufficiency and they verbalize understanding. Follow up in office in 6-8 weeks. Diagnoses and all orders for this visit:    Venous insufficiency (chronic) (peripheral)        Return in about 8 weeks (around 11/22/2021) for [facility] in appt. Homer Tavares is a 67 y.o. female being evaluated by a Virtual Visit (video visit) encounter to address concerns as mentioned above. A caregiver was present when appropriate.  Due to this being a TeleHealth encounter (During [facility] emergency), evaluation of the following organ systems was limited: Vitals/Constitutional/EENT/Resp/CV/GI//MS/Neuro/Skin/Heme-Lymph-Imm. Pursuant to the emergency declaration under the [address] [place] [address] and the [employer] Act, this Virtual Visit was conducted with patient's (and/or legal guardian's) consent, to reduce the patient's risk of exposure to COVID-19 and provide necessary medical care. The patient (and/or legal guardian) has also been advised to contact this office for worsening conditions or problems, and seek emergency medical treatment and/or call 911 if deemed necessary. Patient identification was verified at the start of the visit: Yes    Total time spent on this encounter: 21 minutes 17 seconds    Services were provided through a video synchronous discussion virtually to substitute for in-person clinic visit. Patient and provider were located at their individual homes. --MOHINDER Willson - CNP on 10/3/2021 at 10:23 PM    An electronic signature was used to authenticate this note. Patient should call the office immediately with new or ongoing signs or symptoms or worsening, or proceed to the emergency room. All entries in chief complaint and history of present illness are reviewed and validated by me. No changes in past medical history, past surgical history, social history, or family history were noted during the patient encounter unless specifically listed above. All updates of past medical history, past surgical history, social history, or family history were reviewed personally by me during the office visit. All problems listed in the assessment are stable unless noted otherwise. Medication profile reviewed personally by me during the office visit. Medication side effects and possible impairments from medications were discussed as applicable.      Every effort has been made to assure accurate transcription by this voice recognition software. However, mistakes in transcription may still occur    You are being started on a new medication. All medications have the potential for adverse effects. All medications effect each person differently. Please read and review provided information related to medication. If the medication that you have been prescribed has the potential to cause sedation, do not drive or operate car, truck, or heavy machinery until you know how the medication will effect you. If you experience any adverse effects from the medication, please call the office or report to the emergency department. Learning About Venous Insufficiency  What is it? Venous insufficiency is a problem with the flow of blood from the veins of the legs back to the heart. It's also called chronic venous insufficiency or chronic venous stasis. Your veins bring blood back to the heart after it flows through your body. Veins have valves that keep the blood moving in one directiontoward the heart. But with venous insufficiency, the veins of the legs might not work as they should. This can allow blood to leak backward. Fluid can pool in the legs. This can lead to problems that include varicose veins. What causes it? Venous insufficiency is sometimes caused by deep vein thrombosis and high blood pressure inside leg veins. You are more likely to have venous insufficiency if you:  · Are older. · Are female. · Are overweight. · Don't get enough physical activity. · Smoke. · Have a family history of varicose veins. What are the symptoms? Symptoms of venous insufficiency affect the legs. They may include:  · Swelling, often in the ankles. · A rash. · Varicose veins. · Itching. · Cramping. · Skin sores (ulcers). · Aching or a feeling of heaviness. · Changes in skin color. How is it diagnosed?   Your doctor can diagnose venous insufficiency by examining your legs and by using a type of ultrasound test (duplex Doppler) to find out how well blood is flowing in your legs. How is it treated? To reduce swelling and relieve pain caused by venous insufficiency, you can wear compression stockings. They are tighter at the ankles than at the top of the legs. They also can help venous skin ulcers heal. But there are different types of stockings, and they need to fit right. So your doctor will recommend what you need. You also can try to:  · Get more exercise, especially walking. It can increase blood flow. · Avoid standing still or sitting for a long time, which can make the fluid pool in your legs. · Try not to sit with your legs crossed at the knee. · Keep your legs raised above your heart when you're lying down. This reduces swelling. If these treatments don't work, you may need medicine or a procedure to help relieve symptoms. Procedures might be done to close the vein, to remove the vein, or to improve blood flow. Follow-up care is a key part of your treatment and safety. Be sure to make and go to all appointments, and call your doctor if you are having problems. It's also a good idea to know your test results and keep a list of the medicines you take. Current as of: July 6, 2021               Content Version: 13.0  © 2006-2021 Healthwise, Incorporated. Care instructions adapted under license by [facility] ([facility]). If you have questions about a medical condition or this instruction, always ask your healthcare professional. [place] [address] any warranty or liability for your use of this information.

## 2021-09-27 NOTE — PATIENT INSTRUCTIONS
Patient Education        Learning About Venous Insufficiency  What is it? Venous insufficiency is a problem with the flow of blood from the veins of the legs back to the heart. It's also called chronic venous insufficiency or chronic venous stasis. Your veins bring blood back to the heart after it flows through your body. Veins have valves that keep the blood moving in one directiontoward the heart. But with venous insufficiency, the veins of the legs might not work as they should. This can allow blood to leak backward. Fluid can pool in the legs. This can lead to problems that include varicose veins. What causes it? Venous insufficiency is sometimes caused by deep vein thrombosis and high blood pressure inside leg veins. You are more likely to have venous insufficiency if you:  · Are older. · Are female. · Are overweight. · Don't get enough physical activity. · Smoke. · Have a family history of varicose veins. What are the symptoms? Symptoms of venous insufficiency affect the legs. They may include:  · Swelling, often in the ankles. · A rash. · Varicose veins. · Itching. · Cramping. · Skin sores (ulcers). · Aching or a feeling of heaviness. · Changes in skin color. How is it diagnosed? Your doctor can diagnose venous insufficiency by examining your legs and by using a type of ultrasound test (duplex Doppler) to find out how well blood is flowing in your legs. How is it treated? To reduce swelling and relieve pain caused by venous insufficiency, you can wear compression stockings. They are tighter at the ankles than at the top of the legs. They also can help venous skin ulcers heal. But there are different types of stockings, and they need to fit right. So your doctor will recommend what you need. You also can try to:  · Get more exercise, especially walking. It can increase blood flow. · Avoid standing still or sitting for a long time, which can make the fluid pool in your legs.   · Try not to sit with your legs crossed at the knee. · Keep your legs raised above your heart when you're lying down. This reduces swelling. If these treatments don't work, you may need medicine or a procedure to help relieve symptoms. Procedures might be done to close the vein, to remove the vein, or to improve blood flow. Follow-up care is a key part of your treatment and safety. Be sure to make and go to all appointments, and call your doctor if you are having problems. It's also a good idea to know your test results and keep a list of the medicines you take. Current as of: July 6, 2021               Content Version: 13.0  © 2006-2021 [employer]. Care instructions adapted under license by [facility] ([facility]). If you have questions about a medical condition or this instruction, always ask your healthcare professional. [address] any warranty or liability for your use of this information.

## 2021-09-28 ENCOUNTER — TELEPHONE (OUTPATIENT)
Dept: FAMILY MEDICINE CLINIC | Age: 76
End: 2021-09-28

## 2021-09-28 DIAGNOSIS — M79.89 LEG SWELLING: Primary | ICD-10-CM

## 2021-10-03 ASSESSMENT — ENCOUNTER SYMPTOMS: GASTROINTESTINAL NEGATIVE: 1

## 2021-10-04 RX ORDER — CITALOPRAM 40 MG/1
TABLET ORAL
Qty: 90 TABLET | Refills: 1 | Status: SHIPPED | OUTPATIENT
Start: 2021-10-04

## 2021-10-08 ENCOUNTER — OFFICE VISIT (OUTPATIENT)
Dept: CARDIOLOGY CLINIC | Age: 76
End: 2021-10-08
Payer: MEDICARE

## 2021-10-08 VITALS
OXYGEN SATURATION: 96 % | BODY MASS INDEX: 33.12 KG/M2 | DIASTOLIC BLOOD PRESSURE: 80 MMHG | HEART RATE: 95 BPM | WEIGHT: 211 LBS | SYSTOLIC BLOOD PRESSURE: 120 MMHG | HEIGHT: 67 IN

## 2021-10-08 DIAGNOSIS — E78.5 DYSLIPIDEMIA: ICD-10-CM

## 2021-10-08 DIAGNOSIS — R53.83 FATIGUE, UNSPECIFIED TYPE: ICD-10-CM

## 2021-10-08 DIAGNOSIS — I10 ESSENTIAL HYPERTENSION: ICD-10-CM

## 2021-10-08 DIAGNOSIS — I25.118 CORONARY ARTERY DISEASE OF NATIVE ARTERY OF NATIVE HEART WITH STABLE ANGINA PECTORIS (HCC): Primary | ICD-10-CM

## 2021-10-08 DIAGNOSIS — M79.89 SWELLING OF BOTH LOWER EXTREMITIES: ICD-10-CM

## 2021-10-08 PROCEDURE — 4040F PNEUMOC VAC/ADMIN/RCVD: CPT | Performed by: NURSE PRACTITIONER

## 2021-10-08 PROCEDURE — 1036F TOBACCO NON-USER: CPT | Performed by: NURSE PRACTITIONER

## 2021-10-08 PROCEDURE — G8417 CALC BMI ABV UP PARAM F/U: HCPCS | Performed by: NURSE PRACTITIONER

## 2021-10-08 PROCEDURE — 1123F ACP DISCUSS/DSCN MKR DOCD: CPT | Performed by: NURSE PRACTITIONER

## 2021-10-08 PROCEDURE — G8400 PT W/DXA NO RESULTS DOC: HCPCS | Performed by: NURSE PRACTITIONER

## 2021-10-08 PROCEDURE — 99214 OFFICE O/P EST MOD 30 MIN: CPT | Performed by: NURSE PRACTITIONER

## 2021-10-08 PROCEDURE — G8484 FLU IMMUNIZE NO ADMIN: HCPCS | Performed by: NURSE PRACTITIONER

## 2021-10-08 PROCEDURE — 1090F PRES/ABSN URINE INCON ASSESS: CPT | Performed by: NURSE PRACTITIONER

## 2021-10-08 PROCEDURE — G8427 DOCREV CUR MEDS BY ELIG CLIN: HCPCS | Performed by: NURSE PRACTITIONER

## 2021-10-08 RX ORDER — FUROSEMIDE 20 MG/1
20 TABLET ORAL DAILY
Qty: 90 TABLET | Refills: 1
Start: 2021-10-08 | End: 2021-10-14

## 2021-10-08 NOTE — PATIENT INSTRUCTIONS
1. Agree with using the furosemide (Lasix) 20 mg as needed for swelling or weight gain of 3 lbs in 1 day or 5 lbs over 5 days  2. Keep your fluid intake around 64 oz of all liquids per day  3. No change in other heart medicines  4.  Follow up with me in 6 months at [facility]

## 2021-10-08 NOTE — PROGRESS NOTES
[address]   Cardiac Evaluation    Primary Care Doctor:  Melodie Schuster, APRN - CNP    Chief Complaint   Patient presents with    Follow-up    Dizziness        Assessment:    1. Coronary artery disease of native artery of native heart with stable angina pectoris ([address]    2. Essential hypertension    3. Dyslipidemia    4. Fatigue, unspecified type    5. Swelling of both lower extremities        Plan:   1. Agree with using the furosemide (Lasix) 20 mg as needed for swelling or weight gain of 3 lbs in 1 day or 5 lbs over 5 days  2. Keep your fluid intake around 64 oz of all liquids per day  3. No change in other heart medicines  4. [facility]:    10/08/21 1443   BP: 120/80   Pulse: 95   SpO2: 96%   Weight: 211 lb (95.7 kg)   Height: 5' 7\" (1.702 m)       History of Present Illness:   I had the pleasure of seeing Dilip Webster in follow up for moderate CAD, hypertension with orthostatic hypotension, hyperlipidemia as well as breast cancer s/p bilateral mastectomy. She was seen in the emergency room on 8/30/2021 with dyspnea, edema and weakness. She signed out AMA to discharge home. She then called the office where an echocardiogram and stress test were recommended for further evaluation. Both these these were felt to be normal and no further testing recommended. She states she actually went to the ED for swelling and rash. She still has a lump in right jaw. She always has some shortness of breath with being in hospital/ ED setting  Her biggest concern was swelling with a rash. This has gradually improved over the past 6 weeks. She was taking a water pill daily with improvement. Now it has been a week since her last dose of Lasix. She denies any shortness of breath or chest pain. Dilip [place] describes symptoms including fatigue, edema but denies chest pain, dyspnea, palpitations, orthopnea, PND, early saiety, syncope. NYHA:   II  ACC/ AHA Stage:    C    Past Medical History:   has a past medical history of Anxiety, Benign essential hypertension, Bowel obstruction ([facility] [address] CAD (coronary artery disease), Cancer ([facility] [address] Depression, GERD (gastroesophageal reflux disease), Hyperlipidemia, Hypertension, Irritable bowel syndrome (IBS), Obesity, and Osteoarthritis. Surgical History:   has a past surgical history that includes Colon surgery; Cholecystectomy; Appendectomy; Colonoscopy (2014); Colonoscopy (3/29/16); US BREAST BIOPSY W LOC DEVICE 1ST LESION LEFT (Left, 6/17/2021); US BREAST BIOPSY W LOC DEVICE 1ST LESION RIGHT (Right, 6/17/2021); US BREAST BIOPSY W LOC DEVICE 1ST LESION RIGHT (Right, 6/29/2021); US BREAST BIOPSY W LOC DEVICE EACH ADDL LESION RIGHT (Right, 6/29/2021); US PLACE BREAST LOC DEVICE 1ST LESION LEFT (Left, 7/20/2021); US PLACE BREAST LOC DEVICE 1ST LESION RIGHT (Right, 7/20/2021); US PLACE BREAST LOC DEVICE EACH ADDL RIGHT (Right, 7/20/2021); and Breast lumpectomy (Bilateral, 7/27/2021). Social History:   reports that she quit smoking about 38 years ago. Her smoking use included cigarettes. She started smoking about 48 years ago. She has a 10.00 pack-year smoking history. She has never used smokeless tobacco. She reports that she does not drink alcohol and does not use drugs. Family History:   Family History   Problem Relation Age of Onset    Cancer Mother         colon    Cancer Brother         colon    Heart Disease Sister     Heart Disease Brother        Home Medications:  Prior to Admission medications    Medication Sig Start Date End Date Taking?  Authorizing Provider   citalopram (CELEXA) 40 MG tablet TAKE 1 TABLET EVERY DAY 10/4/21  Yes MOHINDER Donnelly CNP   Compression Stockings MISC by Does not apply route 9/28/21  Yes MOHINDER Donnelly CNP   levothyroxine (SYNTHROID) 75 MCG tablet Take 1 tablet by mouth daily 9/21/21  Yes Jeramie Jacinto MD   fludrocortisone (FLORINEF) 0.1 MG tablet TAKE 1 TABLET TWICE DAILY 9/7/21  Yes MOHINDER Coe CNP   busPIRone (BUSPAR) 15 MG tablet TAKE 1 TABLET BY MOUTH 3 TIMES DAILY AS NEEDED (ANXIETY) 8/9/21  Yes MOHINDER Coe CNP   amLODIPine (NORVASC) 2.5 MG tablet Take 1 tablet by mouth daily 8/6/21  Yes Holly Longo MD   potassium chloride (KLOR-CON M) 20 MEQ extended release tablet Take 1 tablet by mouth daily TAKE 3 TABLETS DAILY 8/6/21  Yes Holly Longo MD   furosemide (LASIX) 20 MG tablet TAKE 1 TABLET EVERY DAY  Patient taking differently: Take 20 mg by mouth daily Takes every other day 7/15/21  Yes MOHINDER Coe CNP   rosuvastatin (CRESTOR) 20 MG tablet TAKE 1 TABLET EVERY DAY  Patient taking differently: Take 20 mg by mouth nightly  6/21/21  Yes Holly Longo MD   mirtazapine (REMERON) 30 MG tablet Take 0.5 tablets by mouth nightly 5/24/21  Yes MOHINDER Coe CNP   dicyclomine (BENTYL) 10 MG capsule Take one capsule twice daily with meals  Patient taking differently: Take 10 mg by mouth Take one capsule twice daily with meals 5/21/21  Yes MOHINDER Coe CNP   risperiDONE (RISPERDAL) 0.25 MG tablet Take 1 mg by mouth nightly  3/2/21  Yes Historical Provider, MD   Calcium Carbonate-Vitamin D (CALCIUM 500 + D) 500-125 MG-UNIT TABS Take by mouth   Yes Historical Provider, MD   aspirin EC 81 MG EC tablet Take 1 tablet by mouth daily 3/2/18  Yes Sarah Kaplan MD   polyethylene glycol (GLYCOLAX) packet Take 17 g by mouth daily    Yes Historical Provider, MD   famotidine (PEPCID) 40 MG tablet TAKE 1 TABLET EVERY EVENING  Patient not taking: Reported on 10/8/2021 8/20/21   MOHINDER Coe CNP   Omega-3 Fatty Acids (FISH OIL) 1000 MG CPDR Take 3,000 mg by mouth   Patient not taking: Reported on 10/8/2021    Historical Provider, MD        Allergies:  Bactrim [sulfamethoxazole-trimethoprim], Ultram [tramadol hcl], Dilaudid [hydromorphone], Phenergan [promethazine], Lipitor [atorvastatin], and Penicillins     Physical Examination:    Vitals:    10/08/21 1443   BP: 120/80   Pulse: 95   SpO2: 96%   Weight: 211 lb (95.7 kg)   Height: 5' 7\" (1.702 m)     Constitutional and General Appearance: Warm and dry, no apparent distress, normal coloration  HEENT:  Normocephalic, atraumatic  Respiratory:  · Normal excursion and expansion without use of accessory muscles  · Resp Auscultation: Clear to auscultation   Cardiovascular:  · The apical impulses not displaced  · Heart tones are crisp and normal  · JVP 8 cm H2O  · Regular rate and rhythm, normal S1S2, no m/g/r  · Peripheral pulses are symmetrical and full  · There is no clubbing, cyanosis of the extremities.   · Trace BLE edema  · Pedal Pulses: 2+ and equal   Abdomen:  · No masses or tenderness  · Liver/Spleen: No Abnormalities Noted  Neurological/Psychiatric:  · Alert and oriented in all spheres  · Moves all extremities well  · Exhibits normal gait balance and coordination  · No abnormalities of mood, affect, memory, mentation, or behavior are noted    Lab Data:  Most recent lab results below reviewed in office    CBC:   Lab Results   Component Value Date    WBC 8.5 08/30/2021    WBC 9.6 08/26/2021    WBC 8.5 07/19/2021    RBC 4.17 08/30/2021    RBC 3.93 08/26/2021    RBC 4.01 07/19/2021    HGB 13.0 08/30/2021    HGB 11.9 08/26/2021    HGB 12.3 07/19/2021    HCT 37.1 08/30/2021    HCT 36.0 08/26/2021    HCT 36.4 07/19/2021    MCV 89.1 08/30/2021    MCV 91.5 08/26/2021    MCV 90.7 07/19/2021    RDW 13.3 08/30/2021    RDW 13.3 08/26/2021    RDW 13.7 07/19/2021     08/30/2021     08/26/2021     07/19/2021     BMP:  Lab Results   Component Value Date     08/30/2021     08/26/2021     08/10/2021    K 3.7 08/30/2021    K 4.4 08/26/2021    K 4.0 08/10/2021    K 4.3 07/19/2021    K 3.6 05/31/2020    K 3.2 05/29/2020     08/30/2021     08/26/2021     08/10/2021 CO2 23 08/30/2021    CO2 22 08/26/2021    CO2 25 08/10/2021    BUN 14 08/30/2021    BUN 19 08/26/2021    BUN 13 08/10/2021    CREATININE 1.1 08/30/2021    CREATININE 1.2 08/26/2021    CREATININE 0.9 08/10/2021     BNP:   Lab Results   Component Value Date    PROBNP 288 08/26/2021    PROBNP 163 05/28/2020    PROBNP 378 05/17/2020     LIPID:   Lab Results   Component Value Date    TRIG 81 08/10/2021    TRIG 68 10/06/2020    HDL 63 08/10/2021    HDL 68 10/06/2020    LDLCALC 44 08/10/2021    LDLCALC 29 10/06/2020       Cardiac Imaging:  ECHO 9/24/2021:   Left ventricular systolic function is normal with ejection fraction estimated at 55-60 %. No regional wall motion abnormalities are noted. There is moderate concentric left ventricular hypertrophy. Grade I diastolic dysfunction with normal filling pressure. The right atrium is mildly dilated. Aortic valve sclerosis without aortic stenosis. Mild aortic regurgitation is present. Unable to obtain SPAP due to lack of tricuspid regurgitation. 5- 55%, lvh, DD1, AV sclerosis, SPAP 25 mmHg. LEXISCAN NUCLEAR STRESS TEST 9/24/2021:     Summary    Normal LV size and systolic function. Left ventricular ejection fraction of    69%. Normal wall motion. There is normal isotope uptake at stress and rest.    There is no evidence of myocardial ischemia or scar. Echo: 05/28/20   Summary   LV systolic function is normal with EF estimated at 55%. No regional wall motion abnormalities. There is mild concentric left ventricular hypertrophy. Grade I diastolic dysfunction with normal LV filling pressure. Aortic valve appears sclerotic but opens adequately. Systolic pulmonary artery pressure (SPAP) is normal estimated at 25mmHg (RAP of 8mmHg). Cath: 03/07/18  IMPRESSION:  1. Moderate CAD of mid LAD as well as mild CAD of distal LAD, proximal  circumflex and mid RCA. 2.  Normal left ventricular systolic function.   3.  Normal left ventricular filling pressure. RECOMMENDATION:  The patient has moderate disease of mid LAD. There was no high-grade disease noted. 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 Will start her on Ranexa. I will avoid oral nitrates as she does have a history of vasodepressor syncope and the nitrates can make it worse. She is already on Lisinopril, which she will continue as ACE inhibitors have shown to be benfical in microvascular angina. I will also start her on moderate-dose of Lipitor for the coronary artery disease. She will continue with low-dose of aspirin. Stress Test: 07/17/17  Summary  There is normal isotope uptake at stress and rest. There is no evidence of  myocardial ischemia or scar. Hyperdynamic LV systolic function with SY>80%  with uniform wall motion. Low risk study. Echo: 01/17/17  Normal left ventricular systolic function with an estimated ejection fraction of 55%. Grade I diastolic dysfunction with normal filling pressure. Systolic pulmonary artery pressure (SPAP) is normal and estimated at 32 mmHg (RA pressure 3 mmHg).          I appreciate the opportunity of cooperating in the care of this individual.    Anisha Eric, MOHINDER - CNP, 10/8/2021, 3:12 PM

## 2021-10-14 DIAGNOSIS — M79.89 SWELLING OF BOTH LOWER EXTREMITIES: ICD-10-CM

## 2021-10-14 RX ORDER — FUROSEMIDE 20 MG/1
TABLET ORAL
Qty: 90 TABLET | Refills: 1 | Status: SHIPPED | OUTPATIENT
Start: 2021-10-14 | End: 2021-10-29

## 2021-10-25 ENCOUNTER — NURSE ONLY (OUTPATIENT)
Dept: FAMILY MEDICINE CLINIC | Age: 76
End: 2021-10-25
Payer: MEDICARE

## 2021-10-25 ENCOUNTER — TELEPHONE (OUTPATIENT)
Dept: FAMILY MEDICINE CLINIC | Age: 76
End: 2021-10-25

## 2021-10-25 DIAGNOSIS — R39.9 UTI SYMPTOMS: Primary | ICD-10-CM

## 2021-10-25 DIAGNOSIS — F41.9 ANXIETY: ICD-10-CM

## 2021-10-25 LAB
BILIRUBIN, POC: NEGATIVE
BLOOD URINE, POC: NEGATIVE
CLARITY, POC: NORMAL
COLOR, POC: NORMAL
GLUCOSE URINE, POC: NEGATIVE
KETONES, POC: NEGATIVE
LEUKOCYTE EST, POC: NEGATIVE
NITRITE, POC: NEGATIVE
PH, POC: 7
PROTEIN, POC: NEGATIVE
SPECIFIC GRAVITY, POC: 1.02
UROBILINOGEN, POC: NORMAL

## 2021-10-25 PROCEDURE — 81002 URINALYSIS NONAUTO W/O SCOPE: CPT | Performed by: NURSE PRACTITIONER

## 2021-10-25 RX ORDER — BUSPIRONE HYDROCHLORIDE 15 MG/1
TABLET ORAL
Qty: 90 TABLET | Refills: 2 | Status: SHIPPED | OUTPATIENT
Start: 2021-10-25 | End: 2022-01-28 | Stop reason: ALTCHOICE

## 2021-10-25 NOTE — TELEPHONE ENCOUNTER
called and states that pt has been seeing things and he thinks that she may have a uti. Was going to see if he could get a urine from her or bring her in.

## 2021-10-28 DIAGNOSIS — N39.0 ACUTE UTI: Primary | ICD-10-CM

## 2021-10-28 LAB
ORGANISM: ABNORMAL
URINE CULTURE, ROUTINE: ABNORMAL

## 2021-10-28 RX ORDER — NITROFURANTOIN 25; 75 MG/1; MG/1
100 CAPSULE ORAL 2 TIMES DAILY
Qty: 14 CAPSULE | Refills: 0 | Status: SHIPPED | OUTPATIENT
Start: 2021-10-28 | End: 2021-11-04

## 2021-10-29 ENCOUNTER — OFFICE VISIT (OUTPATIENT)
Dept: FAMILY MEDICINE CLINIC | Age: 76
End: 2021-10-29
Payer: MEDICARE

## 2021-10-29 VITALS
HEIGHT: 67 IN | HEART RATE: 88 BPM | WEIGHT: 204 LBS | DIASTOLIC BLOOD PRESSURE: 54 MMHG | SYSTOLIC BLOOD PRESSURE: 89 MMHG | OXYGEN SATURATION: 97 % | BODY MASS INDEX: 32.02 KG/M2

## 2021-10-29 DIAGNOSIS — M89.9 BONE DISORDER: ICD-10-CM

## 2021-10-29 DIAGNOSIS — R53.81 CHRONIC FATIGUE AND MALAISE: ICD-10-CM

## 2021-10-29 DIAGNOSIS — Z78.0 POSTMENOPAUSAL: ICD-10-CM

## 2021-10-29 DIAGNOSIS — E03.8 HYPOTHYROIDISM DUE TO HASHIMOTO'S THYROIDITIS: Primary | ICD-10-CM

## 2021-10-29 DIAGNOSIS — K11.9: ICD-10-CM

## 2021-10-29 DIAGNOSIS — R53.82 CHRONIC FATIGUE AND MALAISE: ICD-10-CM

## 2021-10-29 DIAGNOSIS — N39.0 ACUTE UTI: ICD-10-CM

## 2021-10-29 DIAGNOSIS — F41.8 ANXIETY WITH DEPRESSION: ICD-10-CM

## 2021-10-29 DIAGNOSIS — M79.89 SWELLING OF BOTH LOWER EXTREMITIES: ICD-10-CM

## 2021-10-29 DIAGNOSIS — E78.2 MIXED HYPERLIPIDEMIA: ICD-10-CM

## 2021-10-29 DIAGNOSIS — Z13.21 ENCOUNTER FOR VITAMIN DEFICIENCY SCREENING: ICD-10-CM

## 2021-10-29 DIAGNOSIS — E06.3 HYPOTHYROIDISM DUE TO HASHIMOTO'S THYROIDITIS: Primary | ICD-10-CM

## 2021-10-29 LAB
BASOPHILS ABSOLUTE: 0 K/UL (ref 0–0.2)
BASOPHILS RELATIVE PERCENT: 0.6 %
EOSINOPHILS ABSOLUTE: 0.2 K/UL (ref 0–0.6)
EOSINOPHILS RELATIVE PERCENT: 2.3 %
HCT VFR BLD CALC: 37.3 % (ref 36–48)
HEMOGLOBIN: 12.5 G/DL (ref 12–16)
LYMPHOCYTES ABSOLUTE: 1.1 K/UL (ref 1–5.1)
LYMPHOCYTES RELATIVE PERCENT: 14.8 %
MCH RBC QN AUTO: 30.4 PG (ref 26–34)
MCHC RBC AUTO-ENTMCNC: 33.6 G/DL (ref 31–36)
MCV RBC AUTO: 90.7 FL (ref 80–100)
MONOCYTES ABSOLUTE: 0.9 K/UL (ref 0–1.3)
MONOCYTES RELATIVE PERCENT: 11.5 %
NEUTROPHILS ABSOLUTE: 5.3 K/UL (ref 1.7–7.7)
NEUTROPHILS RELATIVE PERCENT: 70.8 %
PDW BLD-RTO: 13.9 % (ref 12.4–15.4)
PLATELET # BLD: 173 K/UL (ref 135–450)
PMV BLD AUTO: 9.2 FL (ref 5–10.5)
RBC # BLD: 4.12 M/UL (ref 4–5.2)
WBC # BLD: 7.5 K/UL (ref 4–11)

## 2021-10-29 PROCEDURE — 36415 COLL VENOUS BLD VENIPUNCTURE: CPT | Performed by: NURSE PRACTITIONER

## 2021-10-29 PROCEDURE — 99215 OFFICE O/P EST HI 40 MIN: CPT | Performed by: NURSE PRACTITIONER

## 2021-10-29 PROCEDURE — 1123F ACP DISCUSS/DSCN MKR DOCD: CPT | Performed by: NURSE PRACTITIONER

## 2021-10-29 PROCEDURE — 1036F TOBACCO NON-USER: CPT | Performed by: NURSE PRACTITIONER

## 2021-10-29 PROCEDURE — G8427 DOCREV CUR MEDS BY ELIG CLIN: HCPCS | Performed by: NURSE PRACTITIONER

## 2021-10-29 PROCEDURE — G8484 FLU IMMUNIZE NO ADMIN: HCPCS | Performed by: NURSE PRACTITIONER

## 2021-10-29 PROCEDURE — 1090F PRES/ABSN URINE INCON ASSESS: CPT | Performed by: NURSE PRACTITIONER

## 2021-10-29 PROCEDURE — 4040F PNEUMOC VAC/ADMIN/RCVD: CPT | Performed by: NURSE PRACTITIONER

## 2021-10-29 PROCEDURE — G8400 PT W/DXA NO RESULTS DOC: HCPCS | Performed by: NURSE PRACTITIONER

## 2021-10-29 PROCEDURE — G8417 CALC BMI ABV UP PARAM F/U: HCPCS | Performed by: NURSE PRACTITIONER

## 2021-10-29 RX ORDER — FUROSEMIDE 20 MG/1
TABLET ORAL
Qty: 90 TABLET | Refills: 1 | Status: SHIPPED | OUTPATIENT
Start: 2021-10-29

## 2021-10-29 RX ORDER — ANASTROZOLE 1 MG/1
1 TABLET ORAL DAILY
COMMUNITY
Start: 2021-08-31

## 2021-10-29 NOTE — PROGRESS NOTES
[place]  PHYSICIAN PRACTICES  [facility]  [address] [facility].  A[address] [facility]  Dept: [phone]  Dept Fax: [phone]  Loc: Lb Doshi is a 67 y.o. female who presents today for her medical conditions/complaints as noted below. Sandra Reeder is c/o of Hypothyroidism, Hyperlipidemia, and Depression        HPI:     Chief Complaint   Patient presents with    Hypothyroidism    Hyperlipidemia    Depression       HPI    Patient is here to follow up on hypothyroidism. Taking medication as prescribed. Denies any palpitations, diarrhea, tremors, constipation, increased fatigue. Denies any side effect from the medication. Patient is here for hyperlipidemia follow up. Taking medication as prescribed. Denies any side effects to the medication. Trying to adhere to a low cholesterol diet. Denies any generalized myalgias. Patient is here to discuss anxiety/depression. Doing well on medication. Denies any side effects. Would like to continue with medication. Denies any thought of hurting oneself or others around them. She is seeing psychiatry - Dr. Enrique Valencia and two times a month she is getting counseling. She admits to taking Macrobid currently. She was having some slight burning with urination when she was diagnosed with her UTI and she was having visual hallucinations. She started the Macrobid about 3 days ago. She denies anymore burning with urination and she is not having anymore hallucinations. She admits that her lower leg swelling has seemed to improve. She is wearing compression hose. She is not taking the frusemide anymore except 1 time a week. She is trying to walk somewhat throughout the day and try to keep her legs elevated above her heart. Krys Cash admits to having chronic fatigue. She feels like her fatigue has been worse over the last several months.   She states that she just wants to sit during the day and does not have much motivation to do things. She is sleeping well at night and she is taking naps several times throughout the day. William Donnelly admits to having swelling on the right side of her jaw. She states the nodule on the right side of her jaw is not painful unless it is being touched. She has noticed this nodule in the right side of her jaw for several weeks now without any improvement in the size. Nothing seems make it worse or better.     Past Medical History:   Diagnosis Date    Anxiety     Benign essential hypertension 1/17/2017    Bowel obstruction (HCC)     CAD (coronary artery disease)     Cancer ([facility] [address] 2012    colon    Depression     GERD (gastroesophageal reflux disease)     Hyperlipidemia     Hypertension     Irritable bowel syndrome (IBS)     Obesity     Osteoarthritis       Past Surgical History:   Procedure Laterality Date    APPENDECTOMY      BREAST LUMPECTOMY Bilateral 7/27/2021    RIGHT RADIO FREQUENCY IDENTIFICATION TAG LOCALIZED PARTIAL MASTECTOMY TIMES TWO; LEFT RADIO FREQUENCY IDENTIFICATION TAG LOCALIZED PARTIAL MASTECTOMY performed by Sandy Muñoz MD at [address] COLONOSCOPY  2014    mass    COLONOSCOPY  3/29/16    normal    US BREAST BIOPSY NEEDLE ADDITIONAL RIGHT Right 6/29/2021    US BREAST BIOPSY NEEDLE ADDITIONAL RIGHT 6/29/2021 Ike Salguero MD [address] BREAST NEEDLE BIOPSY LEFT Left 6/17/2021    US BREAST NEEDLE BIOPSY LEFT 6/17/2021 Adonis Witt MD [facility] [address] [facility] BREAST NEEDLE BIOPSY RIGHT Right 6/17/2021    US BREAST NEEDLE BIOPSY RIGHT 6/17/2021 Adonis Witt MD [facility]    US BREAST NEEDLE BIOPSY RIGHT Right 6/29/2021    US BREAST NEEDLE BIOPSY RIGHT 6/29/2021 Ike Salguero MD [facility] [address] [facility] GUIDED NEEDLE LOC BREAST ADDL RIGHT Right 7/20/2021    US GUIDED NEEDLE LOC BREAST ADDL RIGHT 7/20/2021 [facility]    US GUIDED NEEDLE LOC OF LEFT BREAST Left 2021    US GUIDED NEEDLE LOC OF LEFT BREAST 2021 [facility]    US GUIDED NEEDLE LOC OF RIGHT BREAST Right 2021    US GUIDED NEEDLE LOC OF RIGHT BREAST 2021 [facility]       Family History   Problem Relation Age of Onset    Cancer Mother         colon    Cancer Brother         colon    Heart Disease Sister     Heart Disease Brother        Social History     Tobacco Use    Smoking status: Former Smoker     Packs/day: 1.00     Years: 10.00     Pack years: 10.00     Types: Cigarettes     Start date:      Quit date: 1983     Years since quittin.1    Smokeless tobacco: Never Used   Substance Use Topics    Alcohol use: No      Current Outpatient Medications   Medication Sig Dispense Refill    anastrozole (ARIMIDEX) 1 MG tablet       furosemide (LASIX) 20 MG tablet Take up to once a week for leg swelling if needed 90 tablet 1    nitrofurantoin, macrocrystal-monohydrate, (MACROBID) 100 MG capsule Take 1 capsule by mouth 2 times daily for 7 days 14 capsule 0    busPIRone (BUSPAR) 15 MG tablet TAKE 1 TABLET THREE TIMES DAILY AS NEEDED (ANXIETY) 90 tablet 2    citalopram (CELEXA) 40 MG tablet TAKE 1 TABLET EVERY DAY 90 tablet 1    Compression Stockings MISC by Does not apply route 1 each 0    levothyroxine (SYNTHROID) 75 MCG tablet Take 1 tablet by mouth daily 90 tablet 3    fludrocortisone (FLORINEF) 0.1 MG tablet TAKE 1 TABLET TWICE DAILY 180 tablet 0    potassium chloride (KLOR-CON M) 20 MEQ extended release tablet Take 1 tablet by mouth daily TAKE 3 TABLETS DAILY 270 tablet 1    rosuvastatin (CRESTOR) 20 MG tablet TAKE 1 TABLET EVERY DAY (Patient taking differently: Take 20 mg by mouth nightly ) 90 tablet 1    mirtazapine (REMERON) 30 MG tablet Take 0.5 tablets by mouth nightly 90 tablet 1    dicyclomine (BENTYL) 10 MG capsule Take one capsule twice daily with meals (Patient taking differently: Take 10 mg by mouth Take one capsule twice daily with meals) 180 capsule 1    risperiDONE (RISPERDAL) 0.25 MG tablet Take 1 mg by mouth nightly       Omega-3 Fatty Acids (FISH OIL) 1000 MG CPDR Take 3,000 mg by mouth       Calcium Carbonate-Vitamin D (CALCIUM 500 + D) 500-125 MG-UNIT TABS Take by mouth      aspirin EC 81 MG EC tablet Take 1 tablet by mouth daily 30 tablet 3    polyethylene glycol (GLYCOLAX) packet Take 17 g by mouth daily        No current facility-administered medications for this visit. Allergies   Allergen Reactions    Bactrim [Sulfamethoxazole-Trimethoprim] Anaphylaxis    Ultram [Tramadol Hcl] Anaphylaxis     stridor    Dilaudid [Hydromorphone] Other (See Comments)     Hallucinations    Phenergan [Promethazine] Nausea Only    Lipitor [Atorvastatin]      Nausea    Penicillins        :      Review of Systems   Constitutional: Positive for fatigue. Negative for activity change, appetite change, chills, diaphoresis, fever and unexpected weight change. HENT: Positive for facial swelling (Right side of jaw). Negative for congestion, dental problem, drooling, ear discharge, ear pain, hearing loss, mouth sores, nosebleeds, postnasal drip, rhinorrhea, sinus pressure, sinus pain, sneezing, sore throat, tinnitus, trouble swallowing and voice change. Eyes: Negative for photophobia, pain, discharge, redness, itching and visual disturbance. Respiratory: Negative. Negative for apnea, cough, choking, chest tightness, shortness of breath, wheezing and stridor. Cardiovascular: Positive for leg swelling. Negative for chest pain and palpitations. Gastrointestinal: Negative. Negative for abdominal pain, blood in stool, constipation, diarrhea, nausea and vomiting. Genitourinary: Negative. Negative for decreased urine volume, difficulty urinating, dysuria, enuresis, flank pain, frequency, genital sores, hematuria and urgency. Musculoskeletal: Negative.   Negative for arthralgias, back pain, gait problem, joint swelling, myalgias, neck pain and neck stiffness. Skin: Negative. Negative for color change, pallor, rash and wound. Allergic/Immunologic: Negative. Neurological: Negative. Negative for dizziness, facial asymmetry, weakness, light-headedness and headaches. Psychiatric/Behavioral: Negative for agitation, behavioral problems, confusion, decreased concentration, dysphoric mood, hallucinations, self-injury, sleep disturbance and suicidal ideas. The patient is not nervous/anxious and is not hyperactive. Objective:     Vitals:    10/29/21 1314 10/29/21 1315   BP: (!) 87/56 (!) 89/54   Site: Right Upper Arm Left Upper Arm   Position: Sitting Sitting   Cuff Size: Medium Adult Large Adult   Pulse: 81 88   SpO2: 97%    Weight: 204 lb (92.5 kg)    Height: 5' 7\" (1.702 m)      Wt Readings from Last 3 Encounters:   10/29/21 204 lb (92.5 kg)   10/08/21 211 lb (95.7 kg)   09/24/21 209 lb (94.8 kg)     Temp Readings from Last 3 Encounters:   08/26/21 97.4 °F (36.3 °C)   07/27/21 98.5 °F (36.9 °C) (Temporal)   07/27/21 96.1 °F (35.6 °C)     BP Readings from Last 3 Encounters:   10/29/21 (!) 89/54   10/08/21 120/80   09/24/21 106/70     Pulse Readings from Last 3 Encounters:   10/29/21 88   10/08/21 95   09/24/21 92     Physical Exam  Vitals and nursing note reviewed. Constitutional:       General: She is not in acute distress. Appearance: Normal appearance. She is well-developed. She is ill-appearing (Chronically ill appearance). She is not diaphoretic. HENT:      Head: Normocephalic and atraumatic. Right Ear: Tympanic membrane, ear canal and external ear normal. There is no impacted cerumen. Left Ear: Tympanic membrane, ear canal and external ear normal. There is no impacted cerumen. Nose: Nose normal. No congestion or rhinorrhea. Mouth/Throat:      Mouth: Mucous membranes are moist.      Pharynx: Oropharynx is clear. No oropharyngeal exudate or posterior oropharyngeal erythema.    Eyes:      General: No scleral icterus. Right eye: No discharge. Left eye: No discharge. Extraocular Movements: Extraocular movements intact. Conjunctiva/sclera: Conjunctivae normal.      Pupils: Pupils are equal, round, and reactive to light. Neck:      Vascular: No carotid bruit. Trachea: No tracheal deviation. Cardiovascular:      Rate and Rhythm: Normal rate and regular rhythm. Pulses: Normal pulses. Heart sounds: Normal heart sounds. No murmur heard. No friction rub. No gallop. Pulmonary:      Effort: Pulmonary effort is normal. No respiratory distress. Breath sounds: Normal breath sounds. No stridor. No wheezing, rhonchi or rales. Chest:      Chest wall: No tenderness. Abdominal:      General: Bowel sounds are normal. There is no distension. Palpations: Abdomen is soft. There is no mass. Tenderness: There is no abdominal tenderness. There is no guarding or rebound. Hernia: No hernia is present. Musculoskeletal:         General: No swelling, tenderness, deformity or signs of injury. Normal range of motion. Cervical back: Normal range of motion and neck supple. No rigidity. No muscular tenderness. Right lower le+ Pitting Edema present. Left lower le+ Pitting Edema present. Lymphadenopathy:      Cervical: No cervical adenopathy. Skin:     General: Skin is warm and dry. Capillary Refill: Capillary refill takes less than 2 seconds. Coloration: Skin is not jaundiced or pale. Findings: No bruising, erythema, lesion or rash. Neurological:      General: No focal deficit present. Mental Status: She is alert and oriented to person, place, and time. Mental status is at baseline. Cranial Nerves: No cranial nerve deficit. Sensory: No sensory deficit. Motor: No weakness or abnormal muscle tone.       Coordination: Coordination normal.      Gait: Gait normal.      Deep Tendon Reflexes: Reflexes are normal and symmetric. Reflexes normal.   Psychiatric:         Mood and Affect: Mood normal.         Behavior: Behavior normal.         Thought Content: Thought content normal.         Judgment: Judgment normal.         Nurse Only on 10/25/2021   Component Date Value Ref Range Status    Glucose, UA POC 10/25/2021 negative   Final    Bilirubin, UA 10/25/2021 negative   Final    Ketones, UA 10/25/2021 negative   Final    Spec Grav, UA 10/25/2021 1.020   Final    Blood, UA POC 10/25/2021 negative   Final    pH, UA 10/25/2021 7.0   Final    Protein, UA POC 10/25/2021 negative   Final    Urobilinogen, UA 10/25/2021 0.2 E.U./dL   Final    Leukocytes, UA 10/25/2021 negative   Final    Nitrite, UA 10/25/2021 negative   Final    Organism 10/25/2021 Enterococcus faecalis*  Final    Urine Culture, Routine 10/25/2021 >100,000 CFU/ml   Final           Assessment & Plan: The following diagnoses and conditions are stable with no further orders unless indicated:  1. Hypothyroidism due to Hashimoto's thyroiditis    2. Mixed hyperlipidemia    3. Anxiety with depression    4. Acute UTI    5. Swelling of both lower extremities    6. Chronic fatigue and malaise    7. Discomfort of parotid gland    8. Postmenopausal    9. Bone disorder    10. Encounter for vitamin deficiency screening        Adry Lee was seen today for hypothyroidism, hyperlipidemia and depression. Hypothyroidism symptoms stable. Will check thyroid level today with her fatigue. She is continue following up with endocrinology. She is doing well with her statin. She is continue taking her statin. Anxiety and depression stable. She is continue following up with psychiatry. Since she is had treatment of her UTI she has not had any visual disturbances. She is not having any more burning with urination. She is complete her entire course of antibiotics. Swelling in her bilateral legs have improved especially with wearing compression hose.   Most likely secondary to sedentary lifestyle and venous insufficiency. She may take frusemide weekly as needed for leg swelling. Swelling and tenderness of the right parotid gland. She is to follow-up with ENT. Recommend her trying to suck on hard lemon candy and using a straw when drinking. She admits that this tenderness and swelling in her parotid gland has been present for several weeks and has not worsened. Discussed signs and symptoms of parotid gland stone obstruction and when to go to the ER. Blood pressure today is slightly low. She is also complaining of feeling fatigued. With her risk of falls secondary to dizziness, recommend her stopping the amlodipine 2.5 mg. She is to check her blood pressure daily and call with her blood pressure results in about 1 to 2 weeks. Diagnoses and all orders for this visit:    Hypothyroidism due to Hashimoto's thyroiditis  -     Comprehensive Metabolic Panel  -     CBC Auto Differential  -     TSH with Reflex    Mixed hyperlipidemia  -     Comprehensive Metabolic Panel  -     CBC Auto Differential    Anxiety with depression    Acute UTI    Swelling of both lower extremities  -     furosemide (LASIX) 20 MG tablet; Take up to once a week for leg swelling if needed    Chronic fatigue and malaise  -     Comprehensive Metabolic Panel  -     CBC Auto Differential    Discomfort of parotid gland  -     Suzi Gonzales MD, Otolaryngology, [facility]    Postmenopausal  -     DEXA BONE DENSITY AXIAL SKELETON; Future    Bone disorder  -     Vitamin D 25 Hydroxy    Encounter for vitamin deficiency screening  -     Vitamin B12 & Folate      Prior to Visit Medications    Medication Sig Taking?  Authorizing Provider   anastrozole (ARIMIDEX) 1 MG tablet  Yes Historical Provider, MD   furosemide (LASIX) 20 MG tablet Take up to once a week for leg swelling if needed Yes MOHINDER Willson - CNP   nitrofurantoin, macrocrystal-monohydrate, (MACROBID) 100 MG capsule Take 1 capsule by mouth 2 times daily for 7 days Yes Cynthia Bosworth, APRN - CNP   busPIRone (BUSPAR) 15 MG tablet TAKE 1 TABLET THREE TIMES DAILY AS NEEDED (ANXIETY) Yes Cynthia Bosworth, APRN - CNP   citalopram (CELEXA) 40 MG tablet TAKE 1 TABLET EVERY DAY Yes Cynthia Bosworth, APRN - CNP   Compression Stockings MISC by Does not apply route Yes Cynthia Bosworth, APRN - CNP   levothyroxine (SYNTHROID) 75 MCG tablet Take 1 tablet by mouth daily Yes Son Nava MD   fludrocortisone (FLORINEF) 0.1 MG tablet TAKE 1 TABLET TWICE DAILY Yes Cynthia Bosworth, APRN - CNP   potassium chloride (KLOR-CON M) 20 MEQ extended release tablet Take 1 tablet by mouth daily TAKE 3 TABLETS DAILY Yes Polina Torres MD   rosuvastatin (CRESTOR) 20 MG tablet TAKE 1 TABLET EVERY DAY  Patient taking differently: Take 20 mg by mouth nightly  Yes Polina Torres MD   mirtazapine (REMERON) 30 MG tablet Take 0.5 tablets by mouth nightly Yes Cynthia Bosworth, APRN - CNP   dicyclomine (BENTYL) 10 MG capsule Take one capsule twice daily with meals  Patient taking differently: Take 10 mg by mouth Take one capsule twice daily with meals Yes Cynthia Bosworth, APRN - CNP   risperiDONE (RISPERDAL) 0.25 MG tablet Take 1 mg by mouth nightly  Yes Historical Provider, MD   Omega-3 Fatty Acids (FISH OIL) 1000 MG CPDR Take 3,000 mg by mouth  Yes Historical Provider, MD   Calcium Carbonate-Vitamin D (CALCIUM 500 + D) 500-125 MG-UNIT TABS Take by mouth Yes Historical Provider, MD   aspirin EC 81 MG EC tablet Take 1 tablet by mouth daily Yes Mirlande Castro MD   polyethylene glycol (GLYCOLAX) packet Take 17 g by mouth daily  Yes Historical Provider, MD   busPIRone (BUSPAR) 15 MG tablet TAKE 1 TABLET BY MOUTH 3 TIMES DAILY AS NEEDED (ANXIETY)  Cynthia Bosworth, APRN - CNP     Orders Placed This Encounter   Medications    furosemide (LASIX) 20 MG tablet     Sig: Take up to once a week for leg swelling if needed     Dispense: 90 tablet     Refill:  1         Return in about 2 months (around 12/29/2021) for BP follow up; [facility] . Patient should call the office immediately with new or ongoing signs or symptoms or worsening, or proceedto the emergency room. No changes in past medical history, past surgical history, social history, or family history were noted during the patient encounter unless specifically listed above. All updates of past medicalhistory, past surgical history, social history, or family history were reviewed personally by me during the office visit. All problems listed in the assessment are stable unless noted otherwise. Medication profilereviewed personally by me during the office visit. Medication side effects and possible impairments from medications were discussed as applicable. Call if pattern of symptoms change or persists for an extended time. This document was prepared by a combination of typing and transcription through a voice recognition software. All medications have the potential for adverse effects. All medications effect each person differently. Please read and review provided information related to medication. If the medication that you have been prescribed has the potential to cause sedation, do not drive or operate car, truck, or heavy machinery until you know how the medication will effect you. If you experience any adverse effects from the medication, please call the office or report to the emergency department. A total of 46 minutes was spent discussing HPI, ROS, and treatment plan of care with patient and patient's . Patient and her  educated on Furosemide and hypotension. Reviewed medications with patient and her . Educated her on the need to stop her Amlodipine and to call with BP results in 1-2 weeks.

## 2021-10-30 LAB
A/G RATIO: 1.7 (ref 1.1–2.2)
ALBUMIN SERPL-MCNC: 4.5 G/DL (ref 3.4–5)
ALP BLD-CCNC: 84 U/L (ref 40–129)
ALT SERPL-CCNC: 12 U/L (ref 10–40)
ANION GAP SERPL CALCULATED.3IONS-SCNC: 14 MMOL/L (ref 3–16)
AST SERPL-CCNC: 16 U/L (ref 15–37)
BILIRUB SERPL-MCNC: 0.5 MG/DL (ref 0–1)
BUN BLDV-MCNC: 18 MG/DL (ref 7–20)
CALCIUM SERPL-MCNC: 9.7 MG/DL (ref 8.3–10.6)
CHLORIDE BLD-SCNC: 101 MMOL/L (ref 99–110)
CO2: 23 MMOL/L (ref 21–32)
CREAT SERPL-MCNC: 1.2 MG/DL (ref 0.6–1.2)
FOLATE: 19.43 NG/ML (ref 4.78–24.2)
GFR AFRICAN AMERICAN: 53
GFR NON-AFRICAN AMERICAN: 44
GLUCOSE BLD-MCNC: 81 MG/DL (ref 70–99)
POTASSIUM SERPL-SCNC: 4 MMOL/L (ref 3.5–5.1)
SODIUM BLD-SCNC: 138 MMOL/L (ref 136–145)
TOTAL PROTEIN: 7.1 G/DL (ref 6.4–8.2)
TSH REFLEX: 2.51 UIU/ML (ref 0.27–4.2)
VITAMIN B-12: 181 PG/ML (ref 211–911)
VITAMIN D 25-HYDROXY: 40.1 NG/ML

## 2021-11-02 ENCOUNTER — NURSE ONLY (OUTPATIENT)
Dept: FAMILY MEDICINE CLINIC | Age: 76
End: 2021-11-02
Payer: MEDICARE

## 2021-11-02 DIAGNOSIS — E53.8 LOW VITAMIN B12 LEVEL: Primary | ICD-10-CM

## 2021-11-02 PROBLEM — M79.89 SWELLING OF BOTH LOWER EXTREMITIES: Status: ACTIVE | Noted: 2021-11-02

## 2021-11-02 PROBLEM — F41.8 ANXIETY WITH DEPRESSION: Status: ACTIVE | Noted: 2021-11-02

## 2021-11-02 PROCEDURE — 96372 THER/PROPH/DIAG INJ SC/IM: CPT | Performed by: NURSE PRACTITIONER

## 2021-11-02 RX ORDER — CYANOCOBALAMIN 1000 UG/ML
1000 INJECTION INTRAMUSCULAR; SUBCUTANEOUS ONCE
Status: COMPLETED | OUTPATIENT
Start: 2021-11-02 | End: 2021-11-02

## 2021-11-02 RX ADMIN — CYANOCOBALAMIN 1000 MCG: 1000 INJECTION INTRAMUSCULAR; SUBCUTANEOUS at 13:16

## 2021-11-02 ASSESSMENT — ENCOUNTER SYMPTOMS
FACIAL SWELLING: 1
CHOKING: 0
TROUBLE SWALLOWING: 0
SORE THROAT: 0
CHEST TIGHTNESS: 0
WHEEZING: 0
EYE PAIN: 0
STRIDOR: 0
APNEA: 0
EYE REDNESS: 0
NAUSEA: 0
SHORTNESS OF BREATH: 0
GASTROINTESTINAL NEGATIVE: 1
SINUS PAIN: 0
PHOTOPHOBIA: 0
RESPIRATORY NEGATIVE: 1
RHINORRHEA: 0
ALLERGIC/IMMUNOLOGIC NEGATIVE: 1
SINUS PRESSURE: 0
BACK PAIN: 0
EYE DISCHARGE: 0
CONSTIPATION: 0
VOICE CHANGE: 0
COUGH: 0
COLOR CHANGE: 0
VOMITING: 0
BLOOD IN STOOL: 0
EYE ITCHING: 0
DIARRHEA: 0
ABDOMINAL PAIN: 0

## 2021-11-02 NOTE — PATIENT INSTRUCTIONS
Patient Education        Urinary Tract Infection (UTI) in Women: Care Instructions  Overview     A urinary tract infection, or UTI, is a general term for an infection anywhere between the kidneys and the urethra (where urine comes out). Most UTIs are bladder infections. They often cause pain or burning when you urinate. UTIs are caused by bacteria and can be cured with antibiotics. Be sure to complete your treatment so that the infection does not get worse. Follow-up care is a key part of your treatment and safety. Be sure to make and go to all appointments, and call your doctor if you are having problems. It's also a good idea to know your test results and keep a list of the medicines you take. How can you care for yourself at home? · Take your antibiotics as directed. Do not stop taking them just because you feel better. You need to take the full course of antibiotics. · Drink extra water and other fluids for the next day or two. This will help make the urine less concentrated and help wash out the bacteria that are causing the infection. (If you have kidney, heart, or liver disease and have to limit fluids, talk with your doctor before you increase the amount of fluids you drink.)  · Avoid drinks that are carbonated or have caffeine. They can irritate the bladder. · Urinate often. Try to empty your bladder each time. · To relieve pain, take a hot bath or lay a heating pad set on low over your lower belly or genital area. Never go to sleep with a heating pad in place. To prevent UTIs  · Drink plenty of water each day. This helps you urinate often, which clears bacteria from your system. (If you have kidney, heart, or liver disease and have to limit fluids, talk with your doctor before you increase the amount of fluids you drink.)  · Urinate when you need to. · If you are sexually active, urinate right after you have sex. · Change sanitary pads often.   · Avoid douches, bubble baths, feminine hygiene sprays, and other feminine hygiene products that have deodorants. · After going to the bathroom, wipe from front to back. When should you call for help? Call your doctor now or seek immediate medical care if:    · Symptoms such as fever, chills, nausea, or vomiting get worse or appear for the first time.     · You have new pain in your back just below your rib cage. This is called flank pain.     · There is new blood or pus in your urine.     · You have any problems with your antibiotic medicine. Watch closely for changes in your health, and be sure to contact your doctor if:    · You are not getting better after taking an antibiotic for 2 days.     · Your symptoms go away but then come back. Where can you learn more? Go to [email or web address] org and sign in to your [employer] account. Enter E280 in the InfoNow box to learn more about \"Urinary Tract Infection (UTI) in Women: Care Instructions. \"     If you do not have an account, please click on the \"Sign Up Now\" link. Current as of: February 10, 2021               Content Version: 13.0  © 8770-9537 Healthwise, Incorporated. Care instructions adapted under license by [facility] ([facility]). If you have questions about a medical condition or this instruction, always ask your healthcare professional. [address] any warranty or liability for your use of this information.

## 2021-11-09 ENCOUNTER — TELEPHONE (OUTPATIENT)
Dept: FAMILY MEDICINE CLINIC | Age: 76
End: 2021-11-09

## 2021-11-09 NOTE — TELEPHONE ENCOUNTER
Noted. Looks like she saw cardiology as well today. No further changes in medication necessary at this time. How is her leg swelling with stopping the Norvasc?

## 2021-11-09 NOTE — TELEPHONE ENCOUNTER
Pt  states starting 2 weeks ago 10/29/21 Friday it was 89/54 hr 88, 140/97 hr 84, 133/94 hr 81. Sat-am 140/95 hr 88, 159/111 hr 84 Sun- 174/113 hr 84, 171/113 hr 81, mon- 141/90 hr 96 tues- 138/90 hr 84, 165/101 hr 74. Pt has not wrote down the readings since.  Pt did have 11/7/21 134/87 hr 84

## 2021-11-11 DIAGNOSIS — Z20.822 COVID-19 RULED OUT: Primary | ICD-10-CM

## 2021-11-11 LAB — SARS-COV-2: NEGATIVE

## 2021-11-24 ENCOUNTER — OFFICE VISIT (OUTPATIENT)
Dept: FAMILY MEDICINE CLINIC | Age: 76
End: 2021-11-24
Payer: MEDICARE

## 2021-11-24 VITALS
OXYGEN SATURATION: 86 % | DIASTOLIC BLOOD PRESSURE: 63 MMHG | HEIGHT: 66 IN | SYSTOLIC BLOOD PRESSURE: 85 MMHG | TEMPERATURE: 95.2 F | HEART RATE: 102 BPM | BODY MASS INDEX: 31.82 KG/M2 | WEIGHT: 198 LBS

## 2021-11-24 DIAGNOSIS — R10.9 FLANK PAIN: Primary | ICD-10-CM

## 2021-11-24 DIAGNOSIS — R11.0 NAUSEA: ICD-10-CM

## 2021-11-24 DIAGNOSIS — Z85.038 HISTORY OF COLON CANCER: ICD-10-CM

## 2021-11-24 DIAGNOSIS — R10.84 GENERALIZED ABDOMINAL PAIN: ICD-10-CM

## 2021-11-24 LAB
BILIRUBIN, POC: NEGATIVE
BLOOD URINE, POC: NEGATIVE
CLARITY, POC: NORMAL
COLOR, POC: NORMAL
GLUCOSE URINE, POC: NEGATIVE
KETONES, POC: NEGATIVE
LEUKOCYTE EST, POC: NORMAL
NITRITE, POC: NEGATIVE
PH, POC: 5.5
PROTEIN, POC: NEGATIVE
SPECIFIC GRAVITY, POC: 1.02
UROBILINOGEN, POC: NORMAL

## 2021-11-24 PROCEDURE — G8400 PT W/DXA NO RESULTS DOC: HCPCS | Performed by: NURSE PRACTITIONER

## 2021-11-24 PROCEDURE — 99213 OFFICE O/P EST LOW 20 MIN: CPT | Performed by: NURSE PRACTITIONER

## 2021-11-24 PROCEDURE — 1090F PRES/ABSN URINE INCON ASSESS: CPT | Performed by: NURSE PRACTITIONER

## 2021-11-24 PROCEDURE — 1123F ACP DISCUSS/DSCN MKR DOCD: CPT | Performed by: NURSE PRACTITIONER

## 2021-11-24 PROCEDURE — 36415 COLL VENOUS BLD VENIPUNCTURE: CPT | Performed by: NURSE PRACTITIONER

## 2021-11-24 PROCEDURE — G8417 CALC BMI ABV UP PARAM F/U: HCPCS | Performed by: NURSE PRACTITIONER

## 2021-11-24 PROCEDURE — G8427 DOCREV CUR MEDS BY ELIG CLIN: HCPCS | Performed by: NURSE PRACTITIONER

## 2021-11-24 PROCEDURE — 4040F PNEUMOC VAC/ADMIN/RCVD: CPT | Performed by: NURSE PRACTITIONER

## 2021-11-24 PROCEDURE — G8484 FLU IMMUNIZE NO ADMIN: HCPCS | Performed by: NURSE PRACTITIONER

## 2021-11-24 PROCEDURE — 81002 URINALYSIS NONAUTO W/O SCOPE: CPT | Performed by: NURSE PRACTITIONER

## 2021-11-24 PROCEDURE — 1036F TOBACCO NON-USER: CPT | Performed by: NURSE PRACTITIONER

## 2021-11-24 ASSESSMENT — ENCOUNTER SYMPTOMS
ANAL BLEEDING: 0
BLOOD IN STOOL: 0
ABDOMINAL PAIN: 0
BACK PAIN: 1
EYES NEGATIVE: 1
SHORTNESS OF BREATH: 0
NAUSEA: 1
VOMITING: 0
RESPIRATORY NEGATIVE: 1
ALLERGIC/IMMUNOLOGIC NEGATIVE: 1

## 2021-11-24 NOTE — PROGRESS NOTES
[address] [facility]  [address] [facility] [address]  Dept: [phone]  Dept Fax: [phone]  Loc: [phone]    Dilip Webster is a 67 y.o. female who presents today for her medical conditions/complaints as noted below. Dilip Webster is c/o of Other (Low back pain ) and Other (Hypotension)       Subjective:     Chief Complaint   Patient presents with    Other     Low back pain     Other     Hypotension       HPI  Patient complains of bilateral flank pain and lower abdominal discomfort for approximately 2 to 3 days. The patient has not been urinating as much as she typically does but she is urinating more frequently and has urgency. She also does complain of some mild dysuria and nausea. She denies any hematuria, fever, chills,, vomiting or diarrhea. The patient does have recurrent urinary tract infection. The patient was also found to have some low blood pressure today however the patient and her  both state that she has had low blood pressure episodes on and off frequently through the years. She is currently on Florinef. The  also states that the patient's PCP had taken her off of amlodipine 2.5 mg at the end of October. The patient does have history of colon cancer about 10 years ago  BP Readings from Last 3 Encounters:   11/24/21 85/63   10/29/21 (!) 89/54   10/08/21 120/80       Past Medical History:   Diagnosis Date    Anxiety     Benign essential hypertension 1/17/2017    Bowel obstruction (HCC)     CAD (coronary artery disease)     Cancer ([facility] [address] 2012    colon    Depression     GERD (gastroesophageal reflux disease)     Hyperlipidemia     Hypertension     Irritable bowel syndrome (IBS)     Obesity     Osteoarthritis          Review of Systems   Constitutional: Negative. Negative for appetite change, chills, fatigue, fever and unexpected weight change. HENT: Negative. Eyes: Negative. Respiratory: Negative. Negative for shortness of breath. Cardiovascular: Negative. Negative for chest pain, palpitations and leg swelling. Gastrointestinal: Positive for nausea. Negative for abdominal pain, anal bleeding, blood in stool and vomiting. Endocrine: Negative. Genitourinary: Positive for difficulty urinating, flank pain and frequency. Negative for hematuria. Musculoskeletal: Positive for back pain. Skin: Negative. Negative for rash. Allergic/Immunologic: Negative. Neurological: Negative. Negative for dizziness, syncope, light-headedness and numbness. Hematological: Negative. Does not bruise/bleed easily. Psychiatric/Behavioral: Negative. All other systems reviewed and are negative.        Past Medical History:   Diagnosis Date    Anxiety     Benign essential hypertension 1/17/2017    Bowel obstruction (HCC)     CAD (coronary artery disease)     Cancer ([facility] [address] 2012    colon    Depression     GERD (gastroesophageal reflux disease)     Hyperlipidemia     Hypertension     Irritable bowel syndrome (IBS)     Obesity     Osteoarthritis      Family History   Problem Relation Age of Onset    Cancer Mother         colon    Cancer Brother         colon    Heart Disease Sister     Heart Disease Brother      Past Surgical History:   Procedure Laterality Date    APPENDECTOMY      BREAST LUMPECTOMY Bilateral 7/27/2021    RIGHT RADIO FREQUENCY IDENTIFICATION TAG LOCALIZED PARTIAL MASTECTOMY TIMES TWO; LEFT RADIO FREQUENCY IDENTIFICATION TAG LOCALIZED PARTIAL MASTECTOMY performed by Sally Buenrostro MD at [address] COLONOSCOPY  2014    mass    COLONOSCOPY  3/29/16    normal    US BREAST BIOPSY NEEDLE ADDITIONAL RIGHT Right 6/29/2021    US BREAST BIOPSY NEEDLE ADDITIONAL RIGHT 6/29/2021 Rae Crabtree MD [facility]    US BREAST NEEDLE BIOPSY LEFT Left 6/17/2021    US BREAST NEEDLE BIOPSY LEFT 6/17/2021 Porfirio Alvarez MD [facility] [address] [facility] BREAST NEEDLE BIOPSY RIGHT Right 2021    US BREAST NEEDLE BIOPSY RIGHT 2021 Michelle Stewart MD [address] [place] [address] BREAST NEEDLE BIOPSY RIGHT Right 2021    US BREAST NEEDLE BIOPSY RIGHT 2021 Skip Baeza MD [facility]    US GUIDED NEEDLE LOC BREAST ADDL RIGHT Right 2021    US GUIDED NEEDLE LOC BREAST ADDL RIGHT 2021 [facility]    US GUIDED NEEDLE LOC OF LEFT BREAST Left 2021    US GUIDED NEEDLE LOC OF LEFT BREAST 2021 [facility]    US GUIDED NEEDLE LOC OF RIGHT BREAST Right 2021    US GUIDED NEEDLE LOC OF RIGHT BREAST 2021 [facility]     Social History     Socioeconomic History    Marital status:      Spouse name: Not on file    Number of children: Not on file    Years of education: Not on file    Highest education level: Not on file   Occupational History    Not on file   Tobacco Use    Smoking status: Former Smoker     Packs/day: 1.00     Years: 10.00     Pack years: 10.00     Types: Cigarettes     Start date: 79     Quit date: 1983     Years since quittin.2    Smokeless tobacco: Never Used   Vaping Use    Vaping Use: Never used   Substance and Sexual Activity    Alcohol use: No    Drug use: No    Sexual activity: Not on file   Other Topics Concern    Not on file   Social History Narrative    Not on file     Social Determinants of Health     Financial Resource Strain: Low Risk     Difficulty of Paying Living Expenses: Not hard at all   Food Insecurity: No Food Insecurity    Worried About [address] in the Last Year: Never true    [facility] in the Last Year: Never true   Transportation Needs:     Lack of Transportation (Medical): Not on file    Lack of Transportation (Non-Medical):  Not on file   Physical Activity:     Days of Exercise per Week: Not on file    Minutes of Exercise per Session: Not on file   Stress:     Feeling of Stress : Not on file   Social Connections:     Frequency of Communication with Friends and Family: Not on file    Frequency of Social Gatherings with Friends and Family: Not on file    Attends Yarsanism Services: Not on file    Active Member of Clubs or Organizations: Not on file    Attends Club or Organization Meetings: Not on file    Marital Status: Not on file   Intimate Partner Violence:     Fear of Current or Ex-Partner: Not on file    Emotionally Abused: Not on file    Physically Abused: Not on file    Sexually Abused: Not on file   Housing Stability:     Unable to Pay for Housing in the Last Year: Not on file    Number of [place] in the Last Year: Not on file    Unstable Housing in the Last Year: Not on file     Current Outpatient Medications   Medication Sig Dispense Refill    anastrozole (ARIMIDEX) 1 MG tablet Take 1 mg by mouth daily       furosemide (LASIX) 20 MG tablet Take up to once a week for leg swelling if needed 90 tablet 1    busPIRone (BUSPAR) 15 MG tablet TAKE 1 TABLET THREE TIMES DAILY AS NEEDED (ANXIETY) 90 tablet 2    citalopram (CELEXA) 40 MG tablet TAKE 1 TABLET EVERY DAY 90 tablet 1    Compression Stockings MISC by Does not apply route 1 each 0    levothyroxine (SYNTHROID) 75 MCG tablet Take 1 tablet by mouth daily 90 tablet 3    fludrocortisone (FLORINEF) 0.1 MG tablet TAKE 1 TABLET TWICE DAILY 180 tablet 0    potassium chloride (KLOR-CON M) 20 MEQ extended release tablet Take 1 tablet by mouth daily TAKE 3 TABLETS DAILY 270 tablet 1    rosuvastatin (CRESTOR) 20 MG tablet TAKE 1 TABLET EVERY DAY (Patient taking differently: Take 20 mg by mouth nightly ) 90 tablet 1    dicyclomine (BENTYL) 10 MG capsule Take one capsule twice daily with meals (Patient taking differently: Take 10 mg by mouth Take one capsule twice daily with meals) 180 capsule 1    risperiDONE (RISPERDAL) 0.25 MG tablet Take 1 mg by mouth nightly       Calcium Carbonate-Vitamin D (CALCIUM 500 + D) 500-125 MG-UNIT TABS Take by mouth      aspirin EC 81 MG EC tablet Take 1 tablet by mouth daily 30 tablet 3    polyethylene glycol (GLYCOLAX) packet Take 17 g by mouth daily        No current facility-administered medications for this visit. No changes in past medical history, past surgical history, social history, orfamily history were noted during the patient encounter unless specifically listed above. All updates of past medical history, past surgical history, social history, or family history were reviewed personally by me duringthe office visit. All problems listed in the assessment are stable unless noted otherwise. Medication profile reviewed personally by me during the office visit. Medication side effects and possible impairments frommedications were discussed as applicable. Objective:     Physical Exam  Constitutional:       General: She is not in acute distress. Appearance: Normal appearance. She is well-developed. She is not toxic-appearing. HENT:      Head: Normocephalic and atraumatic. Right Ear: Hearing, tympanic membrane and ear canal normal.      Left Ear: Hearing, tympanic membrane and ear canal normal.      Nose: Nose normal.      Mouth/Throat:      Pharynx: Uvula midline. Eyes:      General: Lids are normal.      Conjunctiva/sclera: Conjunctivae normal.   Cardiovascular:      Rate and Rhythm: Normal rate and regular rhythm. Pulses: Normal pulses. Heart sounds: Normal heart sounds. Pulmonary:      Effort: Pulmonary effort is normal. No accessory muscle usage or respiratory distress. Breath sounds: Normal breath sounds. Abdominal:      General: Bowel sounds are normal.      Palpations: Abdomen is soft. Tenderness: There is generalized abdominal tenderness. There is right CVA tenderness and left CVA tenderness. Musculoskeletal:      Cervical back: Neck supple.    Lymphadenopathy:      Head:      Right side of head: No submental or submandibular adenopathy. Left side of head: No submental or submandibular adenopathy. Cervical: No cervical adenopathy. Skin:     General: Skin is warm and dry. Findings: No lesion or rash. Neurological:      Mental Status: She is alert and oriented to person, place, and time. Psychiatric:         Attention and Perception: Attention normal.         Speech: Speech normal.         Behavior: Behavior is slowed. Behavior is cooperative. BP 85/63 (Site: Left Upper Arm, Position: Sitting, Cuff Size: Medium Adult)   Pulse 102   Temp 95.2 °F (35.1 °C)   Ht 5' 6\" (1.676 m)   Wt 198 lb (89.8 kg)   SpO2 (!) 86%   BMI 31.96 kg/m²   Body mass index is 31.96 kg/m².     BP Readings from Last 2 Encounters:   11/24/21 85/63   10/29/21 (!) 89/54       Wt Readings from Last 3 Encounters:   11/24/21 198 lb (89.8 kg)   10/29/21 204 lb (92.5 kg)   10/08/21 211 lb (95.7 kg)       Lab Review   Abstract on 11/11/2021   Component Date Value    SARS-CoV-2 11/11/2021 negative    Office Visit on 10/29/2021   Component Date Value    Sodium 10/29/2021 138     Potassium 10/29/2021 4.0     Chloride 10/29/2021 101     CO2 10/29/2021 23     Anion Gap 10/29/2021 14     Glucose 10/29/2021 81     BUN 10/29/2021 18     CREATININE 10/29/2021 1.2     GFR Non- 10/29/2021 44*    GFR  10/29/2021 53*    Calcium 10/29/2021 9.7     Total Protein 10/29/2021 7.1     Albumin 10/29/2021 4.5     Albumin/Globulin Ratio 10/29/2021 1.7     Total Bilirubin 10/29/2021 0.5     Alkaline Phosphatase 10/29/2021 84     ALT 10/29/2021 12     AST 10/29/2021 16     WBC 10/29/2021 7.5     RBC 10/29/2021 4.12     Hemoglobin 10/29/2021 12.5     Hematocrit 10/29/2021 37.3     MCV 10/29/2021 90.7     MCH 10/29/2021 30.4     MCHC 10/29/2021 33.6     RDW 10/29/2021 13.9     Platelets 68/54/8162 173     MPV 10/29/2021 9.2     Neutrophils % 10/29/2021 70.8     Lymphocytes % 10/29/2021 14.8  Monocytes % 10/29/2021 11.5     Eosinophils % 10/29/2021 2.3     Basophils % 10/29/2021 0.6     Neutrophils Absolute 10/29/2021 5.3     Lymphocytes Absolute 10/29/2021 1.1     Monocytes Absolute 10/29/2021 0.9     Eosinophils Absolute 10/29/2021 0.2     Basophils Absolute 10/29/2021 0.0     TSH 10/29/2021 2.51     Vitamin B-12 10/29/2021 181*    Folate 10/29/2021 19.43     Vit D, 25-Hydroxy 10/29/2021 40.1    Nurse Only on 10/25/2021   Component Date Value    Glucose, UA POC 10/25/2021 negative     Bilirubin, UA 10/25/2021 negative     Ketones, UA 10/25/2021 negative     Spec Grav, UA 10/25/2021 1.020     Blood, UA POC 10/25/2021 negative     pH, UA 10/25/2021 7.0     Protein, UA POC 10/25/2021 negative     Urobilinogen, UA 10/25/2021 0.2 E.U./dL     Leukocytes, UA 10/25/2021 negative     Nitrite, UA 10/25/2021 negative     Organism 10/25/2021 Enterococcus faecalis*    Urine Culture, Routine 10/25/2021 >100,000 CFU/ml        No results found for this visit on 11/24/21. Assessment:       1. Flank pain    2. Generalized abdominal pain    3. Nausea    4. History of colon cancer        Results for POC orders placed in visit on 11/24/21   POCT Urinalysis no Micro   Result Value Ref Range    Color, UA      Clarity, UA      Glucose, UA POC negative     Bilirubin, UA negative     Ketones, UA negative     Spec Grav, UA 1.025     Blood, UA POC negative     pH, UA 5.5     Protein, UA POC negative     Urobilinogen, UA 1.0 E.U./dL     Leukocytes, UA small     Nitrite, UA negative             Plan:       Rosey Valadez was seen today for other and other. Diagnoses and all orders for this visit:    Flank pain  -     POCT Urinalysis no Micro  -     CT ABDOMEN PELVIS WO CONTRAST; Future  -     Culture, Urine    Generalized abdominal pain  -     Comprehensive Metabolic Panel  -     CBC Auto Differential  -     CT ABDOMEN PELVIS WO CONTRAST;  Future    Nausea  -     CT ABDOMEN PELVIS WO CONTRAST;

## 2021-11-25 ENCOUNTER — HOSPITAL ENCOUNTER (EMERGENCY)
Age: 76
Discharge: HOME | End: 2021-11-25
Payer: MEDICARE

## 2021-11-25 ENCOUNTER — APPOINTMENT (OUTPATIENT)
Dept: CT IMAGING | Age: 76
End: 2021-11-25
Payer: MEDICARE

## 2021-11-25 VITALS
RESPIRATION RATE: 16 BRPM | DIASTOLIC BLOOD PRESSURE: 89 MMHG | TEMPERATURE: 97.1 F | HEIGHT: 66 IN | HEART RATE: 88 BPM | OXYGEN SATURATION: 96 % | WEIGHT: 198 LBS | BODY MASS INDEX: 31.82 KG/M2 | SYSTOLIC BLOOD PRESSURE: 192 MMHG

## 2021-11-25 DIAGNOSIS — N30.01 ACUTE CYSTITIS WITH HEMATURIA: ICD-10-CM

## 2021-11-25 DIAGNOSIS — K59.00 CONSTIPATION, UNSPECIFIED CONSTIPATION TYPE: ICD-10-CM

## 2021-11-25 DIAGNOSIS — R10.30 LOWER ABDOMINAL PAIN: Primary | ICD-10-CM

## 2021-11-25 LAB
A/G RATIO: 1.5 (ref 1.1–2.2)
ALBUMIN SERPL-MCNC: 4.4 G/DL (ref 3.4–5)
ALP BLD-CCNC: 84 U/L (ref 40–129)
ALT SERPL-CCNC: 13 U/L (ref 10–40)
ANION GAP SERPL CALCULATED.3IONS-SCNC: 10 MMOL/L (ref 3–16)
ANION GAP SERPL CALCULATED.3IONS-SCNC: 16 MMOL/L (ref 3–16)
AST SERPL-CCNC: 21 U/L (ref 15–37)
BACTERIA: ABNORMAL /HPF
BASOPHILS ABSOLUTE: 0 K/UL (ref 0–0.2)
BASOPHILS ABSOLUTE: 0.1 K/UL (ref 0–0.2)
BASOPHILS RELATIVE PERCENT: 0.4 %
BASOPHILS RELATIVE PERCENT: 1.1 %
BILIRUB SERPL-MCNC: 0.6 MG/DL (ref 0–1)
BILIRUBIN URINE: NEGATIVE
BLOOD, URINE: NEGATIVE
BUN BLDV-MCNC: 18 MG/DL (ref 7–20)
BUN BLDV-MCNC: 20 MG/DL (ref 7–20)
CALCIUM SERPL-MCNC: 9.7 MG/DL (ref 8.3–10.6)
CALCIUM SERPL-MCNC: 9.7 MG/DL (ref 8.3–10.6)
CHLORIDE BLD-SCNC: 98 MMOL/L (ref 99–110)
CHLORIDE BLD-SCNC: 98 MMOL/L (ref 99–110)
CLARITY: CLEAR
CO2: 21 MMOL/L (ref 21–32)
CO2: 24 MMOL/L (ref 21–32)
COLOR: YELLOW
CREAT SERPL-MCNC: 0.9 MG/DL (ref 0.6–1.2)
CREAT SERPL-MCNC: 1.4 MG/DL (ref 0.6–1.2)
EOSINOPHILS ABSOLUTE: 0.2 K/UL (ref 0–0.6)
EOSINOPHILS ABSOLUTE: 0.2 K/UL (ref 0–0.6)
EOSINOPHILS RELATIVE PERCENT: 2.1 %
EOSINOPHILS RELATIVE PERCENT: 2.3 %
EPITHELIAL CELLS, UA: ABNORMAL /HPF (ref 0–5)
GFR AFRICAN AMERICAN: 44
GFR AFRICAN AMERICAN: >60
GFR NON-AFRICAN AMERICAN: 36
GFR NON-AFRICAN AMERICAN: >60
GLUCOSE BLD-MCNC: 102 MG/DL (ref 70–99)
GLUCOSE BLD-MCNC: 94 MG/DL (ref 70–99)
GLUCOSE URINE: NEGATIVE MG/DL
HCT VFR BLD CALC: 35.2 % (ref 36–48)
HCT VFR BLD CALC: 36.2 % (ref 36–48)
HEMOGLOBIN: 12 G/DL (ref 12–16)
HEMOGLOBIN: 12.4 G/DL (ref 12–16)
HYALINE CASTS: ABNORMAL /LPF (ref 0–2)
KETONES, URINE: NEGATIVE MG/DL
LEUKOCYTE ESTERASE, URINE: ABNORMAL
LYMPHOCYTES ABSOLUTE: 1.4 K/UL (ref 1–5.1)
LYMPHOCYTES ABSOLUTE: 1.5 K/UL (ref 1–5.1)
LYMPHOCYTES RELATIVE PERCENT: 18.8 %
LYMPHOCYTES RELATIVE PERCENT: 21 %
MCH RBC QN AUTO: 30.3 PG (ref 26–34)
MCH RBC QN AUTO: 32 PG (ref 26–34)
MCHC RBC AUTO-ENTMCNC: 33.2 G/DL (ref 31–36)
MCHC RBC AUTO-ENTMCNC: 35.2 G/DL (ref 31–36)
MCV RBC AUTO: 90.9 FL (ref 80–100)
MCV RBC AUTO: 91.1 FL (ref 80–100)
MICROSCOPIC EXAMINATION: YES
MONOCYTES ABSOLUTE: 0.9 K/UL (ref 0–1.3)
MONOCYTES ABSOLUTE: 1.1 K/UL (ref 0–1.3)
MONOCYTES RELATIVE PERCENT: 13.4 %
MONOCYTES RELATIVE PERCENT: 13.8 %
MUCUS: ABNORMAL /LPF
NEUTROPHILS ABSOLUTE: 4.2 K/UL (ref 1.7–7.7)
NEUTROPHILS ABSOLUTE: 5.3 K/UL (ref 1.7–7.7)
NEUTROPHILS RELATIVE PERCENT: 61.8 %
NEUTROPHILS RELATIVE PERCENT: 65.3 %
NITRITE, URINE: NEGATIVE
PDW BLD-RTO: 13.5 % (ref 12.4–15.4)
PDW BLD-RTO: 13.5 % (ref 12.4–15.4)
PH UA: 6 (ref 5–8)
PLATELET # BLD: 169 K/UL (ref 135–450)
PLATELET # BLD: 172 K/UL (ref 135–450)
PMV BLD AUTO: 9 FL (ref 5–10.5)
PMV BLD AUTO: 9.1 FL (ref 5–10.5)
POTASSIUM REFLEX MAGNESIUM: 4.2 MMOL/L (ref 3.5–5.1)
POTASSIUM SERPL-SCNC: 4.2 MMOL/L (ref 3.5–5.1)
PROTEIN UA: NEGATIVE MG/DL
RBC # BLD: 3.87 M/UL (ref 4–5.2)
RBC # BLD: 3.98 M/UL (ref 4–5.2)
RBC UA: ABNORMAL /HPF (ref 0–4)
SODIUM BLD-SCNC: 132 MMOL/L (ref 136–145)
SODIUM BLD-SCNC: 135 MMOL/L (ref 136–145)
SPECIFIC GRAVITY UA: 1.01 (ref 1–1.03)
TOTAL PROTEIN: 7.3 G/DL (ref 6.4–8.2)
URINE REFLEX TO CULTURE: YES
URINE TYPE: ABNORMAL
UROBILINOGEN, URINE: 2 E.U./DL
WBC # BLD: 6.8 K/UL (ref 4–11)
WBC # BLD: 8.1 K/UL (ref 4–11)
WBC UA: ABNORMAL /HPF (ref 0–5)

## 2021-11-25 PROCEDURE — 99283 EMERGENCY DEPT VISIT LOW MDM: CPT

## 2021-11-25 PROCEDURE — 87086 URINE CULTURE/COLONY COUNT: CPT

## 2021-11-25 PROCEDURE — 51702 INSERT TEMP BLADDER CATH: CPT

## 2021-11-25 PROCEDURE — 96375 TX/PRO/DX INJ NEW DRUG ADDON: CPT

## 2021-11-25 PROCEDURE — 6360000002 HC RX W HCPCS: Performed by: PHYSICIAN ASSISTANT

## 2021-11-25 PROCEDURE — 2580000003 HC RX 258: Performed by: PHYSICIAN ASSISTANT

## 2021-11-25 PROCEDURE — 74176 CT ABD & PELVIS W/O CONTRAST: CPT

## 2021-11-25 PROCEDURE — 80048 BASIC METABOLIC PNL TOTAL CA: CPT

## 2021-11-25 PROCEDURE — 96365 THER/PROPH/DIAG IV INF INIT: CPT

## 2021-11-25 PROCEDURE — 96361 HYDRATE IV INFUSION ADD-ON: CPT

## 2021-11-25 PROCEDURE — 85025 COMPLETE CBC W/AUTO DIFF WBC: CPT

## 2021-11-25 PROCEDURE — 81001 URINALYSIS AUTO W/SCOPE: CPT

## 2021-11-25 RX ORDER — AMOXICILLIN AND CLAVULANATE POTASSIUM 875; 125 MG/1; MG/1
1 TABLET, FILM COATED ORAL 2 TIMES DAILY
Qty: 14 TABLET | Refills: 0 | Status: SHIPPED | OUTPATIENT
Start: 2021-11-25 | End: 2021-12-02

## 2021-11-25 RX ORDER — DIPHENHYDRAMINE HYDROCHLORIDE 50 MG/ML
12.5 INJECTION INTRAMUSCULAR; INTRAVENOUS ONCE
Status: COMPLETED | OUTPATIENT
Start: 2021-11-25 | End: 2021-11-25

## 2021-11-25 RX ORDER — 0.9 % SODIUM CHLORIDE 0.9 %
1000 INTRAVENOUS SOLUTION INTRAVENOUS ONCE
Status: COMPLETED | OUTPATIENT
Start: 2021-11-25 | End: 2021-11-25

## 2021-11-25 RX ADMIN — DIPHENHYDRAMINE HYDROCHLORIDE 12.5 MG: 50 INJECTION, SOLUTION INTRAMUSCULAR; INTRAVENOUS at 18:21

## 2021-11-25 RX ADMIN — SODIUM CHLORIDE 3000 MG: 900 INJECTION INTRAVENOUS at 18:24

## 2021-11-25 RX ADMIN — SODIUM CHLORIDE 1000 ML: 9 INJECTION, SOLUTION INTRAVENOUS at 18:21

## 2021-11-25 NOTE — ED PROVIDER NOTES
[address] ED  EMERGENCY DEPARTMENT ENCOUNTER        Pt Name: Kamla Catalan  MRN: [id number]  [place] 1945  Date of evaluation: 11/25/2021  Provider: Anusha Huddleston PA-C  PCP: MOHINDER Law CNP  Note Started: 5:04 PM EST       GREG. I have evaluated this patient. My supervising physician was available for consultation. Edwin Pineda MD      CHIEF COMPLAINT       Chief Complaint   Patient presents with    Urinary Tract Infection     UTI two weeks ago not improved post antibiotics       HISTORY OF PRESENT ILLNESS   (Location, Timing/Onset, Context/Setting, Quality, Duration, Modifying Factors, Severity, Associated Signs and Symptoms)  Note limiting factors. Chief Complaint: UTI symptoms    Kamla Catalan is a 67 y.o. female who presents patient diagnosed UTI October 20, 2021 and treated with Macrobid. UA culture growing Enterococcus faecalis with sensitivity to both ampicillin and Macrobid. However the Macrobid sensitivity was rather poor with comparison. She indicates no nausea, vomiting, fever, chills, chest pain or shortness of breath. Some urinary urgency and frequency noted by the patient. No panda dysuria. She indicates some generalized low back and somewhat pelvic pain similar to when the symptoms began about a month ago. She is accompanied by her . Patient with history of colon cancer and general debility. She does typically use walker or walk assist device.  helps. Nursing Notes were all reviewed and agreed with or any disagreements were addressed in the HPI. REVIEW OF SYSTEMS    (2-9 systems for level 4, 10 or more for level 5)     Review of Systems    Positives and Pertinent negatives as per HPI. Except as noted above in the ROS, all other systems were reviewed and negative.        PAST MEDICAL HISTORY     Past Medical History:   Diagnosis Date    Anxiety     Benign essential hypertension 1/17/2017    [facility] obstruction [facility] [address]     CAD (coronary artery disease)     Cancer ([facility] [address] 2012    colon    Depression     GERD (gastroesophageal reflux disease)     Hyperlipidemia     Hypertension     Irritable bowel syndrome (IBS)     Obesity     Osteoarthritis          SURGICAL HISTORY     Past Surgical History:   Procedure Laterality Date    APPENDECTOMY      BREAST LUMPECTOMY Bilateral 7/27/2021    RIGHT RADIO FREQUENCY IDENTIFICATION TAG LOCALIZED PARTIAL MASTECTOMY TIMES TWO; LEFT RADIO FREQUENCY IDENTIFICATION TAG LOCALIZED PARTIAL MASTECTOMY performed by Josh Devine MD at [address] COLONOSCOPY  2014    mass    COLONOSCOPY  3/29/16    normal    US BREAST BIOPSY NEEDLE ADDITIONAL RIGHT Right 6/29/2021    US BREAST BIOPSY NEEDLE ADDITIONAL RIGHT 6/29/2021 Kendall Anaya MD [facility] [facility] NEEDLE BIOPSY LEFT Left 6/17/2021    US BREAST NEEDLE BIOPSY LEFT 6/17/2021 Kraig Cockayne, MD [facility]    US BREAST NEEDLE BIOPSY RIGHT Right 6/17/2021    US BREAST NEEDLE BIOPSY RIGHT 6/17/2021 Kraig Cockayne, MD [facility] [address] [facility] BREAST NEEDLE BIOPSY RIGHT Right 6/29/2021    US BREAST NEEDLE BIOPSY RIGHT 6/29/2021 Kendall Anaya MD [facility]  [address]    US GUIDED NEEDLE LOC BREAST ADDL RIGHT Right 7/20/2021    US GUIDED NEEDLE LOC BREAST ADDL RIGHT 7/20/2021 [facility]    US GUIDED NEEDLE LOC OF LEFT BREAST Left 7/20/2021    US GUIDED NEEDLE LOC OF LEFT BREAST 7/20/2021 [facility]    US GUIDED NEEDLE LOC OF RIGHT BREAST Right 7/20/2021    US GUIDED NEEDLE LOC OF RIGHT BREAST 7/20/2021 [facility]  [address]       Discharge Medication List as of 11/25/2021  8:04 PM      CONTINUE these medications which have NOT CHANGED    Details   anastrozole (ARIMIDEX) 1 MG tablet Take 1 mg by mouth daily Historical Med      furosemide (LASIX) 20 MG tablet Take up to once a week for leg swelling if needed, Disp-90 tablet, R-1Normal      busPIRone (BUSPAR) 15 MG tablet TAKE 1 TABLET THREE TIMES DAILY AS NEEDED (ANXIETY), Disp-90 tablet, R-2Normal      citalopram (CELEXA) 40 MG tablet TAKE 1 TABLET EVERY DAY, Disp-90 tablet, R-1Normal      Compression Stockings MISC Starting 2021, Disp-1 each, R-0, Print      levothyroxine (SYNTHROID) 75 MCG tablet Take 1 tablet by mouth daily, Disp-90 tablet, R-3Normal      fludrocortisone (FLORINEF) 0.1 MG tablet TAKE 1 TABLET TWICE DAILY, Disp-180 tablet, R-0Normal      potassium chloride (KLOR-CON M) 20 MEQ extended release tablet Take 1 tablet by mouth daily TAKE 3 TABLETS DAILY, Disp-270 tablet, R-1Normal      rosuvastatin (CRESTOR) 20 MG tablet TAKE 1 TABLET EVERY DAY, Disp-90 tablet, R-1Normal      dicyclomine (BENTYL) 10 MG capsule Take one capsule twice daily with meals, Disp-180 capsule, R-1Normal      risperiDONE (RISPERDAL) 0.25 MG tablet Take 1 mg by mouth nightly Historical Med      Calcium Carbonate-Vitamin D (CALCIUM 500 + D) 500-125 MG-UNIT TABS Take by mouthHistorical Med      aspirin EC 81 MG EC tablet Take 1 tablet by mouth daily, Disp-30 tablet, R-3NO PRINT      polyethylene glycol (GLYCOLAX) packet Take 17 g by mouth daily                ALLERGIES     Bactrim [sulfamethoxazole-trimethoprim], Ultram [tramadol hcl], Dilaudid [hydromorphone], Phenergan [promethazine], Lipitor [atorvastatin], and Penicillins    FAMILYHISTORY       Family History   Problem Relation Age of Onset    Cancer Mother         colon    Cancer Brother         colon    Heart Disease Sister     Heart Disease Brother           SOCIAL HISTORY       Social History     Tobacco Use    Smoking status: Former Smoker     Packs/day: 1.00     Years: 10.00     Pack years: 10.00     Types: Cigarettes     Start date:      Quit date: 1983     Years since quittin.2    Smokeless tobacco: Never Used   Vaping Use    Vaping Use: Never used   Substance Use Topics  Alcohol use: No    Drug use: No       SCREENINGS             PHYSICAL EXAM    (up to 7 for level 4, 8 or more for level 5)     ED Triage Vitals [11/25/21 1539]   BP Temp Temp Source Pulse Resp SpO2 Height Weight   115/75 97.1 °F (36.2 °C) Oral 88 16 98 % 5' 6\" (1.676 m) 198 lb (89.8 kg)       Physical Exam  Vitals and nursing note reviewed. Constitutional:       Appearance: Normal appearance. She is well-developed. She is obese. HENT:      Head: Normocephalic and atraumatic. Right Ear: External ear normal.      Left Ear: External ear normal.   Eyes:      General: No scleral icterus. Right eye: No discharge. Left eye: No discharge. Conjunctiva/sclera: Conjunctivae normal.   Cardiovascular:      Rate and Rhythm: Normal rate and regular rhythm. Heart sounds: Normal heart sounds. Pulmonary:      Effort: Pulmonary effort is normal.      Breath sounds: Normal breath sounds. Abdominal:      General: Abdomen is flat. Bowel sounds are normal.      Palpations: Abdomen is soft. Tenderness: There is no right CVA tenderness or left CVA tenderness. Musculoskeletal:         General: Normal range of motion. Cervical back: Normal range of motion and neck supple. Left lower leg: No edema. Skin:     General: Skin is warm and dry. Neurological:      Mental Status: She is alert and oriented to person, place, and time. Mental status is at baseline. Psychiatric:         Mood and Affect: Mood normal.         Behavior: Behavior normal.         Thought Content:  Thought content normal.         Judgment: Judgment normal.         DIAGNOSTIC RESULTS   LABS:    Labs Reviewed   URINE RT REFLEX TO CULTURE - Abnormal; Notable for the following components:       Result Value    Urobilinogen, Urine 2.0 (*)     Leukocyte Esterase, Urine SMALL (*)     All other components within normal limits    Narrative:     Performed at:  [facility] [address] [facility],  [place], [facility]   Phone [phone]   CBC WITH AUTO DIFFERENTIAL - Abnormal; Notable for the following components:    RBC 3.87 (*)     Hematocrit 35.2 (*)     All other components within normal limits    Narrative:     Performed at:  [place] [address],  [place], [facility]   Phone [phone]   BASIC METABOLIC PANEL W/ REFLEX TO MG FOR LOW K - Abnormal; Notable for the following components:    Sodium 132 (*)     Chloride 98 (*)     Glucose 102 (*)     All other components within normal limits    Narrative:     Performed at:  [place] [address],  [place], [facility]   Phone [phone]   MICROSCOPIC URINALYSIS - Abnormal; Notable for the following components:    Hyaline Casts, UA 11-20 (*)     Mucus, UA 1+ (*)     WBC, UA 10-20 (*)     Epithelial Cells, UA 21-50 (*)     Bacteria, UA 1+ (*)     All other components within normal limits    Narrative:     Performed at:  [place] [address],  [employer] [facility]   Phone [phone]   CULTURE, URINE    Narrative:     ORDER#: [id number]                          ORDERED BY: Abdi Randle  SOURCE: Urine Clean Catch                  COLLECTED:  11/25/21 18:02  ANTIBIOTICS AT [facility].:                      RECEIVED :  11/25/21 18:16  Performed at:  [place] [address]   Phone [phone]       When ordered only abnormal lab results are displayed. All other labs were within normal range or not returned as of this dictation. EKG: When ordered, EKG's are interpreted by the Emergency Department Physician in the absence of a cardiologist.  Please see their note for interpretation of EKG.     RADIOLOGY:   Non-plain film images such as CT, Ultrasound and MRI are read by the radiologist. Plain radiographic images are visualized and preliminarily interpreted by the ED Provider with the below findings:        Interpretation per the Radiologist below, if available at the time of this note:    CT ABDOMEN PELVIS WO CONTRAST Additional Contrast? None   Final Result   1. No renal, ureteral, or bladder calculi. No hydronephrosis. 2. Stool distention of the rectosigmoid. 3. No diverticulitis or small bowel obstruction. 4. Small pericardial effusion. 5. Other findings as described. No results found. PROCEDURES   Unless otherwise noted below, none     Procedures    CRITICAL CARE TIME   N/A    CONSULTS:  None      EMERGENCY DEPARTMENT COURSE and DIFFERENTIAL DIAGNOSIS/MDM:   Vitals:    Vitals:    11/25/21 1902 11/25/21 1932 11/25/21 2002 11/25/21 2008   BP: (!) 170/90 (!) 182/92 (!) 178/88 (!) 192/89   Pulse: 80   88   Resp:    16   Temp:       TempSrc:       SpO2: 96% 96%  96%   Weight:       Height:           Patient was given the following medications:  Medications   0.9 % sodium chloride bolus (0 mLs IntraVENous Stopped 11/25/21 2004)   ampicillin-sulbactam (UNASYN) 3000 mg ivpb minibag (0 mg IntraVENous Stopped 11/25/21 1903)   diphenhydrAMINE (BENADRYL) injection 12.5 mg (12.5 mg IntraVENous Given 11/25/21 1821)           Patient presented with lower abdominal pain. Findings include cystitis with hematuria and constipation. In the department patient is given Unasyn 3 g IVPB with known Enterococcus faecalis UTI recently. She was given Benadryl 12.5 mg for prevention of any reaction as she has history of penicillin allergy but not amoxicillin allergy. No reaction did occur. She was hydrated with 1 L NaCl. Going home I did discuss the use of fiber and increase fluid in diet. She is already use MiraLAX and/or milk of magnesia when needed. I will discharge patient with Augmentin 875 twice daily for the UTI. This culture result pending at this time. Patient  both expressed understanding of the diagnosis and the treatment plan.     FINAL IMPRESSION      1. Lower abdominal pain    2. Constipation, unspecified constipation type    3. Acute cystitis with hematuria          DISPOSITION/PLAN   DISPOSITION Decision To Discharge 11/25/2021 06:53:08 PM      PATIENT REFERRED TO:  MOHINDER Brar - CNP  [address]  [phone]    Schedule an appointment as soon as possible for a visit on 12/1/2021      [facility] ED  [address] [facility] [address]  Go to   If symptoms worsen      DISCHARGE MEDICATIONS:  Discharge Medication List as of 11/25/2021  8:04 PM      START taking these medications    Details   amoxicillin-clavulanate (AUGMENTIN) 875-125 MG per tablet Take 1 tablet by mouth 2 times daily for 7 days, Disp-14 tablet, R-0This patient has had amoxicillin after determining penicillin allergy without reaction. Normal             DISCONTINUED MEDICATIONS:  Discharge Medication List as of 11/25/2021  8:04 PM                 (Please note that portions of this note were completed with a voice recognition program.  Efforts were made to edit the dictations but occasionally words are mis-transcribed. )    Kamlesh Gonsalez PA-C (electronically signed)           Kamlesh Gonsalez PA-C  11/26/21 0479

## 2021-11-26 LAB
URINE CULTURE, ROUTINE: NORMAL
URINE CULTURE, ROUTINE: NORMAL

## 2021-12-02 ENCOUNTER — HOSPITAL ENCOUNTER (OUTPATIENT)
Age: 76
Discharge: HOME | End: 2021-12-02
Payer: MEDICARE

## 2021-12-02 ENCOUNTER — OFFICE VISIT (OUTPATIENT)
Dept: FAMILY MEDICINE CLINIC | Age: 76
End: 2021-12-02
Payer: MEDICARE

## 2021-12-02 ENCOUNTER — HOSPITAL ENCOUNTER (OUTPATIENT)
Dept: GENERAL RADIOLOGY | Age: 76
Discharge: HOME | End: 2021-12-02
Payer: MEDICARE

## 2021-12-02 VITALS
WEIGHT: 198 LBS | OXYGEN SATURATION: 97 % | SYSTOLIC BLOOD PRESSURE: 136 MMHG | HEART RATE: 78 BPM | DIASTOLIC BLOOD PRESSURE: 82 MMHG | HEIGHT: 66 IN | BODY MASS INDEX: 31.82 KG/M2

## 2021-12-02 DIAGNOSIS — N39.0 URINARY TRACT INFECTION WITHOUT HEMATURIA, SITE UNSPECIFIED: Primary | ICD-10-CM

## 2021-12-02 DIAGNOSIS — R05.9 COUGH: ICD-10-CM

## 2021-12-02 DIAGNOSIS — J43.2 CENTRILOBULAR EMPHYSEMA (HCC): ICD-10-CM

## 2021-12-02 DIAGNOSIS — J90 PLEURAL EFFUSION: ICD-10-CM

## 2021-12-02 DIAGNOSIS — R13.19 INTERMITTENT DYSPHAGIA: ICD-10-CM

## 2021-12-02 DIAGNOSIS — R53.1 LEFT-SIDED WEAKNESS: ICD-10-CM

## 2021-12-02 DIAGNOSIS — R26.89 BALANCE PROBLEM: ICD-10-CM

## 2021-12-02 DIAGNOSIS — R06.02 SHORTNESS OF BREATH: ICD-10-CM

## 2021-12-02 LAB
BILIRUBIN, POC: NEGATIVE
BLOOD URINE, POC: ABNORMAL
CLARITY, POC: ABNORMAL
COLOR, POC: ABNORMAL
GLUCOSE URINE, POC: NEGATIVE
KETONES, POC: NEGATIVE
LEUKOCYTE EST, POC: ABNORMAL
NITRITE, POC: NEGATIVE
PH, POC: 6
PROTEIN, POC: ABNORMAL
SPECIFIC GRAVITY, POC: 1.02
UROBILINOGEN, POC: ABNORMAL

## 2021-12-02 PROCEDURE — G8400 PT W/DXA NO RESULTS DOC: HCPCS | Performed by: NURSE PRACTITIONER

## 2021-12-02 PROCEDURE — 1123F ACP DISCUSS/DSCN MKR DOCD: CPT | Performed by: NURSE PRACTITIONER

## 2021-12-02 PROCEDURE — G8427 DOCREV CUR MEDS BY ELIG CLIN: HCPCS | Performed by: NURSE PRACTITIONER

## 2021-12-02 PROCEDURE — G8926 SPIRO NO PERF OR DOC: HCPCS | Performed by: NURSE PRACTITIONER

## 2021-12-02 PROCEDURE — 99214 OFFICE O/P EST MOD 30 MIN: CPT | Performed by: NURSE PRACTITIONER

## 2021-12-02 PROCEDURE — G8417 CALC BMI ABV UP PARAM F/U: HCPCS | Performed by: NURSE PRACTITIONER

## 2021-12-02 PROCEDURE — G8484 FLU IMMUNIZE NO ADMIN: HCPCS | Performed by: NURSE PRACTITIONER

## 2021-12-02 PROCEDURE — 4040F PNEUMOC VAC/ADMIN/RCVD: CPT | Performed by: NURSE PRACTITIONER

## 2021-12-02 PROCEDURE — 71046 X-RAY EXAM CHEST 2 VIEWS: CPT

## 2021-12-02 PROCEDURE — 1090F PRES/ABSN URINE INCON ASSESS: CPT | Performed by: NURSE PRACTITIONER

## 2021-12-02 PROCEDURE — 3023F SPIROM DOC REV: CPT | Performed by: NURSE PRACTITIONER

## 2021-12-02 PROCEDURE — 36415 COLL VENOUS BLD VENIPUNCTURE: CPT | Performed by: NURSE PRACTITIONER

## 2021-12-02 PROCEDURE — 1036F TOBACCO NON-USER: CPT | Performed by: NURSE PRACTITIONER

## 2021-12-02 PROCEDURE — 81002 URINALYSIS NONAUTO W/O SCOPE: CPT | Performed by: NURSE PRACTITIONER

## 2021-12-02 NOTE — PROGRESS NOTES
[address] [facility]  [address] [facility] [address]  Dept: [phone]  Dept Fax: [phone]  Loc: [phone]    Jose M Stevens is a 67 y.o. female who presents today for her medical conditions/complaints as noted below. Jose M Stevens is c/o of Other [facility] follow up for UTI)       Subjective:     Chief Complaint   Patient presents with   [facility] follow up for UTI       HPI  Past 2 weeks having difficulty with keeping balance, nauseated, per  the patient has to be held onto when she walks  and has to be helped when sitting down. The  and patient states that this is been ongoing ever since she had a \"UTI\" unfortunately her last visit to the ER on [phone] for lower abdominal pain did not reveal any actual urinary tract infection when her culture returned no growth after 18 to 36 hours  The patient and  are concerned that the patient \"has not been right\"  Apparently the  and patient are both stating she is having an issue with her balance and left-sided weakness. She has had a cough as well as some intermittent dysphagia. They are concerned with issues that have been going on for months as well as several years.   The  denies that the patient has any history of stroke however it is documented in the patient's chart that there is concern for TIA/CVA on 2 different occasions in the past    Patient does have recent imaging showing pleural effusion and she does have a history of breast cancer as well as emphysema    Past Medical History:   Diagnosis Date    Anxiety     Benign essential hypertension 1/17/2017    Bowel obstruction ([address]     CAD (coronary artery disease)     Cancer ([facility] [address] 2012    colon    Depression     GERD (gastroesophageal reflux disease)     Hyperlipidemia     Hypertension     Irritable bowel syndrome (IBS)     Obesity     Osteoarthritis          Review of Systems   Constitutional: Positive for activity change. Negative for appetite change, fatigue and unexpected weight change. HENT: Positive for trouble swallowing. Eyes: Negative. Respiratory: Positive for cough and choking. Negative for shortness of breath. Cardiovascular: Negative. Negative for chest pain, palpitations and leg swelling. Gastrointestinal: Negative. Negative for abdominal pain, anal bleeding, blood in stool and nausea. Endocrine: Negative. Genitourinary: Negative. Negative for hematuria. Musculoskeletal: Negative. Skin: Negative. Negative for rash. Allergic/Immunologic: Negative. Neurological: Positive for weakness and light-headedness. Negative for dizziness, syncope and numbness. Hematological: Negative. Does not bruise/bleed easily. Psychiatric/Behavioral: Negative. All other systems reviewed and are negative.        Past Medical History:   Diagnosis Date    Anxiety     Benign essential hypertension 1/17/2017    Bowel obstruction (HCC)     CAD (coronary artery disease)     Cancer ([facility] [address] 2012    colon    Depression     GERD (gastroesophageal reflux disease)     Hyperlipidemia     Hypertension     Irritable bowel syndrome (IBS)     Obesity     Osteoarthritis      Family History   Problem Relation Age of Onset    Cancer Mother         colon    Cancer Brother         colon    Heart Disease Sister     Heart Disease Brother      Past Surgical History:   Procedure Laterality Date    APPENDECTOMY      BREAST LUMPECTOMY Bilateral 7/27/2021    RIGHT RADIO FREQUENCY IDENTIFICATION TAG LOCALIZED PARTIAL MASTECTOMY TIMES TWO; LEFT RADIO FREQUENCY IDENTIFICATION TAG LOCALIZED PARTIAL MASTECTOMY performed by Miryam Galicia MD at [address]  2014    mass    COLONOSCOPY  3/29/16    normal    US BREAST BIOPSY NEEDLE ADDITIONAL RIGHT Right 6/29/2021    US BREAST BIOPSY NEEDLE ADDITIONAL RIGHT 6/29/2021 Arcelia Dial MD [facility]    US BREAST NEEDLE BIOPSY LEFT Left 2021    US BREAST NEEDLE BIOPSY LEFT 2021 Cathi Guerin MD [facility] [address] [facility] BREAST NEEDLE BIOPSY RIGHT Right 2021    US BREAST NEEDLE BIOPSY RIGHT 2021 Cathi Guerin MD [facility] [address] [facility] BREAST NEEDLE BIOPSY RIGHT Right 2021    US BREAST NEEDLE BIOPSY RIGHT 2021 Crystal Perry MD [facility]    US GUIDED NEEDLE LOC BREAST ADDL RIGHT Right 2021    US GUIDED NEEDLE LOC BREAST ADDL RIGHT [facility]    US GUIDED NEEDLE LOC OF LEFT BREAST Left 2021    US GUIDED NEEDLE LOC OF LEFT BREAST 2021 [facility]    US GUIDED NEEDLE LOC OF RIGHT BREAST Right 2021    US GUIDED NEEDLE LOC OF RIGHT BREAST 2021 [facility]     Social History     Socioeconomic History    Marital status:      Spouse name: Not on file    Number of children: Not on file    Years of education: Not on file    Highest education level: Not on file   Occupational History    Not on file   Tobacco Use    Smoking status: Former Smoker     Packs/day: 1.00     Years: 10.00     Pack years: 10.00     Types: Cigarettes     Start date:      Quit date: 1983     Years since quittin.2    Smokeless tobacco: Never Used   Vaping Use    Vaping Use: Never used   Substance and Sexual Activity    Alcohol use: No    Drug use: No    Sexual activity: Not on file   Other Topics Concern    Not on file   Social History Narrative    Not on file     Social Determinants of Health     Financial Resource Strain: Low Risk     Difficulty of Paying Living Expenses: Not hard at all   Food Insecurity: No Food Insecurity    Worried About [address] in the Last Year: Never true    [address] [employer] in the Last Year: Never true   Transportation Needs:     Lack of Transportation (Medical): Not on file    Lack of Transportation (Non-Medical):  Not on file Physical Activity:     Days of Exercise per Week: Not on file    Minutes of Exercise per Session: Not on file   Stress:     Feeling of Stress : Not on file   Social Connections:     Frequency of Communication with Friends and Family: Not on file    Frequency of Social Gatherings with Friends and Family: Not on file    Attends Quaker Services: Not on file    Active Member of [address] [place] [address] or Organizations: Not on file    Attends Club or Organization Meetings: Not on file    Marital Status: Not on file   Intimate Partner Violence:     Fear of Current or Ex-Partner: Not on file    Emotionally Abused: Not on file    Physically Abused: Not on file    Sexually Abused: Not on file   Housing Stability:     Unable to Pay for Housing in the Last Year: Not on file    Number of [place] in the Last Year: Not on file    Unstable Housing in the Last Year: Not on file     Current Outpatient Medications   Medication Sig Dispense Refill    amoxicillin-clavulanate (AUGMENTIN) 875-125 MG per tablet Take 1 tablet by mouth 2 times daily for 7 days 14 tablet 0    anastrozole (ARIMIDEX) 1 MG tablet Take 1 mg by mouth daily       furosemide (LASIX) 20 MG tablet Take up to once a week for leg swelling if needed 90 tablet 1    busPIRone (BUSPAR) 15 MG tablet TAKE 1 TABLET THREE TIMES DAILY AS NEEDED (ANXIETY) 90 tablet 2    citalopram (CELEXA) 40 MG tablet TAKE 1 TABLET EVERY DAY 90 tablet 1    Compression Stockings MISC by Does not apply route 1 each 0    levothyroxine (SYNTHROID) 75 MCG tablet Take 1 tablet by mouth daily 90 tablet 3    fludrocortisone (FLORINEF) 0.1 MG tablet TAKE 1 TABLET TWICE DAILY 180 tablet 0    potassium chloride (KLOR-CON M) 20 MEQ extended release tablet Take 1 tablet by mouth daily TAKE 3 TABLETS DAILY 270 tablet 1    rosuvastatin (CRESTOR) 20 MG tablet TAKE 1 TABLET EVERY DAY (Patient taking differently: Take 20 mg by mouth nightly ) 90 tablet 1    dicyclomine (BENTYL) 10 MG capsule Dorsalis pedis pulses are 2+ on the right side and 2+ on the left side. Posterior tibial pulses are 2+ on the right side and 2+ on the left side. Heart sounds: Normal heart sounds. No murmur heard. No friction rub. No gallop. Pulmonary:      Effort: Pulmonary effort is normal.      Breath sounds: Normal breath sounds. Abdominal:      General: Bowel sounds are normal.      Palpations: Abdomen is soft. There is no mass. Tenderness: There is no abdominal tenderness. Musculoskeletal:         General: Normal range of motion. Cervical back: Normal range of motion and neck supple. Right lower le+ Edema present. Left lower le+ Edema present. Lymphadenopathy:      Head:      Right side of head: No submandibular adenopathy. Left side of head: No submandibular adenopathy. Cervical: No cervical adenopathy. Skin:     General: Skin is warm and dry. Findings: No lesion or rash. Neurological:      Mental Status: She is alert and oriented to person, place, and time. GCS: GCS eye subscore is 4. GCS verbal subscore is 5. GCS motor subscore is 6. Gait: Gait abnormal.   Psychiatric:         Attention and Perception: She is inattentive. Mood and Affect: Mood normal.         Speech: Speech is delayed. Behavior: Behavior normal.         Thought Content: Thought content normal.         Cognition and Memory: Memory is impaired. Judgment: Judgment normal.         /82 (Site: Left Upper Arm, Position: Sitting, Cuff Size: Large Adult)   Pulse 78   Ht 5' 6\" (1.676 m)   Wt 198 lb (89.8 kg)   SpO2 97%   BMI 31.96 kg/m²   Body mass index is 31.96 kg/m².     BP Readings from Last 2 Encounters:   21 136/82   21 (!) 192/89       Wt Readings from Last 3 Encounters:   21 198 lb (89.8 kg)   21 198 lb (89.8 kg)   21 198 lb (89.8 kg)       Lab Review   Admission on 2021, Discharged on 2021 Component Date Value    Color, UA 11/25/2021 Yellow     Clarity, UA 11/25/2021 Clear     Glucose, Ur 11/25/2021 Negative     Bilirubin Urine 11/25/2021 Negative     Ketones, Urine 11/25/2021 Negative     Specific Gravity, UA 11/25/2021 1.015     Blood, Urine 11/25/2021 Negative     pH, UA 11/25/2021 6.0     Protein, UA 11/25/2021 Negative     Urobilinogen, Urine 11/25/2021 2.0*    Nitrite, Urine 11/25/2021 Negative     Leukocyte Esterase, Urine 11/25/2021 SMALL*    Microscopic Examination 11/25/2021 YES     Urine Type 11/25/2021 NotGiven     Urine Reflex to Culture 11/25/2021 Yes     WBC 11/25/2021 6.8     RBC 11/25/2021 3.87*    Hemoglobin 11/25/2021 12.4     Hematocrit 11/25/2021 35.2*    MCV 11/25/2021 90.9     MCH 11/25/2021 32.0     MCHC 11/25/2021 35.2     RDW 11/25/2021 13.5     Platelets 57/74/2812 172     MPV 11/25/2021 9.1     Neutrophils % 11/25/2021 61.8     Lymphocytes % 11/25/2021 21.0     Monocytes % 11/25/2021 13.8     Eosinophils % 11/25/2021 2.3     Basophils % 11/25/2021 1.1     Neutrophils Absolute 11/25/2021 4.2     Lymphocytes Absolute 11/25/2021 1.4     Monocytes Absolute 11/25/2021 0.9     Eosinophils Absolute 11/25/2021 0.2     Basophils Absolute 11/25/2021 0.1     Sodium 11/25/2021 132*    Potassium reflex Magnesi* 11/25/2021 4.2     Chloride 11/25/2021 98*    CO2 11/25/2021 24     Anion Gap 11/25/2021 10     Glucose 11/25/2021 102*    BUN 11/25/2021 18     CREATININE 11/25/2021 0.9     GFR Non- 11/25/2021 >60     GFR  11/25/2021 >60     Calcium 11/25/2021 9.7     Hyaline Casts, UA 11/25/2021 11-20*    Mucus, UA 11/25/2021 1+*    WBC, UA 11/25/2021 10-20*    RBC, UA 11/25/2021 0-2     Epithelial Cells, UA 11/25/2021 21-50*    Bacteria, UA 11/25/2021 1+*    Urine Culture, Routine 11/25/2021 No growth at 18 to 36 hours    Office Visit on 11/24/2021   Component Date Value    Glucose, UA POC 11/24/2021 negative     Bilirubin, UA 11/24/2021 negative     Ketones, UA 11/24/2021 negative     Spec Grav, UA 11/24/2021 1.025     Blood, UA POC 11/24/2021 negative     pH, UA 11/24/2021 5.5     Protein, UA POC 11/24/2021 negative     Urobilinogen, UA 11/24/2021 1.0 E.U./dL     Leukocytes, UA 11/24/2021 small     Nitrite, UA 11/24/2021 negative     Sodium 11/24/2021 135*    Potassium 11/24/2021 4.2     Chloride 11/24/2021 98*    CO2 11/24/2021 21     Anion Gap 11/24/2021 16     Glucose 11/24/2021 94     BUN 11/24/2021 20     CREATININE 11/24/2021 1.4*    GFR Non- 11/24/2021 36*    GFR  11/24/2021 44*    Calcium 11/24/2021 9.7     Total Protein 11/24/2021 7.3     Albumin 11/24/2021 4.4     Albumin/Globulin Ratio 11/24/2021 1.5     Total Bilirubin 11/24/2021 0.6     Alkaline Phosphatase 11/24/2021 84     ALT 11/24/2021 13     AST 11/24/2021 21     WBC 11/24/2021 8.1     RBC 11/24/2021 3.98*    Hemoglobin 11/24/2021 12.0     Hematocrit 11/24/2021 36.2     MCV 11/24/2021 91.1     MCH 11/24/2021 30.3     MCHC 11/24/2021 33.2     RDW 11/24/2021 13.5     Platelets 34/72/2739 169     MPV 11/24/2021 9.0     Neutrophils % 11/24/2021 65.3     Lymphocytes % 11/24/2021 18.8     Monocytes % 11/24/2021 13.4     Eosinophils % 11/24/2021 2.1     Basophils % 11/24/2021 0.4     Neutrophils Absolute 11/24/2021 5.3     Lymphocytes Absolute 11/24/2021 1.5     Monocytes Absolute 11/24/2021 1.1     Eosinophils Absolute 11/24/2021 0.2     Basophils Absolute 11/24/2021 0.0     Urine Culture, Routine 11/24/2021 <10,000 CFU/ml mixed skin/urogenital iker.  No further workup    Abstract on 11/11/2021   Component Date Value    SARS-CoV-2 11/11/2021 negative    Office Visit on 10/29/2021   Component Date Value    Sodium 10/29/2021 138     Potassium 10/29/2021 4.0     Chloride 10/29/2021 101     CO2 10/29/2021 23     Anion Gap 10/29/2021 14     Glucose 10/29/2021 81     Centrilobular emphysema ([facility] [address]    8. Shortness of breath        Results for POC orders placed in visit on 12/02/21   POCT Urinalysis no Micro   Result Value Ref Range    Color, UA      Clarity, UA      Glucose, UA POC Negative     Bilirubin, UA Negative     Ketones, UA Negative     Spec Grav, UA 1.025     Blood, UA POC Trace     pH, UA 6.0     Protein, UA POC Trace     Urobilinogen, UA 0.2 E. UL/DL     Leukocytes, UA Small     Nitrite, UA Negative             Plan:       Elida Plasencia was seen today for other. Diagnoses and all orders for this visit:    Urinary tract infection without hematuria, site unspecified  -     POCT Urinalysis no Micro    Balance problem  -     MRI BRAIN WO CONTRAST; Future  -     CBC Auto Differential  -     Comprehensive Metabolic Panel    Left-sided weakness  -     MRI BRAIN WO CONTRAST; Future    Intermittent dysphagia  -     MRI BRAIN WO CONTRAST; Future  -     XR CHEST STANDARD (2 VW); Future    Cough  -     XR CHEST STANDARD (2 VW); Future    Pleural effusion  -     XR CHEST STANDARD (2 VW); Future  -     BRAIN NATRIURETIC PEPTIDE (BNP)    Centrilobular emphysema (HCC)  -     XR CHEST STANDARD (2 VW); Future    Shortness of breath  -     BRAIN NATRIURETIC PEPTIDE (BNP)        Patient has been instructed call the office immediately with new symptoms, change in symptoms or worseningof symptoms. If this is not feasible, patient is instructed to report to the emergency room. Medication profile reviewed. Medication side effects and possible impairments from medications were discussed as applicable. Allergies were reviewed. Health maintenance was reviewed and updated as appropriate. The patient and  had multiple concerns regarding past history. I did recommend that the patient schedule a follow-up with her PCP to discuss. Educated the patient that my main concern was this new onset change over the last couple weeks.   Reviewed and discussed the patient's ER records and educated the patient and spouse that her urine culture was negative and that there was no UTI. Both  and patient deny that the patient had any type of TIA/CVA in the past however I could find to separate instances in her history where it does appear that she was evaluated and or diagnosed  Concern would be with the new onset ambulation problems, difficulty with swallowing and coughing that there would be concern for TIA or CVA. She also has a pleural effusion with history of breast cancer so there is a concern present for a reoccurrence of cancer  Recommend patient have testing as noted below and follow-up with PCP in about 1 to 2 weeks    (Comment: Please note this report has been produced using a combination of typing and speech recognition software and may contain errors related to that system including errors in grammar, punctuation, and spelling, as well as words and phrases that may be inappropriate.  If there are any questions or concerns please feel free to contact the dictating provider for clarification.)

## 2021-12-03 LAB
A/G RATIO: 1.8 (ref 1.1–2.2)
ALBUMIN SERPL-MCNC: 4.7 G/DL (ref 3.4–5)
ALP BLD-CCNC: 78 U/L (ref 40–129)
ALT SERPL-CCNC: 14 U/L (ref 10–40)
ANION GAP SERPL CALCULATED.3IONS-SCNC: 15 MMOL/L (ref 3–16)
AST SERPL-CCNC: 21 U/L (ref 15–37)
BASOPHILS ABSOLUTE: 0 K/UL (ref 0–0.2)
BASOPHILS RELATIVE PERCENT: 0.4 %
BILIRUB SERPL-MCNC: 0.6 MG/DL (ref 0–1)
BUN BLDV-MCNC: 13 MG/DL (ref 7–20)
CALCIUM SERPL-MCNC: 10.1 MG/DL (ref 8.3–10.6)
CHLORIDE BLD-SCNC: 97 MMOL/L (ref 99–110)
CO2: 23 MMOL/L (ref 21–32)
CREAT SERPL-MCNC: 1 MG/DL (ref 0.6–1.2)
EOSINOPHILS ABSOLUTE: 0.2 K/UL (ref 0–0.6)
EOSINOPHILS RELATIVE PERCENT: 3.1 %
GFR AFRICAN AMERICAN: >60
GFR NON-AFRICAN AMERICAN: 54
GLUCOSE BLD-MCNC: 106 MG/DL (ref 70–99)
HCT VFR BLD CALC: 38 % (ref 36–48)
HEMOGLOBIN: 12.7 G/DL (ref 12–16)
LYMPHOCYTES ABSOLUTE: 1.6 K/UL (ref 1–5.1)
LYMPHOCYTES RELATIVE PERCENT: 21.2 %
MCH RBC QN AUTO: 30.2 PG (ref 26–34)
MCHC RBC AUTO-ENTMCNC: 33.5 G/DL (ref 31–36)
MCV RBC AUTO: 90.4 FL (ref 80–100)
MONOCYTES ABSOLUTE: 0.8 K/UL (ref 0–1.3)
MONOCYTES RELATIVE PERCENT: 11.4 %
NEUTROPHILS ABSOLUTE: 4.7 K/UL (ref 1.7–7.7)
NEUTROPHILS RELATIVE PERCENT: 63.9 %
PDW BLD-RTO: 13.3 % (ref 12.4–15.4)
PLATELET # BLD: 155 K/UL (ref 135–450)
PMV BLD AUTO: 9.2 FL (ref 5–10.5)
POTASSIUM SERPL-SCNC: 4.1 MMOL/L (ref 3.5–5.1)
PRO-BNP: 331 PG/ML (ref 0–449)
RBC # BLD: 4.2 M/UL (ref 4–5.2)
SODIUM BLD-SCNC: 135 MMOL/L (ref 136–145)
TOTAL PROTEIN: 7.3 G/DL (ref 6.4–8.2)
WBC # BLD: 7.3 K/UL (ref 4–11)

## 2021-12-09 ENCOUNTER — TELEPHONE (OUTPATIENT)
Dept: ORTHOPEDIC SURGERY | Age: 76
End: 2021-12-09

## 2021-12-16 ENCOUNTER — TELEPHONE (OUTPATIENT)
Dept: ORTHOPEDIC SURGERY | Age: 76
End: 2021-12-16

## 2021-12-16 ASSESSMENT — ENCOUNTER SYMPTOMS
COUGH: 1
ALLERGIC/IMMUNOLOGIC NEGATIVE: 1
EYES NEGATIVE: 1
SHORTNESS OF BREATH: 0
BLOOD IN STOOL: 0
TROUBLE SWALLOWING: 1
ABDOMINAL PAIN: 0
GASTROINTESTINAL NEGATIVE: 1
CHOKING: 1
NAUSEA: 0
ANAL BLEEDING: 0

## 2021-12-16 NOTE — TELEPHONE ENCOUNTER
Attempted to contact patient to inform them about the [address]  knee joint pain program. Patient can call [phone] to find out more information or to schedule an appointment with a joint pain orthopedic specialist.

## 2022-01-03 ENCOUNTER — TELEPHONE (OUTPATIENT)
Dept: FAMILY MEDICINE CLINIC | Age: 77
End: 2022-01-03

## 2022-01-03 NOTE — TELEPHONE ENCOUNTER
Raymond(EC) called stating that pt is currently at [address] Pt ended having to get a trach put in. Canceled minnie for 1/4/2022.

## 2022-01-28 ENCOUNTER — APPOINTMENT (OUTPATIENT)
Dept: CT IMAGING | Age: 77
DRG: 394 | End: 2022-01-28
Payer: MEDICARE

## 2022-01-28 ENCOUNTER — HOSPITAL ENCOUNTER (OUTPATIENT)
Age: 77
Setting detail: OBSERVATION
Discharge: NURSING, REHAB OR LONG-TERM CARE | DRG: 394 | End: 2022-01-28
Attending: EMERGENCY MEDICINE | Admitting: INTERNAL MEDICINE
Payer: MEDICARE

## 2022-01-28 ENCOUNTER — APPOINTMENT (OUTPATIENT)
Dept: GENERAL RADIOLOGY | Age: 77
DRG: 394 | End: 2022-01-28
Payer: MEDICARE

## 2022-01-28 VITALS
BODY MASS INDEX: 29.99 KG/M2 | WEIGHT: 180 LBS | RESPIRATION RATE: 16 BRPM | TEMPERATURE: 99 F | SYSTOLIC BLOOD PRESSURE: 118 MMHG | OXYGEN SATURATION: 97 % | HEIGHT: 65 IN | DIASTOLIC BLOOD PRESSURE: 65 MMHG | HEART RATE: 84 BPM

## 2022-01-28 DIAGNOSIS — R77.8 ELEVATED TROPONIN: Primary | ICD-10-CM

## 2022-01-28 DIAGNOSIS — Z46.59 ENCOUNTER FOR NASOGASTRIC (NG) TUBE PLACEMENT: ICD-10-CM

## 2022-01-28 PROBLEM — K59.00 CONSTIPATION: Status: ACTIVE | Noted: 2022-01-28

## 2022-01-28 PROBLEM — J18.9 HCAP (HEALTHCARE-ASSOCIATED PNEUMONIA): Status: RESOLVED | Noted: 2022-01-28 | Resolved: 2022-01-28

## 2022-01-28 PROBLEM — I25.10 CORONARY ARTERY DISEASE INVOLVING NATIVE CORONARY ARTERY OF NATIVE HEART WITHOUT ANGINA PECTORIS: Status: ACTIVE | Noted: 2018-04-09

## 2022-01-28 PROBLEM — R79.89 ELEVATED TROPONIN: Status: ACTIVE | Noted: 2022-01-28

## 2022-01-28 PROBLEM — T85.598A CLOGGED FEEDING TUBE: Status: ACTIVE | Noted: 2022-01-28

## 2022-01-28 PROBLEM — J18.9 HCAP (HEALTHCARE-ASSOCIATED PNEUMONIA): Status: ACTIVE | Noted: 2022-01-28

## 2022-01-28 PROBLEM — R91.8 PULMONARY NODULES: Status: ACTIVE | Noted: 2022-01-28

## 2022-01-28 LAB
A/G RATIO: 1.2 (ref 1.1–2.2)
ALBUMIN SERPL-MCNC: 3.6 G/DL (ref 3.4–5)
ALP BLD-CCNC: 93 U/L (ref 40–129)
ALT SERPL-CCNC: 19 U/L (ref 10–40)
ANION GAP SERPL CALCULATED.3IONS-SCNC: 10 MMOL/L (ref 3–16)
APTT: 37.2 SEC (ref 26.2–38.6)
AST SERPL-CCNC: 29 U/L (ref 15–37)
BASE EXCESS VENOUS: -1.5 MMOL/L (ref -3–3)
BASOPHILS ABSOLUTE: 0 K/UL (ref 0–0.2)
BASOPHILS RELATIVE PERCENT: 0.5 %
BILIRUB SERPL-MCNC: 0.9 MG/DL (ref 0–1)
BUN BLDV-MCNC: 22 MG/DL (ref 7–20)
CALCIUM SERPL-MCNC: 10.2 MG/DL (ref 8.3–10.6)
CARBOXYHEMOGLOBIN: 0.8 % (ref 0–1.5)
CHLORIDE BLD-SCNC: 106 MMOL/L (ref 99–110)
CO2: 22 MMOL/L (ref 21–32)
CREAT SERPL-MCNC: 0.8 MG/DL (ref 0.6–1.2)
EKG ATRIAL RATE: 86 BPM
EKG DIAGNOSIS: NORMAL
EKG P AXIS: 36 DEGREES
EKG P-R INTERVAL: 156 MS
EKG Q-T INTERVAL: 356 MS
EKG QRS DURATION: 90 MS
EKG QTC CALCULATION (BAZETT): 426 MS
EKG R AXIS: 6 DEGREES
EKG T AXIS: -10 DEGREES
EKG VENTRICULAR RATE: 86 BPM
EOSINOPHILS ABSOLUTE: 0.3 K/UL (ref 0–0.6)
EOSINOPHILS RELATIVE PERCENT: 5 %
GFR AFRICAN AMERICAN: >60
GFR NON-AFRICAN AMERICAN: >60
GLUCOSE BLD-MCNC: 90 MG/DL (ref 70–99)
HCO3 VENOUS: 21.8 MMOL/L (ref 23–29)
HCT VFR BLD CALC: 28.8 % (ref 36–48)
HEMOGLOBIN: 9.8 G/DL (ref 12–16)
INFLUENZA A: NOT DETECTED
INFLUENZA B: NOT DETECTED
LACTIC ACID, SEPSIS: 0.8 MMOL/L (ref 0.4–1.9)
LYMPHOCYTES ABSOLUTE: 1.3 K/UL (ref 1–5.1)
LYMPHOCYTES RELATIVE PERCENT: 21.8 %
MCH RBC QN AUTO: 31 PG (ref 26–34)
MCHC RBC AUTO-ENTMCNC: 34.1 G/DL (ref 31–36)
MCV RBC AUTO: 90.9 FL (ref 80–100)
METHEMOGLOBIN VENOUS: 0.3 %
MONOCYTES ABSOLUTE: 0.7 K/UL (ref 0–1.3)
MONOCYTES RELATIVE PERCENT: 11.8 %
NEUTROPHILS ABSOLUTE: 3.7 K/UL (ref 1.7–7.7)
NEUTROPHILS RELATIVE PERCENT: 60.9 %
O2 SAT, VEN: 99 %
O2 THERAPY: ABNORMAL
PCO2, VEN: 32 MMHG (ref 40–50)
PDW BLD-RTO: 15.3 % (ref 12.4–15.4)
PH VENOUS: 7.45 (ref 7.35–7.45)
PLATELET # BLD: 196 K/UL (ref 135–450)
PMV BLD AUTO: 7.6 FL (ref 5–10.5)
PO2, VEN: 136.2 MMHG (ref 25–40)
POTASSIUM SERPL-SCNC: 4.7 MMOL/L (ref 3.5–5.1)
PRO-BNP: 294 PG/ML (ref 0–449)
PROCALCITONIN: 0.12 NG/ML (ref 0–0.15)
RBC # BLD: 3.17 M/UL (ref 4–5.2)
SARS-COV-2 RNA, RT PCR: NOT DETECTED
SODIUM BLD-SCNC: 138 MMOL/L (ref 136–145)
TCO2 CALC VENOUS: 23 MMOL/L
TOTAL PROTEIN: 6.5 G/DL (ref 6.4–8.2)
TROPONIN: 0.03 NG/ML
TROPONIN: 0.05 NG/ML
WBC # BLD: 6.1 K/UL (ref 4–11)

## 2022-01-28 PROCEDURE — 96374 THER/PROPH/DIAG INJ IV PUSH: CPT

## 2022-01-28 PROCEDURE — 6360000002 HC RX W HCPCS: Performed by: EMERGENCY MEDICINE

## 2022-01-28 PROCEDURE — 2580000003 HC RX 258: Performed by: NURSE PRACTITIONER

## 2022-01-28 PROCEDURE — 71260 CT THORAX DX C+: CPT

## 2022-01-28 PROCEDURE — 99220 PR INITIAL OBSERVATION CARE/DAY 70 MINUTES: CPT | Performed by: INTERNAL MEDICINE

## 2022-01-28 PROCEDURE — 87636 SARSCOV2 & INF A&B AMP PRB: CPT

## 2022-01-28 PROCEDURE — 84484 ASSAY OF TROPONIN QUANT: CPT

## 2022-01-28 PROCEDURE — 85025 COMPLETE CBC W/AUTO DIFF WBC: CPT

## 2022-01-28 PROCEDURE — 74022 RADEX COMPL AQT ABD SERIES: CPT

## 2022-01-28 PROCEDURE — 6370000000 HC RX 637 (ALT 250 FOR IP): Performed by: EMERGENCY MEDICINE

## 2022-01-28 PROCEDURE — 83605 ASSAY OF LACTIC ACID: CPT

## 2022-01-28 PROCEDURE — 80053 COMPREHEN METABOLIC PANEL: CPT

## 2022-01-28 PROCEDURE — 93005 ELECTROCARDIOGRAM TRACING: CPT | Performed by: EMERGENCY MEDICINE

## 2022-01-28 PROCEDURE — 71045 X-RAY EXAM CHEST 1 VIEW: CPT

## 2022-01-28 PROCEDURE — 1200000000 HC SEMI PRIVATE

## 2022-01-28 PROCEDURE — 2580000003 HC RX 258: Performed by: EMERGENCY MEDICINE

## 2022-01-28 PROCEDURE — 6370000000 HC RX 637 (ALT 250 FOR IP): Performed by: PHYSICIAN ASSISTANT

## 2022-01-28 PROCEDURE — 84145 PROCALCITONIN (PCT): CPT

## 2022-01-28 PROCEDURE — G0378 HOSPITAL OBSERVATION PER HR: HCPCS

## 2022-01-28 PROCEDURE — 96365 THER/PROPH/DIAG IV INF INIT: CPT

## 2022-01-28 PROCEDURE — 6360000004 HC RX CONTRAST MEDICATION: Performed by: EMERGENCY MEDICINE

## 2022-01-28 PROCEDURE — 85730 THROMBOPLASTIN TIME PARTIAL: CPT

## 2022-01-28 PROCEDURE — 74177 CT ABD & PELVIS W/CONTRAST: CPT

## 2022-01-28 PROCEDURE — 82803 BLOOD GASES ANY COMBINATION: CPT

## 2022-01-28 PROCEDURE — 99235 HOSP IP/OBS SAME DATE MOD 70: CPT | Performed by: INTERNAL MEDICINE

## 2022-01-28 PROCEDURE — 83880 ASSAY OF NATRIURETIC PEPTIDE: CPT

## 2022-01-28 PROCEDURE — 6370000000 HC RX 637 (ALT 250 FOR IP): Performed by: NURSE PRACTITIONER

## 2022-01-28 PROCEDURE — 96366 THER/PROPH/DIAG IV INF ADDON: CPT

## 2022-01-28 PROCEDURE — 93308 TTE F-UP OR LMTD: CPT

## 2022-01-28 PROCEDURE — 99284 EMERGENCY DEPT VISIT MOD MDM: CPT

## 2022-01-28 RX ORDER — FAMOTIDINE 20 MG/1
20 TABLET, FILM COATED ORAL DAILY
Status: DISCONTINUED | OUTPATIENT
Start: 2022-01-28 | End: 2022-01-28 | Stop reason: HOSPADM

## 2022-01-28 RX ORDER — CARVEDILOL 3.12 MG/1
3.12 TABLET ORAL 2 TIMES DAILY WITH MEALS
COMMUNITY

## 2022-01-28 RX ORDER — POLYETHYLENE GLYCOL 3350 17 G/17G
17 POWDER, FOR SOLUTION ORAL DAILY PRN
Status: DISCONTINUED | OUTPATIENT
Start: 2022-01-28 | End: 2022-01-28 | Stop reason: HOSPADM

## 2022-01-28 RX ORDER — POLYETHYLENE GLYCOL 3350 17 G/17G
17 POWDER, FOR SOLUTION ORAL DAILY
Status: DISCONTINUED | OUTPATIENT
Start: 2022-01-28 | End: 2022-01-28

## 2022-01-28 RX ORDER — HEPARIN SODIUM 1000 [USP'U]/ML
2000 INJECTION, SOLUTION INTRAVENOUS; SUBCUTANEOUS PRN
Status: DISCONTINUED | OUTPATIENT
Start: 2022-01-28 | End: 2022-01-28

## 2022-01-28 RX ORDER — ACETAMINOPHEN 325 MG/1
650 TABLET ORAL EVERY 6 HOURS PRN
Status: DISCONTINUED | OUTPATIENT
Start: 2022-01-28 | End: 2022-01-28 | Stop reason: HOSPADM

## 2022-01-28 RX ORDER — ROSUVASTATIN CALCIUM 20 MG/1
20 TABLET, COATED ORAL NIGHTLY
Qty: 30 TABLET | Refills: 0 | Status: ON HOLD | DISCHARGE
Start: 2022-01-28 | End: 2022-07-07 | Stop reason: HOSPADM

## 2022-01-28 RX ORDER — BISACODYL 10 MG
10 SUPPOSITORY, RECTAL RECTAL DAILY PRN
Qty: 1 SUPPOSITORY | Refills: 0 | DISCHARGE
Start: 2022-01-28

## 2022-01-28 RX ORDER — ROSUVASTATIN CALCIUM 10 MG/1
20 TABLET, COATED ORAL NIGHTLY
Status: DISCONTINUED | OUTPATIENT
Start: 2022-01-28 | End: 2022-01-28 | Stop reason: HOSPADM

## 2022-01-28 RX ORDER — POLYETHYLENE GLYCOL 3350 17 G/17G
17 POWDER, FOR SOLUTION ORAL 2 TIMES DAILY
Status: DISCONTINUED | OUTPATIENT
Start: 2022-01-28 | End: 2022-01-28 | Stop reason: HOSPADM

## 2022-01-28 RX ORDER — FAMOTIDINE 20 MG/1
40 TABLET, FILM COATED ORAL DAILY
COMMUNITY

## 2022-01-28 RX ORDER — ACETAMINOPHEN 650 MG/1
650 SUPPOSITORY RECTAL EVERY 6 HOURS PRN
Status: DISCONTINUED | OUTPATIENT
Start: 2022-01-28 | End: 2022-01-28 | Stop reason: HOSPADM

## 2022-01-28 RX ORDER — POTASSIUM CHLORIDE 20 MEQ/1
20 TABLET, EXTENDED RELEASE ORAL DAILY
Status: DISCONTINUED | OUTPATIENT
Start: 2022-01-28 | End: 2022-01-28 | Stop reason: HOSPADM

## 2022-01-28 RX ORDER — POLYETHYLENE GLYCOL 3350 17 G/17G
17 POWDER, FOR SOLUTION ORAL 2 TIMES DAILY
Qty: 527 G | Refills: 0 | DISCHARGE
Start: 2022-01-28

## 2022-01-28 RX ORDER — SODIUM CHLORIDE 9 MG/ML
25 INJECTION, SOLUTION INTRAVENOUS PRN
Status: DISCONTINUED | OUTPATIENT
Start: 2022-01-28 | End: 2022-01-28 | Stop reason: HOSPADM

## 2022-01-28 RX ORDER — CARVEDILOL 3.12 MG/1
3.12 TABLET ORAL 2 TIMES DAILY WITH MEALS
Status: DISCONTINUED | OUTPATIENT
Start: 2022-01-28 | End: 2022-01-28 | Stop reason: HOSPADM

## 2022-01-28 RX ORDER — CITALOPRAM 20 MG/1
40 TABLET ORAL DAILY
Status: DISCONTINUED | OUTPATIENT
Start: 2022-01-28 | End: 2022-01-28 | Stop reason: HOSPADM

## 2022-01-28 RX ORDER — AMANTADINE HYDROCHLORIDE 100 MG/1
100 CAPSULE, GELATIN COATED ORAL 2 TIMES DAILY
Status: DISCONTINUED | OUTPATIENT
Start: 2022-01-28 | End: 2022-01-28 | Stop reason: HOSPADM

## 2022-01-28 RX ORDER — ONDANSETRON 2 MG/ML
4 INJECTION INTRAMUSCULAR; INTRAVENOUS EVERY 6 HOURS PRN
Status: DISCONTINUED | OUTPATIENT
Start: 2022-01-28 | End: 2022-01-28 | Stop reason: HOSPADM

## 2022-01-28 RX ORDER — DICYCLOMINE HYDROCHLORIDE 10 MG/1
10 CAPSULE ORAL 2 TIMES DAILY WITH MEALS
Status: DISCONTINUED | OUTPATIENT
Start: 2022-01-28 | End: 2022-01-28 | Stop reason: HOSPADM

## 2022-01-28 RX ORDER — ASPIRIN 81 MG/1
81 TABLET ORAL DAILY
Status: DISCONTINUED | OUTPATIENT
Start: 2022-01-28 | End: 2022-01-28 | Stop reason: HOSPADM

## 2022-01-28 RX ORDER — FLUCONAZOLE 100 MG/1
100 TABLET ORAL DAILY
Status: DISCONTINUED | OUTPATIENT
Start: 2022-01-28 | End: 2022-01-28 | Stop reason: HOSPADM

## 2022-01-28 RX ORDER — AMANTADINE HYDROCHLORIDE 100 MG/1
100 CAPSULE, GELATIN COATED ORAL 2 TIMES DAILY
COMMUNITY

## 2022-01-28 RX ORDER — AMLODIPINE BESYLATE 10 MG/1
10 TABLET ORAL DAILY
Status: ON HOLD | COMMUNITY
End: 2022-07-07 | Stop reason: SDUPTHER

## 2022-01-28 RX ORDER — FLUCONAZOLE 100 MG/1
100 TABLET ORAL DAILY
COMMUNITY

## 2022-01-28 RX ORDER — SODIUM CHLORIDE 0.9 % (FLUSH) 0.9 %
5-40 SYRINGE (ML) INJECTION EVERY 12 HOURS SCHEDULED
Status: DISCONTINUED | OUTPATIENT
Start: 2022-01-28 | End: 2022-01-28 | Stop reason: HOSPADM

## 2022-01-28 RX ORDER — SODIUM CHLORIDE 0.9 % (FLUSH) 0.9 %
5-40 SYRINGE (ML) INJECTION PRN
Status: DISCONTINUED | OUTPATIENT
Start: 2022-01-28 | End: 2022-01-28 | Stop reason: HOSPADM

## 2022-01-28 RX ORDER — ASPIRIN 300 MG/1
300 SUPPOSITORY RECTAL ONCE
Status: COMPLETED | OUTPATIENT
Start: 2022-01-28 | End: 2022-01-28

## 2022-01-28 RX ORDER — ONDANSETRON 4 MG/1
4 TABLET, ORALLY DISINTEGRATING ORAL EVERY 8 HOURS PRN
Status: DISCONTINUED | OUTPATIENT
Start: 2022-01-28 | End: 2022-01-28 | Stop reason: HOSPADM

## 2022-01-28 RX ORDER — LEVOTHYROXINE SODIUM 0.03 MG/1
75 TABLET ORAL DAILY
Status: DISCONTINUED | OUTPATIENT
Start: 2022-01-28 | End: 2022-01-28 | Stop reason: HOSPADM

## 2022-01-28 RX ORDER — HEPARIN SODIUM 1000 [USP'U]/ML
4000 INJECTION, SOLUTION INTRAVENOUS; SUBCUTANEOUS PRN
Status: DISCONTINUED | OUTPATIENT
Start: 2022-01-28 | End: 2022-01-28

## 2022-01-28 RX ORDER — BISACODYL 10 MG
10 SUPPOSITORY, RECTAL RECTAL ONCE
Status: COMPLETED | OUTPATIENT
Start: 2022-01-28 | End: 2022-01-28

## 2022-01-28 RX ORDER — AMLODIPINE BESYLATE 5 MG/1
10 TABLET ORAL DAILY
Status: DISCONTINUED | OUTPATIENT
Start: 2022-01-28 | End: 2022-01-28 | Stop reason: HOSPADM

## 2022-01-28 RX ORDER — FLUDROCORTISONE ACETATE 0.1 MG/1
0.1 TABLET ORAL 2 TIMES DAILY
Status: DISCONTINUED | OUTPATIENT
Start: 2022-01-28 | End: 2022-01-28

## 2022-01-28 RX ORDER — HEPARIN SODIUM 10000 [USP'U]/100ML
5-30 INJECTION, SOLUTION INTRAVENOUS CONTINUOUS
Status: DISCONTINUED | OUTPATIENT
Start: 2022-01-28 | End: 2022-01-28 | Stop reason: SDUPTHER

## 2022-01-28 RX ORDER — HEPARIN SODIUM 1000 [USP'U]/ML
4000 INJECTION, SOLUTION INTRAVENOUS; SUBCUTANEOUS ONCE
Status: COMPLETED | OUTPATIENT
Start: 2022-01-28 | End: 2022-01-28

## 2022-01-28 RX ORDER — RISPERIDONE 1 MG/1
1 TABLET, FILM COATED ORAL NIGHTLY
Status: DISCONTINUED | OUTPATIENT
Start: 2022-01-28 | End: 2022-01-28 | Stop reason: HOSPADM

## 2022-01-28 RX ADMIN — ASPIRIN 81 MG: 81 TABLET, COATED ORAL at 13:10

## 2022-01-28 RX ADMIN — CITALOPRAM HYDROBROMIDE 40 MG: 20 TABLET ORAL at 13:10

## 2022-01-28 RX ADMIN — DICYCLOMINE HYDROCHLORIDE 10 MG: 10 CAPSULE ORAL at 13:10

## 2022-01-28 RX ADMIN — HEPARIN SODIUM 800 UNITS/HR: 10000 INJECTION, SOLUTION INTRAVENOUS; SUBCUTANEOUS at 08:46

## 2022-01-28 RX ADMIN — IOPAMIDOL 85 ML: 755 INJECTION, SOLUTION INTRAVENOUS at 07:59

## 2022-01-28 RX ADMIN — ASPIRIN 300 MG: 300 SUPPOSITORY RECTAL at 08:44

## 2022-01-28 RX ADMIN — FLUCONAZOLE 100 MG: 100 TABLET ORAL at 13:11

## 2022-01-28 RX ADMIN — LEVOTHYROXINE SODIUM 75 MCG: 25 TABLET ORAL at 13:09

## 2022-01-28 RX ADMIN — FAMOTIDINE 20 MG: 20 TABLET ORAL at 13:10

## 2022-01-28 RX ADMIN — BISACODYL 10 MG: 10 SUPPOSITORY RECTAL at 13:10

## 2022-01-28 RX ADMIN — SODIUM CHLORIDE, PRESERVATIVE FREE 10 ML: 5 INJECTION INTRAVENOUS at 13:12

## 2022-01-28 RX ADMIN — CARVEDILOL 3.12 MG: 3.12 TABLET, FILM COATED ORAL at 13:10

## 2022-01-28 RX ADMIN — HEPARIN SODIUM 4000 UNITS: 1000 INJECTION INTRAVENOUS; SUBCUTANEOUS at 08:44

## 2022-01-28 RX ADMIN — AMLODIPINE BESYLATE 10 MG: 5 TABLET ORAL at 13:09

## 2022-01-28 ASSESSMENT — PAIN SCALES - WONG BAKER: WONGBAKER_NUMERICALRESPONSE: 4

## 2022-01-28 ASSESSMENT — PAIN DESCRIPTION - LOCATION: LOCATION: ABDOMEN

## 2022-01-28 NOTE — H&P
Combined Hospital Medicine History & Physical and Discharge Summary        PCP: MOHINDER Conrad - CNP    Date of Admission: 1/28/2022    Date of Service: Pt seen/examined on 1/28/2022      Chief Complaint:    Chief Complaint   Patient presents with    Other     NG tube not working       History Of Present Illness: The patient is a 67 y.o. female with recent prolonged admission to [facility] for metabolic encephalopathy 2/2 UTI complicated by worsening mentation, possible meningitis, respiratory failure with subsequent inability to wean from ventilator with tracheostomy formation, dysphagia with NG tube placement at time of discharge who presented to [facility] ED from [facility] where she currently resides for clogged NG tube. The patient tells me that she has been feeling well otherwise. She states that she is able to take food and by mouth but she still read receives tube feeds overnight. NG tube was replaced in the emergency department. Laboratory work-up in the emergency department revealed an elevated troponin. No ischemic EKG changes and patient has no complaint of chest pain. CT chest abdomen pelvis also obtained- see below for results. Patient subsequently admitted for further evaluation of elevated troponin.     Past Medical History:        Diagnosis Date    Anxiety     Benign essential hypertension 1/17/2017    Bowel obstruction (HCC)     CAD (coronary artery disease)     Cancer ([facility] [address] 2012    colon    Depression     GERD (gastroesophageal reflux disease)     Hyperlipidemia     Hypertension     Irritable bowel syndrome (IBS)     Obesity     Osteoarthritis        Past Surgical History:        Procedure Laterality Date    APPENDECTOMY      BREAST LUMPECTOMY Bilateral 7/27/2021    RIGHT RADIO FREQUENCY IDENTIFICATION TAG LOCALIZED PARTIAL MASTECTOMY TIMES TWO; LEFT RADIO FREQUENCY IDENTIFICATION TAG LOCALIZED PARTIAL MASTECTOMY performed by Burlene Saint, MD at [address] [place] [address] COLON SURGERY      COLONOSCOPY  2014    mass    COLONOSCOPY  3/29/16    normal    US BREAST BIOPSY NEEDLE ADDITIONAL RIGHT Right 6/29/2021    US BREAST BIOPSY NEEDLE ADDITIONAL RIGHT 6/29/2021 Darien Tadeo MD [address] [place] [address] BREAST NEEDLE BIOPSY LEFT Left 6/17/2021    US BREAST NEEDLE BIOPSY LEFT 6/17/2021 Claire Yoo MD [address] [place] [address] BREAST NEEDLE BIOPSY RIGHT Right 6/17/2021    US BREAST NEEDLE BIOPSY RIGHT 6/17/2021 Claire Yoo MD [address] [facility]    US BREAST NEEDLE BIOPSY RIGHT Right 6/29/2021    US BREAST NEEDLE BIOPSY RIGHT 6/29/2021 Darien Tadeo MD [address] [place] [facility]    US GUIDED NEEDLE LOC BREAST ADDL RIGHT Right 7/20/2021    US GUIDED NEEDLE LOC BREAST ADDL RIGHT 7/20/2021 [address] [place] [facility]    US GUIDED NEEDLE LOC OF LEFT BREAST Left 7/20/2021    US GUIDED NEEDLE LOC OF LEFT BREAST 7/20/2021 [facility]    US GUIDED NEEDLE LOC OF RIGHT BREAST Right 7/20/2021    US GUIDED NEEDLE LOC OF RIGHT BREAST 7/20/2021 [facility]       Medications Prior to Admission:    Prior to Admission medications    Medication Sig Start Date End Date Taking?  Authorizing Provider   amantadine (SYMMETREL) 100 MG capsule Take 100 mg by mouth 2 times daily   Yes Historical Provider, MD   amLODIPine (NORVASC) 10 MG tablet Take 10 mg by mouth daily   Yes Historical Provider, MD   carvedilol (COREG) 3.125 MG tablet Take 3.125 mg by mouth 2 times daily (with meals)   Yes Historical Provider, MD   fluconazole (DIFLUCAN) 100 MG tablet Take 100 mg by mouth daily Daily x 7 days ends 2/3/22   Yes Historical Provider, MD   famotidine (PEPCID) 20 MG tablet Take 40 mg by mouth daily   Yes Historical Provider, MD   anastrozole (ARIMIDEX) 1 MG tablet Take 1 mg by mouth daily  8/31/21  Yes Historical Provider, MD   furosemide (LASIX) 20 MG tablet Take up to once a week for leg swelling if needed 10/29/21  Yes Bard Soda, APRN - CNP   citalopram (CELEXA) 40 MG tablet TAKE 1 TABLET EVERY DAY 10/4/21  Yes Effie Steinberg APRN - CNP   levothyroxine (SYNTHROID) 75 MCG tablet Take 1 tablet by mouth daily 9/21/21  Yes Dk Haro MD   potassium chloride (KLOR-CON M) 20 MEQ extended release tablet Take 1 tablet by mouth daily TAKE 3 TABLETS DAILY 8/6/21  Yes Angelina Melgar MD   aspirin EC 81 MG EC tablet Take 1 tablet by mouth daily 3/2/18  Yes Sintia Gilliland MD   Compression Stockings MISC by Does not apply route 9/28/21   Effie Steinberg APRN - CNP   busPIRone (BUSPAR) 15 MG tablet TAKE 1 TABLET BY MOUTH 3 TIMES DAILY AS NEEDED (ANXIETY) 8/9/21   MOHINDER Caballero - CNP   polyethylene glycol [facility]) packet Take 17 g by mouth daily     Historical Provider, MD       Allergies:  Bactrim [sulfamethoxazole-trimethoprim], Ultram [tramadol hcl], Dilaudid [hydromorphone], Phenergan [promethazine], Lipitor [atorvastatin], and Penicillins    Social History:  The patient currently lives at [place] [address]:   reports that she quit smoking about 38 years ago. Her smoking use included cigarettes. She started smoking about 49 years ago. She has a 10.00 pack-year smoking history. She has never used smokeless tobacco.  ETOH:   reports no history of alcohol use.       Family History:   Positive as follows:        Problem Relation Age of Onset    Cancer Mother         colon    Cancer Brother         colon    Heart Disease Sister     Heart Disease Brother        REVIEW OF SYSTEMS:       Constitutional: Negative for fever   HENT: Negative for sore throat   Eyes: Negative for redness   Respiratory: Negative  for dyspnea, cough   Cardiovascular: Negative for chest pain   Gastrointestinal: Negative for vomiting, diarrhea   Genitourinary: Negative for hematuria   Musculoskeletal: Negative for arthralgias   Skin: Negative for rash   Neurological: Negative for syncope   Hematological: Negative for adenopathy   Psychiatric/Behavorial: Negative for anxiety    PHYSICAL EXAM:    /79   Pulse 90   Temp 99.2 °F (37.3 °C) (Oral)   Resp 18   Ht 5' 5\" (1.651 m)   Wt 180 lb (81.6 kg)   SpO2 100%   BMI 29.95 kg/m²     Gen: Elderly  female sitting up in bed. No distress. Alert. Eyes: PERRL. No sclera icterus. No conjunctival injection. ENT: No discharge. Pharynx clear. NG tube in right nares  Neck: No JVD. Trachea midline. Tracheostomy on trach collar at 2LPM  Resp: No accessory muscle use. No crackles. No wheezes. No rhonchi. CV: Regular rate. Regular rhythm. No murmur. No rub. No edema. Capillary Refill: Brisk,< 3 seconds   Peripheral Pulses: +2 palpable, equal bilaterally   GI: Non-tender. Non-distended. No masses. Normal bowel sounds. Powell catheter in place   Skin: Warm and dry. No nodule on exposed extremities. No rash on exposed extremities. M/S: No cyanosis. No joint deformity. No clubbing. Neuro: Awake. Grossly nonfocal.  Generalized weakness   Psych: Oriented to person, year, president. Could not state month or place. . No anxiety or agitation. CBC:   Recent Labs     01/28/22 0311   WBC 6.1   HGB 9.8*   HCT 28.8*   MCV 90.9        BMP:   Recent Labs     01/28/22 0311      K 4.7      CO2 22   BUN 22*   CREATININE 0.8     LIVER PROFILE:   Recent Labs     01/28/22 0311   AST 29   ALT 19   BILITOT 0.9   ALKPHOS 93     PT/INR: No results for input(s): PROTIME, INR in the last 72 hours. APTT:   Recent Labs     01/28/22  0652   APTT 37.2     CARDIAC ENZYMES  Recent Labs     01/28/22 0311 01/28/22  0528   TROPONINI 0.03* 0.05*     CULTURES  COVID 19 PCR: not detected  Flu A/B: neg/neg    EKG:   Poor data quality, interpretation may be adversely affected  Normal sinus rhythm  Nonspecific T wave abnormality  Low voltage precordial leads  Abnormal ECG  Confirmed by ELENA BARTON      RADIOLOGY  CT CHEST PULMONARY EMBOLISM W CONTRAST   Final Result   No large or central pulmonary embolism.   No convincing pressure.   The right atrium is mildly dilated.   Aortic valve sclerosis without aortic stenosis.   Mild aortic regurgitation is present.   Unable to obtain SPAP due to lack of tricuspid regurgitation.   5- 55%, lvh, DD1, AV sclerosis, SPAP 25 mmHg.     Stress Test: 9/24/21  Conclusions        Summary    Normal LV size and systolic function. Left ventricular ejection fraction of    69%. Normal wall motion. There is normal isotope uptake at stress and rest.   [facility] is no evidence of myocardial ischemia or scar.      [facility] 3/2018  CORONARY ANGIOGRAPHY:  1.  Left main was a large-caliber vessel that bifurcated, it was normal.     2.  The left circumflex was a medium-caliber vessel that gave off two  obtuse marginal branches, the first was small, the second was large. Circumflex had 20% proximal stenosis.     3.  The LAD was a medium-caliber vessel that reached the apex. The mid LAD  had 50% stenosis.  The LAD gave off three diagonal branches, the first two  were small and third was medium caliber.  The LAD became a small-caliber  vessel after giving the third diagonal branch and there was 30% to 40%  diffuse disease in the distal segment.     4.  The right coronary artery was large and dominant and had 30% mid  segment stenosis.  It gave off small right posterior descending artery and  two right posterolateral branches, first was medium-caliber and second was  small. Right posterior descending artery and posterolateral branches were  normal.        IMPRESSION:  1.  Moderate CAD of mid LAD as well as mild CAD of distal LAD, proximal  circumflex and mid RCA. 2.  Normal left ventricular systolic function. 3.  Normal left ventricular filling pressure. Raya Adler MD have reviewed the chart on Paolo Manzanares and personally interviewed and examined patient, reviewed the data (labs and imaging) and after discussion with my PA formulated the plan.   Agree with note with the following edits.    HPI:     Patient is a 48-year-old white female who was sent to the emergency room because of a clogged nasoenteric tube. Patient spent almost a month at [facility] . She was admitted there for complicated UTI. She developed worsening mentation questionable meningitis and respiratory failure. She is intubated. She is not able to be weaned off the vent. She had a bit of tracheostomy. She was sent to the nursing home with a nasoenteric tube. I do see she also had some encephalopathy. In the ER today her nasogastric tube was replaced. Routine lab work showed elevated troponin but she denied any chest pain. She was seen by cardiology and cleared for discharge. No ACS. CT of the chest and abdomen and pelvis showed nothing acute. Patient denies any chest pain. She is mildly anxious. No fever or chills. COVID-19 negative. I reviewed the patient's Past Medical History, Past Surgical History, Medications, and Allergies. Physical exam:    BP (!) 145/86   Pulse 91   Temp 99 °F (37.2 °C) (Oral)   Resp 17   Ht 5' 5\" (1.651 m)   Wt 180 lb (81.6 kg)   SpO2 98%   BMI 29.95 kg/m²     Gen: No distress. Alert. Eyes: PERRL. No sclera icterus. No conjunctival injection. ENT: No discharge. Pharynx clear. Neck: She has a tracheostomy in place. On trach collar. Normal thyroid. Resp: No accessory muscle use. No crackles. No wheezes. No rhonchi. No dullness on percussion. CV: Regular rate. Regular rhythm. No murmur or rub. No edema. GI: Non-tender. Non-distended. No masses. No organomegaly. Normal bowel sounds. No hernia. Skin: Warm and dry. No nodule on exposed extremities. No rash on exposed extremities. Lymph: No cervical LAD. No supraclavicular LAD. M/S: No cyanosis. No joint deformity. No clubbing. Neuro: Awake. Reflexes 2+ symmetric bilaterally. Moves all 4 extremities, non focal/generalized weakness that is nonfocal  Psych: Oriented x 3.  No anxiety or agitation. ASSESSMENT/PLAN:    #Elevated troponin   - 0.03, 0.05, respectively  - No CP and no ischemic EKG changes  - No need for heparin gtt  - Continue baby ASA and statin   - Limited echo obtained- normal EF, no RWMA. - Seen by cardiology- felt elevated troponin 2/2 demand ischemia in the setting of known underlying CAD, hypoxia, and anemia. Normal limited echo. Not ACS. No further inpatient w/u recommended    #CAD  - cont coreg and ASA  - resume Crestor 20 mg daily (this was her previous statin per OP Cardio notes)    #Dysfunction of NG tube   - patient has NG tube in place for supplemental tube feeds. It was clogged on arrival to ER, tube replaced, xray confirms appropriate placement    #Dysphagia   - discussed with SLP- patient on purree/thin diet at [facility] with supplemental tube feeds at night   - cont care with SLP at [facility]    #RUL airspace disease  - noted initially on CXR 1/7/22 at [facility], per notes she was treated with 7 days of VAP coverage prior to discharge to [facility]  - currently on her baseline O2, no fever, no leucocytosis, no PCT- no need for further abx     #Chronic hypoxic respiratory failure with tracheostomy   - per [facility] dc summary patient was using 28% fiO2 via trach collar at time of discharge- currently stable on 2LPM    #HTN  - cont norvasc and coreg     #Constipation   - glycolax BID and dulcolax suppository x 1  - increase glycolax to BID and add PRN suppository to NH meds at discharge     #Anemia   - Hb was 9-10 while admitted at [facility] 12/21-1/22. - Hb 9.8 now- stable    #Pulmonary nodules  - will need repeat CT chest in 12-18 months    #Cervical spinal canal stenosis sp ACDF on 12/19/21    #Breast CA  - diagnosed May 2021, s/p left partial mastectomy and right partial mastectomy in July 2021  - on anastrazole     #GERD  - cont pepcid    #Hypothyroidism  - cont synthroid     #Mood DO  - cont home meds     DVT Prophylaxis: Heparin  Diet: ADULT DIET;  Dysphagia - Pureed  Code Status: Full Code    Dispo: dc back to NH in stable condition. See physician at [facility] in 1 week. Needs f/u for pulmonary nodules in 12-18 months       Medication List      START taking these medications    bisacodyl 10 MG suppository  Commonly known as: DULCOLAX  Place 1 suppository rectally daily as needed for Constipation        CHANGE how you take these medications    polyethylene glycol 17 g packet  Commonly known as: GLYCOLAX  Take 17 g by mouth 2 times daily  What changed: when to take this     rosuvastatin 20 MG tablet  Commonly known as: CRESTOR  Take 1 tablet by mouth nightly  What changed: See the new instructions.         CONTINUE taking these medications    amantadine 100 MG capsule  Commonly known as: SYMMETREL     amLODIPine 10 MG tablet  Commonly known as: NORVASC     anastrozole 1 MG tablet  Commonly known as: ARIMIDEX     aspirin EC 81 MG EC tablet  Take 1 tablet by mouth daily     carvedilol 3.125 MG tablet  Commonly known as: COREG     citalopram 40 MG tablet  Commonly known as: CELEXA  TAKE 1 TABLET EVERY DAY     Compression Stockings Misc  by Does not apply route     famotidine 20 MG tablet  Commonly known as: PEPCID     fluconazole 100 MG tablet  Commonly known as: DIFLUCAN     furosemide 20 MG tablet  Commonly known as: LASIX  Take up to once a week for leg swelling if needed     levothyroxine 75 MCG tablet  Commonly known as: SYNTHROID  Take 1 tablet by mouth daily     potassium chloride 20 MEQ extended release tablet  Commonly known as: KLOR-CON M  Take 1 tablet by mouth daily TAKE 3 TABLETS DAILY        STOP taking these medications    busPIRone 15 MG tablet  Commonly known as: BUSPAR     Calcium 500 + D 500-125 MG-UNIT Tabs  Generic drug: Calcium Carb-Cholecalciferol     dicyclomine 10 MG capsule  Commonly known as: BENTYL     fludrocortisone 0.1 MG tablet  Commonly known as: FLORINEF     risperiDONE 0.25 MG tablet  Commonly known as: RISPERDAL           Where to Get Your Medications Information about where to get these medications is not yet available    Ask your nurse or doctor about these medications  · bisacodyl 10 MG suppository  · polyethylene glycol 17 g packet  · rosuvastatin 20 MG tablet         Lalo Patient PA-C  1/28/2022 11:47 AM        Discharge back to the nursing home in a stable condition.       Nancy Mims MD 1/28/2022 1:12 PM

## 2022-01-28 NOTE — ED NOTES
Dusty Hutson patient's  has called - updated to situation. Mr Shyam Nielsen states pt on a pureed diet and takes po thin liquids without swallowing problems.          Brandin Plascencia RN  01/28/22 6701

## 2022-01-28 NOTE — Clinical Note
Patient Class: Observation [104]   REQUIRED: Diagnosis: HCAP (healthcare-associated pneumonia) [434913]   Estimated Length of Stay: Estimated stay of less than 2 midnights   Admitting Provider: Alycia Bartholomew [[id number]]   Telemetry/Cardiac Monitoring Required?: Yes

## 2022-01-28 NOTE — ED PROVIDER NOTES
Patient signed out to me at 700 by Dr. Barbara Cedeño. Please see her note for presentation and current evaluation. In brief, she is presenting with altered mental status. While here troponin trended upwards from 0.03-0.05. Hospitalist is aware, however requested CTs of the chest/abdomen/pelvis prior to admission. CTs are pending at this time. Cardiology consult is pending at this time. CTs return showing no evidence of PE, though it is suggestive of possible atypical viral pneumonia. CT abdomen shows constipation. Discussed with cardiology, Dr. Johana Wilkerson. He requests repeat EKG and is placed on consult. No further recommendations at this time. Discussed with hospitalist team who is advised of the above and agrees admit the patient to their service. ECG    The Ekg interpreted by me shows sinus rhythm with a rate of 86 bpm.  Left axis deviation. No acute injury pattern. , QRS 90, QTc 426.        Drew Collar, DO  01/28/22 1047

## 2022-01-28 NOTE — ED NOTES
Paperwork put together for pt back to [id number]  here for transport.       Tucker Dowd  01/28/22 8890

## 2022-01-28 NOTE — ED NOTES
Clinical RN Lindsey Guo contacted for guidance and policy review on care of new feeling tube placed on night shift. Plan - Consult IR RN to remove stylet. Feeding tube in place 63 CM  At nose   secured with bridle device - IR RN here to remove stylet.          Jimmy Hunt RN  01/28/22 4731

## 2022-01-28 NOTE — ED NOTES
4781- Cardiology consult called.   [id number] Marion Falk with callback   Doroteo Baeza  01/28/22 6418 [address] [place] [address]  01/28/22 3301

## 2022-01-28 NOTE — CONSULTS
CARDIOLOGY CONSULTATION        Patient Name: Livan Lerma  Date of admission: 1/28/2022  2:20 AM  Admission Dx: No admission diagnoses are documented for this encounter. Requesting Physician: No admitting provider for patient encounter. Primary Care physician: MOHINDER Wadsworth CNP    Reason for Consultation/Chief Complaint: Elevated troponin     History of Present Illness:     Livan Lerma is a 67 y.o. patient with prior medical history coronary artery disease, hypertension, hyperlipidemia, obesity, breast cancer status post bilateral mastectomy, and GERD who presented to the hospital with complaints of altered mental status/change from baseline as well as reported hypoxia. Patient is status post prolonged hospitalization at [address] [place] [address] - decreased mentation, status post tracheostomy. ED course/Vital signs on presentation: Afebrile, heart rate 87 bpm, respirate 20, /59, sats 100% on 4 L trach collar. Work-up notable for creatinine 0.8, proBNP 294 stable from December, serial troponin 0.03, 0.05. EKG with normal sinus rhythm with nonspecific T wave and normality inferiorly, significant artifact. Repeat pending. Chest x-ray with normal cardiac silhouette with no infiltrates/edema. CT chest abdomen pelvis is notable for no large central pulmonary embolism. No peripheral emboli seen though there was some respiratory motion artifact with the study. GGO's noted right upper lobe suggestive of early pneumonia. No panda pulmonary edema. On evaluation this morning patient is alert and oriented to person, time, but not place. She has nephric and difficulty communicating owing to trach collar. Unsure how much oxygen she is on at baseline at this point. States she had an episode yesterday but does not elaborate. On yes/no questioning, she denies chest pain/pressure/heaviness at present or during her course. Denies significant shortness of breath at the moment.  Denies palpitations, dizziness, near-syncope or panda syncope. Denies paroxysmal nocturnal dyspnea. Notes history of orthopnea. No edema. Patient follows with our office, last seen 10/8/2021 with NEVAEH Quiles. Followed for nonobstructive coronary artery disease, hypertension management. Past Medical History:   has a past medical history of Anxiety, Benign essential hypertension, Bowel obstruction ([address] CAD (coronary artery disease), Cancer ([address] Depression, GERD (gastroesophageal reflux disease), Hyperlipidemia, Hypertension, Irritable bowel syndrome (IBS), Obesity, and Osteoarthritis. Surgical History:   has a past surgical history that includes Colon surgery; Cholecystectomy; Appendectomy; Colonoscopy (2014); Colonoscopy (3/29/16); US BREAST BIOPSY W LOC DEVICE 1ST LESION LEFT (Left, 6/17/2021); US BREAST BIOPSY W LOC DEVICE 1ST LESION RIGHT (Right, 6/17/2021); US BREAST BIOPSY W LOC DEVICE 1ST LESION RIGHT (Right, 6/29/2021); US BREAST BIOPSY W LOC DEVICE EACH ADDL LESION RIGHT (Right, 6/29/2021); US PLACE BREAST LOC DEVICE 1ST LESION LEFT (Left, 7/20/2021); US PLACE BREAST LOC DEVICE 1ST LESION RIGHT (Right, 7/20/2021); US PLACE BREAST LOC DEVICE EACH ADDL RIGHT (Right, 7/20/2021); and Breast lumpectomy (Bilateral, 7/27/2021). Social History:   reports that she quit smoking about 38 years ago. Her smoking use included cigarettes. She started smoking about 49 years ago. She has a 10.00 pack-year smoking history. She has never used smokeless tobacco. She reports that she does not drink alcohol and does not use drugs. Family History:  family history includes Cancer in her brother and mother; Heart Disease in her brother and sister. Home Medications:  Were reviewed and are listed in nursing record and/or below  Prior to Admission medications    Medication Sig Start Date End Date Taking?  Authorizing Provider   anastrozole (ARIMIDEX) 1 MG tablet Take 1 mg by mouth daily  8/31/21 Historical Provider, MD   furosemide (LASIX) 20 MG tablet Take up to once a week for leg swelling if needed 10/29/21   Yanni Cough, APRN - CNP   busPIRone (BUSPAR) 15 MG tablet TAKE 1 TABLET THREE TIMES DAILY AS NEEDED (ANXIETY) 10/25/21   Yanni Cough, APRN - CNP   citalopram (CELEXA) 40 MG tablet TAKE 1 TABLET EVERY DAY 10/4/21   Yanni Cough, APRN - CNP   Compression Stockings MISC by Does not apply route 9/28/21   Yanni Cough, APRN - CNP   levothyroxine (SYNTHROID) 75 MCG tablet Take 1 tablet by mouth daily 9/21/21   Yvon Beaver MD   fludrocortisone (FLORINEF) 0.1 MG tablet TAKE 1 TABLET TWICE DAILY 9/7/21   Yanni Cough, APRN - CNP   busPIRone (BUSPAR) 15 MG tablet TAKE 1 TABLET BY MOUTH 3 TIMES DAILY AS NEEDED (ANXIETY) 8/9/21   Yanni Cough, APRN - CNP   potassium chloride (KLOR-CON M) 20 MEQ extended release tablet Take 1 tablet by mouth daily TAKE 3 TABLETS DAILY 8/6/21   aGbriela Oliveira MD   rosuvastatin (CRESTOR) 20 MG tablet TAKE 1 TABLET EVERY DAY  Patient taking differently: Take 20 mg by mouth nightly  6/21/21   Gabriela Oliveira MD   dicyclomine (BENTYL) 10 MG capsule Take one capsule twice daily with meals  Patient taking differently: Take 10 mg by mouth Take one capsule twice daily with meals 5/21/21   Yanni Cough, APRN - CNP   risperiDONE (RISPERDAL) 0.25 MG tablet Take 1 mg by mouth nightly  3/2/21   Historical Provider, MD   Calcium Carbonate-Vitamin D (CALCIUM 500 + D) 500-125 MG-UNIT TABS Take by mouth    Historical Provider, MD   aspirin EC 81 MG EC tablet Take 1 tablet by mouth daily 3/2/18   Katiuska Reyes MD   polyethylene glycol [facility]) packet Take 17 g by mouth daily     Historical Provider, MD        CURRENT Medications:  lipase-protease-amylase (CREON) delayed release capsule 6,000 Units, Once  heparin (porcine) injection 60 Units/kg, Once  heparin (porcine) injection 60 Units/kg, PRN  heparin (porcine) injection 30 Units/kg, PRN  heparin 25,000 units in dextrose 5% 250 mL (premix) infusion, Continuous  aspirin suppository 300 mg, Once  iopamidol (ISOVUE-370) 76 % injection 85 mL, ONCE PRN        Allergies:  Bactrim [sulfamethoxazole-trimethoprim], Ultram [tramadol hcl], Dilaudid [hydromorphone], Phenergan [promethazine], Lipitor [atorvastatin], and Penicillins     Review of Systems:   A 14 point review of symptoms completed. Pertinent positives identified in the HPI, all other review of symptoms negative as below. Objective:     Vitals:    01/28/22 0432 01/28/22 0632 01/28/22 0730 01/28/22 0802   BP: 114/68 (!) 141/78 127/79    Pulse: 87 89 90    Resp: 25 20 18    Temp:       TempSrc:       SpO2:   100%    Weight:    180 lb (81.6 kg)   Height:    5' 5\" (1.651 m)              PHYSICAL EXAM:      General:   Chronically ill-appearing woman in no acute distress   Head:  Normocephalic, atraumatic   Eyes:  Conjunctiva/corneas clear, anicteric sclerae    Nose: Nares normal, no drainage or sinus tenderness   Throat: No abnormalities of the lips, oral mucosa or tongue. Neck: Trachea midline. Neck supple with no lymphadenopathy, thyroid not enlarged, symmetric, no tenderness/mass/nodules, flat neck veins   Lungs:   Clear to auscultation bilaterally, no wheezes, no rales, no respiratory distress. Trach collar, 4 L   Chest Wall:  No deformity or tenderness to palpation   Heart:  Regular rate and rhythm, distant heart sounds, grade 1/6 systolic ejection murmur at the apex, no rub, no S3/S4, PMI non-palpable. Abdomen:   Soft, non-tender, with normoactive bowel sounds. No masses, no hepatosplenomegaly   Extremities: No cyanosis, clubbing or pitting edema. Vascular: 2+ radial, diminished dorsalis pedis and posterior tibial pulses bilaterally. Brisk carotid upstrokes without carotid bruit. Skin: Skin color, texture, turgor are normal with no rashes or ulceration.     Pysch:  Limited   Neurologic: Oriented to person, time but not place. No slurred speech or facial asymmetry. Noted restricted motion right upper extremity that she states is chronic. Labs:   CBC:   Lab Results   Component Value Date    WBC 6.1 01/28/2022    RBC 3.17 01/28/2022    HGB 9.8 01/28/2022    HCT 28.8 01/28/2022    MCV 90.9 01/28/2022    RDW 15.3 01/28/2022     01/28/2022     CMP:  Lab Results   Component Value Date     01/28/2022    K 4.7 01/28/2022    K 4.2 11/25/2021     01/28/2022    CO2 22 01/28/2022    BUN 22 01/28/2022    CREATININE 0.8 01/28/2022    GFRAA >60 01/28/2022    AGRATIO 1.2 01/28/2022    LABGLOM >60 01/28/2022    GLUCOSE 90 01/28/2022    PROT 6.5 01/28/2022    CALCIUM 10.2 01/28/2022    BILITOT 0.9 01/28/2022    ALKPHOS 93 01/28/2022    AST 29 01/28/2022    ALT 19 01/28/2022     PT/INR:  No results found for: PTINR  HgBA1c:  Lab Results   Component Value Date    LABA1C 5.2 08/30/2021     Lab Results   Component Value Date    TROPONINI 0.05 (H) 01/28/2022       Lab Results   Component Value Date    CHOL 123 08/10/2021    CHOL 111 10/06/2020    CHOL 268 (H) 03/07/2018     Lab Results   Component Value Date    TRIG 81 08/10/2021    TRIG 68 10/06/2020    TRIG 220 (H) 03/07/2018     Lab Results   Component Value Date    HDL 63 (H) 08/10/2021    HDL 68 (H) 10/06/2020    HDL 45 03/07/2018     Lab Results   Component Value Date    LDLCALC 44 08/10/2021    LDLCALC 29 10/06/2020    LDLCALC 179 (H) 03/07/2018     Lab Results   Component Value Date    LABVLDL 16 08/10/2021    LABVLDL 14 10/06/2020    LABVLDL 44 03/07/2018     No results found for: CHOLHDLRATIO     Cardiac Data:     EKG: Personally reviewed with my interpretation as above. Telemetry personally reviewed: No events    Echo: 9/2021  Conclusions      Summary   Left ventricular systolic function is normal with ejection fraction   estimated at 55-60 %. No regional wall motion abnormalities are noted.    There is moderate concentric left ventricular hypertrophy. Grade I diastolic dysfunction with normal filling pressure. The right atrium is mildly dilated. Aortic valve sclerosis without aortic stenosis. Mild aortic regurgitation is present. Unable to obtain SPAP due to lack of tricuspid regurgitation. 5- 55%, lvh, DD1, AV sclerosis, SPAP 25 mmHg. Stress Test: 9/24/21  Conclusions        Summary    Normal LV size and systolic function. Left ventricular ejection fraction of    69%. Normal wall motion. There is normal isotope uptake at stress and rest.    There is no evidence of myocardial ischemia or scar. [facility] 3/2018  CORONARY ANGIOGRAPHY:  1. Left main was a large-caliber vessel that bifurcated, it was normal.     2. The left circumflex was a medium-caliber vessel that gave off two  obtuse marginal branches, the first was small, the second was large. Circumflex had 20% proximal stenosis.     3. The LAD was a medium-caliber vessel that reached the apex. The mid LAD  had 50% stenosis. The LAD gave off three diagonal branches, the first two  were small and third was medium caliber. The LAD became a small-caliber  vessel after giving the third diagonal branch and there was 30% to 40%  diffuse disease in the distal segment.     4. The right coronary artery was large and dominant and had 30% mid  segment stenosis. It gave off small right posterior descending artery and  two right posterolateral branches, first was medium-caliber and second was  small. Right posterior descending artery and posterolateral branches were  normal.        IMPRESSION:  1. Moderate CAD of mid LAD as well as mild CAD of distal LAD, proximal  circumflex and mid RCA. 2.  Normal left ventricular systolic function. 3.  Normal left ventricular filling pressure. Impression and Plan:      1. Elevated troponin in the absence of chest pain or ischemic changes by EKG   2. Acute on chronic hypoxic respiratory failure with hypoxia reported at facility  3. Mild cognitive impairment with recent change in mental status/acute encephalopathy reported, currently alert and oriented 2/3  4. Nonobstructive coronary artery disease with negative stress test 9/2020  5. Preserved EF by prior echo  6. Hypertension reasonably controlled  7. Hyperlipidemia  8. History of orthostatic hypotension  9. History of breast cancer    Patient Active Problem List   Diagnosis    GERD (gastroesophageal reflux disease)    Irritable bowel syndrome (IBS)    Chest pain    Near syncope    Shortness of breath    Dizziness    Cardiac microvascular disease    Coronary artery disease of native artery of native heart with stable angina pectoris ([address]    Mixed hyperlipidemia    MCI (mild cognitive impairment)    HTN (hypertension), benign    Dysthymia    Dyslipidemia    AMS (altered mental status)    Acute encephalopathy    Acute respiratory failure with hypoxemia (HCC)    Low grade fever    Hyponatremia    Acute respiratory failure (HCC)    Unsteady gait    Pre-syncope    Acute UTI    Orthostatic hypotension    Abnormal chest x-ray    Acute pain of right shoulder    Right wrist pain    Rotator cuff strain, right, initial encounter    Right wrist sprain, initial encounter    Malignant neoplasm of upper-outer quadrant of right breast in female, estrogen receptor positive ([address]    Malignant neoplasm of lower-outer quadrant of left breast of female, estrogen receptor positive ([address]    Malignant neoplasm of lower-inner quadrant of right breast of female, estrogen receptor positive ([address]    Swelling of both lower extremities    Anxiety with depression       PLAN:  1. Recent ischemic evaluation negative in September. Feel troponin to be related to demand ischemia in the setting of known underlying coronary artery disease, hypoxia, anemia. Will review repeat EKG. 2.  Continue baby aspirin daily, nightly statin. Received heparin bolus.  No strong indication to continue with drip at this time. 3. Limited echo to clarify. If abnormal LV function, will medically manage as ACS given co-morbidities. I will address the patient's cardiac risk factors and adjusted pharmacologic treatment as needed. In addition, I have reinforced the need for patient directed risk factor modification. All questions and concerns were addressed to the patient/family. Alternatives to my treatment were discussed. Thank you for allowing us to participate in the care of Rajat Oh. Please call me with any questions [phone].     Viv Barnes MD, [facility]  Cardiovascular Disease  [address]  [phone] [address] [facility]  [phone] [address] [place] [address]  1/28/2022 7:57 AM

## 2022-01-28 NOTE — ED NOTES
3404- Perfect Serve sent to hospitalist.  [facility] with callback   Nicky Sanderson  01/28/22 1200 [facility]  01/28/22 3149

## 2022-01-28 NOTE — ED NOTES
Call for transport. Pt. To [facility]. Transport needs BLS- [facility] with ETA of [phone]. Accepted this squad. [facility] with no squads  [facility] with an ETA of 2300.      Price Alert  01/28/22 1428

## 2022-01-28 NOTE — PROGRESS NOTES
FYI:  Additive QT interval prolongation may occur during combination therapy of Fluconazole, Citalopram, Ondansetron, and Risperidone. Most adverse events occur when the QTC > 500. Patient's QTC = 433. Compazine has lesser effects on the QT interval and can be ordered as an alternative to Zofran. The maximum recommended daily dose of Citalopram for patients over age 61 is 20 mg. Observe for signs and symptoms.   Ronald Gross, JAYDEN.Ph.1/28/202212:07 PM

## 2022-01-28 NOTE — DISCHARGE INSTR - COC
Continuity of Care Form    Patient Name: Jin Borja   :  1945  MRN:  [id number]    Admit date:  2022  Discharge date:  ***    Code Status Order: Full Code   Advance Directives:      Admitting Physician:  Vanesa Mosquera MD  PCP: Eduardo Jorge, APRN - CNP    Discharging Nurse: [facility] Unit/Room#:   Discharging Unit Phone Number: ***    Emergency Contact:   Extended Emergency Contact Information  Primary Emergency Contact: QuintenRaymond MAMIE  Address: [address] [place] [address] Phone: [phone]  Mobile Phone: [phone]  Relation: Spouse  Secondary Emergency Contact: [address] [place] [address] Phone: [phone]  Relation: Child   needed?  No    Past Surgical History:  Past Surgical History:   Procedure Laterality Date    APPENDECTOMY      BREAST LUMPECTOMY Bilateral 2021    RIGHT RADIO FREQUENCY IDENTIFICATION TAG LOCALIZED PARTIAL MASTECTOMY TIMES TWO; LEFT RADIO FREQUENCY IDENTIFICATION TAG LOCALIZED PARTIAL MASTECTOMY performed by Francisco Pulido MD at [address]  2014    mass    COLONOSCOPY  3/29/16    normal    US BREAST BIOPSY NEEDLE ADDITIONAL RIGHT Right 2021    US BREAST BIOPSY NEEDLE ADDITIONAL RIGHT 2021 Aggie Martínez MD [address] BREAST NEEDLE BIOPSY LEFT Left 2021    US BREAST NEEDLE BIOPSY LEFT 2021 MD [address] [facility] [address] BREAST NEEDLE BIOPSY RIGHT Right 2021    US BREAST NEEDLE BIOPSY RIGHT 2021 MD [address] [facility]    US BREAST NEEDLE BIOPSY RIGHT Right 2021    US BREAST NEEDLE BIOPSY RIGHT 2021 MD [address] [facility]    US GUIDED NEEDLE LOC BREAST ADDL RIGHT Right 2021    US GUIDED NEEDLE LOC BREAST ADDL RIGHT 2021 [facility]    US GUIDED NEEDLE LOC OF LEFT BREAST Left 7/20/2021    US GUIDED NEEDLE LOC OF LEFT BREAST 7/20/2021 [facility]    US GUIDED NEEDLE LOC OF RIGHT BREAST Right 7/20/2021    US GUIDED NEEDLE LOC OF RIGHT BREAST 7/20/2021 [facility]       Immunization History:   Immunization History   Administered Date(s) Administered    COVID-19, Xavi Trujillo, Primary or Immunocompromised, PF, 100mcg/0.5mL 02/05/2021, 03/05/2021, 11/04/2021    Influenza Vaccine, unspecified formulation 12/22/2016    Influenza Virus Vaccine 11/12/2014, 12/22/2016    Influenza, High Dose (Fluzone 65 yrs and older) 10/20/2016, 12/17/2019    Influenza, Quadv, IM, PF (6 mo and older Fluzone, Flulaval, Fluarix, and 3 yrs and older Afluria) 11/28/2018, 10/13/2020    Pneumococcal Conjugate 13-valent ([id number]) 10/20/2016    Pneumococcal Polysaccharide ([id number]) 02/23/2015, 12/22/2016    Tdap (Boostrix, Adacel) 07/30/2012       Active Problems:  Patient Active Problem List   Diagnosis Code    GERD (gastroesophageal reflux disease) K21.9    Irritable bowel syndrome (IBS) K58.9    Chest pain R07.9    Near syncope R55    Shortness of breath R06.02    Dizziness R42    Cardiac microvascular disease I25.89    Coronary artery disease of native 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    Mixed hyperlipidemia E78.2    MCI (mild cognitive impairment) G31.84    HTN (hypertension), benign I10    Dysthymia F34.1    Dyslipidemia E78.5    AMS (altered mental status) R41.82    Acute encephalopathy G93.40    Acute respiratory failure with hypoxemia (HCC) J96.01    Low grade fever R50.9    Hyponatremia E87.1    Acute respiratory failure (HCC) J96.00    Unsteady gait R26.81    Pre-syncope R55    Acute UTI N39.0    Orthostatic hypotension I95.1    Abnormal chest x-ray R93.89    Acute pain of right shoulder M25.511    Right wrist pain M25.531    Rotator cuff strain, right, initial encounter S46.011A    Right wrist sprain, initial encounter S63.501A    Malignant neoplasm of upper-outer quadrant of right breast in female, estrogen receptor positive ([facility] C50.411, Z17.0    Malignant neoplasm of lower-outer quadrant of left breast of female, estrogen receptor positive ([facility] C50.512, Z17.0    Malignant neoplasm of lower-inner quadrant of right breast of female, estrogen receptor positive ([facility] C50.311, Z17.0    Swelling of both lower extremities M79.89    Anxiety with depression F41.8    HCAP (healthcare-associated pneumonia) J18.9       Isolation/Infection:   Isolation            No Isolation          Patient Infection Status       Infection Onset Added Last Indicated Last Indicated By Review Planned Expiration Resolved Resolved By    None active    Resolved    COVID-19 (Rule Out) 01/28/22 01/28/22 01/28/22 COVID-19 & Influenza Combo (Ordered)   01/28/22 Rule-Out Test Resulted    COVID-19 (Rule Out) 05/18/20 05/18/20 05/18/20 COVID-19 (Ordered)   05/18/20 Rule-Out Test Resulted            Nurse Assessment:  Last Vital Signs: BP (!) 145/86   Pulse 91   Temp 99 °F (37.2 °C) (Oral)   Resp 17   Ht 5' 5\" (1.651 m)   Wt 180 lb (81.6 kg)   SpO2 98%   BMI 29.95 kg/m²     Last documented pain score (0-10 scale):    Last Weight:   Wt Readings from Last 1 Encounters:   01/28/22 180 lb (81.6 kg)     Mental Status:  {IP PT MENTAL STATUS:65856}    IV Access:  { TALHA IV ACCESS:343633525}    Nursing Mobility/ADLs:  Walking   {P DME UULJ:615793786}  Transfer  {P DME VIWZ:451475818}  Bathing  {[facility] DME BRUT:181533939}  Dressing  {P DME ODYI:665977309}  Toileting  {[facility] DME EKCL:431703277}  Feeding  {P DME CTON:077487410}  Med Admin  {P DME JMVO:882884317}  Med Delivery   { TALHA MED Delivery:825204793}    Wound Care Documentation and Therapy:        Elimination:  Continence:    Bowel: {YES / QZ:46205}  Bladder: {YES / TK:46572}  Urinary Catheter: {Urinary Catheter:698720109}   Colostomy/Ileostomy/Ileal Conduit: {YES / FU:19467}       Date of Last BM: ***  No intake or output data in the 24 hours ending 22 1242  No intake/output data recorded. Safety Concerns:     [address] TALHA Safety Concerns:087578626}    Impairments/Disabilities:      [address] TALHA Impairments/Disabilities:754991441}    Nutrition Therapy:  Current Nutrition Therapy:   [address] TALHA Diet List:252384224}    Routes of Feeding: {CHP DME Other Feedings:470413239}  Liquids: {Slp liquid thickness:35230}  Daily Fluid Restriction: {CHP DME Yes amt example:650467989}  Last Modified Barium Swallow with Video (Video Swallowing Test): {Done Not Done TIOK:000054063}    Treatments at the Time of Hospital Discharge:   Respiratory Treatments: ***  Oxygen Therapy:  {Therapy; copd oxygen:15462}  Ventilator:    {MH CC Vent GKIF:801967152}    Rehab Therapies: {THERAPEUTIC INTERVENTION:9182555374}  Weight Bearing Status/Restrictions: [address]  Weight Bearin}  Other Medical Equipment (for information only, NOT a DME order):  {EQUIPMENT:813773865}  Other Treatments: ***    Patient's personal belongings (please select all that are sent with patient):  {CHP DME Belongings:437781653}    RN SIGNATURE:  {Esignature:808696965}    CASE MANAGEMENT/SOCIAL WORK SECTION    Inpatient Status Date: 22    Readmission Risk Assessment Score:  Readmission Risk              Risk of Unplanned Readmission:  25           Discharging to Facility/ Agency   Name: [facility]  Address:  Phone: 21 [phone]    Dialysis Facility (if applicable)   Name:  Address:  Dialysis Schedule:  Phone:  Fax:    / signature: Electronically signed by Lis Roberson RN on 22 at 3:23 PM EST    PHYSICIAN SECTION    Prognosis: Good    Condition at Discharge: Stable    Rehab Potential (if transferring to Rehab): Good    Recommended Labs or Other Treatments After Discharge: Repeat CT chest in 12-28 months for pulmonary nodule follow up.   Patient's  reported to staff at [facility] that patient is supposed to be on Florinef 0.1 mg daily, but this was not on med list sent from [facility]. The indication is not clear to me so I did not add this medication to her list.  Please review this with patient's  upon her return and consider adding this medication if appropriate. Physician Certification: I certify the above information and transfer of Rajat Oh  is necessary for the continuing treatment of the diagnosis listed and that she requires [place] for less 30 days.      Update Admission H&P: No change in H&P    PHYSICIAN SIGNATURE:  Electronically signed by Ronny Leroy PA-C on 1/28/22 at 12:42 PM EST

## 2022-01-28 NOTE — CARE COORDINATION
CASE MANAGEMENT DISCHARGE SUMMARY      Discharge to: Back to [facility] Skilled     Precertification completed: [facility] Exemption Notification (HENS) completed: NA from facility     Transportation: [facility]    Family/car:   Medical Transport explained to pt/family. Pt/family voice no agency preference. Agency used:   time: 1600   Ambulance form completed: Yes    Confirmed discharge plan with:     Patient: yes and  at bedside     Facility/Agency, name:  [facility] faxed   Phone number for report to facility: [phone]     RN, name:  Rashel Mccord    Note: Discharging nurse to complete [facility], reconcile [facility], and place final copy with patient's discharge packet. RN to ensure that written prescriptions for  Level II medications are sent with patient to the facility as per protocol.     Marcy Zambrano RN    '

## 2022-01-28 NOTE — ED NOTES
Jeannie Ramírez pt's son updated that pt in hospital at [place] ED. Attempted to call report to pt's  but no answer. Son Paty Hewitt states he will take care of notifying his father.       [address], RN  01/28/22 3349

## 2022-01-28 NOTE — ED TRIAGE NOTES
[facility] NG tube not working for unknown amount of time. Nursing staff wants G-tube placed.

## 2022-01-28 NOTE — PROGRESS NOTES
Echocardiogram images reviewed, LV function is intact with no regional wall motion abnormalities. No chest pain. Repeat EKG with LVH and no ST-T wave changes. Presentation not consistent with ACS. Cardiology will sign off.     Km Schmid MD, [address] [facility]  [phone] [facility]  [phone] [address] [place] [address]

## 2022-01-28 NOTE — PROGRESS NOTES
Pharmacy Note  Vancomycin Consult    Nadia Muñoz is a 67 y.o. female started on Vancomycin for HAP consult received from KYLE Malone to manage therapy. Also receiving the following antibiotics: .     Patient Active Problem List   Diagnosis    GERD (gastroesophageal reflux disease)    Irritable bowel syndrome (IBS)    Chest pain    Near syncope    Shortness of breath    Dizziness    Cardiac microvascular disease    Coronary artery disease of native artery of native heart with stable angina pectoris ([address]    Mixed hyperlipidemia    MCI (mild cognitive impairment)    HTN (hypertension), benign    Dysthymia    Dyslipidemia    AMS (altered mental status)    Acute encephalopathy    Acute respiratory failure with hypoxemia (HCC)    Low grade fever    Hyponatremia    Acute respiratory failure (HCC)    Unsteady gait    Pre-syncope    Acute UTI    Orthostatic hypotension    Abnormal chest x-ray    Acute pain of right shoulder    Right wrist pain    Rotator cuff strain, right, initial encounter    Right wrist sprain, initial encounter    Malignant neoplasm of upper-outer quadrant of right breast in female, estrogen receptor positive ([address]    Malignant neoplasm of lower-outer quadrant of left breast of female, estrogen receptor positive ([address]    Malignant neoplasm of lower-inner quadrant of right breast of female, estrogen receptor positive ([address]    Swelling of both lower extremities    Anxiety with depression    HCAP (healthcare-associated pneumonia)     Allergies:  Bactrim [sulfamethoxazole-trimethoprim], Ultram [tramadol hcl], Dilaudid [hydromorphone], Phenergan [promethazine], Lipitor [atorvastatin], and Penicillins     Temp max:    Recent Labs     01/28/22  0311   BUN 22*   CREATININE 0.8   WBC 6.1     No intake or output data in the 24 hours ending 01/28/22 1123  Culture Date      Source                       Results  NGTD    Ht Readings from Last 1 Encounters:   01/28/22 5' 5\" (1.651 m)        Wt Readings from Last 1 Encounters:   01/28/22 180 lb (81.6 kg)       Body mass index is 29.95 kg/m². Estimated Creatinine Clearance: 62 mL/min (based on SCr of 0.8 mg/dL). Goal AUC Level: 400-600mcg/mL    Assessment/Plan:  Will initiate Vancomycin   1500 mg IV every 24 hours. Timing of trough level will be determined based on culture results, renal function, and clinical response. Level due on 1/30 @ 1100  Thank you for the consult. Will continue to follow.    1500 mg every 24 hours, starting on Jan 28th 2022, 12:00 EST  Target exposure: AUC24 (range) 400-600 mg/L.hr (  AUC24,ss: 450 mg/L.hr(  Probability of AUC24 > 400: 64 %(  Ctrough,ss: 12.6 mg/L(  Probability of Ctrough,ss > 20: 11 %(  Probability of nephrotoxicity (Lodise JET 2009): 8 %  Venkat Macias Pharm D 1/28/202211:25 AM  .

## 2022-01-28 NOTE — PROGRESS NOTES
Pharmacy to Manage Heparin Infusion per [facility]    Dx: Increased Troponin. Pt wt = 81.6 kg (will use adjusted wt of 66.8 kg - actual body weight > 120% ideal body weight). Baseline aPTT = 37.2. Oral factor Xa-inhibitors may alter and elevate anti-Xa levels used for unfractionated heparin monitoring. As a result, anti-Xa monitoring is not accurate while Xa-inhibitor activity is detectable. Utilize aPTT monitoring when patient received an oral factor Xa-inhibitor (apixaban, betrixaban, edoxaban or rivaroxaban) within 72 hours prior to admission (please document last administration time). The goal is to allow a washout of oral factor Xa-inhibitors by using aPTT for 72 hours, then change to ant-Xa levels for UFH. Low Dose Heparin Infusion  Heparin 60 units/kg IVP bolus followed by Heparin infusion at 12 units/kg/hr (recommended initial max dose 1000 units./hr). Recheck aPTT in 6 hours. Goal anti-Xa 0.3-0.7 IU/mL  Goal aPTT = 60-90 seconds. Will monitor with Heparin Anti-Xa after Heparin administration. Heparin bolus = 4000 units;  initial Heparin infusion of 800 units/hr.   KANDIS SeymourPh.1/28/20228:21 AM

## 2022-01-28 NOTE — PROGRESS NOTES
Speech Language Pathology  SLP Attempt / DC Note        Name: Mick Fernandez  : 1945  Medical Diagnosis: HCAP (healthcare-associated pneumonia) [J18.9]    Chart reviewed. Pt in for clogged NG with request for G-tube from nursing staff. Pt from [facility]. Called SLP from [facility] who states pt is currently on puree/thin diet based upon rec's from FEES completed at [facility] on 01/15. Per SLP, pt only consumes ~10% of meals max, would likely benefit from permanent alt means of nutrition via G-Tube or PEG. Discussed with Mansoor Matias, [address]. She thinks pt could likely d/c back this date and does not warrant BSE at this time. I agree. She is to d/c eval order. Please re-consult should additional concerns arise. Thank you!     Giorgio Méndez M.A., [address] [facility]  Speech-Language Pathologist  Phone: [phone]

## 2022-01-28 NOTE — ED PROVIDER NOTES
[address] ED      CHIEF COMPLAINT  Other (NG tube not working)       HISTORY OF PRESENT ILLNESS  Ignacio Mcghee is a 67 y.o. female with history of hypertension, hyperlipidemia, CAD, recent trach on 12/29/21 who was sent over from nursing home for evaluation of clogged NG tube. Per EMS, patient has a clogged NG tube for unknown amount of time. Nursing home wanted patient to get a G-tube and sent her in for evaluation. Patient is on 4 L nasal cannula O2 on trach collar. Patient is not able to answer any questions. Per EMS, this is her baseline. No caregivers present at bedside to provide further history. Per chart review, patient had a [facility] discharge note from 01/16/2022 where patient was admitted for encephalopathy which was thought to be secondary to Klebsiella UTI but continued to have worsening mental status and was intubated on 12/15/2021. Patient was unable to be extubated due to mental status and tracheostomy was placed 12/29. NG tube was placed due to dysphagia. No other complaints, modifying factors or associated symptoms. I have reviewed the following from the nursing documentation.     Past Medical History:   Diagnosis Date    Anxiety     Benign essential hypertension 1/17/2017    Bowel obstruction (HCC)     CAD (coronary artery disease)     Cancer ([facility] 2012    colon    Depression     GERD (gastroesophageal reflux disease)     Hyperlipidemia     Hypertension     Irritable bowel syndrome (IBS)     Obesity     Osteoarthritis      Past Surgical History:   Procedure Laterality Date    APPENDECTOMY      BREAST LUMPECTOMY Bilateral 7/27/2021    RIGHT RADIO FREQUENCY IDENTIFICATION TAG LOCALIZED PARTIAL MASTECTOMY TIMES TWO; LEFT RADIO FREQUENCY IDENTIFICATION TAG LOCALIZED PARTIAL MASTECTOMY performed by Laine Avina MD at [address] COLONOSCOPY  2014    mass    COLONOSCOPY  3/29/16    normal    US BREAST BIOPSY NEEDLE ADDITIONAL RIGHT Right 2021    US BREAST BIOPSY NEEDLE ADDITIONAL RIGHT 2021 [address] BREAST NEEDLE BIOPSY LEFT Left 2021    US BREAST NEEDLE BIOPSY LEFT 2021 Katherine Arambula MD [address] [place] BREAST NEEDLE BIOPSY RIGHT Right 2021    US BREAST NEEDLE BIOPSY RIGHT 2021 Katherine Arambula MD [address] [facility]    US BREAST NEEDLE BIOPSY RIGHT Right 2021    US BREAST NEEDLE BIOPSY RIGHT 2021 Nitesh Dawson MD [address] [facility]    US GUIDED NEEDLE LOC BREAST ADDL RIGHT Right 2021    US GUIDED NEEDLE LOC BREAST ADDL RIGHT 2021 [facility]    US GUIDED NEEDLE LOC OF LEFT BREAST Left 2021    US GUIDED NEEDLE LOC OF LEFT BREAST 2021 [facility]    US GUIDED NEEDLE LOC OF RIGHT BREAST Right 2021    US GUIDED NEEDLE LOC OF RIGHT BREAST 2021 [facility]     Family History   Problem Relation Age of Onset    Cancer Mother         colon    Cancer Brother         colon    Heart Disease Sister     Heart Disease Brother      Social History     Socioeconomic History    Marital status:      Spouse name: Not on file    Number of children: Not on file    Years of education: Not on file    Highest education level: Not on file   Occupational History    Not on file   Tobacco Use    Smoking status: Former Smoker     Packs/day: 1.00     Years: 10.00     Pack years: 10.00     Types: Cigarettes     Start date: 80     Quit date: 1983     Years since quittin.3    Smokeless tobacco: Never Used   Vaping Use    Vaping Use: Never used   Substance and Sexual Activity    Alcohol use: No    Drug use: No    Sexual activity: Not on file   Other Topics Concern    Not on file   Social History Narrative    Not on file     Social Determinants of Health     Financial Resource Strain: Low Risk     Difficulty of Paying Living Expenses: Not hard at all   Food Insecurity: No Food Insecurity    Worried About [facility] in the Last Year: Never true    Ran Out of Food in the Last Year: Never true   Transportation Needs:     Lack of Transportation (Medical): Not on file    Lack of Transportation (Non-Medical):  Not on file   Physical Activity:     Days of Exercise per Week: Not on file    Minutes of Exercise per Session: Not on file   Stress:     Feeling of Stress : Not on file   Social Connections:     Frequency of Communication with Friends and Family: Not on file    Frequency of Social Gatherings with Friends and Family: Not on file    Attends [employer] Services: Not on file    Active Member of [address] [place] [address] or Organizations: Not on file    Attends Club or Organization Meetings: Not on file    Marital Status: Not on file   Intimate Partner Violence:     Fear of Current or Ex-Partner: Not on file    Emotionally Abused: Not on file    Physically Abused: Not on file    Sexually Abused: Not on file   Housing Stability:     Unable to Pay for Housing in the Last Year: Not on file    Number of [place] in the Last Year: Not on file    Unstable Housing in the Last Year: Not on file     Current Facility-Administered Medications   Medication Dose Route Frequency Provider Last Rate Last Admin    lipase-protease-amylase (CREON) delayed release capsule 6,000 Units  6,000 Units Oral Once Mitchell Aviles MD        heparin (porcine) injection 60 Units/kg  60 Units/kg IntraVENous Once Mitchell Aviles MD        heparin (porcine) injection 60 Units/kg  60 Units/kg IntraVENous PRN Mitchell Aviles MD        heparin (porcine) injection 30 Units/kg  30 Units/kg IntraVENous PRN Mitchell Aviles MD        heparin 25,000 units in dextrose 5% 250 mL (premix) infusion  5-30 Units/kg/hr IntraVENous Continuous Mitchell Aviles MD        aspirin suppository 300 mg  300 mg Rectal Once Mitchell Aviles MD         Current Outpatient Medications Medication Sig Dispense Refill    anastrozole (ARIMIDEX) 1 MG tablet Take 1 mg by mouth daily       furosemide (LASIX) 20 MG tablet Take up to once a week for leg swelling if needed 90 tablet 1    busPIRone (BUSPAR) 15 MG tablet TAKE 1 TABLET THREE TIMES DAILY AS NEEDED (ANXIETY) 90 tablet 2    citalopram (CELEXA) 40 MG tablet TAKE 1 TABLET EVERY DAY 90 tablet 1    Compression Stockings MISC by Does not apply route 1 each 0    levothyroxine (SYNTHROID) 75 MCG tablet Take 1 tablet by mouth daily 90 tablet 3    fludrocortisone (FLORINEF) 0.1 MG tablet TAKE 1 TABLET TWICE DAILY 180 tablet 0    potassium chloride (KLOR-CON M) 20 MEQ extended release tablet Take 1 tablet by mouth daily TAKE 3 TABLETS DAILY 270 tablet 1    rosuvastatin (CRESTOR) 20 MG tablet TAKE 1 TABLET EVERY DAY (Patient taking differently: Take 20 mg by mouth nightly ) 90 tablet 1    dicyclomine (BENTYL) 10 MG capsule Take one capsule twice daily with meals (Patient taking differently: Take 10 mg by mouth Take one capsule twice daily with meals) 180 capsule 1    risperiDONE (RISPERDAL) 0.25 MG tablet Take 1 mg by mouth nightly       Calcium Carbonate-Vitamin D (CALCIUM 500 + D) 500-125 MG-UNIT TABS Take by mouth      aspirin EC 81 MG EC tablet Take 1 tablet by mouth daily 30 tablet 3    polyethylene glycol (GLYCOLAX) packet Take 17 g by mouth daily        Allergies   Allergen Reactions    Bactrim [Sulfamethoxazole-Trimethoprim] Anaphylaxis    Ultram [Tramadol Hcl] Anaphylaxis     stridor    Dilaudid [Hydromorphone] Other (See Comments)     Hallucinations    Phenergan [Promethazine] Nausea Only    Lipitor [Atorvastatin]      Nausea    Penicillins        REVIEW OF SYSTEMS  10 systems reviewed, pertinent positives per HPI otherwise noted to be negative. PHYSICAL EXAM  BP (!) 141/78   Pulse 89   Temp 99.2 °F (37.3 °C) (Oral)   Resp 20   SpO2 100%    GENERAL APPEARANCE: Awake. Non verbal. Confused. HENT: Normocephalic. Atraumatic. PERRL. EOMI. Trach collar on 4L O2  HEART/CHEST: RRR. LUNGS: Respirations unlabored. Trach collar on 4L O2.  ABDOMEN: Soft, non-distended abdomen. Non tender to palpation. No guarding. No rebound. EXTREMITIES: No gross deformities. Palpable pulses to all extremities  SKIN: Warm and dry. No acute rashes. NEUROLOGICAL: Confused. No gross facial drooping. Non verbal. Moving all extremities.     LABS  Results for orders placed or performed during the hospital encounter of 01/28/22   COVID-19 & Influenza Combo    Specimen: Nasopharyngeal Swab; Nasal   Result Value Ref Range    SARS-CoV-2 RNA, RT PCR NOT DETECTED NOT DETECTED    INFLUENZA A NOT DETECTED NOT DETECTED    INFLUENZA B NOT DETECTED NOT DETECTED   Comprehensive Metabolic Panel   Result Value Ref Range    Sodium 138 136 - 145 mmol/L    Potassium 4.7 3.5 - 5.1 mmol/L    Chloride 106 99 - 110 mmol/L    CO2 22 21 - 32 mmol/L    Anion Gap 10 3 - 16    Glucose 90 70 - 99 mg/dL    BUN 22 (H) 7 - 20 mg/dL    CREATININE 0.8 0.6 - 1.2 mg/dL    GFR Non-African American >60 >60    GFR African American >60 >60    Calcium 10.2 8.3 - 10.6 mg/dL    Total Protein 6.5 6.4 - 8.2 g/dL    Albumin 3.6 3.4 - 5.0 g/dL    Albumin/Globulin Ratio 1.2 1.1 - 2.2    Total Bilirubin 0.9 0.0 - 1.0 mg/dL    Alkaline Phosphatase 93 40 - 129 U/L    ALT 19 10 - 40 U/L    AST 29 15 - 37 U/L   CBC Auto Differential   Result Value Ref Range    WBC 6.1 4.0 - 11.0 K/uL    RBC 3.17 (L) 4.00 - 5.20 M/uL    Hemoglobin 9.8 (L) 12.0 - 16.0 g/dL    Hematocrit 28.8 (L) 36.0 - 48.0 %    MCV 90.9 80.0 - 100.0 fL    MCH 31.0 26.0 - 34.0 pg    MCHC 34.1 31.0 - 36.0 g/dL    RDW 15.3 12.4 - 15.4 %    Platelets 871 019 - 219 K/uL    MPV 7.6 5.0 - 10.5 fL    Neutrophils % 60.9 %    Lymphocytes % 21.8 %    Monocytes % 11.8 %    Eosinophils % 5.0 %    Basophils % 0.5 %    Neutrophils Absolute 3.7 1.7 - 7.7 K/uL    Lymphocytes Absolute 1.3 1.0 - 5.1 K/uL    Monocytes Absolute 0.7 0.0 - 1.3 K/uL Eosinophils Absolute 0.3 0.0 - 0.6 K/uL    Basophils Absolute 0.0 0.0 - 0.2 K/uL   Brain Natriuretic Peptide   Result Value Ref Range    Pro- 0 - 449 pg/mL   Lactate, Sepsis   Result Value Ref Range    Lactic Acid, Sepsis 0.8 0.4 - 1.9 mmol/L   Troponin   Result Value Ref Range    Troponin 0.03 (H) <0.01 ng/mL   Blood Gas, Venous   Result Value Ref Range    pH, Adarsh 7.452 (H) 7.350 - 7.450    pCO2, Adarsh 32.0 (L) 40.0 - 50.0 mmHg    pO2, Adarsh 136.2 (H) 25.0 - 40.0 mmHg    HCO3, Venous 21.8 (L) 23.0 - 29.0 mmol/L    Base Excess, Adarsh -1.5 -3.0 - 3.0 mmol/L    O2 Sat, Adarsh 99 Not Established %    Carboxyhemoglobin 0.8 0.0 - 1.5 %    MetHgb, Adarsh 0.3 <1.5 %    TC02 (Calc), Adarsh 23 Not Established mmol/L    O2 Therapy Unknown    Procalcitonin   Result Value Ref Range    Procalcitonin 0.12 0.00 - 0.15 ng/mL   Troponin   Result Value Ref Range    Troponin 0.05 (H) <0.01 ng/mL   EKG 12 Lead   Result Value Ref Range    Ventricular Rate 86 BPM    Atrial Rate 86 BPM    P-R Interval 156 ms    QRS Duration 90 ms    Q-T Interval 356 ms    QTc Calculation (Bazett) 426 ms    P Axis 36 degrees    R Axis 6 degrees    T Axis -10 degrees    Diagnosis       Sinus rhythm with Fusion complexesOtherwise normal ECG       I have reviewed all labs for this visit. ECG  The Ekg interpreted by me shows  Sinus rhythm with a rate of 86  Axis is normal  QTc is normal  Intervals and Durations are unremarkable. ST Segments: Nonspecific ST changes    RADIOLOGY  XR ACUTE ABD SERIES CHEST 1 VW    Result Date: 1/28/2022  EXAMINATION: TWO XRAY VIEWS OF THE ABDOMEN AND SINGLE  XRAY VIEW OF THE CHEST 1/28/2022 3:46 am COMPARISON: 12/02/2021 HISTORY: ORDERING SYSTEM PROVIDED HISTORY: NGT problem TECHNOLOGIST PROVIDED HISTORY: Reason for exam:->NGT problem Reason for Exam: Other (NG tube not working) FINDINGS: The cardiac silhouette is at the upper limits of normal in size. Tracheostomy tube is noted.   Vascular calcifications are noted along the aortic arch. There is mild pulmonary vascular congestion. There is a feeding tube coiled in the stomach with the tip located near the gastric antrum. Cholecystectomy clips are noted. There is a normal bowel gas pattern without evidence of obstruction. Degenerative changes are noted along the spine and sacroiliac joints. No renal calculi. 1. Cardiomegaly with mild pulmonary vascular congestion. 2. The feeding tube is coiled in stomach with the tip located in the gastric antrum. 3. Normal bowel gas pattern without evidence of obstruction. XR CHEST PORTABLE    Result Date: 1/28/2022  EXAMINATION: ONE XRAY VIEW OF THE CHEST 1/28/2022 6:11 am COMPARISON: 12/02/2021 HISTORY: ORDERING SYSTEM PROVIDED HISTORY: NGT placement TECHNOLOGIST PROVIDED HISTORY: Will call after placement Reason for exam:->NGT placement Reason for Exam: NGT placement FINDINGS: A Dobbhoff catheter is identified with the tip overlying the region of the distal stomach. Cardiac and mediastinal silhouettes appear similar. A tracheostomy tube is noted. Cervical spine fusion hardware is noted. COPD. No pneumothorax. Surgical clips in the right upper quadrant. Enteric catheter tip terminates in the distal stomach. ED COURSE/MDM  Patient seen and evaluated. At presentation, patient was on 4 L nasal cannula O2 trach collar. Not able to answer questions. Will obtain x-ray of chest and abdomen and assess location of the NG tube. No caregivers present at bedside to provide further history. She has tenderness palpation throughout. Labs show normal creatinine, normal liver enzymes, normal lactate, BNP of 294. Troponin is 0.03. Last troponin was obtained in August 2021 and it was negative at that time. Will obtain 3-hour troponin. Procalcitonin is 0.12. COVID-19 and influenza a and B are negative. Attempted to on clog the NG tube using coke. That was unsuccessful. Viokase ordered and unsuccessful.   As attempts to unclog the NG tube unsuccessful, decision to replace the NG tube. It was confirmed with a chest x-ray. Repeat troponin is elevated at 0.05. Aspirin and heparin are ordered for the patient. Hospitalist consulted for admission for. As the cause for the elevated troponin unknown and she is complaining of abdominal tenderness, will obtain CT PE and CT abdomen pelvis with contrast.  Plan for admission. I spoke with hospitalist who requested cardiology be consulted. Consult placed. Pending CT results and cardiolopgy call back, patient care handed off to Dr. Ivan Argueta at shift change. I provided at least 30 minutes of critical care excluding separately billable procedures. Pt was seen during the [place] 19 pandemic. Appropriate PPE worn by ME during patient encounters. Patient was cared for during a time with constrained hospital bed capacity with nationwide stress on resources and staffing. During the patient's ED course, the patient was given:  Medications   lipase-protease-amylase (CREON) delayed release capsule 6,000 Units (6,000 Units Oral Not Given 1/28/22 0610)   heparin (porcine) injection 60 Units/kg (has no administration in time range)   heparin (porcine) injection 60 Units/kg (has no administration in time range)   heparin (porcine) injection 30 Units/kg (has no administration in time range)   heparin 25,000 units in dextrose 5% 250 mL (premix) infusion (has no administration in time range)   aspirin suppository 300 mg (has no administration in time range)        CLINICAL IMPRESSION  1. Elevated troponin    2. Encounter for nasogastric (NG) tube placement        Blood pressure (!) 141/78, pulse 89, temperature 99.2 °F (37.3 °C), temperature source Oral, resp. rate 20, SpO2 100 %, not currently breastfeeding. DISPOSITION  Emery Hoang was admitted to the hospital.     Patient was given scripts for the following medications. I counseled patient how to take these medications.    New Prescriptions    No medications on file       Follow-up with:  No follow-up provider specified. DISCLAIMER: This chart was created using Dragon dictation software. Efforts were made by me to ensure accuracy, however some errors may be present due to limitations of this technology and occasionally words are not transcribed correctly.         Homer Crow MD  01/28/22 3336

## 2022-01-28 NOTE — ED NOTES
Med Rec updated with [facility] Med Rec.       David Neil RN  01/28/22 8636

## 2022-01-29 LAB
EKG ATRIAL RATE: 89 BPM
EKG DIAGNOSIS: NORMAL
EKG P AXIS: 26 DEGREES
EKG P-R INTERVAL: 192 MS
EKG Q-T INTERVAL: 356 MS
EKG QRS DURATION: 82 MS
EKG QTC CALCULATION (BAZETT): 433 MS
EKG R AXIS: -14 DEGREES
EKG T AXIS: 33 DEGREES
EKG VENTRICULAR RATE: 89 BPM

## 2022-01-29 PROCEDURE — 93010 ELECTROCARDIOGRAM REPORT: CPT | Performed by: INTERNAL MEDICINE

## 2022-02-06 ENCOUNTER — HOSPITAL ENCOUNTER (EMERGENCY)
Age: 77
Discharge: OTHER INSTITUTION | End: 2022-02-06
Attending: EMERGENCY MEDICINE
Payer: MEDICARE

## 2022-02-06 ENCOUNTER — APPOINTMENT (OUTPATIENT)
Dept: GENERAL RADIOLOGY | Age: 77
End: 2022-02-06
Payer: MEDICARE

## 2022-02-06 VITALS
TEMPERATURE: 98.2 F | RESPIRATION RATE: 18 BRPM | DIASTOLIC BLOOD PRESSURE: 68 MMHG | WEIGHT: 184 LBS | BODY MASS INDEX: 30.62 KG/M2 | SYSTOLIC BLOOD PRESSURE: 115 MMHG | OXYGEN SATURATION: 98 % | HEART RATE: 84 BPM

## 2022-02-06 DIAGNOSIS — T85.598A OBSTRUCTION OF NASOGASTRIC TUBE: Primary | ICD-10-CM

## 2022-02-06 PROCEDURE — 99284 EMERGENCY DEPT VISIT MOD MDM: CPT

## 2022-02-06 PROCEDURE — 74018 RADEX ABDOMEN 1 VIEW: CPT

## 2022-02-06 NOTE — ED NOTES
[address] calls for transport back to [address], [facility]- 0 trucks available, [employer]     [employer]  02/06/22 4985

## 2022-02-06 NOTE — ED PROVIDER NOTES
[address] ED      CHIEF COMPLAINT  Feeding Tube Problem (per EMS transport. pt from [facility] w problems flushing nasogastric tube. no report from sending facility, pt disoriented at baseline. prehospital muniz patent, NG/corepack to R julien. clamped at this time. moist cough noted. pt sts normal for her. no fever. attempting report. no resp distress. )       HISTORY OF PRESENT ILLNESS  John Umanzor is a 67 y.o. female  who presents to the ED complaining of feeding tube problem. Apparently difficulty flushing her tube. Pt with trach in place. No dyspnea per patient. No vomiting. Pt tells me she can eat but also has the NG in place. Gets nocturnal feeds. Ate dinner pretty well last night and had breakfast today already. Did not get her tube feeds last night. No other complaints, modifying factors or associated symptoms. I have reviewed the following from the nursing documentation.     Past Medical History:   Diagnosis Date    Anxiety     Benign essential hypertension 1/17/2017    Bowel obstruction (HCC)     CAD (coronary artery disease)     Cancer ([facility] [address] 2012    colon    Depression     GERD (gastroesophageal reflux disease)     Hyperlipidemia     Hypertension     Irritable bowel syndrome (IBS)     Obesity     Osteoarthritis      Past Surgical History:   Procedure Laterality Date    APPENDECTOMY      BREAST LUMPECTOMY Bilateral 7/27/2021    RIGHT RADIO FREQUENCY IDENTIFICATION TAG LOCALIZED PARTIAL MASTECTOMY TIMES TWO; LEFT RADIO FREQUENCY IDENTIFICATION TAG LOCALIZED PARTIAL MASTECTOMY performed by Dmitri Copeland MD at [address]  2014    mass    COLONOSCOPY  3/29/16    normal    US BREAST BIOPSY NEEDLE ADDITIONAL RIGHT Right 6/29/2021    US BREAST BIOPSY NEEDLE ADDITIONAL RIGHT 6/29/2021 [address] BREAST NEEDLE BIOPSY LEFT Left 6/17/2021    US BREAST [facility]    US BREAST NEEDLE BIOPSY RIGHT Right 2021    US BREAST NEEDLE BIOPSY RIGHT 2021 Deniz Lopez MD [facility]    US BREAST NEEDLE BIOPSY RIGHT Right 2021    US BREAST NEEDLE BIOPSY RIGHT 2021 Royal Tenzin MD [facility]    US GUIDED NEEDLE LOC BREAST ADDL RIGHT Right 2021    US GUIDED NEEDLE LOC BREAST ADDL RIGHT 2021 [facility]    US GUIDED NEEDLE LOC OF LEFT BREAST Left 2021    US GUIDED NEEDLE LOC OF LEFT BREAST 2021 [facility]    US GUIDED NEEDLE LOC OF RIGHT BREAST Right 2021    US GUIDED NEEDLE LOC OF RIGHT BREAST 2021 [facility]     Family History   Problem Relation Age of Onset    Cancer Mother         colon    Cancer Brother         colon    Heart Disease Sister     Heart Disease Brother      Social History     Socioeconomic History    Marital status:      Spouse name: Not on file    Number of children: Not on file    Years of education: Not on file    Highest education level: Not on file   Occupational History    Not on file   Tobacco Use    Smoking status: Former Smoker     Packs/day: 1.00     Years: 10.00     Pack years: 10.00     Types: Cigarettes     Start date:      Quit date: 1983     Years since quittin.4    Smokeless tobacco: Never Used   Vaping Use    Vaping Use: Never used   Substance and Sexual Activity    Alcohol use: No    Drug use: No    Sexual activity: Not on file   Other Topics Concern    Not on file   Social History Narrative    Not on file     Social Determinants of Health     Financial Resource Strain: Low Risk     Difficulty of Paying Living Expenses: Not hard at all   Food Insecurity: No Food Insecurity    Worried About [address] [employer] in the Last Year: Never true    Ericka of Food in the Last Year: Never true   Transportation Needs:     Lack of Transportation (Medical): Not on file    Lack of Transportation (Non-Medical): Not on file   Physical Activity:     Days of Exercise per Week: Not on file    Minutes of Exercise per Session: Not on file   Stress:     Feeling of Stress : Not on file   Social Connections:     Frequency of Communication with Friends and Family: Not on file    Frequency of Social Gatherings with Friends and Family: Not on file    Attends [employer] Services: Not on file    Active Member of [address] [place] [address] [employer] or Organizations: Not on file    Attends Club or Organization Meetings: Not on file    Marital Status: Not on file   Intimate Partner Violence:     Fear of Current or Ex-Partner: Not on file    Emotionally Abused: Not on file    Physically Abused: Not on file    Sexually Abused: Not on file   Housing Stability:     Unable to Pay for Housing in the Last Year: Not on file    Number of [place] in the Last Year: Not on file    Unstable Housing in the Last Year: Not on file     No current facility-administered medications for this encounter.      Current Outpatient Medications   Medication Sig Dispense Refill    amantadine (SYMMETREL) 100 MG capsule Take 100 mg by mouth 2 times daily      amLODIPine (NORVASC) 10 MG tablet Take 10 mg by mouth daily      carvedilol (COREG) 3.125 MG tablet Take 3.125 mg by mouth 2 times daily (with meals)      fluconazole (DIFLUCAN) 100 MG tablet Take 100 mg by mouth daily Daily x 7 days ends 2/3/22      famotidine (PEPCID) 20 MG tablet Take 40 mg by mouth daily      rosuvastatin (CRESTOR) 20 MG tablet Take 1 tablet by mouth nightly 30 tablet 0    polyethylene glycol (GLYCOLAX) 17 g packet Take 17 g by mouth 2 times daily 527 g 0    bisacodyl (DULCOLAX) 10 MG suppository Place 1 suppository rectally daily as needed for Constipation 1 suppository 0    anastrozole (ARIMIDEX) 1 MG tablet Take 1 mg by mouth daily       furosemide (LASIX) 20 MG tablet Take up to once a week for leg swelling if needed 90 tablet 1    citalopram (CELEXA) 40 MG tablet TAKE 1 TABLET EVERY DAY 90 tablet 1    Compression Stockings MISC by Does not apply route 1 each 0    levothyroxine (SYNTHROID) 75 MCG tablet Take 1 tablet by mouth daily 90 tablet 3    potassium chloride (KLOR-CON M) 20 MEQ extended release tablet Take 1 tablet by mouth daily TAKE 3 TABLETS DAILY 270 tablet 1    aspirin EC 81 MG EC tablet Take 1 tablet by mouth daily 30 tablet 3     Allergies   Allergen Reactions    Bactrim [Sulfamethoxazole-Trimethoprim] Anaphylaxis    Ultram [Tramadol Hcl] Anaphylaxis     stridor    Dilaudid [Hydromorphone] Other (See Comments)     Hallucinations    Phenergan [Promethazine] Nausea Only    Lipitor [Atorvastatin]      Nausea    Penicillins        REVIEW OF SYSTEMS  10 systems reviewed, pertinent positives per HPI otherwise noted to be negative. PHYSICAL EXAM  /68   Pulse 84   Temp 98.2 °F (36.8 °C)   Resp 18   Wt 184 lb (83.5 kg)   SpO2 98%   BMI 30.62 kg/m²    GENERAL APPEARANCE: Awake and alert. Cooperative. No acute distress. HENT: Normocephalic. Atraumatic. Mucous membranes are moist.  No drooling or stridor. NG noted from right nares with bridle in place. NECK: Supple. Trach in place without surrounding drainage or bleeding. EYES: PERRL. EOM's grossly intact. HEART/CHEST: RRR. No murmurs. LUNGS: Respirations unlabored. Mild rhonchi. Good air exchange. Speaking in full sentences. ABDOMEN: No tenderness. Soft. Non-distended. No masses. No organomegaly. No guarding or rebound. MUSCULOSKELETAL: No extremity edema. Compartments soft. No deformity. No tenderness in the extremities. All extremities neurovascularly intact. SKIN: Warm and dry. No acute rashes. NEUROLOGICAL: Alert and oriented. CN's 2-12 intact. No gross facial drooping. No gross focal deficits. PSYCHIATRIC: Normal mood and affect. LABS  I have reviewed all labs for this visit.    No results found for this visit on 02/06/22. RADIOLOGY  Echo Limited    Result Date: 1/28/2022  Transthoracic Echocardiography Report (TTE)  Demographics   Patient Name       Alisia Tijerina   Date of Study      01/28/2022       Gender               Female   Patient Number     [id number]       Date of Birth        1945   Visit Number       [id number]        Age                  67 year(s)   Accession Number   [id number]       Room Number          12   Corporate ID       [id number]          Sonographer          Elne Castillo, [address] [facility]   Ordering Physician Kim Gregorio MD  [address]                                      Physician            Kim Gregorio MD  Procedure Type of Study   TTE procedure:ECHOCARDIOGRAM LIMITED. Procedure Date Date: 01/28/2022 Start: 10:16 AM Study Location: [address] [place] [address] - Echo Lab Technical Quality: Adequate visualization Additional Indications:Elevated troponin. Patient Status: Routine Height: 65 inches Weight: 180 pounds BSA: 1.89 m2 BMI: 29.95 kg/m2 BP: 127/79 mmHg  Conclusions   Summary  Limited exam for LVEF. Left ventricular systolic function is normal with ejection fraction  estimated at 55-60%. No regional wall motion abnormalities. Compared with the prior study performed 9/24/21, LV function is unchanged. Signature   ------------------------------------------------------------------  Electronically signed by Kim Gregorio MD (Interpreting  physician) on 01/28/2022 at 12:34 PM  ------------------------------------------------------------------   Findings   Left Ventricle  Left ventricular systolic function is normal with ejection fraction  estimated at 55-60%. No regional wall motion abnormalities.       XR ACUTE ABD SERIES CHEST 1 VW    Result Date: 1/28/2022  EXAMINATION: TWO XRAY VIEWS OF THE ABDOMEN AND SINGLE  XRAY VIEW OF THE CHEST 1/28/2022 3:46 am COMPARISON: 12/02/2021 HISTORY: ORDERING SYSTEM PROVIDED HISTORY: NGT problem TECHNOLOGIST PROVIDED HISTORY: Reason for exam:->NGT problem Reason for Exam: Other (NG tube not working) FINDINGS: The cardiac silhouette is at the upper limits of normal in size. Tracheostomy tube is noted. Vascular calcifications are noted along the aortic arch. There is mild pulmonary vascular congestion. There is a feeding tube coiled in the stomach with the tip located near the gastric antrum. Cholecystectomy clips are noted. There is a normal bowel gas pattern without evidence of obstruction. Degenerative changes are noted along the spine and sacroiliac joints. No renal calculi. 1. Cardiomegaly with mild pulmonary vascular congestion. 2. The feeding tube is coiled in stomach with the tip located in the gastric antrum. 3. Normal bowel gas pattern without evidence of obstruction. CT ABDOMEN PELVIS W IV CONTRAST Additional Contrast? None    Result Date: 1/28/2022  EXAMINATION: CT OF THE ABDOMEN AND PELVIS WITH CONTRAST 1/28/2022 7:58 am TECHNIQUE: CT of the abdomen and pelvis was performed with the administration of intravenous contrast. Multiplanar reformatted images are provided for review. Dose modulation, iterative reconstruction, and/or weight based adjustment of the mA/kV was utilized to reduce the radiation dose to as low as reasonably achievable. COMPARISON: CT chest same date and CT abdomen and pelvis November 25, 2020. HISTORY: ORDERING SYSTEM PROVIDED HISTORY: abd pain. h/o ogilvies. TECHNOLOGIST PROVIDED HISTORY: Reason for exam:->abd pain. h/o ogilvies. Additional Contrast?->None Decision Support Exception - unselect if not a suspected or confirmed emergency medical condition->Emergency Medical Condition (MA) FINDINGS: Lower Chest: See dedicated chest CT same date for lung findings. Organs: Evaluation of the upper abdomen is slightly limited due to artifact from the patient's overlying arm. There is intra and extrahepatic biliary ductal dilatation likely related to prior cholecystectomy, unchanged compared to priors.   The liver, spleen, pancreas, and adrenal glands are without acute process. Both kidneys are symmetric in size and enhancement. There are a few small subcentimeter low-attenuation lesions in the kidneys which are too small to characterize, likely cysts, also unchanged. No hydronephrosis or hydroureter. GI/Bowel: Nasogastric tube tip overlies the distal stomach. Stomach is collapsed, limiting evaluation but grossly unremarkable. A large volume of stool seen within the rectum with stool ball measuring up to 14 x 9.5 cm. Postsurgical changes are seen in the proximal sigmoid. Moderate to large volume of stool is seen throughout the colon. No areas of focal wall thickening or obstruction identified. The appendix is not visualized. Pelvis: Powell catheter is present within the bladder which is collapsed, limiting evaluation. Uterus and adnexa are without acute process. Peritoneum/Retroperitoneum: The visualized vasculature is without acute process. No lymphadenopathy, free intraperitoneal air, free fluid, or focal fluid collection identified. Bones/Soft Tissues: Soft tissues and osseous structures are without acute process. Large stool burden throughout the colon with a large volume of stool in the rectum. Findings consistent with constipation. No evidence of obstruction or acute process. RECOMMENDATIONS: Unavailable     XR CHEST PORTABLE    Result Date: 1/28/2022  EXAMINATION: ONE XRAY VIEW OF THE CHEST 1/28/2022 6:11 am COMPARISON: 12/02/2021 HISTORY: ORDERING SYSTEM PROVIDED HISTORY: NGT placement TECHNOLOGIST PROVIDED HISTORY: Will call after placement Reason for exam:->NGT placement Reason for Exam: NGT placement FINDINGS: A Dobbhoff catheter is identified with the tip overlying the region of the distal stomach. Cardiac and mediastinal silhouettes appear similar. A tracheostomy tube is noted. Cervical spine fusion hardware is noted. COPD. No pneumothorax. Surgical clips in the right upper quadrant. Enteric catheter tip terminates in the distal stomach. CT CHEST PULMONARY EMBOLISM W CONTRAST    Result Date: 1/28/2022  EXAMINATION: CTA OF THE CHEST 1/28/2022 7:58 am TECHNIQUE: CTA of the chest was performed after the administration of intravenous contrast.  Multiplanar reformatted images are provided for review. MIP images are provided for review. Dose modulation, iterative reconstruction, and/or weight based adjustment of the mA/kV was utilized to reduce the radiation dose to as low as reasonably achievable. COMPARISON: May 26, 2021 HISTORY: ORDERING SYSTEM PROVIDED HISTORY: elevated troponin TECHNOLOGIST PROVIDED HISTORY: Reason for exam:->elevated troponin Decision Support Exception - unselect if not a suspected or confirmed emergency medical condition->Emergency Medical Condition (MA) Reason for Exam: pt nonverbal FINDINGS: Pulmonary Arteries: Pulmonary arteries are adequately opacified for evaluation. Significant respiratory motion artifact. No evidence of intraluminal filling defect to suggest pulmonary embolism. Main pulmonary artery is normal in caliber. Mediastinum: Mild cardiomegaly. No pericardial effusion. Thoracic aorta is normal in caliber. Tracheostomy and enteric tubes in place. No adenopathy. Lungs/pleura: No focal consolidation. Few small right upper lobe ground-glass opacities. No effusion or pneumothorax. Right lower lobe 6 mm nodule redemonstrated (axial image 92), unchanged. Right upper lobe pulmonary nodule measuring up to 6 mm (axial image 62), unchanged. Numerous small basilar predominant pulmonary cysts redemonstrated bilaterally, most likely related to emphysematous changes. Upper Abdomen: No acute abnormality identified. Biliary dilatation appears unchanged presumably related to post cholecystectomy status. Soft Tissues/Bones: No acute bone or soft tissue abnormality. No large or central pulmonary embolism.   No convincing evidence of smaller pulmonary emboli, however, sensitivity is significantly reduced due to respiratory motion artifact. Few small ground-glass opacities within the right upper lobe suggesting early multifocal or viral/atypical pneumonia. 6 mm right upper and right lower lobe pulmonary nodules appear unchanged. Follow-up CT of the chest without contrast recommended in 12-18 months. Recommendation based on 2017 Fleischner society guidelines. XR ABDOMEN FOR NG/OG/NE TUBE PLACEMENT    Result Date: 2/6/2022  EXAMINATION: ONE SUPINE XRAY VIEW(S) OF THE ABDOMEN 2/6/2022 10:41 am COMPARISON: 01/28/2022 HISTORY: ORDERING SYSTEM PROVIDED HISTORY: ng placement TECHNOLOGIST PROVIDED HISTORY: Reason for exam:->ng placement Portable? ->Yes Reason for Exam: ng placement FINDINGS: Feeding tube tip in region of gastric antrum. No significant distention of bowel loops. Feeding tube tip in region of gastric antrum       ED COURSE/MDM  Patient seen and evaluated. Old records reviewed. Labs and imaging reviewed and results discussed with patient. Pt with clogged feeding tube, is NG tube. Attempts to unclog unsuccessful. I did confirm it was in correct location during process, but given that we were unable to unclog, will remove. Spoke at length with patient and . Patient has improved PO intake lately, especially over past several days. Goal was to get NG out and  at this time would like to keep it out and continue with PO feeding/nutrition of patient. They will call her GI provider tomorrow to discuss further and if fails trial of just PO nutrition they can coordinate to have an NG replaced as outpt. Pt and  very much in agreement of plan and all questions answered at time of d/c. I estimate there is LOW risk for ACUTE APPENDICITIS, BOWEL OBSTRUCTION, CHOLECYSTITIS, DIVERTICULITIS, INCARCERATED HERNIA, PANCREATITIS, or PERFORATED BOWEL or ULCER, thus I consider the discharge disposition reasonable.  Also, there is no evidence or peritonitis, sepsis, or toxicity. Repatsy Presume and I have discussed the diagnosis and risks, and we agree with discharging home to follow-up with their primary doctor. We also discussed returning to the Emergency Department immediately if new or worsening symptoms occur. We have discussed the symptoms which are most concerning (e.g., bloody stool, fever, changing or worsening pain, vomiting) that necessitate immediate return. During the patient's ED course, the patient was given:  Medications - No data to display     CLINICAL IMPRESSION  1. Obstruction of nasogastric tube        Blood pressure 115/68, pulse 84, temperature 98.2 °F (36.8 °C), resp. rate 18, weight 184 lb (83.5 kg), SpO2 98 %, not currently breastfeeding. DISPOSITION  Jin Borja was discharged to home in stable condition. Patient was given scripts for the following medications. I counseled patient how to take these medications. Discharge Medication List as of 2/6/2022  2:26 PM          Follow-up with:  MOHINDER Garcia - [address]  [phone]    Schedule an appointment as soon as possible for a visit in 1 day  For recheck    Your gastroenterologist  Call tomorrow to discuss possible re-placement of your NG/feeding tube as an outpatient if it is still needed          DISCLAIMER: This chart was created using Dragon dictation software. Efforts were made by me to ensure accuracy, however some errors may be present due to limitations of this technology and occasionally words are not transcribed correctly.         Remy Cohen MD  02/07/22 [id number]

## 2022-02-27 PROBLEM — R77.8 ELEVATED TROPONIN: Status: RESOLVED | Noted: 2022-01-28 | Resolved: 2022-02-27

## 2022-02-27 PROBLEM — R79.89 ELEVATED TROPONIN: Status: RESOLVED | Noted: 2022-01-28 | Resolved: 2022-02-27

## 2022-06-12 PROBLEM — Z78.9 LIVES IN LONG-TERM CARE FACILITY: Chronic | Status: ACTIVE | Noted: 2022-06-12

## 2022-07-05 ENCOUNTER — APPOINTMENT (OUTPATIENT)
Dept: GENERAL RADIOLOGY | Age: 77
End: 2022-07-05
Payer: MEDICARE

## 2022-07-05 ENCOUNTER — HOSPITAL ENCOUNTER (OUTPATIENT)
Age: 77
Setting detail: OBSERVATION
Discharge: NURSING, REHAB OR LONG-TERM CARE | End: 2022-07-07
Attending: EMERGENCY MEDICINE | Admitting: INTERNAL MEDICINE
Payer: MEDICARE

## 2022-07-05 ENCOUNTER — OFFICE VISIT (OUTPATIENT)
Dept: CARDIOLOGY CLINIC | Age: 77
End: 2022-07-05
Payer: MEDICARE

## 2022-07-05 ENCOUNTER — TELEPHONE (OUTPATIENT)
Dept: CARDIOLOGY CLINIC | Age: 77
End: 2022-07-05

## 2022-07-05 VITALS
HEART RATE: 85 BPM | HEIGHT: 65 IN | BODY MASS INDEX: 30.62 KG/M2 | DIASTOLIC BLOOD PRESSURE: 34 MMHG | SYSTOLIC BLOOD PRESSURE: 56 MMHG | OXYGEN SATURATION: 97 %

## 2022-07-05 DIAGNOSIS — I10 ESSENTIAL HYPERTENSION: ICD-10-CM

## 2022-07-05 DIAGNOSIS — I95.1 ORTHOSTATIC HYPOTENSION: ICD-10-CM

## 2022-07-05 DIAGNOSIS — I25.89 CARDIAC MICROVASCULAR DISEASE: ICD-10-CM

## 2022-07-05 DIAGNOSIS — I95.9 HYPOTENSION, UNSPECIFIED HYPOTENSION TYPE: ICD-10-CM

## 2022-07-05 DIAGNOSIS — N17.9 AKI (ACUTE KIDNEY INJURY) (HCC): Primary | ICD-10-CM

## 2022-07-05 DIAGNOSIS — E78.2 MIXED HYPERLIPIDEMIA: ICD-10-CM

## 2022-07-05 DIAGNOSIS — I25.118 CORONARY ARTERY DISEASE OF NATIVE ARTERY OF NATIVE HEART WITH STABLE ANGINA PECTORIS (HCC): Primary | ICD-10-CM

## 2022-07-05 PROBLEM — E86.1 HYPOVOLEMIA: Status: ACTIVE | Noted: 2022-07-05

## 2022-07-05 LAB
A/G RATIO: 1.3 (ref 1.1–2.2)
ALBUMIN SERPL-MCNC: 4 G/DL (ref 3.4–5)
ALP BLD-CCNC: 99 U/L (ref 40–129)
ALT SERPL-CCNC: 8 U/L (ref 10–40)
ANION GAP SERPL CALCULATED.3IONS-SCNC: 13 MMOL/L (ref 3–16)
AST SERPL-CCNC: 11 U/L (ref 15–37)
BASOPHILS ABSOLUTE: 0 K/UL (ref 0–0.2)
BASOPHILS RELATIVE PERCENT: 0.5 %
BILIRUB SERPL-MCNC: 0.6 MG/DL (ref 0–1)
BILIRUBIN URINE: NEGATIVE
BLOOD, URINE: NEGATIVE
BUN BLDV-MCNC: 26 MG/DL (ref 7–20)
CALCIUM SERPL-MCNC: 10.2 MG/DL (ref 8.3–10.6)
CHLORIDE BLD-SCNC: 100 MMOL/L (ref 99–110)
CLARITY: ABNORMAL
CO2: 24 MMOL/L (ref 21–32)
COLOR: YELLOW
CREAT SERPL-MCNC: 1.3 MG/DL (ref 0.6–1.2)
EOSINOPHILS ABSOLUTE: 0.3 K/UL (ref 0–0.6)
EOSINOPHILS RELATIVE PERCENT: 3.5 %
EPITHELIAL CELLS, UA: >100 /HPF (ref 0–5)
GFR AFRICAN AMERICAN: 48
GFR NON-AFRICAN AMERICAN: 40
GLUCOSE BLD-MCNC: 110 MG/DL (ref 70–99)
GLUCOSE URINE: NEGATIVE MG/DL
HCT VFR BLD CALC: 35.4 % (ref 36–48)
HEMOGLOBIN: 12 G/DL (ref 12–16)
KETONES, URINE: NEGATIVE MG/DL
LEUKOCYTE ESTERASE, URINE: NEGATIVE
LYMPHOCYTES ABSOLUTE: 1.3 K/UL (ref 1–5.1)
LYMPHOCYTES RELATIVE PERCENT: 16.9 %
MCH RBC QN AUTO: 30.5 PG (ref 26–34)
MCHC RBC AUTO-ENTMCNC: 34 G/DL (ref 31–36)
MCV RBC AUTO: 89.7 FL (ref 80–100)
MICROSCOPIC EXAMINATION: YES
MONOCYTES ABSOLUTE: 1 K/UL (ref 0–1.3)
MONOCYTES RELATIVE PERCENT: 12.7 %
NEUTROPHILS ABSOLUTE: 5.2 K/UL (ref 1.7–7.7)
NEUTROPHILS RELATIVE PERCENT: 66.4 %
NITRITE, URINE: NEGATIVE
PDW BLD-RTO: 14.9 % (ref 12.4–15.4)
PH UA: 6 (ref 5–8)
PLATELET # BLD: 237 K/UL (ref 135–450)
PMV BLD AUTO: 7 FL (ref 5–10.5)
POTASSIUM SERPL-SCNC: 4.3 MMOL/L (ref 3.5–5.1)
PRO-BNP: 115 PG/ML (ref 0–449)
PROTEIN UA: ABNORMAL MG/DL
RBC # BLD: 3.95 M/UL (ref 4–5.2)
RBC UA: ABNORMAL /HPF (ref 0–4)
RENAL EPITHELIAL, UA: ABNORMAL /HPF (ref 0–1)
SODIUM BLD-SCNC: 137 MMOL/L (ref 136–145)
SPECIFIC GRAVITY UA: 1.02 (ref 1–1.03)
T4 FREE: 1.4 NG/DL (ref 0.9–1.8)
TOTAL PROTEIN: 7.2 G/DL (ref 6.4–8.2)
TROPONIN: <0.01 NG/ML
TSH REFLEX: 5.88 UIU/ML (ref 0.27–4.2)
URINE TYPE: ABNORMAL
UROBILINOGEN, URINE: 0.2 E.U./DL
WBC # BLD: 7.8 K/UL (ref 4–11)
WBC UA: ABNORMAL /HPF (ref 0–5)

## 2022-07-05 PROCEDURE — 96361 HYDRATE IV INFUSION ADD-ON: CPT

## 2022-07-05 PROCEDURE — 99285 EMERGENCY DEPT VISIT HI MDM: CPT

## 2022-07-05 PROCEDURE — 84443 ASSAY THYROID STIM HORMONE: CPT

## 2022-07-05 PROCEDURE — G8427 DOCREV CUR MEDS BY ELIG CLIN: HCPCS | Performed by: NURSE PRACTITIONER

## 2022-07-05 PROCEDURE — 96360 HYDRATION IV INFUSION INIT: CPT

## 2022-07-05 PROCEDURE — 1036F TOBACCO NON-USER: CPT | Performed by: NURSE PRACTITIONER

## 2022-07-05 PROCEDURE — G0378 HOSPITAL OBSERVATION PER HR: HCPCS

## 2022-07-05 PROCEDURE — 1123F ACP DISCUSS/DSCN MKR DOCD: CPT | Performed by: NURSE PRACTITIONER

## 2022-07-05 PROCEDURE — 71045 X-RAY EXAM CHEST 1 VIEW: CPT

## 2022-07-05 PROCEDURE — 2580000003 HC RX 258: Performed by: INTERNAL MEDICINE

## 2022-07-05 PROCEDURE — 1090F PRES/ABSN URINE INCON ASSESS: CPT | Performed by: NURSE PRACTITIONER

## 2022-07-05 PROCEDURE — 83880 ASSAY OF NATRIURETIC PEPTIDE: CPT

## 2022-07-05 PROCEDURE — 84439 ASSAY OF FREE THYROXINE: CPT

## 2022-07-05 PROCEDURE — 51701 INSERT BLADDER CATHETER: CPT

## 2022-07-05 PROCEDURE — 2580000003 HC RX 258: Performed by: PHYSICIAN ASSISTANT

## 2022-07-05 PROCEDURE — 84484 ASSAY OF TROPONIN QUANT: CPT

## 2022-07-05 PROCEDURE — 6370000000 HC RX 637 (ALT 250 FOR IP): Performed by: INTERNAL MEDICINE

## 2022-07-05 PROCEDURE — 85025 COMPLETE CBC W/AUTO DIFF WBC: CPT

## 2022-07-05 PROCEDURE — G8400 PT W/DXA NO RESULTS DOC: HCPCS | Performed by: NURSE PRACTITIONER

## 2022-07-05 PROCEDURE — 93005 ELECTROCARDIOGRAM TRACING: CPT | Performed by: EMERGENCY MEDICINE

## 2022-07-05 PROCEDURE — 99214 OFFICE O/P EST MOD 30 MIN: CPT | Performed by: NURSE PRACTITIONER

## 2022-07-05 PROCEDURE — 81001 URINALYSIS AUTO W/SCOPE: CPT

## 2022-07-05 PROCEDURE — 80053 COMPREHEN METABOLIC PANEL: CPT

## 2022-07-05 PROCEDURE — G8417 CALC BMI ABV UP PARAM F/U: HCPCS | Performed by: NURSE PRACTITIONER

## 2022-07-05 RX ORDER — MAGNESIUM SULFATE 1 G/100ML
1000 INJECTION INTRAVENOUS PRN
Status: DISCONTINUED | OUTPATIENT
Start: 2022-07-05 | End: 2022-07-06

## 2022-07-05 RX ORDER — AMANTADINE HYDROCHLORIDE 100 MG/1
100 CAPSULE, GELATIN COATED ORAL 2 TIMES DAILY
Status: DISCONTINUED | OUTPATIENT
Start: 2022-07-05 | End: 2022-07-07 | Stop reason: HOSPADM

## 2022-07-05 RX ORDER — SODIUM CHLORIDE 9 MG/ML
INJECTION, SOLUTION INTRAVENOUS PRN
Status: DISCONTINUED | OUTPATIENT
Start: 2022-07-05 | End: 2022-07-07 | Stop reason: HOSPADM

## 2022-07-05 RX ORDER — POTASSIUM CHLORIDE 7.45 MG/ML
10 INJECTION INTRAVENOUS PRN
Status: DISCONTINUED | OUTPATIENT
Start: 2022-07-05 | End: 2022-07-06

## 2022-07-05 RX ORDER — CARVEDILOL 3.12 MG/1
3.12 TABLET ORAL 2 TIMES DAILY WITH MEALS
Status: DISCONTINUED | OUTPATIENT
Start: 2022-07-06 | End: 2022-07-07 | Stop reason: HOSPADM

## 2022-07-05 RX ORDER — CALCIUM CARBONATE 200(500)MG
1000 TABLET,CHEWABLE ORAL 3 TIMES DAILY PRN
Status: DISCONTINUED | OUTPATIENT
Start: 2022-07-05 | End: 2022-07-07 | Stop reason: HOSPADM

## 2022-07-05 RX ORDER — ACETAMINOPHEN 650 MG/1
650 SUPPOSITORY RECTAL EVERY 6 HOURS PRN
Status: DISCONTINUED | OUTPATIENT
Start: 2022-07-05 | End: 2022-07-07 | Stop reason: HOSPADM

## 2022-07-05 RX ORDER — AMLODIPINE BESYLATE 5 MG/1
5 TABLET ORAL DAILY
Status: DISCONTINUED | OUTPATIENT
Start: 2022-07-06 | End: 2022-07-07 | Stop reason: HOSPADM

## 2022-07-05 RX ORDER — SODIUM CHLORIDE, SODIUM LACTATE, POTASSIUM CHLORIDE, AND CALCIUM CHLORIDE .6; .31; .03; .02 G/100ML; G/100ML; G/100ML; G/100ML
1000 INJECTION, SOLUTION INTRAVENOUS ONCE
Status: COMPLETED | OUTPATIENT
Start: 2022-07-05 | End: 2022-07-06

## 2022-07-05 RX ORDER — POTASSIUM CHLORIDE 20 MEQ/1
40 TABLET, EXTENDED RELEASE ORAL PRN
Status: DISCONTINUED | OUTPATIENT
Start: 2022-07-05 | End: 2022-07-06

## 2022-07-05 RX ORDER — 0.9 % SODIUM CHLORIDE 0.9 %
1000 INTRAVENOUS SOLUTION INTRAVENOUS ONCE
Status: COMPLETED | OUTPATIENT
Start: 2022-07-05 | End: 2022-07-05

## 2022-07-05 RX ORDER — ONDANSETRON 2 MG/ML
4 INJECTION INTRAMUSCULAR; INTRAVENOUS EVERY 6 HOURS PRN
Status: DISCONTINUED | OUTPATIENT
Start: 2022-07-05 | End: 2022-07-07 | Stop reason: HOSPADM

## 2022-07-05 RX ORDER — CITALOPRAM 20 MG/1
40 TABLET ORAL DAILY
Status: DISCONTINUED | OUTPATIENT
Start: 2022-07-06 | End: 2022-07-07 | Stop reason: HOSPADM

## 2022-07-05 RX ORDER — ATORVASTATIN CALCIUM 40 MG/1
40 TABLET, FILM COATED ORAL NIGHTLY
Status: DISCONTINUED | OUTPATIENT
Start: 2022-07-05 | End: 2022-07-07 | Stop reason: HOSPADM

## 2022-07-05 RX ORDER — RISPERIDONE 1 MG/1
TABLET, FILM COATED ORAL
COMMUNITY
Start: 2022-05-31

## 2022-07-05 RX ORDER — FAMOTIDINE 20 MG/1
10 TABLET, FILM COATED ORAL 2 TIMES DAILY
Status: DISCONTINUED | OUTPATIENT
Start: 2022-07-05 | End: 2022-07-07 | Stop reason: HOSPADM

## 2022-07-05 RX ORDER — ASPIRIN 81 MG/1
81 TABLET ORAL DAILY
Status: DISCONTINUED | OUTPATIENT
Start: 2022-07-06 | End: 2022-07-07 | Stop reason: HOSPADM

## 2022-07-05 RX ORDER — ACETAMINOPHEN 325 MG/1
650 TABLET ORAL EVERY 6 HOURS PRN
Status: DISCONTINUED | OUTPATIENT
Start: 2022-07-05 | End: 2022-07-07 | Stop reason: HOSPADM

## 2022-07-05 RX ORDER — SODIUM CHLORIDE 0.9 % (FLUSH) 0.9 %
10 SYRINGE (ML) INJECTION PRN
Status: DISCONTINUED | OUTPATIENT
Start: 2022-07-05 | End: 2022-07-07 | Stop reason: HOSPADM

## 2022-07-05 RX ORDER — ATORVASTATIN CALCIUM 40 MG/1
TABLET, FILM COATED ORAL
COMMUNITY
Start: 2022-05-31

## 2022-07-05 RX ORDER — MIRTAZAPINE 7.5 MG/1
TABLET, FILM COATED ORAL
COMMUNITY
Start: 2022-05-31

## 2022-07-05 RX ORDER — MIRTAZAPINE 15 MG/1
7.5 TABLET, FILM COATED ORAL NIGHTLY
Status: DISCONTINUED | OUTPATIENT
Start: 2022-07-05 | End: 2022-07-07 | Stop reason: HOSPADM

## 2022-07-05 RX ORDER — LANOLIN ALCOHOL/MO/W.PET/CERES
3 CREAM (GRAM) TOPICAL NIGHTLY PRN
Status: DISCONTINUED | OUTPATIENT
Start: 2022-07-05 | End: 2022-07-07 | Stop reason: HOSPADM

## 2022-07-05 RX ORDER — ANASTROZOLE 1 MG/1
1 TABLET ORAL DAILY
Status: DISCONTINUED | OUTPATIENT
Start: 2022-07-06 | End: 2022-07-07 | Stop reason: HOSPADM

## 2022-07-05 RX ORDER — ENOXAPARIN SODIUM 100 MG/ML
40 INJECTION SUBCUTANEOUS DAILY
Status: DISCONTINUED | OUTPATIENT
Start: 2022-07-06 | End: 2022-07-07 | Stop reason: HOSPADM

## 2022-07-05 RX ORDER — ONDANSETRON 4 MG/1
4 TABLET, ORALLY DISINTEGRATING ORAL EVERY 8 HOURS PRN
Status: DISCONTINUED | OUTPATIENT
Start: 2022-07-05 | End: 2022-07-07 | Stop reason: HOSPADM

## 2022-07-05 RX ADMIN — ATORVASTATIN CALCIUM 40 MG: 40 TABLET, FILM COATED ORAL at 22:09

## 2022-07-05 RX ADMIN — FAMOTIDINE 10 MG: 20 TABLET, FILM COATED ORAL at 22:09

## 2022-07-05 RX ADMIN — SODIUM CHLORIDE 1000 ML: 9 INJECTION, SOLUTION INTRAVENOUS at 16:36

## 2022-07-05 RX ADMIN — AMANTADINE HYDROCHLORIDE 100 MG: 100 CAPSULE, LIQUID FILLED ORAL at 23:06

## 2022-07-05 RX ADMIN — SODIUM CHLORIDE, POTASSIUM CHLORIDE, SODIUM LACTATE AND CALCIUM CHLORIDE 1000 ML: 600; 310; 30; 20 INJECTION, SOLUTION INTRAVENOUS at 22:08

## 2022-07-05 RX ADMIN — MIRTAZAPINE 7.5 MG: 15 TABLET, FILM COATED ORAL at 22:10

## 2022-07-05 ASSESSMENT — PAIN - FUNCTIONAL ASSESSMENT: PAIN_FUNCTIONAL_ASSESSMENT: NONE - DENIES PAIN

## 2022-07-05 NOTE — PROGRESS NOTES
[facility]   Cardiac Evaluation    Primary Care Doctor:  MOHINDER Rivas - CNP    Chief Complaint   Patient presents with    Follow-up    Irregular Heart Beat        Assessment:    1. Coronary artery disease of native artery of native heart with stable angina pectoris ([address]    2. Cardiac microvascular disease    3. Essential hypertension    4. Orthostatic hypotension    5. Mixed hyperlipidemia        Plan:   1. Recommend evaluation in the ED for hypotension with decreased responsiveness. 2. No acute abnormalities on EKG obtained from facility  3. Will arrange follow up after hospitalization     Vitals:    07/05/22 1437 07/05/22 1452 07/05/22 1459   BP: Comment: palp (!) 58/0  Comment: palp (!) 56/34   Site:  Left Upper Arm Right Upper Arm   Position:  Sitting Sitting   Cuff Size:  Medium Adult Medium Adult   Pulse: 85     SpO2: 97%     Height: 5' 5\" (1.651 m)         Primary Cardiologist: Dr. Markie Viveros     History of Present Illness:   I had the pleasure of seeing Marianela Hook (65 y.o.) in follow up for moderate CAD, hypertension with orthostatic hypotension, hyperlipidemia as well as breast cancer s/p bilateral mastectomy. She is being seen as an urgent visit today for abnormal EKG at SNF with unresponsiveness. EKG received from facility a partial copy - shows NSR 80's, poor tracing. Labs received - collected 6/7/22 - renal panel stable, glucose of 63, CBC stable. No one accompanied patient today. Patient denies chest pain but states his weak and dizzy. Very lethargic and mumbling/ soft spoken. She states has been eating and drinking okay - had something before she came to office. Marianela Hook describes symptoms including fatigue, dizziness but denies chest pain, dyspnea, palpitations, orthopnea, PND, early saiety, edema, syncope.      NYHA:   IV  ACC/ AHA Stage:    C    Past Medical History:   has a past medical history of Anxiety, Benign essential hypertension, Bowel obstruction ([facility] CAD (coronary artery disease), Cancer ([facility] Depression, GERD (gastroesophageal reflux disease), Hyperlipidemia, Hypertension, Irritable bowel syndrome (IBS), Obesity, and Osteoarthritis. Surgical History:   has a past surgical history that includes Colon surgery; Cholecystectomy; Appendectomy; Colonoscopy (2014); Colonoscopy (3/29/16); US BREAST BIOPSY W LOC DEVICE 1ST LESION LEFT (Left, 6/17/2021); US BREAST BIOPSY W LOC DEVICE 1ST LESION RIGHT (Right, 6/17/2021); US BREAST BIOPSY W LOC DEVICE 1ST LESION RIGHT (Right, 6/29/2021); US BREAST BIOPSY W LOC DEVICE EACH ADDL LESION RIGHT (Right, 6/29/2021); US PLACE BREAST LOC DEVICE 1ST LESION LEFT (Left, 7/20/2021); US PLACE BREAST LOC DEVICE 1ST LESION RIGHT (Right, 7/20/2021); US PLACE BREAST LOC DEVICE EACH ADDL RIGHT (Right, 7/20/2021); and Breast lumpectomy (Bilateral, 7/27/2021). Social History:   reports that she quit smoking about 38 years ago. Her smoking use included cigarettes. She started smoking about 49 years ago. She has a 10.00 pack-year smoking history. She has never used smokeless tobacco. She reports that she does not drink alcohol and does not use drugs. Family History:   Family History   Problem Relation Age of Onset    Cancer Mother         colon    Cancer Brother         colon    Heart Disease Sister     Heart Disease Brother        Home Medications:  Prior to Admission medications    Medication Sig Start Date End Date Taking?  Authorizing Provider   atorvastatin (LIPITOR) 40 MG tablet  5/31/22  Yes Historical Provider, MD   amantadine (SYMMETREL) 100 MG capsule Take 100 mg by mouth 2 times daily   Yes Historical Provider, MD   amLODIPine (NORVASC) 10 MG tablet Take 10 mg by mouth daily   Yes Historical Provider, MD   carvedilol (COREG) 3.125 MG tablet Take 3.125 mg by mouth 2 times daily (with meals)   Yes Historical Provider, MD   famotidine (PEPCID) 20 MG tablet Take 40 mg by mouth daily   Yes Historical Provider, MD   polyethylene glycol (GLYCOLAX) 17 g packet Take 17 g by mouth 2 times daily 1/28/22  Yes Jd Larson PA-C   bisacodyl (DULCOLAX) 10 MG suppository Place 1 suppository rectally daily as needed for Constipation 1/28/22  Yes Jd Larson PA-C   anastrozole (ARIMIDEX) 1 MG tablet Take 1 mg by mouth daily  8/31/21  Yes Historical Provider, MD   furosemide (LASIX) 20 MG tablet Take up to once a week for leg swelling if needed 10/29/21  Yes MOHINDER Maldonado CNP   citalopram (CELEXA) 40 MG tablet TAKE 1 TABLET EVERY DAY 10/4/21  Yes MOHINDER Maldonado CNP   levothyroxine (SYNTHROID) 75 MCG tablet Take 1 tablet by mouth daily 9/21/21  Yes Ekaterina Nobles MD   potassium chloride (KLOR-CON M) 20 MEQ extended release tablet Take 1 tablet by mouth daily TAKE 3 TABLETS DAILY 8/6/21  Yes Wojciech Kothari MD   aspirin EC 81 MG EC tablet Take 1 tablet by mouth daily 3/2/18  Yes Jay Arciniega MD   mirtazapine (REMERON) 7.5 MG tablet  5/31/22   Historical Provider, MD   risperiDONE (RISPERDAL) 1 MG tablet  5/31/22   Historical Provider, MD   fluconazole (DIFLUCAN) 100 MG tablet Take 100 mg by mouth daily Daily x 7 days ends 2/3/22    Historical Provider, MD   rosuvastatin (CRESTOR) 20 MG tablet Take 1 tablet by mouth nightly  Patient not taking: Reported on 7/5/2022 1/28/22   Helder Osborn PA-C   Compression Stockings MISC by Does not apply route 9/28/21   MOHINDER Maldonado CNP   busPIRone (BUSPAR) 15 MG tablet TAKE 1 TABLET BY MOUTH 3 TIMES DAILY AS NEEDED (ANXIETY) 8/9/21   MOHINDER Maldonado CNP        Allergies:  Bactrim [sulfamethoxazole-trimethoprim], Ultram Elex Melena hcl], Dilaudid [hydromorphone], Phenergan [promethazine], Lipitor [atorvastatin], and Penicillins     Physical Examination:    Vitals:    07/05/22 1437 07/05/22 1452 07/05/22 1459   BP: Comment: palp (!) 58/0  Comment: palp (!) 56/34   Site:  Left Upper Arm Right Upper Arm Position:  Sitting Sitting   Cuff Size:  Medium Adult Medium Adult   Pulse: 85     SpO2: 97%     Height: 5' 5\" (1.651 m)       Constitutional and General Appearance: Warm and dry, no apparent distress, pale, lethargic  HEENT:  Normocephalic, atraumatic  Respiratory:  · Normal excursion and expansion without use of accessory muscles  · Resp Auscultation: Clear to auscultation posteriorly  Cardiovascular:  · The apical impulses not displaced  · Heart tones are crisp and normal  · JVP unable to assess  · Regular rate and rhythm, heart tones distant, + murmur, no g/r  · Peripheral pulses are symmetrical and full  · There is no clubbing, cyanosis of the extremities.   · 1+ non-pitting BLE edema  · Pedal Pulses: 2+ and equal   Abdomen:  · No masses or tenderness  · Liver/Spleen: No Abnormalities Noted  Neurological/Psychiatric:  · Alert and oriented in all spheres  · Moves all extremities well  · Presents in wheelchair  · Lethargic    Lab Data:  Most recent lab results below reviewed in office    CBC:   Lab Results   Component Value Date/Time    WBC 6.1 01/28/2022 03:11 AM    WBC 7.3 12/02/2021 06:05 PM    WBC 6.8 11/25/2021 04:41 PM    RBC 3.17 01/28/2022 03:11 AM    RBC 4.20 12/02/2021 06:05 PM    RBC 3.87 11/25/2021 04:41 PM    HGB 9.8 01/28/2022 03:11 AM    HGB 12.7 12/02/2021 06:05 PM    HGB 12.4 11/25/2021 04:41 PM    HCT 28.8 01/28/2022 03:11 AM    HCT 38.0 12/02/2021 06:05 PM    HCT 35.2 11/25/2021 04:41 PM    MCV 90.9 01/28/2022 03:11 AM    MCV 90.4 12/02/2021 06:05 PM    MCV 90.9 11/25/2021 04:41 PM    RDW 15.3 01/28/2022 03:11 AM    RDW 13.3 12/02/2021 06:05 PM    RDW 13.5 11/25/2021 04:41 PM     01/28/2022 03:11 AM     12/02/2021 06:05 PM     11/25/2021 04:41 PM     BMP:  Lab Results   Component Value Date/Time     01/28/2022 03:11 AM     12/02/2021 06:05 PM     11/25/2021 06:27 PM    K 4.7 01/28/2022 03:11 AM    K 4.1 12/02/2021 06:05 PM    K 4.2 11/25/2021 06:27 PM valve appears sclerotic but opens adequately. Systolic pulmonary artery pressure (SPAP) is normal estimated at 25mmHg (RAP   of 8mmHg). Cath: 03/07/18  IMPRESSION:  1. Moderate CAD of mid LAD as well as mild CAD of distal LAD, proximal  circumflex and mid RCA. 2.  Normal left ventricular systolic function. 3.  Normal left ventricular filling pressure. RECOMMENDATION:  The patient has moderate disease of mid LAD. There was no  high-grade disease noted. However, her chest pain is anginal and she  likely has microvascular angina. Will start her on Ranexa. I will  avoid oral nitrates as she does have a history of vasodepressor syncope and  the nitrates can make it worse. She is already on Lisinopril, which she  will continue as ACE inhibitors have shown to be benfical in microvascular  angina. I will also start her on moderate-dose of Lipitor for the coronary  artery disease. She will continue with low-dose of aspirin. Stress Test: 07/17/17  Summary  There is normal isotope uptake at stress and rest. There is no evidence of  myocardial ischemia or scar. Hyperdynamic LV systolic function with OF>83%  with uniform wall motion. Low risk study. Echo: 01/17/17  Normal left ventricular systolic function with an estimated ejection   fraction of 55%. Grade I diastolic dysfunction with normal filling pressure. Systolic pulmonary artery pressure (SPAP) is normal and estimated at 32 mmHg   (RA pressure 3 mmHg).          I appreciate the opportunity of cooperating in the care of this individual.    MOHINDER Stock - CNP, 7/5/2022, 3:00 PM

## 2022-07-05 NOTE — TELEPHONE ENCOUNTER
Rabia @ [facility] called and sts that the pt has been Bradycardia and @ times has been unresponsive ( sts very hard to wake up). They ran an EKG on pt and it resulted abnormal. Pt last saw [facility] 10/8/21 and [facility] 8/6/21. Before putting pt on [facility] schedule today @ 215, I looked here @ 55 [facility] did not have available appts until August. SNF will be bringing the EKG and made sure to bring med list as well. They VU.

## 2022-07-05 NOTE — ED PROVIDER NOTES
[facility] Emergency Department    CHIEF COMPLAINT  Hypotension (pt sent from [facility] for hypotension. pt was seen today for a check up. denies any pain at this time. )      SHARED SERVICE VISIT  I have seen and evaluated this patient with my supervising physician, Dr. Rosy English. HISTORY OF PRESENT ILLNESS  Sushma Valdez is a 65 y.o. female who presents to the ED complaining of low blood pressure. Patient was brought over for evaluation by caregivers. Patient states that she was at her family doctor's today because she was feeling fatigued and lightheaded. States that she then saw her cardiologist who sent her over secondary to low blood pressure. Patient states that side of feeling occasionally lightheaded has no other complaints. She denies headaches or dizziness. She denies nasal congestion or sore throat. No chest pain or shortness of breath. Denies cough congestion. States that she has also had no fevers or chills. She states that there has been no change in appetite. She denies nausea, vomiting or diarrhea. No leg pain or swelling. No pain with deep breaths. Denies urinary symptoms. No other complaints, modifying factors or associated symptoms. Nursing notes reviewed.    Past Medical History:   Diagnosis Date    Anxiety     Benign essential hypertension 1/17/2017    Bowel obstruction (HCC)     CAD (coronary artery disease)     Cancer ([facility] 2012    colon    Depression     GERD (gastroesophageal reflux disease)     Hyperlipidemia     Hypertension     Irritable bowel syndrome (IBS)     Obesity     Osteoarthritis      Past Surgical History:   Procedure Laterality Date    APPENDECTOMY      BREAST LUMPECTOMY Bilateral 7/27/2021    RIGHT RADIO FREQUENCY IDENTIFICATION TAG LOCALIZED PARTIAL MASTECTOMY TIMES TWO; LEFT RADIO FREQUENCY IDENTIFICATION TAG LOCALIZED PARTIAL MASTECTOMY performed by Bria Lezama [facility] at [facility] CHOLECYSTECTOMY      COLON SURGERY      COLONOSCOPY  2014    mass    COLONOSCOPY  3/29/16    normal    US BREAST BIOPSY NEEDLE ADDITIONAL RIGHT Right 2021    US BREAST BIOPSY NEEDLE ADDITIONAL RIGHT 2021 [address] BREAST NEEDLE BIOPSY LEFT Left 2021    US BREAST NEEDLE BIOPSY LEFT 2021 Hortensia Severs, MD [facility] [address] BREAST NEEDLE BIOPSY RIGHT Right 2021    US BREAST NEEDLE BIOPSY RIGHT 2021 Hortensia Severs, MD [facility]    US BREAST NEEDLE BIOPSY RIGHT Right 2021    US BREAST NEEDLE BIOPSY RIGHT 2021 Yelitza Puri MD [facility]    US GUIDED NEEDLE LOC BREAST ADDL RIGHT Right 2021    US GUIDED NEEDLE LOC BREAST ADDL RIGHT 2021 [facility] [place]    US GUIDED NEEDLE LOC OF LEFT BREAST Left 2021    US GUIDED NEEDLE LOC OF LEFT BREAST 2021 [facility]    US GUIDED NEEDLE LOC OF RIGHT BREAST Right 2021    US GUIDED NEEDLE LOC OF RIGHT BREAST 2021 [facility]     Family History   Problem Relation Age of Onset    Cancer Mother         colon    Cancer Brother         colon    Heart Disease Sister     Heart Disease Brother      Social History     Socioeconomic History    Marital status:      Spouse name: Not on file    Number of children: Not on file    Years of education: Not on file    Highest education level: Not on file   Occupational History    Not on file   Tobacco Use    Smoking status: Former Smoker     Packs/day: 1.00     Years: 10.00     Pack years: 10.00     Types: Cigarettes     Start date:      Quit date: 1983     Years since quittin.8    Smokeless tobacco: Never Used   Vaping Use    Vaping Use: Never used   Substance and Sexual Activity    Alcohol use: No    Drug use: No    Sexual activity: Not on file   Other Topics Concern    Not on file   Social History Narrative    Not on file Social Determinants of Health     Financial Resource Strain: Low Risk     Difficulty of Paying Living Expenses: Not hard at all   Food Insecurity: No Food Insecurity    Worried About Running Out of Food in the Last Year: Never true    Ericka of Food in the Last Year: Never true   Transportation Needs:     Lack of Transportation (Medical): Not on file    Lack of Transportation (Non-Medical): Not on file   Physical Activity:     Days of Exercise per Week: Not on file    Minutes of Exercise per Session: Not on file   Stress:     Feeling of Stress : Not on file   Social Connections:     Frequency of Communication with Friends and Family: Not on file    Frequency of Social Gatherings with Friends and Family: Not on file    Attends [employer] Services: Not on file    Active Member of [address] [place] [address] or Organizations: Not on file    Attends Club or Organization Meetings: Not on file    Marital Status: Not on file   Intimate Partner Violence:     Fear of Current or Ex-Partner: Not on file    Emotionally Abused: Not on file    Physically Abused: Not on file    Sexually Abused: Not on file   Housing Stability:     Unable to Pay for Housing in the Last Year: Not on file    Number of [place] in the Last Year: Not on file    Unstable Housing in the Last Year: Not on file     No current facility-administered medications for this encounter.      Current Outpatient Medications   Medication Sig Dispense Refill    atorvastatin (LIPITOR) 40 MG tablet       mirtazapine (REMERON) 7.5 MG tablet       risperiDONE (RISPERDAL) 1 MG tablet       amantadine (SYMMETREL) 100 MG capsule Take 100 mg by mouth 2 times daily      amLODIPine (NORVASC) 10 MG tablet Take 10 mg by mouth daily      carvedilol (COREG) 3.125 MG tablet Take 3.125 mg by mouth 2 times daily (with meals)      fluconazole (DIFLUCAN) 100 MG tablet Take 100 mg by mouth daily Daily x 7 days ends 2/3/22      famotidine (PEPCID) 20 MG tablet Take 40 mg by mouth daily      rosuvastatin (CRESTOR) 20 MG tablet Take 1 tablet by mouth nightly (Patient not taking: Reported on 7/5/2022) 30 tablet 0    polyethylene glycol (GLYCOLAX) 17 g packet Take 17 g by mouth 2 times daily 527 g 0    bisacodyl (DULCOLAX) 10 MG suppository Place 1 suppository rectally daily as needed for Constipation 1 suppository 0    anastrozole (ARIMIDEX) 1 MG tablet Take 1 mg by mouth daily       furosemide (LASIX) 20 MG tablet Take up to once a week for leg swelling if needed 90 tablet 1    citalopram (CELEXA) 40 MG tablet TAKE 1 TABLET EVERY DAY 90 tablet 1    Compression Stockings MISC by Does not apply route 1 each 0    levothyroxine (SYNTHROID) 75 MCG tablet Take 1 tablet by mouth daily 90 tablet 3    potassium chloride (KLOR-CON M) 20 MEQ extended release tablet Take 1 tablet by mouth daily TAKE 3 TABLETS DAILY 270 tablet 1    aspirin EC 81 MG EC tablet Take 1 tablet by mouth daily 30 tablet 3     Allergies   Allergen Reactions    Bactrim [Sulfamethoxazole-Trimethoprim] Anaphylaxis    Ultram [Tramadol Hcl] Anaphylaxis     stridor    Dilaudid [Hydromorphone] Other (See Comments)     Hallucinations    Phenergan [Promethazine] Nausea Only    Lipitor [Atorvastatin]      Nausea    Penicillins        REVIEW OF SYSTEMS  10 systems reviewed, pertinent positives per HPI otherwise noted to be negative    PHYSICAL EXAM  BP (!) 83/56   Pulse 87   Temp 97.7 °F (36.5 °C) (Oral)   Resp 17   Ht 5' 5\" (1.651 m)   Wt 184 lb (83.5 kg)   SpO2 97%   BMI 30.62 kg/m²   GENERAL APPEARANCE: Awake and alert. Cooperative. No acute distress. HEAD: Normocephalic. Atraumatic. EYES: PERRL. EOM's grossly intact. ENT: Mucous membranes are moist.   NECK: Supple. No JVD. No tracheal tenderness deviation. No crepitus. HEART: RRR. No murmurs. No chest wall tenderness. LUNGS: Respirations unlabored. CTAB. Good air exchange. Speaking comfortably in full sentences.   No wheezing, rhonchi, rales.  ABDOMEN: Soft. Non-distended. Non-tender. No guarding or rebound. No midline pulsatile mass. EXTREMITIES: No peripheral edema. No unilateral calf pain, redness or swelling. Moves all extremities equally. All extremities neurovascularly intact. SKIN: Warm and dry. No acute rashes. NEUROLOGICAL: Alert and oriented. CN's 2-12 intact. No gross facial drooping. Strength 5/5, sensation intact. PSYCHIATRIC: Normal mood and affect. RADIOLOGY  XR CHEST PORTABLE    Result Date: 7/5/2022  EXAMINATION: ONE XRAY VIEW OF THE CHEST 7/5/2022 4:13 pm COMPARISON: 01/28/2022 HISTORY: ORDERING SYSTEM PROVIDED HISTORY: fatigue TECHNOLOGIST PROVIDED HISTORY: Reason for exam:->fatigue Reason for Exam: hypotension, fatigue FINDINGS: Suboptimal patient positioning. Cardiomediastinal silhouette appears within normal limits. No focal consolidation or overt pulmonary edema. Costophrenic angles are sharp. No evidence of pneumothorax. No acute osseous abnormalities. No radiographic evidence of an acute cardiopulmonary process. ED COURSE  Pain control was not required while here in the emergency department. Triage vitals showing blood pressure 83/56. CBC without leukocytosis or anemia. CMP appears consistent with acute kidney injury with elevated BUN and creatinine at 26 and 1.3. Troponin negative. Urinalysis without evidence for infectious process. Stable portable chest x-ray. EKG was normal sinus rhythm without evidence for acute ischemic change. Blood pressure has been improving. Suspecting dehydration given hypotension and FREDI. We are at this time recommending admission. Discussed case with hospital medicine and orders have been placed. A discussion was had with Ms. Indra Demi regarding low blood pressure, ED findings and recommendations for admission. Risk management discussed and shared decision making had with patient and/or surrogate. All questions were answered.  Patient and family are in agreement. CRITICAL CARE TIME  20 Minutes of critical care time spent not including separately billable procedures.     MDM  Results for orders placed or performed during the hospital encounter of 07/05/22   Urinalysis   Result Value Ref Range    Color, UA Yellow Straw/Yellow    Clarity, UA CLOUDY (A) Clear    Glucose, Ur Negative Negative mg/dL    Bilirubin Urine Negative Negative    Ketones, Urine Negative Negative mg/dL    Specific Gravity, UA 1.025 1.005 - 1.030    Blood, Urine Negative Negative    pH, UA 6.0 5.0 - 8.0    Protein, UA TRACE (A) Negative mg/dL    Urobilinogen, Urine 0.2 <2.0 E.U./dL    Nitrite, Urine Negative Negative    Leukocyte Esterase, Urine Negative Negative    Microscopic Examination YES     Urine Type NotGiven    Brain Natriuretic Peptide   Result Value Ref Range    Pro- 0 - 449 pg/mL   CBC with Auto Differential   Result Value Ref Range    WBC 7.8 4.0 - 11.0 K/uL    RBC 3.95 (L) 4.00 - 5.20 M/uL    Hemoglobin 12.0 12.0 - 16.0 g/dL    Hematocrit 35.4 (L) 36.0 - 48.0 %    MCV 89.7 80.0 - 100.0 fL    MCH 30.5 26.0 - 34.0 pg    MCHC 34.0 31.0 - 36.0 g/dL    RDW 14.9 12.4 - 15.4 %    Platelets 358 681 - 883 K/uL    MPV 7.0 5.0 - 10.5 fL    Neutrophils % 66.4 %    Lymphocytes % 16.9 %    Monocytes % 12.7 %    Eosinophils % 3.5 %    Basophils % 0.5 %    Neutrophils Absolute 5.2 1.7 - 7.7 K/uL    Lymphocytes Absolute 1.3 1.0 - 5.1 K/uL    Monocytes Absolute 1.0 0.0 - 1.3 K/uL    Eosinophils Absolute 0.3 0.0 - 0.6 K/uL    Basophils Absolute 0.0 0.0 - 0.2 K/uL   Comprehensive Metabolic Panel   Result Value Ref Range    Sodium 137 136 - 145 mmol/L    Potassium 4.3 3.5 - 5.1 mmol/L    Chloride 100 99 - 110 mmol/L    CO2 24 21 - 32 mmol/L    Anion Gap 13 3 - 16    Glucose 110 (H) 70 - 99 mg/dL    BUN 26 (H) 7 - 20 mg/dL    CREATININE 1.3 (H) 0.6 - 1.2 mg/dL    GFR Non- 40 (A) >60    GFR  48 (A) >60    Calcium 10.2 8.3 - 10.6 mg/dL    Total Protein 7.2 6.4 - 8.2 g/dL    Albumin 4.0 3.4 - 5.0 g/dL    Albumin/Globulin Ratio 1.3 1.1 - 2.2    Total Bilirubin 0.6 0.0 - 1.0 mg/dL    Alkaline Phosphatase 99 40 - 129 U/L    ALT 8 (L) 10 - 40 U/L    AST 11 (L) 15 - 37 U/L   Troponin   Result Value Ref Range    Troponin <0.01 <0.01 ng/mL   TSH with Reflex   Result Value Ref Range    TSH 5.88 (H) 0.27 - 4.20 uIU/mL   Microscopic Urinalysis   Result Value Ref Range    WBC, UA None seen 0 - 5 /HPF    RBC, UA None seen 0 - 4 /HPF    Epithelial Cells, UA >100 (A) 0 - 5 /HPF    Renal Epithelial, UA 0-1 0 - 1 /HPF   T4, Free   Result Value Ref Range    T4 Free 1.4 0.9 - 1.8 ng/dL   EKG 12 Lead   Result Value Ref Range    Ventricular Rate 86 BPM    Atrial Rate 86 BPM    P-R Interval 182 ms    QRS Duration 76 ms    Q-T Interval 380 ms    QTc Calculation (Bazett) 454 ms    P Axis 36 degrees    R Axis -4 degrees    T Axis 67 degrees    Diagnosis       Normal sinus rhythmNormal ECGWhen compared with ECG of 28-JAN-2022 10:48,Nonspecific T wave abnormality no longer evident in Inferior leadsNonspecific T wave abnormality now evident in Lateral leads     I spoke with Ganesh Augustin and Vivek Bartlett. We thoroughly discussed the history, physical exam, laboratory and imaging studies, as well as, emergency department course. Based upon that discussion, we've decided to admit Kusum Vallecillo to the hospital for further observation, evaluation, and treatment. Final Impression  1. FREDI (acute kidney injury) ([address]    2. Hypotension, unspecified hypotension type      Blood pressure (!) 134/96, pulse 75, temperature 97.9 °F (36.6 °C), temperature source Oral, resp. rate 16, height 5' 5\" (1.651 m), weight 184 lb (83.5 kg), SpO2 94 %, not currently breastfeeding. DISPOSITION  Patient was admitted to the hospital in stable condition.           Sherita Smith, [id number]  07/06/22 0003

## 2022-07-06 PROBLEM — N17.9 AKI (ACUTE KIDNEY INJURY) (HCC): Status: ACTIVE | Noted: 2022-07-06

## 2022-07-06 LAB
ANION GAP SERPL CALCULATED.3IONS-SCNC: 11 MMOL/L (ref 3–16)
BUN BLDV-MCNC: 25 MG/DL (ref 7–20)
CALCIUM SERPL-MCNC: 10.4 MG/DL (ref 8.3–10.6)
CHLORIDE BLD-SCNC: 103 MMOL/L (ref 99–110)
CO2: 25 MMOL/L (ref 21–32)
CREAT SERPL-MCNC: 0.9 MG/DL (ref 0.6–1.2)
GFR AFRICAN AMERICAN: >60
GFR NON-AFRICAN AMERICAN: >60
GLUCOSE BLD-MCNC: 89 MG/DL (ref 70–99)
POTASSIUM REFLEX MAGNESIUM: 4.5 MMOL/L (ref 3.5–5.1)
SODIUM BLD-SCNC: 139 MMOL/L (ref 136–145)

## 2022-07-06 PROCEDURE — 96372 THER/PROPH/DIAG INJ SC/IM: CPT

## 2022-07-06 PROCEDURE — 6370000000 HC RX 637 (ALT 250 FOR IP): Performed by: PHYSICIAN ASSISTANT

## 2022-07-06 PROCEDURE — 96361 HYDRATE IV INFUSION ADD-ON: CPT

## 2022-07-06 PROCEDURE — G0378 HOSPITAL OBSERVATION PER HR: HCPCS

## 2022-07-06 PROCEDURE — 80048 BASIC METABOLIC PNL TOTAL CA: CPT

## 2022-07-06 PROCEDURE — 6370000000 HC RX 637 (ALT 250 FOR IP): Performed by: INTERNAL MEDICINE

## 2022-07-06 PROCEDURE — 6360000002 HC RX W HCPCS: Performed by: INTERNAL MEDICINE

## 2022-07-06 PROCEDURE — 36415 COLL VENOUS BLD VENIPUNCTURE: CPT

## 2022-07-06 RX ORDER — LEVOFLOXACIN 500 MG/1
500 TABLET, FILM COATED ORAL DAILY
COMMUNITY
Start: 2022-06-30 | End: 2022-07-07

## 2022-07-06 RX ADMIN — ASPIRIN 81 MG: 81 TABLET, COATED ORAL at 10:19

## 2022-07-06 RX ADMIN — FAMOTIDINE 10 MG: 20 TABLET, FILM COATED ORAL at 10:18

## 2022-07-06 RX ADMIN — ENOXAPARIN SODIUM 40 MG: 100 INJECTION SUBCUTANEOUS at 10:19

## 2022-07-06 RX ADMIN — CARVEDILOL 3.12 MG: 3.12 TABLET, FILM COATED ORAL at 17:18

## 2022-07-06 RX ADMIN — ACETAMINOPHEN 650 MG: 325 TABLET ORAL at 11:14

## 2022-07-06 RX ADMIN — LEVOTHYROXINE SODIUM 75 MCG: 0.03 TABLET ORAL at 10:18

## 2022-07-06 RX ADMIN — ANASTROZOLE 1 MG: 1 TABLET, COATED ORAL at 10:19

## 2022-07-06 RX ADMIN — AMANTADINE HYDROCHLORIDE 100 MG: 100 CAPSULE, LIQUID FILLED ORAL at 21:03

## 2022-07-06 RX ADMIN — MIRTAZAPINE 7.5 MG: 15 TABLET, FILM COATED ORAL at 20:20

## 2022-07-06 RX ADMIN — FAMOTIDINE 10 MG: 20 TABLET, FILM COATED ORAL at 20:19

## 2022-07-06 RX ADMIN — AMANTADINE HYDROCHLORIDE 100 MG: 100 CAPSULE, LIQUID FILLED ORAL at 10:19

## 2022-07-06 RX ADMIN — DICLOFENAC SODIUM 2 G: 10 GEL TOPICAL at 13:41

## 2022-07-06 RX ADMIN — CITALOPRAM HYDROBROMIDE 40 MG: 20 TABLET ORAL at 10:19

## 2022-07-06 RX ADMIN — ATORVASTATIN CALCIUM 40 MG: 40 TABLET, FILM COATED ORAL at 20:19

## 2022-07-06 RX ADMIN — AMLODIPINE BESYLATE 5 MG: 5 TABLET ORAL at 10:18

## 2022-07-06 RX ADMIN — CARVEDILOL 3.12 MG: 3.12 TABLET, FILM COATED ORAL at 10:18

## 2022-07-06 ASSESSMENT — PAIN SCALES - GENERAL
PAINLEVEL_OUTOF10: 3
PAINLEVEL_OUTOF10: 0

## 2022-07-06 NOTE — PROGRESS NOTES
Hospitalist Progress Note      PCP: Eron Kumar, MOHINDER - CNP    Date of Admission: 7/5/2022    Chief Complaint:   Hypotension    Hospital Course:   Feliciano Flores is a 65 y.o. female. She was sent from her F for fatigue and hypotension. An EKG was performed at her ECF because at times her HR was low and she was difficult to wake up. It resulted abnormal so she was seen urgently by her cardiologist.  The EKG was read as unremarkable in the cardiology office but her BP was low so she was sent to the ER.     Patient relates she feel tired, weak and dizzy. Carmen Fenton relates she has had a normal appetite and oral intake lately. She is prescribed lasix prn but thinks it is being given to her every day.     She has been at the [facility] for about a year. She required a spinal surgery for spinal stenosis. Her post-op course was complicated and it sounds like she became septic from a UTI. She required vent support, trachesotomy, and so on. Ultimately she did not recover the ability to walk so has been wheelchair bound since then     Subjective:   Seen resting in bed, states she feels much better, no new complaints, VSS, afebrile.         Medications:  Reviewed    Infusion Medications    sodium chloride       Scheduled Medications    amantadine  100 mg Oral BID    amLODIPine  5 mg Oral Daily    anastrozole  1 mg Oral Daily    aspirin EC  81 mg Oral Daily    atorvastatin  40 mg Oral Nightly    carvedilol  3.125 mg Oral BID WC    citalopram  40 mg Oral Daily    famotidine  10 mg Oral BID    levothyroxine  75 mcg Oral Daily    mirtazapine  7.5 mg Oral Nightly    enoxaparin  40 mg SubCUTAneous Daily     PRN Meds: diclofenac sodium, sodium chloride flush, sodium chloride, ondansetron **OR** ondansetron, acetaminophen **OR** acetaminophen, melatonin, calcium carbonate    No intake or output data in the 24 hours ending 07/06/22 1654    Physical Exam Performed:    BP (!) 144/71   Pulse 76   Temp 98.3 °F (36.8 °C) (Oral)   Resp 16   Ht 5' 5\" (1.651 m)   Wt 156 lb 8.4 oz (71 kg)   SpO2 96%   BMI 26.05 kg/m²     General appearance: No apparent distress, appears stated age and cooperative. HEENT: Pupils equal, round, and reactive to light. Conjunctivae/corneas clear. Neck: Supple, with full range of motion. No jugular venous distention. Trachea midline. Respiratory:  Normal respiratory effort. Clear to auscultation, bilaterally without Rales/Wheezes/Rhonchi. Cardiovascular: Regular rate and rhythm with normal S1/S2 without murmurs, rubs or gallops. Abdomen: Soft, non-tender, non-distended with normal bowel sounds. Musculoskeletal: No clubbing, cyanosis or edema bilaterally. Full range of motion without deformity. Skin: Skin color, texture, turgor normal.  No rashes or lesions. Neurologic:  Neurovascularly intact without any focal sensory/motor deficits. Cranial nerves: II-XII intact, grossly non-focal.  Psychiatric: Alert and oriented, thought content appropriate, normal insight  Capillary Refill: Brisk,3 seconds, normal   Peripheral Pulses: +2 palpable, equal bilaterally       Labs:   Recent Labs     07/05/22  1607   WBC 7.8   HGB 12.0   HCT 35.4*        Recent Labs     07/05/22  1607 07/06/22  0533    139   K 4.3 4.5    103   CO2 24 25   BUN 26* 25*   CREATININE 1.3* 0.9   CALCIUM 10.2 10.4     Recent Labs     07/05/22  1607   AST 11*   ALT 8*   BILITOT 0.6   ALKPHOS 99     No results for input(s): INR in the last 72 hours.   Recent Labs     07/05/22  1607   TROPONINI <0.01       Urinalysis:      Lab Results   Component Value Date/Time    NITRU Negative 07/05/2022 06:14 PM    WBCUA None seen 07/05/2022 06:14 PM    BACTERIA 1+ 11/25/2021 06:02 PM    RBCUA None seen 07/05/2022 06:14 PM    BLOODU Negative 07/05/2022 06:14 PM    SPECGRAV 1.025 07/05/2022 06:14 PM    GLUCOSEU Negative 07/05/2022 06:14 PM       Radiology:  XR CHEST PORTABLE   Final Result   No radiographic evidence of an acute cardiopulmonary process. Consults  None      Assessment/Plan:    Active Hospital Problems    Diagnosis     FREDI (acute kidney injury) ([facility] [address] [N17.9]      Priority: Medium    Hypovolemia [E86.1]      Priority: Medium    Hypotension [I95.9]     Coronary artery disease involving native coronary artery of native heart without angina pectoris [I25.10]      Hypotension  -  Due to a combination of hypovolemia and treatment with antihypertensives. Resolved following 1L NS bolus. Of note HR was inappropriately normal during her hypotensive episode. - Received a total of 2 L (NS and LR), BP stable     FREDI, resolved   -  Mild. Baseline SCr ~ 0.8, creatinine at baseline today, appears euvolemic      Nonobstructive CAD, HTN, HLD  -  Continue home ASA, statin, and coreg. Reduce amlodipine from 10 to 5mg daily. DVT Prophylaxis: Lovenox, SCDs   Diet: ADULT DIET;  Regular; Low Fat/Low Chol/High Fiber/NISA  Code Status: Full Code    PT/OT Eval Status: Pending     Dispo - Likely tomorrow     ROOSEVELT Luciano

## 2022-07-06 NOTE — CARE COORDINATION
Attempt to assess- call to spouse JALIL X2- msg left. Geovanni Bourne RN     1350 Pt from [facility]? Call to admissions to determine status. Message left. Geovanni Bourne RN     [phone] Pt from [id number] [facility] bed hold- they can accept back LTC or if skilled then thru [facility]. CM following- continue to try to reach family. Geovanni Bourne RN        CASE MANAGEMENT INITIAL ASSESSMENT      Reviewed chart and completed assessment with patient: spouse at bedside  Family present: yes  Explained Case Management role/services. yes    Primary contact information:    Health Care Decision Maker :   Primary Decision Maker: Viki Coulter    Secondary Decision Maker: Jaelyn James - Brother/Sister - [phone]          Can this person be reached and be able to respond quickly, such as within a few minutes or hours? Yes      Admit date/status:Obs 7/5  Diagnosis:Hypovolemia   Is this a Readmission?:  No      Insurance:[facility]   Precert required for SNF: Yes       3 night stay required: No    Living arrangements, Adls, care needs, prior to admission:From [address] [place] [address] return- no barriers    Durable Medical Equipment at home:  Walker__Cane__RTS__ BSC__Shower Chair__  02__ HHN__ CPAP__  BiPap__  Hospital Bed__ W/C___ Other__per facility___    Services in the home and/or outpatient, prior to admission:LTC    Current PCP: MOHINDER Richard CNP                               Medications: Prescription coverage? YesWill pt require financial assistance with medications No     Transportation needs: Ambulance- [address] [address]    ·     PT/OT recs:na    Hospital Exemption Notification (HEN):na    Barriers to discharge:none    Plan/comments:Return to [place] [id number] on chart for MD signature    Geovanni Bourne RN

## 2022-07-06 NOTE — H&P
Hospital Medicine History & Physical      Patient:  Kamla Catalan  :   1945  MRN:   [id number]  Date of Service: 22    Chief Complaint   Patient presents with    Hypotension     pt sent from [place] [address] for hypotension. pt was seen today for a check up. denies any pain at this time. HISTORY OF PRESENT ILLNESS:    Kamla Catalan is a 65 y.o. female. She was sent from her F for fatigue and hypotension. An EKG was performed at her ECF because at times her HR was low and she was difficult to wake up. It resulted abnormal so she was seen urgently by her cardiologist.  The EKG was read as unremarkable in the cardiology office but her BP was low so she was sent to the ER. Patient relates she feel tired, weak and dizzy. She relates she has had a normal appetite and oral intake lately. She is prescribed lasix prn but thinks it is being given to her every day. She has been at the [facility] for about a year. She required a spinal surgery for spinal stenosis. Her post-op course was complicated and it sounds like she became septic from a UTI. She required vent support, trachesotomy, and so on. Ultimately she did not recover the ability to walk so has been wheelchair bound since then    Review of Systems:  All pertinent positives and negatives are as noted in the HPI section. All other systems were reviewed and are negative.     Past Medical History:   Diagnosis Date    Anxiety     Benign essential hypertension 2017    Bowel obstruction (HCC)     CAD (coronary artery disease)     Cancer ([facility] [address] 2012    colon    Depression     GERD (gastroesophageal reflux disease)     Hyperlipidemia     Hypertension     Irritable bowel syndrome (IBS)     Obesity     Osteoarthritis        Past Surgical History:   Procedure Laterality Date    APPENDECTOMY      BREAST LUMPECTOMY Bilateral 2021    RIGHT RADIO FREQUENCY IDENTIFICATION TAG LOCALIZED PARTIAL MASTECTOMY TIMES TWO; LEFT RADIO FREQUENCY IDENTIFICATION TAG LOCALIZED PARTIAL MASTECTOMY performed by Kenneth Gutierrez MD at [address] COLONOSCOPY  2014    mass    COLONOSCOPY  3/29/16    normal    US BREAST BIOPSY NEEDLE ADDITIONAL RIGHT Right 6/29/2021    US BREAST BIOPSY NEEDLE ADDITIONAL RIGHT 6/29/2021 Rilla Bloch, MD [address] [place] [address] BREAST NEEDLE BIOPSY LEFT Left 6/17/2021    US BREAST NEEDLE BIOPSY LEFT 6/17/2021 Milagros Calle MD [address] [place] BREAST NEEDLE BIOPSY RIGHT Right 6/17/2021    US BREAST NEEDLE BIOPSY RIGHT 6/17/2021 Milagros Calle MD [address] [place] [facility]    US BREAST NEEDLE BIOPSY RIGHT Right 6/29/2021    US BREAST NEEDLE BIOPSY RIGHT 6/29/2021 Rilla Bloch, MD [address] [place] [facility]    US GUIDED NEEDLE LOC BREAST ADDL RIGHT Right 7/20/2021    US GUIDED NEEDLE LOC BREAST ADDL RIGHT 7/20/2021 [address] [place] [facility]    US GUIDED NEEDLE LOC OF LEFT BREAST Left 7/20/2021    US GUIDED NEEDLE LOC OF LEFT BREAST 7/20/2021 [facility]    US GUIDED NEEDLE LOC OF RIGHT BREAST Right 7/20/2021    US GUIDED NEEDLE LOC OF RIGHT BREAST 7/20/2021 [address] [facility]         Prior to Admission medications    Medication Sig Start Date End Date Taking?  Authorizing Provider   atorvastatin (LIPITOR) 40 MG tablet  5/31/22   Historical Provider, MD   mirtazapine (REMERON) 7.5 MG tablet  5/31/22   Historical Provider, MD   risperiDONE (RISPERDAL) 1 MG tablet  5/31/22   Historical Provider, MD   amantadine (SYMMETREL) 100 MG capsule Take 100 mg by mouth 2 times daily    Historical Provider, MD   amLODIPine (NORVASC) 10 MG tablet Take 10 mg by mouth daily    Historical Provider, MD   carvedilol (COREG) 3.125 MG tablet Take 3.125 mg by mouth 2 times daily (with meals)    Historical Provider, MD   fluconazole (DIFLUCAN) 100 MG tablet Take 100 mg by mouth daily Daily x 7 days ends 2/3/22    Historical Provider, MD   famotidine (PEPCID) 20 MG tablet Take 40 mg by mouth daily    Historical Provider, MD   rosuvastatin (CRESTOR) 20 MG tablet Take 1 tablet by mouth nightly  Patient not taking: Reported on 7/5/2022 1/28/22   Lucero Annigham BRAYAN Larson   polyethylene glycol (GLYCOLAX) 17 g packet Take 17 g by mouth 2 times daily 1/28/22   Sherrill Terrell PA-C   bisacodyl (DULCOLAX) 10 MG suppository Place 1 suppository rectally daily as needed for Constipation 1/28/22   Sherrill Terrell PA-C   anastrozole (ARIMIDEX) 1 MG tablet Take 1 mg by mouth daily  8/31/21   Historical Provider, MD   furosemide (LASIX) 20 MG tablet Take up to once a week for leg swelling if needed 10/29/21   MOHINDER Obregon CNP   citalopram (CELEXA) 40 MG tablet TAKE 1 TABLET EVERY DAY 10/4/21   MOHINDER Obregon CNP   Compression Stockings MISC by Does not apply route 9/28/21   MOHINDER Obregon CNP   levothyroxine (SYNTHROID) 75 MCG tablet Take 1 tablet by mouth daily 9/21/21   Mary Montenegro MD   busPIRone (BUSPAR) 15 MG tablet TAKE 1 TABLET BY MOUTH 3 TIMES DAILY AS NEEDED (ANXIETY) 8/9/21   MOHINDER Obregon CNP   potassium chloride (KLOR-CON M) 20 MEQ extended release tablet Take 1 tablet by mouth daily TAKE 3 TABLETS DAILY 8/6/21   Beuford Kussmaul, MD   aspirin EC 81 MG EC tablet Take 1 tablet by mouth daily 3/2/18   Brea Onofre MD       Allergies:   Bactrim [sulfamethoxazole-trimethoprim], Ultram [tramadol hcl], Dilaudid [hydromorphone], Phenergan [promethazine], Lipitor [atorvastatin], and Penicillins    Social:   reports that she quit smoking about 38 years ago. Her smoking use included cigarettes. She started smoking about 49 years ago. She has a 10.00 pack-year smoking history. She has never used smokeless tobacco.   reports no history of alcohol use.   Social History     Substance and Sexual Activity   Drug Use No       Family History   Problem Relation Age of Onset    Cancer Mother         [facility] Cancer Brother         colon    Heart Disease Sister     Heart Disease Brother        PHYSICAL EXAM:  I performed this physical examination. Vitals:  Patient Vitals for the past 24 hrs:   BP Temp Temp src Pulse Resp SpO2 Height Weight   07/05/22 2016 113/75 -- -- 84 16 96 % -- --   07/05/22 1849 110/75 -- -- 91 22 98 % -- --   07/05/22 1731 125/76 -- -- 88 22 97 % -- --   07/05/22 1636 106/73 -- -- 85 16 100 % -- --   07/05/22 1557 (!) 83/56 97.7 °F (36.5 °C) Oral 87 17 97 % 5' 5\" (1.651 m) 184 lb (83.5 kg)     Room air    GEN:  Appearance:  Elderly female in NAD. Appears tired. She is soft spoken . Level of Consciousness:  alert . Orientation:  Fully oriented    HEENT: Sclera anicteric.  no conjunctival chemosis. moist mucus membranes. no specific or diagnostic oral lesions. NECK:  no signs of meningismus. Jugular veins not distended. Carotid pulses  2+.  no cervical lymphadenopathy. no thyromegaly. CV:  regular rhythm. normal S1 & S2.    no murmur. no rub.  no gallop. PULM:  Chest excursion is symmetric. Breath sounds are vesicular. Adventitious sounds:  none    AB:  Abdominal shape is normal.  Bowel sounds are active. Generally soft to palpation. no tenderness is present. no involuntary guarding. no rebound guarding. EXTR:  Skin is wrm. Capillary refill brisk. no specific or pathognomic rash. no clubbing. no pitting edema. no active wound or ulcer.   Pulses 2+ x 4      LABS:  Lab Results   Component Value Date    WBC 7.8 07/05/2022    HGB 12.0 07/05/2022    HCT 35.4 (L) 07/05/2022    MCV 89.7 07/05/2022     07/05/2022     Lab Results   Component Value Date    CREATININE 1.3 (H) 07/05/2022    BUN 26 (H) 07/05/2022     07/05/2022    K 4.3 07/05/2022     07/05/2022    CO2 24 07/05/2022     Lab Results   Component Value Date    ALT 8 (L) 07/05/2022    AST 11 (L) 07/05/2022    ALKPHOS 99 07/05/2022    BILITOT 0.6 07/05/2022     Lab Results Component Value Date    TROPONINI <0.01 07/05/2022     No results for input(s): PHART, PXY6KZS, PO2ART in the last 72 hours. IMAGING:  XR CHEST PORTABLE    Result Date: 7/5/2022  EXAMINATION: ONE XRAY VIEW OF THE CHEST 7/5/2022 4:13 pm COMPARISON: 01/28/2022 HISTORY: ORDERING SYSTEM PROVIDED HISTORY: fatigue TECHNOLOGIST PROVIDED HISTORY: Reason for exam:->fatigue Reason for Exam: hypotension, fatigue FINDINGS: Suboptimal patient positioning. Cardiomediastinal silhouette appears within normal limits. No focal consolidation or overt pulmonary edema. Costophrenic angles are sharp. No evidence of pneumothorax. No acute osseous abnormalities. No radiographic evidence of an acute cardiopulmonary process. I directly reviewed all recent imaging studies as well as pertinent prior studies. Radiology reports may or may not be available at the time of my review. EKG:  New and pertinent prior tracings were directly reviewed. My interpretation is as follows:  Normal sinus rhythm. Active Hospital Problems    Diagnosis Date Noted    FREDI (acute kidney injury) ([facility] [address] [N17.9] 07/06/2022     Priority: Medium    Hypovolemia [E86.1] 07/05/2022     Priority: Medium    Hypotension [I95.9]     Coronary artery disease involving native coronary artery of native heart without angina pectoris [I25.10] 04/09/2018       ASSESSMENT & PLAN  Hypotension  -  Due to a combination of hypovolemia and treatment with antihypertensives. Resolved following 1L NS bolus. Of note HR was inappropriately normal during her hypotensive episode. -  No lasix. Will give another 1L LR over 4 hours and continue to monitor. FREDI  -  Mild. Baseline SCr ~ 0.8 and currently 1.3. Clinically she appears euvolemic but is likely hypovolemic. Nonobstructive CAD, HTN, HLD  -  Continue home ASA, statin, and coreg. Reduce amlodipine from 10 to 5mg daily. DVT prophylaxis: SCDs, lovenox  Stress ulcers:    Code Status:  Full code.  Patient has not thought specifically about CPR and vent support. She would like time to think about it. Disposition:  Observation. Anticipate d/c back to ECF in 1-2 days.     Graciela Benitez MD MD

## 2022-07-07 VITALS
SYSTOLIC BLOOD PRESSURE: 133 MMHG | RESPIRATION RATE: 17 BRPM | WEIGHT: 156.53 LBS | OXYGEN SATURATION: 97 % | TEMPERATURE: 97.7 F | BODY MASS INDEX: 26.08 KG/M2 | HEIGHT: 65 IN | HEART RATE: 88 BPM | DIASTOLIC BLOOD PRESSURE: 83 MMHG

## 2022-07-07 LAB
ANION GAP SERPL CALCULATED.3IONS-SCNC: 14 MMOL/L (ref 3–16)
BUN BLDV-MCNC: 20 MG/DL (ref 7–20)
CALCIUM SERPL-MCNC: 10.1 MG/DL (ref 8.3–10.6)
CHLORIDE BLD-SCNC: 102 MMOL/L (ref 99–110)
CO2: 22 MMOL/L (ref 21–32)
CREAT SERPL-MCNC: 0.7 MG/DL (ref 0.6–1.2)
EKG ATRIAL RATE: 86 BPM
EKG DIAGNOSIS: NORMAL
EKG P AXIS: 36 DEGREES
EKG P-R INTERVAL: 182 MS
EKG Q-T INTERVAL: 380 MS
EKG QRS DURATION: 76 MS
EKG QTC CALCULATION (BAZETT): 454 MS
EKG R AXIS: -4 DEGREES
EKG T AXIS: 67 DEGREES
EKG VENTRICULAR RATE: 86 BPM
GFR AFRICAN AMERICAN: >60
GFR NON-AFRICAN AMERICAN: >60
GLUCOSE BLD-MCNC: 99 MG/DL (ref 70–99)
POTASSIUM REFLEX MAGNESIUM: 4 MMOL/L (ref 3.5–5.1)
SARS-COV-2, NAAT: NOT DETECTED
SODIUM BLD-SCNC: 138 MMOL/L (ref 136–145)

## 2022-07-07 PROCEDURE — G0378 HOSPITAL OBSERVATION PER HR: HCPCS

## 2022-07-07 PROCEDURE — 6360000002 HC RX W HCPCS: Performed by: INTERNAL MEDICINE

## 2022-07-07 PROCEDURE — 96372 THER/PROPH/DIAG INJ SC/IM: CPT

## 2022-07-07 PROCEDURE — 97162 PT EVAL MOD COMPLEX 30 MIN: CPT

## 2022-07-07 PROCEDURE — 93010 ELECTROCARDIOGRAM REPORT: CPT | Performed by: INTERNAL MEDICINE

## 2022-07-07 PROCEDURE — 97167 OT EVAL HIGH COMPLEX 60 MIN: CPT

## 2022-07-07 PROCEDURE — 97535 SELF CARE MNGMENT TRAINING: CPT

## 2022-07-07 PROCEDURE — 97530 THERAPEUTIC ACTIVITIES: CPT

## 2022-07-07 PROCEDURE — 6370000000 HC RX 637 (ALT 250 FOR IP): Performed by: INTERNAL MEDICINE

## 2022-07-07 PROCEDURE — 80048 BASIC METABOLIC PNL TOTAL CA: CPT

## 2022-07-07 PROCEDURE — 87635 SARS-COV-2 COVID-19 AMP PRB: CPT

## 2022-07-07 PROCEDURE — 36415 COLL VENOUS BLD VENIPUNCTURE: CPT

## 2022-07-07 RX ORDER — AMLODIPINE BESYLATE 5 MG/1
5 TABLET ORAL DAILY
Qty: 30 TABLET | Refills: 0 | Status: SHIPPED | OUTPATIENT
Start: 2022-07-07 | End: 2022-08-06

## 2022-07-07 RX ADMIN — ASPIRIN 81 MG: 81 TABLET, COATED ORAL at 09:16

## 2022-07-07 RX ADMIN — AMANTADINE HYDROCHLORIDE 100 MG: 100 CAPSULE, LIQUID FILLED ORAL at 09:27

## 2022-07-07 RX ADMIN — ENOXAPARIN SODIUM 40 MG: 100 INJECTION SUBCUTANEOUS at 09:15

## 2022-07-07 RX ADMIN — CITALOPRAM HYDROBROMIDE 40 MG: 20 TABLET ORAL at 09:15

## 2022-07-07 RX ADMIN — CARVEDILOL 3.12 MG: 3.12 TABLET, FILM COATED ORAL at 09:15

## 2022-07-07 RX ADMIN — LEVOTHYROXINE SODIUM 75 MCG: 0.03 TABLET ORAL at 09:15

## 2022-07-07 RX ADMIN — AMLODIPINE BESYLATE 5 MG: 5 TABLET ORAL at 09:16

## 2022-07-07 RX ADMIN — FAMOTIDINE 10 MG: 20 TABLET, FILM COATED ORAL at 09:16

## 2022-07-07 RX ADMIN — ANASTROZOLE 1 MG: 1 TABLET, COATED ORAL at 09:15

## 2022-07-07 ASSESSMENT — PAIN SCALES - GENERAL
PAINLEVEL_OUTOF10: 0
PAINLEVEL_OUTOF10: 0

## 2022-07-07 NOTE — PLAN OF CARE
Patient and family given discharge instructions. All questions and concerns were addressed. [facility] via transport.

## 2022-07-07 NOTE — PROGRESS NOTES
Physical Therapy  Facility/Department: [place] [address] - MED SURG/ORTHO  Physical Therapy Initial Assessment/ Discharge    Name: Lupe Allen  : 1945  MRN: [id number]  Date of Service: 2022    Discharge Recommendations:  [address] without PT   PT Equipment Recommendations  Equipment Needed: No      Patient Diagnosis(es): The primary encounter diagnosis was FREDI (acute kidney injury) ([facility] [address] A diagnosis of Hypotension, unspecified hypotension type was also pertinent to this visit. Past Medical History:  has a past medical history of Anxiety, Benign essential hypertension, Bowel obstruction ([facility] [address] CAD (coronary artery disease), Cancer ([facility] [address] Depression, GERD (gastroesophageal reflux disease), Hyperlipidemia, Hypertension, Irritable bowel syndrome (IBS), Obesity, and Osteoarthritis. Past Surgical History:  has a past surgical history that includes Colon surgery; Cholecystectomy; Appendectomy; Colonoscopy (); Colonoscopy (3/29/16); US BREAST BIOPSY W LOC DEVICE 1ST LESION LEFT (Left, 2021); US BREAST BIOPSY W LOC DEVICE 1ST LESION RIGHT (Right, 2021); US BREAST BIOPSY W LOC DEVICE 1ST LESION RIGHT (Right, 2021); US BREAST BIOPSY W LOC DEVICE EACH ADDL LESION RIGHT (Right, 2021); US PLACE BREAST LOC DEVICE 1ST LESION LEFT (Left, 2021); US PLACE BREAST LOC DEVICE 1ST LESION RIGHT (Right, 2021); US PLACE BREAST LOC DEVICE EACH ADDL RIGHT (Right, 2021); and Breast lumpectomy (Bilateral, 2021). Assessment   Body Structures, Functions, Activity Limitations Requiring Skilled Therapeutic Intervention: Decreased balance;Decreased functional mobility ; Decreased endurance;Decreased strength;Decreased ROM  Assessment: 64 yo female adm with hypovolemia. PLOF: Long term care x 1 yr, w/c bound since spinal surgery followed by sepsis. Reclining w/c observed in room with seat belt. Pt reports [facility], and needs alot of help to get to the w/c.  Today pt appears to be at baseline with max assist of 2 for bed mob and sitting balance. BP did drop in sitting about 20 points systolic. Recommend return to long term care without PT is she is at baseline function  Treatment Diagnosis: decreased strength, ROM and mobiltiy  Therapy Prognosis: Guarded  Decision Making: Medium Complexity  Barriers to Learning: cognition  Requires PT Follow-Up: No  Activity Tolerance  Activity Tolerance: Patient tolerated evaluation without incident;Patient tolerated treatment well     Plan   Plan: Discharge  Safety Devices  Type of Devices: All reta prominences offloaded,All fall risk precautions in place,Patient at risk for falls,Bed alarm in place,Left in bed,Call light within reach,Gait belt,Nurse notified     Restrictions  Restrictions/Precautions  Restrictions/Precautions: Fall Risk,General Precautions,Up as Tolerated     Subjective   Chart Reviewed: Yes  Patient assessed for rehabilitation services?: Yes  Family / Caregiver Present: No  Referring Practitioner: LIMA Carranza  Referral Date : 07/06/22  Diagnosis: Hypovolemia  Follows Commands: Within Functional Limits  Comments: RN cleared pt for therapy, pt resting in bed on approach, Juice spilled on plate/tray and floor on approach. Pt observed to cough drinking thin liquids and having difficulty managing food on tray  Subjective  Subjective: Pt agrees to therapy. PAIN: reports Bilat knee discomfort with exercise         Social/Functional History  Social/Functional History  Lives With: Alone  Type of Home: Facility [facility])  Home Equipment:  (recliner w/c)  ADL Assistance: Needs assistance (Assist for bathing, dressing and toileting.  Pt needs assist to feed self)  Homemaking Responsibilities: No  Ambulation Assistance:  (Uses w/c for mobility, non-ambulatory)  Transfer Assistance: Needs assistance (Unsure of method of transfer at [address])  Vision/Hearing  Vision  Vision: Impaired  Vision Exceptions: Wears glasses at all times  Hearing  Hearing: Within functional limits    Cognition   Overall Orientation Status: Impaired  Orientation Level: Oriented to person;Disoriented to place; Disoriented to time;Disoriented to situation  Overall Cognitive Status: Exceptions  Arousal/Alertness: Delayed responses to stimuli  Following Commands: Inconsistently follows commands  Attention Span: Attends with cues to redirect; Difficulty attending to directions  Memory: Decreased recall of recent events;Decreased short term memory;Decreased recall of biographical Information  Safety Judgement: Decreased awareness of need for safety;Decreased awareness of need for assistance  Problem Solving: Assistance required to identify errors made;Assistance required to correct errors made  Insights: Decreased awareness of deficits  Initiation: Requires cues for all  Sequencing: Requires cues for all     Objective   Heart Rate: 88  Heart Rate Source: Monitor  BP: 133/83  BP Location: Left upper arm  BP Method: Automatic  Patient Position: Semi fowlers  MAP (Calculated): 99.67  Resp: 17  SpO2: 97 %  O2 Device: None (Room air)     PROM RLE (degrees)  RLE General PROM: bilat plantar flexion contractures, tight hip flexors, Knee flexion RLE only 90*, Hips internally rotate to neutral only  Strength RLE  Comment: BLEs: grossly poor throughout with abnormal tone in BLEs           Bed mobility  Supine to Sit: Dependent/Total;Maximum assistance;2 Person assistance  Sit to Supine: Dependent/Total;Maximum assistance;2 Person assistance  Transfers  Comment: Not assessed due to poor sitting balance, recommend Maxi move bed to reclined w/c with seat belt for mobility with nursing staff. BP post sitting 107/73  HR 87  Ambulation  Comments: non amb at baseline x 1 yr     Balance: Dependent on max assist of 1-2 for sitting balance EOB, strong posterior lean.  Sat EOB ~5 minutes     Exercise Treatment:   Ankle pumps: 10 x B assisted, minimal ROM      SLR: 5 x B mod assist and minimal clearance from bed     Hip IR/ER: 5 x B assisted from ER to neutral only     Heel slides: 5 x B assisted     Hip abd: 5 x B assisted        AM-PAC Score     AM-PAC Inpatient Mobility without Stair Climbing Raw Score : 7 (07/07/22 1019)  AM-PAC Inpatient without Stair Climbing T-Scale Score : 28.66 (07/07/22 1019)  Mobility Inpatient CMS 0-100% Score: 86.29 (07/07/22 1019)  Mobility Inpatient without Stair CMS G-Code Modifier : CM (07/07/22 1019)       Goals  Short Term Goals  Time Frame for Short term goals: 1 visit  Short term goal 1: pt to voice understanding of ROM ex, positioning and breathing ex to prevent complications of bedrest: MET  Patient Goals   Patient goals : \"I guess I'd like to walk again, but I'd be starting from scratch\"     Education  Patient Education  Education Given To: Patient  Education Provided: Role of Therapy;Plan of Care  Education Provided Comments: Disease specific: pt educated in positioning, ROM ex  and deep breathing to prevent complications of bedrest.  Education Method: Verbal;Demonstration  Barriers to Learning: Cognition  Education Outcome: Verbalized understanding    Therapy Time   Individual Concurrent Group Co-treatment   Time In 0942         Time Out 1005         Minutes 23         Timed Code Treatment Minutes: [facility], PT

## 2022-07-07 NOTE — PROGRESS NOTES
Occupational Therapy  Facility/Department: [place] [address] - MED SURG/ORTHO  Occupational Therapy Initial Assessment and Treatment Note 1x    Name: Zenaida Taveras  : 1945  MRN: [id number]  Date of Service: 2022    Discharge Recommendations:  [address] without OT      Patient Diagnosis(es): The primary encounter diagnosis was FREDI (acute kidney injury) ([facility] [address] A diagnosis of Hypotension, unspecified hypotension type was also pertinent to this visit. Past Medical History:  has a past medical history of Anxiety, Benign essential hypertension, Bowel obstruction ([facility] [address] CAD (coronary artery disease), Cancer ([facility] [address] Depression, GERD (gastroesophageal reflux disease), Hyperlipidemia, Hypertension, Irritable bowel syndrome (IBS), Obesity, and Osteoarthritis. Past Surgical History:  has a past surgical history that includes Colon surgery; Cholecystectomy; Appendectomy; Colonoscopy (); Colonoscopy (3/29/16); US BREAST BIOPSY W LOC DEVICE 1ST LESION LEFT (Left, 2021); US BREAST BIOPSY W LOC DEVICE 1ST LESION RIGHT (Right, 2021); US BREAST BIOPSY W LOC DEVICE 1ST LESION RIGHT (Right, 2021); US BREAST BIOPSY W LOC DEVICE EACH ADDL LESION RIGHT (Right, 2021); US PLACE BREAST LOC DEVICE 1ST LESION LEFT (Left, 2021); US PLACE BREAST LOC DEVICE 1ST LESION RIGHT (Right, 2021); US PLACE BREAST LOC DEVICE EACH ADDL RIGHT (Right, 2021); and Breast lumpectomy (Bilateral, 2021). Assessment   Assessment: Pt seen for OT eval and tx. She was admitted for hypovolemia from LTC facility. Pt requires extensive assist for BADLs at baseline. During OT session, pt required extensive assist for self-care and max A for sitting balance EOB. She demo's abnormal tone in trunk, RUE and BLE. Pt appears to be functioning at baseline and does not require further OT services.   Prognosis: Guarded  Decision Making: High Complexity  REQUIRES OT FOLLOW-UP: No  Activity Tolerance  Activity Tolerance: Patient limited by fatigue;Treatment limited secondary to decreased cognition        Restrictions  Restrictions/Precautions  Restrictions/Precautions: Fall Risk,General Precautions,Up as Tolerated    Subjective   General  Chart Reviewed: Yes  Patient assessed for rehabilitation services?: Yes  Additional Pertinent Hx: hx C5-C6 ACDF  d/t stenosis  Family / Caregiver Present: No  Referring Practitioner: ROHIT Carranza  Diagnosis: hypovolemia  Subjective  Subjective: Pt resting in bed, eating breakfast. Denies pain  General Comment  Comments: RN approved therapy     Social/Functional History  Social/Functional History  Lives With: Alone  Type of Home: Facility [facility])  Home Equipment:  (recliner w/c)  ADL Assistance: Needs assistance (Assist for bathing, dressing and toileting. Pt needs assist to feed self)  Homemaking Responsibilities: No  Ambulation Assistance:  (Uses w/c for mobility, non-ambulatory)  Transfer Assistance: Needs assistance (Unsure of method of transfer at LTC facility)     Objective   Heart Rate: 88  Heart Rate Source: Monitor  BP: 133/83  BP Location: Left upper arm  BP Method: Automatic  Patient Position: Semi fowlers  MAP (Calculated): 99.67  Resp: 17  SpO2: 97 %  O2 Device: None (Room air)        Safety Devices  Type of Devices: All reta prominences offloaded; All fall risk precautions in place; Patient at risk for falls; Bed alarm in place; Left in bed;Call light within reach;Gait belt;Nurse notified   AROM: Generally decreased, functional (Impaired B shoulder ROM (R shoulder 0-80, L shoulder 0-90). Lack of isolated ROM in B shoulders. Lack of R wrist extension.)  Strength: Generally decreased, functional (3+/5)  Coordination: Generally decreased, functional (Impaired fine and gross motor coordination. Greater RUE deficits compared to LUE.)  Tone: Abnormal  Sensation: Intact  ADL  Feeding: Minimal assistance; Beverage management  Feeding Skilled Clinical Factors: Poor coordination for self-feeding  UE Bathing: Maximum assistance  UE Dressing: Maximum assistance  LE Dressing: Dependent/Total  Toileting: Dependent/Total     Activity Tolerance  Activity Tolerance: Patient tolerated evaluation without incident;Patient tolerated treatment well  Bed mobility  Supine to Sit: Dependent/Total;Maximum assistance;2 Person assistance  Sit to Supine: Dependent/Total;Maximum assistance;2 Person assistance  Scooting: Dependent/Total;2 Person assistance (to [facility])  Bed Mobility Comments: R side lean in bed  Transfers  Transfer Comments: Unsafe to transfer d/t poor sitting balance. Sat EOB x5 min with max /total assist x1, strong R side and posterior lean. Noted tone in BLE and trunk     Cognition  Overall Cognitive Status: Exceptions  Arousal/Alertness: Delayed responses to stimuli  Following Commands: Inconsistently follows commands  Attention Span: Attends with cues to redirect; Difficulty attending to directions  Memory: Decreased recall of recent events;Decreased short term memory;Decreased recall of biographical Information  Safety Judgement: Decreased awareness of need for safety;Decreased awareness of need for assistance  Problem Solving: Assistance required to identify errors made;Assistance required to correct errors made  Insights: Decreased awareness of deficits  Initiation: Requires cues for all  Sequencing: Requires cues for all  Orientation  Overall Orientation Status: Impaired  Orientation Level: Oriented to person;Disoriented to place; Disoriented to time;Disoriented to situation      Education Given To: Patient  Education Provided: Role of Therapy  Education Method: Verbal  Barriers to Learning: Cognition  Education Outcome: Unable to demonstrate understanding    Disease Specific Education: Pt educated on importance of OOB mobility, prevention of complications of bedrest, and general safety during hospitalization.  Pt verbalized understanding    AM-PAC Score   AM-PAC Inpatient Daily Activity Raw Score: 9 (07/07/22 1021)  AM-PAC Inpatient ADL T-Scale Score : 25.33 (07/07/22 1021)  ADL Inpatient CMS 0-100% Score: 79.59 (07/07/22 1021)  ADL Inpatient CMS G-Code Modifier : CL (07/07/22 1021)  Goals  Short Term Goals  Time Frame for Short term goals: 1x  Short Term Goal 1: Perform UE ADL tasks (ie grooming and eating ) with mod A. Goal met 7/7  Short Term Goal 2: Sit EOB with assist x1. goal met 7/7  Patient Goals   Patient goals : Pt did not provide     Therapy Time   Individual Concurrent Group Co-treatment   Time In 0921         Time Out 0941         Minutes 20         Timed Code Treatment Minutes: 10 Minutes (10 min eval)     Wale Cervantes OT

## 2022-07-07 NOTE — CARE COORDINATION
CASE MANAGEMENT DISCHARGE SUMMARY      Discharge to: [facility] completed: [facility] Exemption Notification (HENS) completed: n/a return     New Durable Medical Equipment ordered/agency:     Transportation:    Medical Transport explained to pt/family. Pt/family voice no agency preference. Agency used: [facility]    time:  [employer]ia [employer] form completed: Yes    Confirmed discharge plan with: Pt, Pt's  Keila Husain, facility and RN. Facility/Agency, name:  [facility] faxed   Phone number for report to facility: [facility] :[phone]        Note: Discharging nurse to complete [facility], reconcile [facility], and place final copy with patient's discharge packet. RN to ensure that written prescriptions for  Level II medications are sent with patient to the facility as per protocol.

## 2022-07-07 NOTE — DISCHARGE SUMMARY
Hospital Medicine Discharge Summary    Patient ID: Flynn Avilez      Patient's PCP: MOHINDER Richard - CNP    Admit Date: 7/5/2022     Discharge Date: 7/7/2022      Admitting Provider: Allyssa Corcoran MD     Discharge Provider: ROOSEVELT Glez     Discharge Diagnoses: Active Hospital Problems    Diagnosis     FREDI (acute kidney injury) ([facility] [address] [N17.9]      Priority: Medium    Hypovolemia [E86.1]      Priority: Medium    Hypotension [I95.9]     Coronary artery disease involving native coronary artery of native heart without angina pectoris [I25.10]        The patient was seen and examined on day of discharge and this discharge summary is in conjunction with any daily progress note from day of discharge. Hospital Course:   Madalyn Murray a 65 y. o. female.   She was sent from her ECF for fatigue and hypotension.  An EKG was performed at her ECF because at times her HR was low and she was difficult to wake up.  It resulted abnormal so she was seen urgently by her cardiologist. Miguelito Tashi EKG was read as unremarkable in the cardiology office but her BP was low so she was sent to the ER.     Patient relates she feel tired, weak and dizzy.  She relates she has had a normal appetite and oral intake lately. Jodie Rudolph is prescribed lasix prn but thinks it is being given to her every day.     She has been at the [facility] for about a year.  She required a spinal surgery for spinal stenosis.  Her post-op course was complicated and it sounds like she became septic from a UTI.  She required vent support, trachesotomy, and so on.  Ultimately she did not recover the ability to walk so has been wheelchair bound since then     Hypotension  -  Due to a combination of hypovolemia and treatment with antihypertensives.  Resolved following 1L NS bolus.  Of note HR was inappropriately normal during her hypotensive episode.   - Received a total of 2 L (NS and LR), BP stable upon discharge.     FREDI, resolved   - Lebron Weiner SCr ~ 0.8, creatinine at baseline today, appears euvolemic      Nonobstructive CAD, HTN, HLD  -  Continue home ASA, statin, and coreg.  Reduce amlodipine from 10 to 5mg daily. Follow-up with PCP. Physical Exam Performed:     /83   Pulse 88   Temp 97.7 °F (36.5 °C) (Oral)   Resp 17   Ht 5' 5\" (1.651 m)   Wt 156 lb 8.4 oz (71 kg)   SpO2 97%   BMI 26.05 kg/m²       General appearance:  No apparent distress, appears stated age and cooperative. HEENT:  Normal cephalic, atraumatic without obvious deformity. Pupils equal, round, and reactive to light. Extra ocular muscles intact. Conjunctivae/corneas clear. Neck: Supple, with full range of motion. No jugular venous distention. Trachea midline. Respiratory:  Normal respiratory effort. Clear to auscultation, bilaterally without Rales/Wheezes/Rhonchi. Cardiovascular:  Regular rate and rhythm with normal S1/S2 without murmurs, rubs or gallops. Abdomen: Soft, non-tender, non-distended with normal bowel sounds. Musculoskeletal:  No clubbing, cyanosis or edema bilaterally. Full range of motion without deformity. Skin: Skin color, texture, turgor normal.  No rashes or lesions. Neurologic:  Chronic lower extremity weakness. Neurovascularly intact without any focal sensory/motor deficits. Cranial nerves: II-XII intact, grossly non-focal.  Psychiatric:  Alert and oriented, thought content appropriate, normal insight  Capillary Refill: Brisk,< 3 seconds   Peripheral Pulses: +2 palpable, equal bilaterally       Labs:  For convenience and continuity at follow-up the following most recent labs are provided:      CBC:    Lab Results   Component Value Date/Time    WBC 7.8 07/05/2022 04:07 PM    HGB 12.0 07/05/2022 04:07 PM    HCT 35.4 07/05/2022 04:07 PM     07/05/2022 04:07 PM       Renal:    Lab Results   Component Value Date/Time     07/07/2022 09:09 AM    K 4.0 07/07/2022 09:09 AM     07/07/2022 09:09 AM    CO2 22 07/07/2022 09:09 AM    BUN 20 07/07/2022 09:09 AM    CREATININE 0.7 07/07/2022 09:09 AM    CALCIUM 10.1 07/07/2022 09:09 AM         Significant Diagnostic Studies    Radiology:   XR CHEST PORTABLE   Final Result   No radiographic evidence of an acute cardiopulmonary process. Consults:     None    Disposition:  Long term care, [place]      Condition at Discharge: Stable    Discharge Instructions/Follow-up:    Follow-up with PCP in 1 week.   PT/OT    Code Status:  FULL    Activity: activity as tolerated    Diet: regular diet      Discharge Medications:     Discharge Medication List as of 7/7/2022 12:15 PM           Details   amLODIPine (NORVASC) 5 MG tablet Take 1 tablet by mouth daily, Disp-30 tablet, R-0Normal              Details   levoFLOXacin (LEVAQUIN) 500 MG tablet Take 500 mg by mouth dailyHistorical Med      atorvastatin (LIPITOR) 40 MG tablet Historical Med      mirtazapine (REMERON) 7.5 MG tablet Historical Med      risperiDONE (RISPERDAL) 1 MG tablet Historical Med      amantadine (SYMMETREL) 100 MG capsule Take 100 mg by mouth 2 times dailyHistorical Med      carvedilol (COREG) 3.125 MG tablet Take 3.125 mg by mouth 2 times daily (with meals)Historical Med      fluconazole (DIFLUCAN) 100 MG tablet Take 100 mg by mouth daily Daily x 7 days ends 2/3/22Historical Med      famotidine (PEPCID) 20 MG tablet Take 40 mg by mouth dailyHistorical Med      polyethylene glycol (GLYCOLAX) 17 g packet Take 17 g by mouth 2 times daily, Disp-527 g, R-0DC to SNF      bisacodyl (DULCOLAX) 10 MG suppository Place 1 suppository rectally daily as needed for Constipation, Disp-1 suppository, R-0DC to SNF      anastrozole (ARIMIDEX) 1 MG tablet Take 1 mg by mouth daily Historical Med      furosemide (LASIX) 20 MG tablet Take up to once a week for leg swelling if needed, Disp-90 tablet, R-1Normal      citalopram (CELEXA) 40 MG tablet TAKE 1 TABLET EVERY DAY, Disp-90 tablet, R-1Normal      Compression Stockings MISC Starting Tue 9/28/2021, Disp-1 each, R-0, Print      levothyroxine (SYNTHROID) 75 MCG tablet Take 1 tablet by mouth daily, Disp-90 tablet, R-3Normal      potassium chloride (KLOR-CON M) 20 MEQ extended release tablet Take 1 tablet by mouth daily TAKE 3 TABLETS DAILY, Disp-270 tablet, R-1Normal      aspirin EC 81 MG EC tablet Take 1 tablet by mouth daily, Disp-30 tablet, R-3NO PRINT             Time Spent on discharge is more than 30 minutes in the examination, evaluation, counseling and review of medications and discharge plan. Signed:    ROOSEVELT Harrington   7/8/2022      Thank you MOHINDER Esparza - CORA for the opportunity to be involved in this patient's care. If you have any questions or concerns, please feel free to contact me at [phone].

## 2022-07-07 NOTE — DISCHARGE INSTR - COC
Continuity of Care Form    Patient Name: Silvestre Omer   :  1945  MRN:  [id number]    Admit date:  2022  Discharge date:  22    Code Status Order: Full Code   Advance Directives:      Admitting Physician:  Selin Azar MD  PCP: Nasim Nance APRN - [facility]    Discharging Nurse: [facility] Unit/Room#: 8584/5711-08  Discharging Unit Phone Number: ***    Emergency Contact:   Extended Emergency Contact Information  Primary Emergency Contact: Raymond Shipley  Address: [address] [place] [address] Phone: [phone]  Mobile Phone: [phone]  Relation: Spouse  Secondary Emergency Contact: [address] [place] [address] Phone: [phone]  Relation: Child   needed?  No    Past Surgical History:  Past Surgical History:   Procedure Laterality Date    APPENDECTOMY      BREAST LUMPECTOMY Bilateral 2021    RIGHT RADIO FREQUENCY IDENTIFICATION TAG LOCALIZED PARTIAL MASTECTOMY TIMES TWO; LEFT RADIO FREQUENCY IDENTIFICATION TAG LOCALIZED PARTIAL MASTECTOMY performed by Moraima Joiner MD at [address]  2014    [facility]    COLONOSCOPY  3/29/16    normal    US BREAST BIOPSY NEEDLE ADDITIONAL RIGHT Right 2021    US BREAST BIOPSY NEEDLE ADDITIONAL RIGHT 2021 Benita Hughes MD [facility] [address] BREAST NEEDLE BIOPSY LEFT Left 2021    US BREAST NEEDLE BIOPSY LEFT 2021 Davis Zazueta MD [facility] [address] BREAST NEEDLE BIOPSY RIGHT Right 2021    US BREAST NEEDLE BIOPSY RIGHT 2021 Davis Zazueta [facility]    US BREAST NEEDLE BIOPSY RIGHT Right 2021    US BREAST NEEDLE BIOPSY RIGHT 2021 Benita Hughes MD [facility]    US GUIDED NEEDLE LOC BREAST ADDL RIGHT Right 2021    US GUIDED NEEDLE LOC BREAST ADDL RIGHT 2021 [facility]    US GUIDED NEEDLE LOC OF LEFT BREAST Left 7/20/2021    US GUIDED NEEDLE LOC OF LEFT BREAST 7/20/2021 [facility]    US GUIDED NEEDLE LOC OF RIGHT BREAST Right 7/20/2021    US GUIDED NEEDLE LOC OF RIGHT BREAST 7/20/2021 [facility]       Immunization History:   Immunization History   Administered Date(s) Administered    COVID-19, MODERNA BLUE border, Primary or Immunocompromised, (age 12y+), IM, 100 mcg/0.5mL 02/05/2021, 03/05/2021, 11/04/2021    Influenza Vaccine, unspecified formulation 12/22/2016    Influenza Virus Vaccine 11/12/2014, 12/22/2016    Influenza, High Dose (Fluzone 65 yrs and older) 10/20/2016, 12/17/2019    Influenza, Quadv, IM, PF (6 mo and older Fluzone, Flulaval, Fluarix, and 3 yrs and older Afluria) 11/28/2018, 10/13/2020    Pneumococcal Conjugate 13-valent (Rgebbeo15) 10/20/2016    Pneumococcal Polysaccharide (Bzpknkuop31) 02/23/2015, 12/22/2016    Tdap (Boostrix, Adacel) 07/30/2012       Active Problems:  Patient Active Problem List   Diagnosis Code    GERD (gastroesophageal reflux disease) K21.9    Irritable bowel syndrome (IBS) K58.9    Chest pain R07.9    Near syncope R55    Shortness of breath R06.02    Dizziness R42    Cardiac microvascular disease I25.89    Coronary artery disease involving native coronary artery of native heart without angina pectoris I25.10    Mixed hyperlipidemia E78.2    MCI (mild cognitive impairment) G31.84    HTN (hypertension), benign I10    Dysthymia F34.1    Dyslipidemia E78.5    AMS (altered mental status) R41.82    Acute encephalopathy G93.40    Acute respiratory failure with hypoxemia (HCC) J96.01    Low grade fever R50.9    Hyponatremia E87.1    Acute respiratory failure (HCC) J96.00    Unsteady gait R26.81    Pre-syncope R55    Acute UTI N39.0    Hypotension I95.9    Abnormal chest x-ray R93.89    Acute pain of right shoulder M25.511    Right wrist pain M25.531    Rotator cuff strain, right, initial encounter S46.011A    Right wrist sprain, initial encounter N88.384R Malignant neoplasm of upper-outer quadrant of right breast in female, estrogen receptor positive ([facility] [address] C50.411, Z17.0    Malignant neoplasm of lower-outer quadrant of left breast of female, estrogen receptor [facility] C50.512, Z17.0    Malignant neoplasm of lower-inner quadrant of right breast of female, estrogen receptor positive ([facility] C50.311, Z17.0    Swelling of both lower extremities M79.89    Anxiety with depression F41.8    Clogged feeding tube T85.598A    Constipation K59.00    Pulmonary nodules R91.8    Encounter for nasogastric (NG) tube placement Z46.59    Lives in long-term care facility Z59.3    Hypovolemia E86.1    FREDI (acute kidney injury) ([facility] N17.9       Isolation/Infection:   Isolation            No Isolation          Patient Infection Status       Infection Onset Added Last Indicated Last Indicated By Review Planned Expiration Resolved Resolved By    None active    Resolved    COVID-19 (Rule Out) 01/28/22 01/28/22 01/28/22 COVID-19 & Influenza Combo (Ordered)   01/28/22 Rule-Out Test Resulted    COVID-19 (Rule Out) 05/18/20 05/18/20 05/18/20 COVID-19 (Ordered)   05/18/20 Rule-Out Test Resulted            Nurse Assessment:  Last Vital Signs: BP (!) 147/90   Pulse 81   Temp 98.4 °F (36.9 °C) (Oral)   Resp 16   Ht 5' 5\" (1.651 m)   Wt 156 lb 8.4 oz (71 kg)   SpO2 95%   BMI 26.05 kg/m²     Last documented pain score (0-10 scale): Pain Level: 0  Last Weight:   Wt Readings from Last 1 Encounters:   07/06/22 156 lb 8.4 oz (71 kg)     Mental Status:  disoriented, oriented, and alert    IV Access:  - None    Nursing Mobility/ADLs:  Walking   Dependent  Transfer  Dependent  Bathing  Dependent  Dressing  Dependent  Toileting  Dependent  Feeding  Assisted  Med Admin  Dependent  Med Delivery   whole    Wound Care Documentation and Therapy:        Elimination:  Continence:    Bowel: No  Bladder: No  Urinary Catheter: None   Colostomy/Ileostomy/Ileal Conduit: No       Date of Last BM: 7/06  No intake or output data in the 24 hours ending 22 0855  No intake/output data recorded. Safety Concerns: At Risk for Falls    Impairments/Disabilities:      None    Nutrition Therapy:  Current Nutrition Therapy:   - Oral Diet:  Low Fat and Low Sodium (2gm)    Routes of Feeding: Oral  Liquids: Thin Liquids  Daily Fluid Restriction: no  Last Modified Barium Swallow with Video (Video Swallowing Test): not done    Treatments at the Time of Hospital Discharge:   Respiratory Treatments:     Oxygen Therapy:  is not on home oxygen therapy. Ventilator:    - No ventilator support    Rehab Therapies: {THERAPEUTIC INTERVENTION:3648211070}  Weight Bearing Status/Restrictions: [facility] Weight Bearin}  Other Medical Equipment (for information only, NOT a DME order):  {EQUIPMENT:590659847}  Other Treatments: ***    Patient's personal belongings (please select all that are sent with patient):  {CHP DME Belongings:026156902}    RN SIGNATURE:  Electronically signed by Lazarus Gasmen, RN on 22 at 10:36 AM EDT    CASE MANAGEMENT/SOCIAL WORK SECTION    Inpatient Status Date: ***    Readmission Risk Assessment Score:  Readmission Risk              Risk of Unplanned Readmission:  0           Discharging to Facility/ [address]    Skilled Nursing   [address] Render [address]   [phone]     Dialysis Facility (if applicable) n/a   Name:  Address:  Dialysis Schedule:  Phone:  Fax:    / signature: Electronically signed by Estefany Rose RN on 22 at 9:28 AM EDT    PHYSICIAN SECTION    Prognosis: Good    Condition at Discharge: Stable    Rehab Potential (if transferring to Rehab): Good    Recommended Labs or Other Treatments After Discharge: Follow-up with PCP as soon as possible. Encourage adequate PO intake and fluids. Amlodipine decreased to 5 mg daily to avoid hypotension.      Physician Certification: I certify the above information and transfer of Khalif Bucio  is necessary for the continuing treatment of the diagnosis listed and that she requires LTAC for greater 30 days.      Update Admission H&P: No change in H&P    PHYSICIAN SIGNATURE:  Electronically signed by ROOSEVELT Singh on 7/7/22 at 8:57 AM EDT

## 2023-02-06 ENCOUNTER — HOSPITAL ENCOUNTER (INPATIENT)
Age: 78
LOS: 2 days | Discharge: HOME | End: 2023-02-09
Attending: EMERGENCY MEDICINE | Admitting: HOSPITALIST
Payer: COMMERCIAL

## 2023-02-06 ENCOUNTER — APPOINTMENT (OUTPATIENT)
Dept: CT IMAGING | Age: 78
End: 2023-02-06
Payer: COMMERCIAL

## 2023-02-06 DIAGNOSIS — K62.5 RECTAL BLEEDING: ICD-10-CM

## 2023-02-06 DIAGNOSIS — K52.9 COLITIS: Primary | ICD-10-CM

## 2023-02-06 LAB
A/G RATIO: 0.9 (ref 1.1–2.2)
ALBUMIN SERPL-MCNC: 3.5 G/DL (ref 3.4–5)
ALP BLD-CCNC: 143 U/L (ref 40–129)
ALT SERPL-CCNC: 37 U/L (ref 10–40)
ANION GAP SERPL CALCULATED.3IONS-SCNC: 11 MMOL/L (ref 3–16)
APTT: 34.4 SEC (ref 23–34.3)
AST SERPL-CCNC: 39 U/L (ref 15–37)
BANDED NEUTROPHILS RELATIVE PERCENT: 3 % (ref 0–7)
BASOPHILS ABSOLUTE: 0 K/UL (ref 0–0.2)
BASOPHILS RELATIVE PERCENT: 0 %
BILIRUB SERPL-MCNC: 0.4 MG/DL (ref 0–1)
BUN BLDV-MCNC: 9 MG/DL (ref 7–20)
CALCIUM SERPL-MCNC: 8.5 MG/DL (ref 8.3–10.6)
CHLORIDE BLD-SCNC: 104 MMOL/L (ref 99–110)
CO2: 21 MMOL/L (ref 21–32)
CREAT SERPL-MCNC: 0.6 MG/DL (ref 0.6–1.2)
EOSINOPHILS ABSOLUTE: 0.3 K/UL (ref 0–0.6)
EOSINOPHILS RELATIVE PERCENT: 4 %
GFR SERPL CREATININE-BSD FRML MDRD: >60 ML/MIN/{1.73_M2}
GLUCOSE BLD-MCNC: 115 MG/DL (ref 70–99)
HCT VFR BLD CALC: 35.4 % (ref 36–48)
HEMOGLOBIN: 12.2 G/DL (ref 12–16)
INR BLD: 1.02 (ref 0.87–1.14)
LIPASE: 54 U/L (ref 13–60)
LYMPHOCYTES ABSOLUTE: 1.5 K/UL (ref 1–5.1)
LYMPHOCYTES RELATIVE PERCENT: 20 %
MCH RBC QN AUTO: 30.4 PG (ref 26–34)
MCHC RBC AUTO-ENTMCNC: 34.6 G/DL (ref 31–36)
MCV RBC AUTO: 87.8 FL (ref 80–100)
MONOCYTES ABSOLUTE: 1.1 K/UL (ref 0–1.3)
MONOCYTES RELATIVE PERCENT: 14 %
NEUTROPHILS ABSOLUTE: 4.8 K/UL (ref 1.7–7.7)
NEUTROPHILS RELATIVE PERCENT: 59 %
OCCULT BLOOD DIAGNOSTIC: ABNORMAL
PDW BLD-RTO: 13.8 % (ref 12.4–15.4)
PLATELET # BLD: 212 K/UL (ref 135–450)
PLATELET SLIDE REVIEW: ADEQUATE
PMV BLD AUTO: 7.9 FL (ref 5–10.5)
POTASSIUM REFLEX MAGNESIUM: 5.3 MMOL/L (ref 3.5–5.1)
PROTHROMBIN TIME: 13.2 SEC (ref 11.7–14.5)
RBC # BLD: 4.03 M/UL (ref 4–5.2)
SLIDE REVIEW: ABNORMAL
SODIUM BLD-SCNC: 136 MMOL/L (ref 136–145)
TOTAL PROTEIN: 7.3 G/DL (ref 6.4–8.2)
WBC # BLD: 7.7 K/UL (ref 4–11)

## 2023-02-06 PROCEDURE — 85730 THROMBOPLASTIN TIME PARTIAL: CPT

## 2023-02-06 PROCEDURE — 85610 PROTHROMBIN TIME: CPT

## 2023-02-06 PROCEDURE — 99285 EMERGENCY DEPT VISIT HI MDM: CPT

## 2023-02-06 PROCEDURE — 80053 COMPREHEN METABOLIC PANEL: CPT

## 2023-02-06 PROCEDURE — 85025 COMPLETE CBC W/AUTO DIFF WBC: CPT

## 2023-02-06 PROCEDURE — 96365 THER/PROPH/DIAG IV INF INIT: CPT

## 2023-02-06 PROCEDURE — 74177 CT ABD & PELVIS W/CONTRAST: CPT

## 2023-02-06 PROCEDURE — 83690 ASSAY OF LIPASE: CPT

## 2023-02-06 PROCEDURE — 36415 COLL VENOUS BLD VENIPUNCTURE: CPT

## 2023-02-06 PROCEDURE — 82270 OCCULT BLOOD FECES: CPT

## 2023-02-06 PROCEDURE — 6360000004 HC RX CONTRAST MEDICATION: Performed by: EMERGENCY MEDICINE

## 2023-02-06 RX ADMIN — IOPAMIDOL 75 ML: 755 INJECTION, SOLUTION INTRAVENOUS at 23:00

## 2023-02-06 ASSESSMENT — PAIN DESCRIPTION - ORIENTATION: ORIENTATION: LEFT;LOWER

## 2023-02-06 ASSESSMENT — PAIN DESCRIPTION - FREQUENCY: FREQUENCY: CONTINUOUS

## 2023-02-06 ASSESSMENT — PAIN DESCRIPTION - LOCATION: LOCATION: ABDOMEN

## 2023-02-06 ASSESSMENT — PAIN - FUNCTIONAL ASSESSMENT: PAIN_FUNCTIONAL_ASSESSMENT: 0-10

## 2023-02-06 ASSESSMENT — PAIN DESCRIPTION - DESCRIPTORS: DESCRIPTORS: CRAMPING

## 2023-02-06 ASSESSMENT — PAIN SCALES - GENERAL: PAINLEVEL_OUTOF10: 10

## 2023-02-07 PROBLEM — K52.9 COLITIS: Status: ACTIVE | Noted: 2023-02-07

## 2023-02-07 PROBLEM — K62.5 RECTAL BLEEDING: Status: ACTIVE | Noted: 2023-02-07

## 2023-02-07 LAB
BACTERIA: ABNORMAL /HPF
BILIRUBIN URINE: NEGATIVE
BLOOD, URINE: NEGATIVE
CLARITY: CLEAR
COLOR: YELLOW
EPITHELIAL CELLS, UA: ABNORMAL /HPF (ref 0–5)
GLUCOSE URINE: NEGATIVE MG/DL
HCT VFR BLD CALC: 32.1 % (ref 36–48)
HCT VFR BLD CALC: 33.5 % (ref 36–48)
HCT VFR BLD CALC: 34.4 % (ref 36–48)
HEMOGLOBIN: 10.8 G/DL (ref 12–16)
HEMOGLOBIN: 11.2 G/DL (ref 12–16)
HEMOGLOBIN: 11.7 G/DL (ref 12–16)
INFLUENZA A: NOT DETECTED
INFLUENZA B: NOT DETECTED
KETONES, URINE: NEGATIVE MG/DL
LACTIC ACID: 1 MMOL/L (ref 0.4–2)
LEUKOCYTE ESTERASE, URINE: ABNORMAL
MICROSCOPIC EXAMINATION: YES
NITRITE, URINE: NEGATIVE
PH UA: 6 (ref 5–8)
POTASSIUM SERPL-SCNC: 3.5 MMOL/L (ref 3.5–5.1)
PROTEIN UA: NEGATIVE MG/DL
RBC UA: ABNORMAL /HPF (ref 0–4)
SARS-COV-2 RNA, RT PCR: NOT DETECTED
SPECIFIC GRAVITY UA: 1.01 (ref 1–1.03)
URINE REFLEX TO CULTURE: ABNORMAL
URINE TYPE: ABNORMAL
UROBILINOGEN, URINE: 0.2 E.U./DL
WBC UA: ABNORMAL /HPF (ref 0–5)

## 2023-02-07 PROCEDURE — 36415 COLL VENOUS BLD VENIPUNCTURE: CPT

## 2023-02-07 PROCEDURE — 85014 HEMATOCRIT: CPT

## 2023-02-07 PROCEDURE — 2580000003 HC RX 258: Performed by: HOSPITALIST

## 2023-02-07 PROCEDURE — 85018 HEMOGLOBIN: CPT

## 2023-02-07 PROCEDURE — 6370000000 HC RX 637 (ALT 250 FOR IP): Performed by: EMERGENCY MEDICINE

## 2023-02-07 PROCEDURE — 83605 ASSAY OF LACTIC ACID: CPT

## 2023-02-07 PROCEDURE — 2500000003 HC RX 250 WO HCPCS: Performed by: HOSPITALIST

## 2023-02-07 PROCEDURE — 1200000000 HC SEMI PRIVATE

## 2023-02-07 PROCEDURE — 81001 URINALYSIS AUTO W/SCOPE: CPT

## 2023-02-07 PROCEDURE — 6360000002 HC RX W HCPCS: Performed by: HOSPITALIST

## 2023-02-07 PROCEDURE — 84132 ASSAY OF SERUM POTASSIUM: CPT

## 2023-02-07 PROCEDURE — 87636 SARSCOV2 & INF A&B AMP PRB: CPT

## 2023-02-07 PROCEDURE — 2500000003 HC RX 250 WO HCPCS: Performed by: EMERGENCY MEDICINE

## 2023-02-07 PROCEDURE — 99223 1ST HOSP IP/OBS HIGH 75: CPT

## 2023-02-07 RX ORDER — LACTOBACILLUS ACIDOPHILUS 500MM CELL
1 CAPSULE ORAL 2 TIMES DAILY
COMMUNITY

## 2023-02-07 RX ORDER — LACTOBACILLUS RHAMNOSUS GG 10B CELL
1 CAPSULE ORAL 2 TIMES DAILY
Status: DISCONTINUED | OUTPATIENT
Start: 2023-02-07 | End: 2023-02-09 | Stop reason: HOSPADM

## 2023-02-07 RX ORDER — POTASSIUM CHLORIDE 40 MEQ/30ML
20 LIQUID ORAL 3 TIMES DAILY
COMMUNITY

## 2023-02-07 RX ORDER — CARVEDILOL 6.25 MG/1
3.12 TABLET ORAL 2 TIMES DAILY WITH MEALS
Status: DISCONTINUED | OUTPATIENT
Start: 2023-02-07 | End: 2023-02-09 | Stop reason: HOSPADM

## 2023-02-07 RX ORDER — CIPROFLOXACIN 2 MG/ML
400 INJECTION, SOLUTION INTRAVENOUS EVERY 12 HOURS
Status: DISCONTINUED | OUTPATIENT
Start: 2023-02-07 | End: 2023-02-09 | Stop reason: HOSPADM

## 2023-02-07 RX ORDER — ATORVASTATIN CALCIUM 40 MG/1
40 TABLET, FILM COATED ORAL
Status: DISCONTINUED | OUTPATIENT
Start: 2023-02-07 | End: 2023-02-09 | Stop reason: HOSPADM

## 2023-02-07 RX ORDER — METRONIDAZOLE 500 MG/100ML
500 INJECTION, SOLUTION INTRAVENOUS ONCE
Status: COMPLETED | OUTPATIENT
Start: 2023-02-07 | End: 2023-02-07

## 2023-02-07 RX ORDER — SODIUM CHLORIDE 9 MG/ML
INJECTION, SOLUTION INTRAVENOUS CONTINUOUS
Status: DISCONTINUED | OUTPATIENT
Start: 2023-02-07 | End: 2023-02-09 | Stop reason: HOSPADM

## 2023-02-07 RX ORDER — ANASTROZOLE 1 MG/1
1 TABLET ORAL DAILY
Status: DISCONTINUED | OUTPATIENT
Start: 2023-02-07 | End: 2023-02-09 | Stop reason: HOSPADM

## 2023-02-07 RX ORDER — ASCORBIC ACID 500 MG
500 TABLET ORAL DAILY
COMMUNITY

## 2023-02-07 RX ORDER — SODIUM CHLORIDE 0.9 % (FLUSH) 0.9 %
5-40 SYRINGE (ML) INJECTION PRN
Status: DISCONTINUED | OUTPATIENT
Start: 2023-02-07 | End: 2023-02-09 | Stop reason: HOSPADM

## 2023-02-07 RX ORDER — CIPROFLOXACIN 500 MG/1
500 TABLET, FILM COATED ORAL ONCE
Status: COMPLETED | OUTPATIENT
Start: 2023-02-07 | End: 2023-02-07

## 2023-02-07 RX ORDER — NITROFURANTOIN 25; 75 MG/1; MG/1
100 CAPSULE ORAL 2 TIMES DAILY
Status: DISCONTINUED | OUTPATIENT
Start: 2023-02-07 | End: 2023-02-07

## 2023-02-07 RX ORDER — ACETAMINOPHEN 325 MG/1
650 TABLET ORAL EVERY 4 HOURS PRN
COMMUNITY

## 2023-02-07 RX ORDER — ASCORBIC ACID 500 MG
500 TABLET ORAL DAILY
Status: DISCONTINUED | OUTPATIENT
Start: 2023-02-07 | End: 2023-02-09 | Stop reason: HOSPADM

## 2023-02-07 RX ORDER — ACETAMINOPHEN 500 MG
500 TABLET ORAL DAILY
COMMUNITY

## 2023-02-07 RX ORDER — ONDANSETRON 4 MG/1
4 TABLET, ORALLY DISINTEGRATING ORAL EVERY 8 HOURS PRN
Status: DISCONTINUED | OUTPATIENT
Start: 2023-02-07 | End: 2023-02-09 | Stop reason: HOSPADM

## 2023-02-07 RX ORDER — ONDANSETRON 2 MG/ML
4 INJECTION INTRAMUSCULAR; INTRAVENOUS EVERY 6 HOURS PRN
Status: DISCONTINUED | OUTPATIENT
Start: 2023-02-07 | End: 2023-02-09 | Stop reason: HOSPADM

## 2023-02-07 RX ORDER — ACETAMINOPHEN 650 MG/1
650 SUPPOSITORY RECTAL EVERY 6 HOURS PRN
Status: DISCONTINUED | OUTPATIENT
Start: 2023-02-07 | End: 2023-02-09 | Stop reason: HOSPADM

## 2023-02-07 RX ORDER — LANOLIN ALCOHOL/MO/W.PET/CERES
3 CREAM (GRAM) TOPICAL
Status: DISCONTINUED | OUTPATIENT
Start: 2023-02-07 | End: 2023-02-09 | Stop reason: HOSPADM

## 2023-02-07 RX ORDER — NITROFURANTOIN 25; 75 MG/1; MG/1
100 CAPSULE ORAL 2 TIMES DAILY
Status: ON HOLD | COMMUNITY
Start: 2023-02-01 | End: 2023-02-09 | Stop reason: HOSPADM

## 2023-02-07 RX ORDER — MIRTAZAPINE 15 MG/1
7.5 TABLET, FILM COATED ORAL NIGHTLY
Status: DISCONTINUED | OUTPATIENT
Start: 2023-02-07 | End: 2023-02-09 | Stop reason: HOSPADM

## 2023-02-07 RX ORDER — ACETAMINOPHEN 325 MG/1
650 TABLET ORAL EVERY 6 HOURS PRN
Status: DISCONTINUED | OUTPATIENT
Start: 2023-02-07 | End: 2023-02-09 | Stop reason: HOSPADM

## 2023-02-07 RX ORDER — SODIUM CHLORIDE 9 MG/ML
INJECTION, SOLUTION INTRAVENOUS PRN
Status: DISCONTINUED | OUTPATIENT
Start: 2023-02-07 | End: 2023-02-09 | Stop reason: HOSPADM

## 2023-02-07 RX ORDER — POLYETHYLENE GLYCOL 3350 17 G/17G
17 POWDER, FOR SOLUTION ORAL DAILY PRN
Status: DISCONTINUED | OUTPATIENT
Start: 2023-02-07 | End: 2023-02-09 | Stop reason: HOSPADM

## 2023-02-07 RX ORDER — SODIUM CHLORIDE 0.9 % (FLUSH) 0.9 %
5-40 SYRINGE (ML) INJECTION EVERY 12 HOURS SCHEDULED
Status: DISCONTINUED | OUTPATIENT
Start: 2023-02-07 | End: 2023-02-09 | Stop reason: HOSPADM

## 2023-02-07 RX ORDER — LANOLIN ALCOHOL/MO/W.PET/CERES
3 CREAM (GRAM) TOPICAL
COMMUNITY

## 2023-02-07 RX ORDER — AMANTADINE HYDROCHLORIDE 100 MG/1
100 CAPSULE, GELATIN COATED ORAL 2 TIMES DAILY
Status: DISCONTINUED | OUTPATIENT
Start: 2023-02-07 | End: 2023-02-09 | Stop reason: HOSPADM

## 2023-02-07 RX ORDER — LOPERAMIDE HYDROCHLORIDE 2 MG/1
2 TABLET ORAL EVERY 6 HOURS PRN
COMMUNITY

## 2023-02-07 RX ORDER — POLYETHYLENE GLYCOL 3350 17 G/17G
17 POWDER, FOR SOLUTION ORAL 2 TIMES DAILY
Status: DISCONTINUED | OUTPATIENT
Start: 2023-02-07 | End: 2023-02-09 | Stop reason: HOSPADM

## 2023-02-07 RX ORDER — AMLODIPINE BESYLATE 5 MG/1
5 TABLET ORAL DAILY
Status: DISCONTINUED | OUTPATIENT
Start: 2023-02-07 | End: 2023-02-09 | Stop reason: HOSPADM

## 2023-02-07 RX ORDER — ONDANSETRON 4 MG/1
4 TABLET, FILM COATED ORAL EVERY 8 HOURS PRN
COMMUNITY

## 2023-02-07 RX ORDER — DIMETHICONE, OXYBENZONE, AND PADIMATE O 2; 2.5; 6.6 G/100G; G/100G; G/100G
STICK TOPICAL PRN
Status: DISCONTINUED | OUTPATIENT
Start: 2023-02-07 | End: 2023-02-09 | Stop reason: HOSPADM

## 2023-02-07 RX ORDER — METRONIDAZOLE 500 MG/100ML
500 INJECTION, SOLUTION INTRAVENOUS EVERY 8 HOURS
Status: DISCONTINUED | OUTPATIENT
Start: 2023-02-07 | End: 2023-02-09 | Stop reason: HOSPADM

## 2023-02-07 RX ORDER — ONDANSETRON 4 MG/1
4 TABLET, ORALLY DISINTEGRATING ORAL EVERY 8 HOURS PRN
COMMUNITY

## 2023-02-07 RX ORDER — FAMOTIDINE 20 MG/1
40 TABLET, FILM COATED ORAL DAILY
Status: DISCONTINUED | OUTPATIENT
Start: 2023-02-07 | End: 2023-02-09 | Stop reason: HOSPADM

## 2023-02-07 RX ADMIN — SODIUM CHLORIDE: 9 INJECTION, SOLUTION INTRAVENOUS at 05:05

## 2023-02-07 RX ADMIN — CIPROFLOXACIN 400 MG: 2 INJECTION, SOLUTION INTRAVENOUS at 13:43

## 2023-02-07 RX ADMIN — CIPROFLOXACIN 500 MG: 500 TABLET, FILM COATED ORAL at 01:04

## 2023-02-07 RX ADMIN — METRONIDAZOLE 500 MG: 500 INJECTION, SOLUTION INTRAVENOUS at 19:20

## 2023-02-07 RX ADMIN — SODIUM CHLORIDE: 9 INJECTION, SOLUTION INTRAVENOUS at 23:13

## 2023-02-07 RX ADMIN — METRONIDAZOLE 500 MG: 500 INJECTION, SOLUTION INTRAVENOUS at 01:03

## 2023-02-07 RX ADMIN — METRONIDAZOLE 500 MG: 500 INJECTION, SOLUTION INTRAVENOUS at 10:19

## 2023-02-07 ASSESSMENT — PAIN DESCRIPTION - LOCATION
LOCATION: ABDOMEN
LOCATION: ABDOMEN

## 2023-02-07 ASSESSMENT — PAIN SCALES - GENERAL
PAINLEVEL_OUTOF10: 0

## 2023-02-07 ASSESSMENT — PAIN DESCRIPTION - ORIENTATION: ORIENTATION: LEFT;LOWER

## 2023-02-07 ASSESSMENT — LIFESTYLE VARIABLES: HOW OFTEN DO YOU HAVE A DRINK CONTAINING ALCOHOL: NEVER

## 2023-02-07 NOTE — ED PROVIDER NOTES
[address] ED    CHIEF COMPLAINT  Rectal Bleeding (Pt from [facility] with c/o abd pain and bloody stool. Pt is end stage pancreatic cancer. Pt is Jehova witness.)       HISTORY OF PRESENT ILLNESS  Zane Krueger is a 65 y.o. female who presents to the ED with complaint of abdominal pain and rectal bleeding. She presents from [facility] where she was noted to have concern for rectal bleeding. Denies fever, chest pain. Complains of mild SOB. Denies nausea, vomiting. Diarrhea several weeks ago, now resolved. The abdominal pain is poorly characterized, left sided. Occasional dysuria and hematuria. Declines pain medication. Scientology - will refuse blood product. Denies anticoagulation.      I have reviewed the following from the nursing documentation:    Past Medical History:   Diagnosis Date    Anxiety     Benign essential hypertension 1/17/2017    Bowel obstruction (HCC)     CAD (coronary artery disease)     Cancer ([facility] [address] 2012    colon    Depression     GERD (gastroesophageal reflux disease)     Hyperlipidemia     Hypertension     Irritable bowel syndrome (IBS)     Obesity     Osteoarthritis      Past Surgical History:   Procedure Laterality Date    APPENDECTOMY      BREAST LUMPECTOMY Bilateral 7/27/2021    RIGHT RADIO FREQUENCY IDENTIFICATION TAG LOCALIZED PARTIAL MASTECTOMY TIMES TWO; LEFT RADIO FREQUENCY IDENTIFICATION TAG LOCALIZED PARTIAL MASTECTOMY performed by Niki Rainey MD at [address]  2014    [facility]    COLONOSCOPY  3/29/16    normal    US BREAST BIOPSY NEEDLE ADDITIONAL RIGHT Right 6/29/2021    US BREAST BIOPSY NEEDLE ADDITIONAL RIGHT 6/29/2021 Lindsay De Los Santos MD [facility] [place] W LOC DEVICE 1ST LESION LEFT Left 6/17/2021    US BREAST NEEDLE BIOPSY LEFT 6/17/2021 Sam Russo MD [facility] W LOC DEVICE 1ST LESION RIGHT Right 6/17/2021    US BREAST NEEDLE BIOPSY RIGHT 2021 Lee Lopez MD [facility]    US BREAST BIOPSY W LOC DEVICE 1ST LESION RIGHT Right 2021    US BREAST NEEDLE BIOPSY RIGHT 2021 Hina Perry MD [facility]    US GUIDED NEEDLE LOC BREAST ADDL RIGHT Right 2021    US GUIDED NEEDLE LOC BREAST ADDL RIGHT 2021 [facility]    US GUIDED NEEDLE LOC OF LEFT BREAST Left 2021    US GUIDED NEEDLE LOC OF LEFT BREAST 2021 [facility]    US GUIDED NEEDLE LOC OF RIGHT BREAST Right 2021    US GUIDED NEEDLE LOC OF RIGHT BREAST 2021 [facility]     Family History   Problem Relation Age of Onset    Cancer Mother         colon    Cancer Brother         colon    Heart Disease Sister     Heart Disease Brother      Social History     Socioeconomic History    Marital status:      Spouse name: Not on file    Number of children: Not on file    Years of education: Not on file    Highest education level: Not on file   Occupational History    Not on file   Tobacco Use    Smoking status: Former     Packs/day: 1.00     Years: 10.00     Pack years: 10.00     Types: Cigarettes     Start date: 79     Quit date: 1983     Years since quittin.4    Smokeless tobacco: Never   Vaping Use    Vaping Use: Never used   Substance and Sexual Activity    Alcohol use: No    Drug use: No    Sexual activity: Not on file   Other Topics Concern    Not on file   Social History Narrative    Not on file     Social Determinants of Health     Financial Resource Strain: Not on file   Food Insecurity: Not on file   Transportation Needs: Not on file   Physical Activity: Not on file   Stress: Not on file   Social Connections: Not on file   Intimate Partner Violence: Not on file   Housing Stability: Not on file     No current facility-administered medications for this encounter.      Current Outpatient Medications   Medication Sig Dispense Refill    potassium chloride 20 MEQ/15ML (10%) solution Take 20 mEq by mouth 3 times daily      acetaminophen (TYLENOL) 500 MG tablet Take 500 mg by mouth daily      acetaminophen (TYLENOL) 325 MG tablet Take 650 mg by mouth every 4 hours as needed for Fever or Pain      Acidophilus Lactobacillus CAPS Take 1 capsule by mouth 2 times daily      loperamide (IMODIUM A-D) 2 MG tablet Take 2 mg by mouth every 6 hours as needed for Diarrhea      melatonin 3 MG TABS tablet Take 3 mg by mouth nightly      nitrofurantoin, macrocrystal-monohydrate, (MACROBID) 100 MG capsule Take 100 mg by mouth 2 times daily Indications: UTI      ondansetron (ZOFRAN-ODT) 4 MG disintegrating tablet Take 4 mg by mouth every 8 hours as needed for Nausea or Vomiting      ondansetron (ZOFRAN) 4 MG tablet Take 4 mg by mouth every 8 hours as needed for Nausea or Vomiting IM injection      sertraline (ZOLOFT) 50 MG tablet Take 75 mg by mouth daily      vitamin C (ASCORBIC ACID) 500 MG tablet Take 500 mg by mouth daily      amLODIPine (NORVASC) 5 MG tablet Take 1 tablet by mouth daily 30 tablet 0    atorvastatin (LIPITOR) 40 MG tablet Take 40 mg by mouth nightly      mirtazapine (REMERON) 7.5 MG tablet       risperiDONE (RISPERDAL) 1 MG tablet  (Patient not taking: Reported on 2/7/2023)      amantadine (SYMMETREL) 100 MG capsule Take 100 mg by mouth 2 times daily      carvedilol (COREG) 3.125 MG tablet Take 3.125 mg by mouth 2 times daily (with meals)      fluconazole (DIFLUCAN) 100 MG tablet Take 100 mg by mouth daily Daily x 7 days ends 2/3/22 (Patient not taking: Reported on 2/7/2023)      famotidine (PEPCID) 20 MG tablet Take 40 mg by mouth daily      polyethylene glycol (GLYCOLAX) 17 g packet Take 17 g by mouth 2 times daily 527 g 0    bisacodyl (DULCOLAX) 10 MG suppository Place 1 suppository rectally daily as needed for Constipation 1 suppository 0    anastrozole (ARIMIDEX) 1 MG tablet Take 1 mg by mouth daily       furosemide (LASIX) 20 MG tablet Take up to once a week for leg swelling if needed 90 tablet 1    citalopram (CELEXA) 40 MG tablet TAKE 1 TABLET EVERY DAY (Patient not taking: Reported on 2/7/2023) 90 tablet 1    Compression Stockings MISC by Does not apply route 1 each 0    levothyroxine (SYNTHROID) 75 MCG tablet Take 1 tablet by mouth daily 90 tablet 3    potassium chloride (KLOR-CON M) 20 MEQ extended release tablet Take 1 tablet by mouth daily TAKE 3 TABLETS DAILY (Patient not taking: Reported on 2/7/2023) 270 tablet 1    aspirin EC 81 MG EC tablet Take 1 tablet by mouth daily 30 tablet 3     Allergies   Allergen Reactions    Bactrim [Sulfamethoxazole-Trimethoprim] Anaphylaxis    Ultram [Tramadol Hcl] Anaphylaxis     stridor    Dilaudid [Hydromorphone] Other (See Comments)     Hallucinations    Phenergan [Promethazine] Nausea Only    Lipitor [Atorvastatin]      Nausea    Penicillins          PHYSICAL EXAM  ED Triage Vitals [02/06/23 2133]   BP Temp Temp Source Heart Rate Resp SpO2 Height Weight   (!) 143/89 98.5 °F (36.9 °C) Axillary 81 16 98 % 5' 6\" (1.676 m) 137 lb 12.8 oz (62.5 kg)     General appearance: Awake and alert. Cooperative. No acute distress. HEENT: Normocephalic. Atraumatic. Mucous membranes are moist.  Heart/Chest: RRR. No murmurs. Lungs: Respirations unlabored. CTAB. Good air exchange. Speaking comfortably in full sentences. Abdomen: Soft. Non-tender. Non-distended. No rebound or guarding. Rectal: (performed with nurse chaperone) external hemorrhoids; no palpable internal masses; trace maroon stool  Skin: Warm and dry. No acute rashes. LABS  I have reviewed all labs for this visit.    Results for orders placed or performed during the hospital encounter of 02/06/23   CBC with Auto Differential   Result Value Ref Range    WBC 7.7 4.0 - 11.0 K/uL    RBC 4.03 4.00 - 5.20 M/uL    Hemoglobin 12.2 12.0 - 16.0 g/dL    Hematocrit 35.4 (L) 36.0 - 48.0 %    MCV 87.8 80.0 - 100.0 fL    MCH 30.4 26.0 - 34.0 pg    MCHC 34.6 31.0 - 36.0 g/dL    RDW 13.8 12.4 - 15.4 %    Platelets 212 135 - 450 K/uL    MPV 7.9 5.0 - 10.5 fL    PLATELET SLIDE REVIEW Adequate     SLIDE REVIEW see below     Neutrophils % 59.0 %    Lymphocytes % 20.0 %    Monocytes % 14.0 %    Eosinophils % 4.0 %    Basophils % 0.0 %    Neutrophils Absolute 4.8 1.7 - 7.7 K/uL    Lymphocytes Absolute 1.5 1.0 - 5.1 K/uL    Monocytes Absolute 1.1 0.0 - 1.3 K/uL    Eosinophils Absolute 0.3 0.0 - 0.6 K/uL    Basophils Absolute 0.0 0.0 - 0.2 K/uL    Bands Relative 3 0 - 7 %   Comprehensive Metabolic Panel w/ Reflex to MG   Result Value Ref Range    Sodium 136 136 - 145 mmol/L    Potassium reflex Magnesium 5.3 (H) 3.5 - 5.1 mmol/L    Chloride 104 99 - 110 mmol/L    CO2 21 21 - 32 mmol/L    Anion Gap 11 3 - 16    Glucose 115 (H) 70 - 99 mg/dL    BUN 9 7 - 20 mg/dL    Creatinine 0.6 0.6 - 1.2 mg/dL    Est, Glom Filt Rate >60 >60    Calcium 8.5 8.3 - 10.6 mg/dL    Total Protein 7.3 6.4 - 8.2 g/dL    Albumin 3.5 3.4 - 5.0 g/dL    Albumin/Globulin Ratio 0.9 (L) 1.1 - 2.2    Total Bilirubin 0.4 0.0 - 1.0 mg/dL    Alkaline Phosphatase 143 (H) 40 - 129 U/L    ALT 37 10 - 40 U/L    AST 39 (H) 15 - 37 U/L   Protime-INR   Result Value Ref Range    Protime 13.2 11.7 - 14.5 sec    INR 1.02 0.87 - 1.14   APTT   Result Value Ref Range    aPTT 34.4 (H) 23.0 - 34.3 sec   Urinalysis with Reflex to Culture    Specimen: Urine, clean catch   Result Value Ref Range    Color, UA Yellow Straw/Yellow    Clarity, UA Clear Clear    Glucose, Ur Negative Negative mg/dL    Bilirubin Urine Negative Negative    Ketones, Urine Negative Negative mg/dL    Specific Gravity, UA 1.010 1.005 - 1.030    Blood, Urine Negative Negative    pH, UA 6.0 5.0 - 8.0    Protein, UA Negative Negative mg/dL    Urobilinogen, Urine 0.2 <2.0 E.U./dL    Nitrite, Urine Negative Negative    Leukocyte Esterase, Urine TRACE (A) Negative    Microscopic Examination YES     Urine Type NotGiven     Urine Reflex to Culture Not Indicated    Lipase   Result Value Ref Range    Lipase 54.0 13.0 - 60.0 U/L   Blood occult stool #1   Result Value Ref Range    Occult Blood Diagnostic Result: POSITIVE  Normal range: Negative   (A)    Microscopic Urinalysis   Result Value Ref Range    WBC, UA 6-9 (A) 0 - 5 /HPF    RBC, UA 3-4 0 - 4 /HPF    Epithelial Cells, UA 0-1 0 - 5 /HPF    Bacteria, UA 2+ (A) None Seen /HPF   Lactic Acid   Result Value Ref Range    Lactic Acid 1.0 0.4 - 2.0 mmol/L         RADIOLOGY  I have reviewed all radiographic studies for this visit. CT ABDOMEN PELVIS W IV CONTRAST Additional Contrast? None   Preliminary Result   1. Severe circumferential wall thickening involving the sigmoid colon and   rectum, most consistent with a focal infectious or inflammatory colitis or   ischemic colitis. There is no evidence of perforation, free air, or abscess. 2. Nonobstructing right nephrolithiasis. 3. Bilateral renal cysts. 4. Patient status post cholecystectomy with postoperative dilatation of the   biliary ducts. 5. Mild amount of intraluminal gas within the urinary bladder, likely   secondary to prior instrumentation. ED COURSE/MDM    65 y.o. female presents with abdominal pain and rectal bleeding. Rectal exam notable for maroon stool. CT abdomen pelvis with severe circumferential wall thickening of the sigmoid colon and rectum favoring infectious or inflammatory colitis. Overall the suspicion for infection is lower given the absence of fever or leukocytosis however will treat with antibiotics. We will send stool studies. Ischemic colitis favored less likely given lactate within normal limits. Given patient's age and comorbidities will admit to hospital medicine for further evaluation and treatment. Case d/w Dr. Audrey Corona via [employer]. - Consults:   IP CONSULT TO HOSPITALIST     Is this patient to be included in the SEP-1 Core Measure?   No   Exclusion criteria - the patient is NOT to be included for SEP-1 Core Measure due to:  2+ SIRS criteria are not met    ICoretta Chela Samayoa MD, am the primary clinician of record. During the patient's ED course, the patient was given:  Medications   iopamidol (ISOVUE-370) 76 % injection 75 mL (75 mLs IntraVENous Given 2/6/23 2300)   ciprofloxacin (CIPRO) tablet 500 mg (500 mg Oral Given 2/7/23 0104)   metronidazole (FLAGYL) 500 mg in 0.9% NaCl 100 mL IVPB premix (500 mg IntraVENous New Bag 2/7/23 0103)        All questions were answered and the patient/family expressed understanding and agreement with the plan. PROCEDURES  None    CRITICAL CARE  N/A    CLINICAL IMPRESSION  1. Colitis    2. Rectal bleeding        DISPOSITION  Decision To Admit 02/07/2023 12:38:11 AM     Gatito Plascencia MD    Note: This chart was created using voice recognition dictation software. Efforts were made by me to ensure accuracy, however some errors may be present due to limitations of this technology and occasionally words are not transcribed correctly.         Gatito Plascencia MD  02/07/23 8503

## 2023-02-07 NOTE — PROGRESS NOTES
AM Shift assessment completed. Pt is alert to self only; patient unable to be re oriented to place time or situation. Vital signs are WNL. Respirations are even & easy. Patient refuses any pain at this time. Pt denies needs at this time. SR up x 2 and bed in low position. Call light is within reach. Will monitor. .Bedside Mobility Assessment Tool (BMAT):     Assessment Level 1- Sit and Shake    1. From a semi-reclined position, ask patient to sit up and rotate to a seated position at the side of the bed. Can use the bedrail. 2. Ask patient to reach out and grab your hand and shake making sure patient reaches across his/her midline. Pass- Patient is able to come to a seated position, maintain core strength. Maintains seated balance while reaching across midline. Move on to Assessment Level 2. Assessment Level 2- Stretch and Point   1. With patient in seated position at the side of the bed, have patient place both feet on the floor (or stool) with knees no higher than hips. 2. Ask patient to stretch one leg and straighten the knee, then bend the ankle/flex and point the toes. If appropriate, repeat with the other leg. Fail- Patient is unable to complete task. Patient is MOBILITY LEVEL 2. Assessment Level 3- Stand   1. Ask patient to elevate off the bed or chair (seated to standing) using an assistive device (cane, bedrail). 2. Patient should be able to raise buttocks off be and hold for a count of five. May repeat once. Fail- Patient unable to demonstrate standing stability. Patient is MOBILITY LEVEL 3. Assessment Level 4- Walk   1. Ask patient to march in place at bedside. 2. Then ask patient to advance step and return each foot. Some medical conditions may render a patient from stepping backwards, use your best clinical judgement. Fail- Patient not able to complete tasks OR requires use of assistive device. Patient is MOBILITY LEVEL 3.        Mobility Level- 1

## 2023-02-07 NOTE — PLAN OF CARE
Problem: Safety - Adult  Goal: Free from fall injury  Description: INTERVENTIONS:  Outcome: Progressing     Problem: Pain  Goal: Pain level  Description: Minimal reported or demonstrated pain.   Outcome: Progressing

## 2023-02-07 NOTE — H&P
Hospital Medicine History & Physical      PCP: Delfin Thomas MD    Date of Admission: 2/6/2023    Date of Service: Pt seen/examined on 02/07/23       Chief Complaint:    Chief Complaint   Patient presents with    Rectal Bleeding     Pt from [facility] with c/o abd pain and bloody stool. Pt is end stage pancreatic cancer. Pt is Jaunita Oliver witness. History Of Present Illness: The patient is a 65 y.o. female with PMHx GERD, IBS, hx colon cancer, pancreatic cancer, CAD, HTN, osteoarthritis and anxiety who presented to [facility] ED via EMS from [facility] with complaint of rectal bleeding and abdominal pain. Patient had acute onset diarrhea last Thursday and Friday, that resolved with OTC medications. States she continued to experience crampy abdominal pain, however.  reports a similar but less severe bought of diarrhea around that same time. Per chart review, staff at the [facility] facility noted bloody stools starting yesterday. Patient herself was not aware.  at bedside does not have additional details. She endorses intermittent chills, but denies fever, nausea, vomiting, constipation or urinary symptoms. Patient admitted for further evaluation and symptomatic management of suspected colitis. Patient is a [employer] witness and does not accept blood products.       Past Medical History:        Diagnosis Date    Anxiety     Benign essential hypertension 1/17/2017    Bowel obstruction (HCC)     CAD (coronary artery disease)     Cancer ([facility] [address] 2012    colon    Depression     GERD (gastroesophageal reflux disease)     Hyperlipidemia     Hypertension     Irritable bowel syndrome (IBS)     Obesity     Osteoarthritis        Past Surgical History:        Procedure Laterality Date    APPENDECTOMY      BREAST LUMPECTOMY Bilateral 7/27/2021    RIGHT RADIO FREQUENCY IDENTIFICATION TAG LOCALIZED PARTIAL MASTECTOMY TIMES TWO; LEFT RADIO FREQUENCY IDENTIFICATION TAG LOCALIZED PARTIAL MASTECTOMY performed by Benjamín Fernandez MD at [address]  2014    [facility]    COLONOSCOPY  3/29/16    normal    US BREAST BIOPSY NEEDLE ADDITIONAL RIGHT Right 6/29/2021    US BREAST BIOPSY NEEDLE ADDITIONAL RIGHT 6/29/2021 Niecy Funez MD [facility]    US BREAST BIOPSY W LOC DEVICE 1ST LESION LEFT Left 6/17/2021    US BREAST NEEDLE BIOPSY LEFT 6/17/2021 Amanda Coulter MD [facility]    US BREAST BIOPSY W LOC DEVICE 1ST LESION RIGHT Right 6/17/2021    US BREAST NEEDLE BIOPSY RIGHT 6/17/2021 Amanda Coulter MD [facility]    US BREAST BIOPSY W LOC DEVICE 1ST LESION RIGHT Right 6/29/2021    US BREAST NEEDLE BIOPSY RIGHT 6/29/2021 Niecy Funez MD [facility]    US GUIDED NEEDLE LOC BREAST ADDL RIGHT Right 7/20/2021    US GUIDED NEEDLE LOC BREAST ADDL RIGHT 7/20/2021 [facility]    US GUIDED NEEDLE LOC OF LEFT BREAST Left 7/20/2021    US GUIDED NEEDLE LOC OF LEFT BREAST 7/20/2021 [facility]    US GUIDED NEEDLE LOC OF RIGHT BREAST Right 7/20/2021    US GUIDED NEEDLE LOC OF RIGHT BREAST 7/20/2021 [facility]       Medications Prior to Admission:    Prior to Admission medications    Medication Sig Start Date End Date Taking?  Authorizing Provider   potassium chloride 20 MEQ/15ML (10%) solution Take 20 mEq by mouth 3 times daily   Yes Historical Provider, MD   acetaminophen (TYLENOL) 500 MG tablet Take 500 mg by mouth daily   Yes Historical Provider, MD   acetaminophen (TYLENOL) 325 MG tablet Take 650 mg by mouth every 4 hours as needed for Fever or Pain   Yes Historical Provider, MD   Acidophilus Lactobacillus CAPS Take 1 capsule by mouth 2 times daily   Yes Historical Provider, MD   loperamide (IMODIUM A-D) 2 MG tablet Take 2 mg by mouth every 6 hours as needed for Diarrhea   Yes Historical Provider, MD   melatonin 3 MG TABS tablet Take 3 mg by mouth nightly   Yes Historical Provider, MD nitrofurantoin, macrocrystal-monohydrate, (MACROBID) 100 MG capsule Take 100 mg by mouth 2 times daily Indications: UTI 2/1/23 2/11/23 Yes Historical Provider, MD   ondansetron (ZOFRAN-ODT) 4 MG disintegrating tablet Take 4 mg by mouth every 8 hours as needed for Nausea or Vomiting   Yes Historical Provider, MD   ondansetron (ZOFRAN) 4 MG tablet Take 4 mg by mouth every 8 hours as needed for Nausea or Vomiting IM injection   Yes Historical Provider, MD   sertraline (ZOLOFT) 50 MG tablet Take 75 mg by mouth daily   Yes Historical Provider, MD   vitamin C (ASCORBIC ACID) 500 MG tablet Take 500 mg by mouth daily   Yes Historical Provider, MD   amLODIPine (NORVASC) 5 MG tablet Take 1 tablet by mouth daily 7/7/22 2/7/23  ROOSEVELT Whyte   atorvastatin (LIPITOR) 40 MG tablet Take 40 mg by mouth nightly 5/31/22   Historical Provider, MD   mirtazapine (REMERON) 7.5 MG tablet  5/31/22   Historical Provider, MD   risperiDONE (RISPERDAL) 1 MG tablet  5/31/22   Historical Provider, MD   amantadine (SYMMETREL) 100 MG capsule Take 100 mg by mouth 2 times daily    Historical Provider, MD   carvedilol (COREG) 3.125 MG tablet Take 3.125 mg by mouth 2 times daily (with meals)    Historical Provider, MD   fluconazole (DIFLUCAN) 100 MG tablet Take 100 mg by mouth daily Daily x 7 days ends 2/3/22  Patient not taking: Reported on 2/7/2023    Historical Provider, MD   famotidine (PEPCID) 20 MG tablet Take 40 mg by mouth daily    Historical Provider, MD   polyethylene glycol (GLYCOLAX) 17 g packet Take 17 g by mouth 2 times daily 1/28/22   Tremaine Oro PA-C   bisacodyl (DULCOLAX) 10 MG suppository Place 1 suppository rectally daily as needed for Constipation 1/28/22   Tremaine Oro PA-C   anastrozole (ARIMIDEX) 1 MG tablet Take 1 mg by mouth daily  8/31/21   Historical Provider, MD   furosemide (LASIX) 20 MG tablet Take up to once a week for leg swelling if needed 10/29/21   Samson Hart, MOHINDER - CNP citalopram (CELEXA) 40 MG tablet TAKE 1 TABLET EVERY DAY  Patient not taking: Reported on 2/7/2023 10/4/21   MOHINDER Almeida CNP   Compression Stockings MISC by Does not apply route 9/28/21   MOHINDER Almeida CNP   levothyroxine (SYNTHROID) 75 MCG tablet Take 1 tablet by mouth daily 9/21/21   Ray Oleary MD   busPIRone (BUSPAR) 15 MG tablet TAKE 1 TABLET BY MOUTH 3 TIMES DAILY AS NEEDED (ANXIETY) 8/9/21   MOHINDER Almeida CNP   potassium chloride (KLOR-CON M) 20 MEQ extended release tablet Take 1 tablet by mouth daily TAKE 3 TABLETS DAILY  Patient not taking: Reported on 2/7/2023 8/6/21   Donita Zuniga MD   aspirin EC 81 MG EC tablet Take 1 tablet by mouth daily 3/2/18   Albert Loomis MD       Allergies:  Bactrim [sulfamethoxazole-trimethoprim], Ultram Eric Drought hcl], Dilaudid [hydromorphone], Phenergan [promethazine], Lipitor [atorvastatin], and Penicillins    Social History:  The patient currently lives at [facility]:   reports that she quit smoking about 39 years ago. Her smoking use included cigarettes. She started smoking about 50 years ago. She has a 10.00 pack-year smoking history. She has never used smokeless tobacco.  ETOH:   reports no history of alcohol use.       Family History:   Positive as follows:        Problem Relation Age of Onset    Cancer Mother         colon    Cancer Brother         colon    Heart Disease Sister     Heart Disease Brother        REVIEW OF SYSTEMS:     Constitutional: Negative for fever   HENT: Negative for sore throat   Eyes: Negative for redness   Respiratory: Negative  for dyspnea, cough   Cardiovascular: Negative for chest pain   Gastrointestinal: Negative for vomiting, +diarrhea   Genitourinary: Negative for hematuria   Musculoskeletal: Negative for arthralgias   Skin: Negative for rash   Neurological: Negative for syncope   Hematological: Negative for adenopathy   Psychiatric/Behavorial: Negative for anxiety    PHYSICAL EXAM:    BP (!) 148/88   Pulse 96   Temp 97.9 °F (36.6 °C) (Axillary)   Resp 20   Ht 5' 6\" (1.676 m)   Wt 137 lb 12.8 oz (62.5 kg)   SpO2 95%   BMI 22.24 kg/m²   Gen: No distress. Alert. Chronically ill-appearing elderly woman. Eyes: PERRL. No sclera icterus. No conjunctival injection. ENT: No discharge. Pharynx clear. Neck: No JVD. No Carotid Bruit. Trachea midline. Resp: No accessory muscle use. No crackles. No wheezes. No rhonchi. CV: Regular rate. Regular rhythm. No murmur. No rub. No edema. Peripheral Pulses: +2 palpable, equal bilaterally   GI: Non-distended. No masses. No organomegaly. Normal bowel sounds. No hernia. +Diffusely tender to palpation   Skin: Warm and dry. No nodule on exposed extremities. No rash on exposed extremities. M/S: No cyanosis. No joint deformity. No clubbing. Neuro: Awake. Grossly nonfocal    Psych: Oriented to person and place. Not time or situation. Intermittent confusion. No anxiety or agitation.      CBC:   Recent Labs     02/06/23 2140 02/07/23  1120 02/07/23  1643   WBC 7.7  --   --    HGB 12.2 11.2* 11.7*   HCT 35.4* 33.5* 34.4*   MCV 87.8  --   --      --   --      BMP:   Recent Labs     02/06/23 2140 02/07/23  1643     --    K 5.3* 3.5     --    CO2 21  --    BUN 9  --    CREATININE 0.6  --      LIVER PROFILE:   Recent Labs     02/06/23 2140   AST 39*   ALT 37   LIPASE 54.0   BILITOT 0.4   ALKPHOS 143*     PT/INR:   Recent Labs     02/06/23 2140   PROTIME 13.2   INR 1.02     APTT:   Recent Labs     02/06/23 2140   APTT 34.4*     UA:  Recent Labs     02/07/23  0000   COLORU Yellow   PHUR 6.0   WBCUA 6-9*   RBCUA 3-4   BACTERIA 2+*   CLARITYU Clear   SPECGRAV 1.010   LEUKOCYTESUR TRACE*   UROBILINOGEN 0.2   BILIRUBINUR Negative   BLOODU Negative   GLUCOSEU Negative        U/A:    Lab Results   Component Value Date/Time    NITRITE Negative 12/02/2021 05:58 PM    COLORU Yellow 02/07/2023 12:00 AM WBCUA 6-9 02/07/2023 12:00 AM    RBCUA 3-4 02/07/2023 12:00 AM    MUCUS 1+ 11/25/2021 06:02 PM    BACTERIA 2+ 02/07/2023 12:00 AM    CLARITYU Clear 02/07/2023 12:00 AM    SPECGRAV 1.010 02/07/2023 12:00 AM    LEUKOCYTESUR TRACE 02/07/2023 12:00 AM    BLOODU Negative 02/07/2023 12:00 AM    GLUCOSEU Negative 02/07/2023 12:00 AM    AMORPHOUS 1+ 05/17/2020 10:02 AM       ABG    Lab Results   Component Value Date/Time    XCF0DQX 20.2 05/18/2020 08:40 AM    BEART -4.9 05/18/2020 08:40 AM    K6UCJLHM 96.1 05/18/2020 08:40 AM    PHART 7.352 05/18/2020 08:40 AM    XSY0HXA 37.2 05/18/2020 08:40 AM    PO2ART 85.2 05/18/2020 08:40 AM    VUW6RND 21.3 05/18/2020 08:40 AM       CULTURES  Covid/influenza not detected   GI panel pending   C. Diff pending  Ova & Parasite pending  Giardia antigen pending     EKG:    N/A    RADIOLOGY  CT ABDOMEN PELVIS W IV CONTRAST Additional Contrast? None   Final Result   1. Severe circumferential wall thickening involving the sigmoid colon and   rectum, most consistent with a focal infectious or inflammatory colitis or   ischemic colitis. There is no evidence of perforation, free air, or abscess. 2. Nonobstructing right nephrolithiasis. 3. Bilateral renal cysts. 4. Patient status post cholecystectomy with postoperative dilatation of the   biliary ducts. 5. Mild amounts of intraluminal gas within the urinary bladder, likely   secondary to prior instrumentation.              ASSESSMENT/PLAN:  #Colitis  #IBS  #Hx colon cancer 2012  -recent history of diarrhea, now resolved   -FOBT+  -CT abdomen suggestive of inflammatory vs ischemic colitis  -lactate WNLs  -stool studies pending  -trending H&H  -patient is [employer], will not accept blood products  -continue IVF  -cipro and flagyl D#1  -start clears and advance to low fiber diet as tolerated   -will need OP colonoscopy in 4 weeks  -GI following    #Pancreatic cancer  -follows w/ [facility]  -continue anastrozole    #GERD  -continue pepcid #Non-obstructing right nephrolithiasis  -incidental finding on CT    #Bilateral renal cysts  -incidental finding on CT    #HTN  -BP stable  -continue norvasc     #HLD  #CAD  -continue statin, coreg  -holding ASA    #Hypothyroidism  -continue synthroid     #Depression  #Anxiety  -continue zoloft     #Osteoarthritis   -tylenol prn      DVT Prophylaxis: SCDs  Diet: ADULT DIET;  Clear Liquid  Code Status: Full Code    Pilar Rivera PA-C  2/7/2023  6:55 PM

## 2023-02-07 NOTE — CONSULTS
Gastroenterology Consult Note    Patient:   Bell Peacock   :    1945   Facility:   [facility]   Referring/PCP: Negrita Norton MD  Date:     2023  Consultant:   Kiko Sharma MD, MD      Chief Complaint   Patient presents with    Rectal Bleeding     Pt from [facility] with c/o abd pain and bloody stool. Pt is end stage pancreatic cancer. Pt is Oralia Night witness. History of Present illness   65 y.o. female with pmx of CAD, GERD, HTN, HLD, IBS, osteoarthritis, depression. Bowel obstruction, and colon cancer () who was admitted 23 from [facility] with rectal bleeding and nonlocallized abdominal pain. Pt. Is a [employer]. Pt's Hgb was stable in ED at 12.2. Pt He is a poor historian due to underlying dementia. Review of records and discussion with family at the bedside were performed to obtain history. She had diarrhea several weeks ago. CTA abdomen/pelvis in ED revealed severe circumferential wall thickening involving sigmoid colon and rectum, suggestive of infectious or inflammatory colitis.        Past Medical History:   Diagnosis Date    Anxiety     Benign essential hypertension 2017    Bowel obstruction (HCC)     CAD (coronary artery disease)     Cancer ([facility] [address] 2012    colon    Depression     GERD (gastroesophageal reflux disease)     Hyperlipidemia     Hypertension     Irritable bowel syndrome (IBS)     Obesity     Osteoarthritis      Past Surgical History:   Procedure Laterality Date    APPENDECTOMY      BREAST LUMPECTOMY Bilateral 2021    RIGHT RADIO FREQUENCY IDENTIFICATION TAG LOCALIZED PARTIAL MASTECTOMY TIMES TWO; LEFT RADIO FREQUENCY IDENTIFICATION TAG LOCALIZED PARTIAL MASTECTOMY performed by Rose Marie Mendes MD at [address]  2014    [facility]    COLONOSCOPY  3/29/16    normal    US BREAST BIOPSY NEEDLE ADDITIONAL RIGHT Right 2021    US BREAST BIOPSY NEEDLE ADDITIONAL RIGHT 2021 Karl Duhram MD [facility]    US BREAST BIOPSY W LOC DEVICE 1ST LESION LEFT Left 2021    US BREAST NEEDLE BIOPSY LEFT 2021 Polina Diop MD [facility]    US BREAST BIOPSY W LOC DEVICE 1ST LESION RIGHT Right 2021    US BREAST NEEDLE BIOPSY RIGHT 2021 Polina Diop MD [facility]    US BREAST BIOPSY W LOC DEVICE 1ST LESION RIGHT Right 2021    US BREAST NEEDLE BIOPSY RIGHT 2021 Karl Durham MD [facility]    US GUIDED NEEDLE LOC BREAST ADDL RIGHT Right 2021    US GUIDED NEEDLE LOC BREAST ADDL RIGHT 2021 [facility]    US GUIDED NEEDLE LOC OF LEFT BREAST Left 2021    US GUID[facility] BREAST [facility]    US GUIDED NEEDLE LOC OF RIGHT BREAST Right 2021    US GUIDED NEEDLE LOC OF RIGHT BREAST 2021 [facility]       Social:   Social History     Tobacco Use    Smoking status: Former     Packs/day: 1.00     Years: 10.00     Pack years: 10.00     Types: Cigarettes     Start date:      Quit date: 1983     Years since quittin.4    Smokeless tobacco: Never   Substance Use Topics    Alcohol use: No     Family:   Family History   Problem Relation Age of Onset    Cancer Mother         colon    Cancer Brother         colon    Heart Disease Sister     Heart Disease Brother      No current facility-administered medications on file prior to encounter.      Current Outpatient Medications on File Prior to Encounter   Medication Sig Dispense Refill    potassium chloride 20 MEQ/15ML (10%) solution Take 20 mEq by mouth 3 times daily      acetaminophen (TYLENOL) 500 MG tablet Take 500 mg by mouth daily      acetaminophen (TYLENOL) 325 MG tablet Take 650 mg by mouth every 4 hours as needed for Fever or Pain      Acidophilus Lactobacillus CAPS Take 1 capsule by mouth 2 times daily      loperamide (IMODIUM A-D) 2 MG tablet Take 2 mg by mouth every 6 hours as needed for Diarrhea      melatonin 3 MG TABS tablet Take 3 mg by mouth nightly      nitrofurantoin, macrocrystal-monohydrate, (MACROBID) 100 MG capsule Take 100 mg by mouth 2 times daily Indications: UTI      ondansetron (ZOFRAN-ODT) 4 MG disintegrating tablet Take 4 mg by mouth every 8 hours as needed for Nausea or Vomiting      ondansetron (ZOFRAN) 4 MG tablet Take 4 mg by mouth every 8 hours as needed for Nausea or Vomiting IM injection      sertraline (ZOLOFT) 50 MG tablet Take 75 mg by mouth daily      vitamin C (ASCORBIC ACID) 500 MG tablet Take 500 mg by mouth daily      amLODIPine (NORVASC) 5 MG tablet Take 1 tablet by mouth daily 30 tablet 0    atorvastatin (LIPITOR) 40 MG tablet Take 40 mg by mouth nightly      mirtazapine (REMERON) 7.5 MG tablet       risperiDONE (RISPERDAL) 1 MG tablet  (Patient not taking: Reported on 2/7/2023)      amantadine (SYMMETREL) 100 MG capsule Take 100 mg by mouth 2 times daily      carvedilol (COREG) 3.125 MG tablet Take 3.125 mg by mouth 2 times daily (with meals)      fluconazole (DIFLUCAN) 100 MG tablet Take 100 mg by mouth daily Daily x 7 days ends 2/3/22 (Patient not taking: Reported on 2/7/2023)      famotidine (PEPCID) 20 MG tablet Take 40 mg by mouth daily      polyethylene glycol (GLYCOLAX) 17 g packet Take 17 g by mouth 2 times daily 527 g 0    bisacodyl (DULCOLAX) 10 MG suppository Place 1 suppository rectally daily as needed for Constipation 1 suppository 0    anastrozole (ARIMIDEX) 1 MG tablet Take 1 mg by mouth daily       furosemide (LASIX) 20 MG tablet Take up to once a week for leg swelling if needed 90 tablet 1    citalopram (CELEXA) 40 MG tablet TAKE 1 TABLET EVERY DAY (Patient not taking: Reported on 2/7/2023) 90 tablet 1    Compression Stockings MISC by Does not apply route 1 each 0    levothyroxine (SYNTHROID) 75 MCG tablet Take 1 tablet by mouth daily 90 tablet 3    [DISCONTINUED] busPIRone (BUSPAR) 15 MG tablet TAKE 1 TABLET BY MOUTH 3 TIMES DAILY AS NEEDED (ANXIETY) 90 tablet 2    potassium chloride (KLOR-CON M) 20 MEQ extended release tablet Take 1 tablet by mouth daily TAKE 3 TABLETS DAILY (Patient not taking: Reported on 2/7/2023) 270 tablet 1    aspirin EC 81 MG EC tablet Take 1 tablet by mouth daily 30 tablet 3      Infusions:    sodium chloride      sodium chloride 75 mL/hr at 02/07/23 0505     PRN Medications: sodium chloride flush, sodium chloride, ondansetron **OR** ondansetron, polyethylene glycol, acetaminophen **OR** acetaminophen  Allergies: Allergies   Allergen Reactions    Bactrim [Sulfamethoxazole-Trimethoprim] Anaphylaxis    Ultram [Tramadol Hcl] Anaphylaxis     stridor    Dilaudid [Hydromorphone] Other (See Comments)     Hallucinations    Phenergan [Promethazine] Nausea Only    Lipitor [Atorvastatin]      Nausea    Penicillins        ROS: Unobtainable    Physical Exam   /86   Pulse 86   Temp 98.2 °F (36.8 °C) (Axillary)   Resp 20   Ht 5' 6\" (1.676 m)   Wt 137 lb 12.8 oz (62.5 kg)   SpO2 96%   BMI 22.24 kg/m²     General appearance: appears stated age, cachectic, and delirious  Head: Normocephalic, without obvious abnormality  Neck: supple, symmetrical, trachea midline  Heart: regular rate and rhythm  Abdomen:  soft, NT, ND  Extremities: no edema    Lab and Imaging Review   Labs:  CBC:   Recent Labs     02/06/23 2140   WBC 7.7   HGB 12.2   HCT 35.4*   MCV 87.8        BMP:   Recent Labs     02/06/23  2140      K 5.3*      CO2 21   BUN 9   CREATININE 0.6     LIVER PROFILE:   Recent Labs     02/06/23 2140   AST 39*   ALT 37   LIPASE 54.0   PROT 7.3   BILITOT 0.4   ALKPHOS 143*     PT/INR:   Recent Labs     02/06/23 2140   INR 1.02     Triglycerides: No results for input(s): TRIG in the last 72 hours.   Iron panel:  Lab Results   Component Value Date/Time    FERRITIN 83.1 08/30/2021 02:00 PM    IRON 91 08/30/2021 02:00 PM    LABIRON 28 08/30/2021 02:00 PM    [id number] 181 10/29/2021 03:14 PM    FOLATE 19.43 10/29/2021 03:14 PM     Imagin. Severe circumferential wall thickening involving the sigmoid colon and   rectum, most consistent with a focal infectious or inflammatory colitis or   ischemic colitis. There is no evidence of perforation, free air, or abscess. 2. Nonobstructing right nephrolithiasis. 3. Bilateral renal cysts. 4. Patient status post cholecystectomy with postoperative dilatation of the   biliary ducts. 5. Mild amounts of intraluminal gas within the urinary bladder, likely   secondary to prior instrumentation. Assessment:   Pt. Is a 65 y.o. female with pmx of CAD, GERD, HTN, HLD, IBS, Osteoarthritis, colon cancer () admitted with colitis likely infectious versus ischemic etiologies.     Plan:   Continue supportive care  Check stool tests  Broad spectrum antibiotics  Monitor Hgb  Outpatient colonoscopy in 4 weeks  Start clear liquid diet and advance to low fiber as tolerated     Karlee Rachel MD, MD  9:23 AM 2023

## 2023-02-07 NOTE — ED NOTES
3520 - Call placed to [address] [place] [address] for routine consult     Trude Gowers  02/07/23 2388

## 2023-02-07 NOTE — ED NOTES
[facility] about admission status.       [facility]  02/07/23 0310

## 2023-02-07 NOTE — PROGRESS NOTES
Patient admission questions completed with  and son at bedside. Questions answered. .Bedside Mobility Assessment Tool (BMAT):     Assessment Level 1- Sit and Shake    1. From a semi-reclined position, ask patient to sit up and rotate to a seated position at the side of the bed. Can use the bedrail. 2. Ask patient to reach out and grab your hand and shake making sure patient reaches across his/her midline. Pass- Patient is able to come to a seated position, maintain core strength. Maintains seated balance while reaching across midline. Move on to Assessment Level 2. Assessment Level 2- Stretch and Point   1. With patient in seated position at the side of the bed, have patient place both feet on the floor (or stool) with knees no higher than hips. 2. Ask patient to stretch one leg and straighten the knee, then bend the ankle/flex and point the toes. If appropriate, repeat with the other leg. Fail- Patient is unable to complete task. Patient is MOBILITY LEVEL 2. Assessment Level 3- Stand   1. Ask patient to elevate off the bed or chair (seated to standing) using an assistive device (cane, bedrail). 2. Patient should be able to raise buttocks off be and hold for a count of five. May repeat once. Fail- Patient unable to demonstrate standing stability. Patient is MOBILITY LEVEL 3. Assessment Level 4- Walk   1. Ask patient to march in place at bedside. 2. Then ask patient to advance step and return each foot. Some medical conditions may render a patient from stepping backwards, use your best clinical judgement. Fail- Patient not able to complete tasks OR requires use of assistive device. Patient is MOBILITY LEVEL 3. Mobility Level- 1    . Patient is not able to demonstrated the ability to move from a reclining position to an upright position within the recliner. Patient is confused, demented and /or unable to follow instruction.

## 2023-02-08 LAB
ALBUMIN SERPL-MCNC: 2.9 G/DL (ref 3.4–5)
ALP BLD-CCNC: 103 U/L (ref 40–129)
ALT SERPL-CCNC: 21 U/L (ref 10–40)
ANION GAP SERPL CALCULATED.3IONS-SCNC: 9 MMOL/L (ref 3–16)
AST SERPL-CCNC: 17 U/L (ref 15–37)
BILIRUB SERPL-MCNC: 0.4 MG/DL (ref 0–1)
BILIRUBIN DIRECT: <0.2 MG/DL (ref 0–0.3)
BILIRUBIN, INDIRECT: ABNORMAL MG/DL (ref 0–1)
BUN BLDV-MCNC: 10 MG/DL (ref 7–20)
CALCIUM SERPL-MCNC: 7.9 MG/DL (ref 8.3–10.6)
CHLORIDE BLD-SCNC: 109 MMOL/L (ref 99–110)
CO2: 21 MMOL/L (ref 21–32)
CREAT SERPL-MCNC: 0.7 MG/DL (ref 0.6–1.2)
GFR SERPL CREATININE-BSD FRML MDRD: >60 ML/MIN/{1.73_M2}
GLUCOSE BLD-MCNC: 90 MG/DL (ref 70–99)
LACTIC ACID: 0.8 MMOL/L (ref 0.4–2)
MAGNESIUM: 1.4 MG/DL (ref 1.8–2.4)
POTASSIUM REFLEX MAGNESIUM: 3.2 MMOL/L (ref 3.5–5.1)
SODIUM BLD-SCNC: 139 MMOL/L (ref 136–145)
TOTAL PROTEIN: 5.5 G/DL (ref 6.4–8.2)

## 2023-02-08 PROCEDURE — 6360000002 HC RX W HCPCS: Performed by: HOSPITALIST

## 2023-02-08 PROCEDURE — 6370000000 HC RX 637 (ALT 250 FOR IP)

## 2023-02-08 PROCEDURE — 2580000003 HC RX 258: Performed by: INTERNAL MEDICINE

## 2023-02-08 PROCEDURE — 80048 BASIC METABOLIC PNL TOTAL CA: CPT

## 2023-02-08 PROCEDURE — 2580000003 HC RX 258: Performed by: HOSPITALIST

## 2023-02-08 PROCEDURE — 2500000003 HC RX 250 WO HCPCS: Performed by: HOSPITALIST

## 2023-02-08 PROCEDURE — 6370000000 HC RX 637 (ALT 250 FOR IP): Performed by: HOSPITALIST

## 2023-02-08 PROCEDURE — 80076 HEPATIC FUNCTION PANEL: CPT

## 2023-02-08 PROCEDURE — 83605 ASSAY OF LACTIC ACID: CPT

## 2023-02-08 PROCEDURE — 6370000000 HC RX 637 (ALT 250 FOR IP): Performed by: INTERNAL MEDICINE

## 2023-02-08 PROCEDURE — 6360000002 HC RX W HCPCS: Performed by: INTERNAL MEDICINE

## 2023-02-08 PROCEDURE — 83735 ASSAY OF MAGNESIUM: CPT

## 2023-02-08 PROCEDURE — 1200000000 HC SEMI PRIVATE

## 2023-02-08 PROCEDURE — 36415 COLL VENOUS BLD VENIPUNCTURE: CPT

## 2023-02-08 RX ORDER — 0.9 % SODIUM CHLORIDE 0.9 %
500 INTRAVENOUS SOLUTION INTRAVENOUS ONCE
Status: COMPLETED | OUTPATIENT
Start: 2023-02-08 | End: 2023-02-08

## 2023-02-08 RX ORDER — MAGNESIUM SULFATE IN WATER 40 MG/ML
4000 INJECTION, SOLUTION INTRAVENOUS ONCE
Status: COMPLETED | OUTPATIENT
Start: 2023-02-08 | End: 2023-02-08

## 2023-02-08 RX ORDER — POTASSIUM CHLORIDE 20 MEQ/1
40 TABLET, EXTENDED RELEASE ORAL ONCE
Status: DISCONTINUED | OUTPATIENT
Start: 2023-02-08 | End: 2023-02-08 | Stop reason: ALTCHOICE

## 2023-02-08 RX ORDER — ATORVASTATIN CALCIUM 40 MG/1
TABLET, FILM COATED ORAL
Status: COMPLETED
Start: 2023-02-08 | End: 2023-02-08

## 2023-02-08 RX ADMIN — AMANTADINE 100 MG: 100 CAPSULE ORAL at 20:05

## 2023-02-08 RX ADMIN — AMLODIPINE BESYLATE 5 MG: 5 TABLET ORAL at 10:38

## 2023-02-08 RX ADMIN — CARVEDILOL 3.12 MG: 6.25 TABLET, FILM COATED ORAL at 10:54

## 2023-02-08 RX ADMIN — Medication 10 ML: at 20:06

## 2023-02-08 RX ADMIN — Medication 1 CAPSULE: at 20:06

## 2023-02-08 RX ADMIN — CIPROFLOXACIN 400 MG: 2 INJECTION, SOLUTION INTRAVENOUS at 14:45

## 2023-02-08 RX ADMIN — SERTRALINE HYDROCHLORIDE 75 MG: 50 TABLET ORAL at 10:38

## 2023-02-08 RX ADMIN — POTASSIUM BICARBONATE 40 MEQ: 782 TABLET, EFFERVESCENT ORAL at 11:42

## 2023-02-08 RX ADMIN — ATORVASTATIN CALCIUM 40 MG: 40 TABLET, FILM COATED ORAL at 20:06

## 2023-02-08 RX ADMIN — CIPROFLOXACIN 400 MG: 2 INJECTION, SOLUTION INTRAVENOUS at 01:53

## 2023-02-08 RX ADMIN — SODIUM CHLORIDE: 9 INJECTION, SOLUTION INTRAVENOUS at 21:25

## 2023-02-08 RX ADMIN — METRONIDAZOLE 500 MG: 500 INJECTION, SOLUTION INTRAVENOUS at 17:37

## 2023-02-08 RX ADMIN — MAGNESIUM SULFATE HEPTAHYDRATE 4000 MG: 40 INJECTION, SOLUTION INTRAVENOUS at 10:32

## 2023-02-08 RX ADMIN — MIRTAZAPINE 7.5 MG: 15 TABLET, FILM COATED ORAL at 20:06

## 2023-02-08 RX ADMIN — LEVOTHYROXINE SODIUM 75 MCG: 25 TABLET ORAL at 10:38

## 2023-02-08 RX ADMIN — METRONIDAZOLE 500 MG: 500 INJECTION, SOLUTION INTRAVENOUS at 03:49

## 2023-02-08 RX ADMIN — METRONIDAZOLE 500 MG: 500 INJECTION, SOLUTION INTRAVENOUS at 09:46

## 2023-02-08 RX ADMIN — SODIUM CHLORIDE 500 ML: 9 INJECTION, SOLUTION INTRAVENOUS at 12:48

## 2023-02-08 RX ADMIN — ANASTROZOLE 1 MG: 1 TABLET ORAL at 11:42

## 2023-02-08 RX ADMIN — Medication 1 CAPSULE: at 10:38

## 2023-02-08 RX ADMIN — AMANTADINE 100 MG: 100 CAPSULE ORAL at 10:55

## 2023-02-08 RX ADMIN — CARVEDILOL 3.12 MG: 6.25 TABLET, FILM COATED ORAL at 17:35

## 2023-02-08 RX ADMIN — POLYETHYLENE GLYCOL 3350 17 G: 17 POWDER, FOR SOLUTION ORAL at 20:06

## 2023-02-08 RX ADMIN — Medication 3 MG: at 20:05

## 2023-02-08 RX ADMIN — FAMOTIDINE 40 MG: 20 TABLET, FILM COATED ORAL at 10:37

## 2023-02-08 RX ADMIN — OXYCODONE HYDROCHLORIDE AND ACETAMINOPHEN 500 MG: 500 TABLET ORAL at 10:38

## 2023-02-08 NOTE — PROGRESS NOTES
Patient chewing on tongue, refuses to swallow pills or fluids, refusing to eat or drink. Unable to reach RN at facility to determine patient's at home diet and way to take pills. Patient in front of nursing station for safety reasons. Bed alarm on as patient has made multiple attempts leaning out of bed.

## 2023-02-08 NOTE — FLOWSHEET NOTE
Patient sleeping, RN at bedside, purewick in place, IV assessed, IV infusing per [facility]

## 2023-02-08 NOTE — PROGRESS NOTES
Progress Note    Admit Date:  2/6/2023    Elderly patient admitted with colitis. On broad-spectrum IV antibiotics GI consulted. Subjective:  Ms. Tom Rodriguez looks ill and fatigued. But more awake and responding today. In no distress. Denies any significant abdominal pain. Tolerating clear liquids today. Low-grade fevers    Objective:   /80   Pulse 81   Temp 99.2 °F (37.3 °C) (Axillary)   Resp 19   Ht 5' 6\" (1.676 m)   Wt 137 lb 12.8 oz (62.5 kg)   SpO2 95%   BMI 22.24 kg/m²     Intake/Output Summary (Last 24 hours) at 2/8/2023 1139  Last data filed at 2/8/2023 1111  Gross per 24 hour   Intake 360 ml   Output 200 ml   Net 160 ml         Physical Exam:  Gen: No distress. Alert. Chronically ill-appearing elderly woman. Looks very fatigued  More awake and responding today. Feels to be partially oriented to person. Eyes: PERRL. No sclera icterus. No conjunctival injection. ENT: No discharge. Pharynx clear. Neck: No JVD. No Carotid Bruit. Trachea midline. Resp: No accessory muscle use. No crackles. No wheezes. No rhonchi. CV: Regular rate. Regular rhythm. No murmur. No rub. No edema. Peripheral Pulses: +2 palpable, equal bilaterally   GI: Non-distended. No masses. No organomegaly. Normal bowel sounds. No hernia. Mildly tender to palpation   Skin: Warm and dry. No nodule on exposed extremities. No rash on exposed extremities. M/S: No cyanosis. No joint deformity. No clubbing. Neuro: Awake. Grossly nonfocal    Psych: Oriented to person and place. Not time or situation. Intermittent confusion. No anxiety or agitation.          Medications:   Scheduled Meds:   magnesium sulfate  4,000 mg IntraVENous Once    potassium bicarb-citric acid  40 mEq Oral Once    ciprofloxacin  400 mg IntraVENous Q12H    metroNIDAZOLE  500 mg IntraVENous Q8H    carvedilol  3.125 mg Oral BID WC    mirtazapine  7.5 mg Oral Nightly    sertraline  75 mg Oral Daily    sodium chloride flush  5-40 mL IntraVENous 2 times per day    levothyroxine  75 mcg Oral Daily    amLODIPine  5 mg Oral Daily    anastrozole  1 mg Oral Daily    atorvastatin  40 mg Oral QHS    amantadine  100 mg Oral BID    famotidine  40 mg Oral Daily    melatonin  3 mg Oral QHS    polyethylene glycol  17 g Oral BID    vitamin C  500 mg Oral Daily    [Held by provider] potassium bicarb-citric acid  20 mEq Oral TID    lactobacillus  1 capsule Oral BID       Continuous Infusions:   sodium chloride      sodium chloride 75 mL/hr at 02/07/23 2313       Data:  CBC:   Recent Labs     02/06/23 2140 02/07/23  1120 02/07/23 1643 02/07/23  2206   WBC 7.7  --   --   --    RBC 4.03  --   --   --    HGB 12.2 11.2* 11.7* 10.8*   HCT 35.4* 33.5* 34.4* 32.1*   MCV 87.8  --   --   --    RDW 13.8  --   --   --      --   --   --      BMP:   Recent Labs     02/06/23 2140 02/07/23 1643 02/08/23  0523     --  139   K 5.3* 3.5 3.2*     --  109   CO2 21  --  21   BUN 9  --  10   CREATININE 0.6  --  0.7     BNP: No results for input(s): BNP in the last 72 hours. PT/INR:   Recent Labs     02/06/23 2140   PROTIME 13.2   INR 1.02     APTT:   Recent Labs     02/06/23 2140   APTT 34.4*     CARDIAC ENZYMES: No results for input(s): CKMB, CKMBINDEX, TROPONINI in the last 72 hours. Invalid input(s): CKTOTAL;3  FASTING LIPID PANEL:  Lab Results   Component Value Date    CHOL 123 08/10/2021    HDL 63 (H) 08/10/2021    TRIG 81 08/10/2021     LIVER PROFILE:   Recent Labs     02/06/23 2140 02/08/23  0523   AST 39* 17   ALT 37 21   BILIDIR  --  <0.2   BILITOT 0.4 0.4   ALKPHOS 143* 103          Cultures  CULTURES  Covid/influenza not detected   GI panel pending   C. Diff pending  Ova & Parasite pending  Giardia antigen pending     Radiology  CT ABDOMEN PELVIS W IV CONTRAST Additional Contrast? None   Final Result   1.  Severe circumferential wall thickening involving the sigmoid colon and   rectum, most consistent with a focal infectious or inflammatory colitis or ischemic colitis. There is no evidence of perforation, free air, or abscess. 2. Nonobstructing right nephrolithiasis. 3. Bilateral renal cysts. 4. Patient status post cholecystectomy with postoperative dilatation of the   biliary ducts. 5. Mild amounts of intraluminal gas within the urinary bladder, likely   secondary to prior instrumentation. Assessment:  Principal Problem:    Colitis  Active Problems:    Rectal bleeding    GERD (gastroesophageal reflux disease)    Irritable bowel syndrome (IBS)    Coronary artery disease involving native coronary artery of native heart without angina pectoris    Mixed hyperlipidemia    MCI (mild cognitive impairment)    HTN (hypertension), benign    Anxiety with depression  Resolved Problems:    * No resolved hospital problems. *      Plan:    #Colitis  #IBS  #Hx colon cancer 2012  -recent history of diarrhea, now resolved   -GI consulted  -FOBT+  -CT abdomen suggestive of inflammatory vs ischemic colitis  -lactate WNLs  -stool studies pending-patient has not had a BM since admission  -trending H&H  -patient is [employer], will not accept blood products  -continue IVF  -Continue IV cipro and flagyl D#2-for likely infectious bacterial colitis  -start clears and advance to low fiber diet as tolerated   -will need OP colonoscopy in 4 weeks  -GI following  She has been on clear liquids, will advance to full liquid diet and monitor today    # Hypokalemia   - replete     # Hypomagnesemia   - replete    # Breast  cancer  -follows w/ [facility]  -continue anastrozole    # S/P colectomy for colon cancer 2012    ( Clarification NO pancreatic Cancer ) . NH informed   I reviewed records on patient's current chart, as well as notes from oncologist office. No mention of pancreatic cancer. Patient's  clearly states that patient does not have pancreatic cancer. This has been noted on nursing home records, needs to be removed.    RN has called nursing home informed. #GERD  -continue pepcid      #Non-obstructing right nephrolithiasis  -incidental finding on CT     #Bilateral renal cysts  -incidental finding on CT     #HTN  -BP stable  -continue norvasc      #HLD  #CAD  -continue statin, coreg  -holding ASA     #Hypothyroidism  -continue synthroid      #Depression  #Anxiety  -continue zoloft      #Osteoarthritis   -tylenol prn     # History of dementia  Patient was more lethargic yesterday per nursing staff ,  she is more awake today responding appropriately     DVT Prophylaxis: SCDs  Diet: ADULT DIET;  Clear Liquid  Code Status: Full Code           Chalino Swanson MD   2/8/2023 11:39 AM

## 2023-02-08 NOTE — ACP (ADVANCE CARE PLANNING)
Advance Care Planning     General Advance Care Planning (ACP) Conversation    Date of Conversation: 2/6/2023  Conducted with: Patient with Decision Making Capacity    Healthcare Decision Maker:    Primary Decision Maker: Marlen [facility] [address] - [phone]    Secondary Decision Maker: Benjamin Otero - Child - [phone]    Secondary Decision Maker: Jeanna Diana - Brother/Sister - [phone]  Click here to complete Healthcare Decision Makers including selection of the Healthcare Decision Maker Relationship (ie \"Primary\"). Today we documented Decision Maker(s) consistent with ACP documents on file. Content/Action Overview:   Has ACP document(s) on file - reflects the patient's care preferences  Reviewed DNR/DNI and patient elects Full Code (Attempt Resuscitation)        Length of Voluntary ACP Conversation in minutes:  <16 minutes (Non-Billable)    Treasure Anna RN

## 2023-02-08 NOTE — PROGRESS NOTES
Sent message to Dr. Lauren Partida pt given amlodipine and carvedilol this AM w/ BP m116/80. Now BP 87/53 Map 60 P 79. she is getting IV fluid 75 ml/hr. Will await any new order.

## 2023-02-08 NOTE — PROGRESS NOTES
PROGRESS NOTE  S:77 yrs Patient  admitted on 2023 with Colitis [K52.9]  Rectal bleeding [K62.5] . She has had soft stool. No further bleeding. Denies abdominal pain. Tolerating full liquid diet. Exam:   Vitals:    23 0838   BP: 116/80   Pulse: 81   Resp: 19   Temp: 99.2 °F (37.3 °C)   SpO2: 95%     General appearance: appears stated age, cachectic, and delirious  Head: Normocephalic, without obvious abnormality  Neck: supple, symmetrical, trachea midline  Heart: regular rate and rhythm  Abdomen:  soft, NT, ND  Extremities: no edema    Medications: Reviewed    Labs:  CBC:   Recent Labs     23  1120 23  1643 02/07/23  2206   WBC 7.7  --   --   --    HGB 12.2 11.2* 11.7* 10.8*   HCT 35.4* 33.5* 34.4* 32.1*   MCV 87.8  --   --   --      --   --   --      BMP:   Recent Labs     23  0523     --  139   K 5.3* 3.5 3.2*     --  109   CO2 21  --  21   BUN 9  --  10   CREATININE 0.6  --  0.7     LIVER PROFILE:   Recent Labs     23  0523   AST 39* 17   ALT 37 21   LIPASE 54.0  --    PROT 7.3 5.5*   BILIDIR  --  <0.2   BILITOT 0.4 0.4   ALKPHOS 143* 103     PT/INR:   Recent Labs     23   INR 1.02        Imagin. Severe circumferential wall thickening involving the sigmoid colon and   rectum, most consistent with a focal infectious or inflammatory colitis or   ischemic colitis. There is no evidence of perforation, free air, or abscess. 2. Nonobstructing right nephrolithiasis. 3. Bilateral renal cysts. 4. Patient status post cholecystectomy with postoperative dilatation of the   biliary ducts. 5. Mild amounts of intraluminal gas within the urinary bladder, likely   secondary to prior instrumentation. Assessment:   Pt.  Is a 65 y.o. female with pmx of CAD, GERD, HTN, HLD, IBS, osteoarthritis, colon cancer () admitted with colitis likely infectious versus ischemic etiologies.      Plan:   Continue supportive care  Await stool tests  Broad spectrum antibiotics  Monitor Hgb  Outpatient colonoscopy in 4 weeks  Advance to low fiber diet as tolerated        Koko Jeffrey MD, MD  10:06 AM 2/8/2023

## 2023-02-08 NOTE — PROGRESS NOTES
PM Shift report given to Casandra RN. Patient is stable. All end of shift needs have been met. No further assistance needed at this time.

## 2023-02-08 NOTE — PROGRESS NOTES
Pt had BM brown soft/loose but unable to collect stool saturated in urine. Pt tolerating liquids took few bites of soup.    Pt had no c/o nausea or ABD pain

## 2023-02-08 NOTE — PROGRESS NOTES
Shift assessment completed, see flow sheet. Pt sleeping and briefly opened eyes to name but closed them. Pt responds to stimuli but will not open eyes. Resistance to UE when trying to move extremities. SpO2 95%. Respirations are easy, even, and unlabored. Bilateral lung sounds clear. VSS  NSR on the monitor    PIV, WNL with NaCl infusing at 75 ml/hr    All lines and monitoring devices in place. external  [place] is patent and secured. Bed in lowest position with wheels locked. Bed alarm on for safety precaution. No needs expressed at this time. Will continue to monitor.

## 2023-02-08 NOTE — PROGRESS NOTES
Pt starting to get restless and confusion & irritation.  at bedside states seems typical behavior gets more confused at night.

## 2023-02-09 VITALS
DIASTOLIC BLOOD PRESSURE: 59 MMHG | OXYGEN SATURATION: 97 % | RESPIRATION RATE: 18 BRPM | BODY MASS INDEX: 22.14 KG/M2 | HEART RATE: 82 BPM | HEIGHT: 66 IN | TEMPERATURE: 97.5 F | WEIGHT: 137.8 LBS | SYSTOLIC BLOOD PRESSURE: 92 MMHG

## 2023-02-09 LAB
ANION GAP SERPL CALCULATED.3IONS-SCNC: 9 MMOL/L (ref 3–16)
BUN BLDV-MCNC: 7 MG/DL (ref 7–20)
CALCIUM SERPL-MCNC: 8 MG/DL (ref 8.3–10.6)
CHLORIDE BLD-SCNC: 110 MMOL/L (ref 99–110)
CO2: 21 MMOL/L (ref 21–32)
CREAT SERPL-MCNC: <0.5 MG/DL (ref 0.6–1.2)
GFR SERPL CREATININE-BSD FRML MDRD: >60 ML/MIN/{1.73_M2}
GLUCOSE BLD-MCNC: 84 MG/DL (ref 70–99)
HCT VFR BLD CALC: 31 % (ref 36–48)
HEMOGLOBIN: 10.4 G/DL (ref 12–16)
MAGNESIUM: 2.1 MG/DL (ref 1.8–2.4)
MCH RBC QN AUTO: 30.8 PG (ref 26–34)
MCHC RBC AUTO-ENTMCNC: 33.7 G/DL (ref 31–36)
MCV RBC AUTO: 91.5 FL (ref 80–100)
PDW BLD-RTO: 14 % (ref 12.4–15.4)
PHOSPHORUS: 2.4 MG/DL (ref 2.5–4.9)
PLATELET # BLD: 171 K/UL (ref 135–450)
PMV BLD AUTO: 7.6 FL (ref 5–10.5)
POTASSIUM REFLEX MAGNESIUM: 3.2 MMOL/L (ref 3.5–5.1)
RBC # BLD: 3.39 M/UL (ref 4–5.2)
SODIUM BLD-SCNC: 140 MMOL/L (ref 136–145)
WBC # BLD: 6.4 K/UL (ref 4–11)

## 2023-02-09 PROCEDURE — 87506 IADNA-DNA/RNA PROBE TQ 6-11: CPT

## 2023-02-09 PROCEDURE — 2500000003 HC RX 250 WO HCPCS: Performed by: HOSPITALIST

## 2023-02-09 PROCEDURE — 87328 CRYPTOSPORIDIUM AG IA: CPT

## 2023-02-09 PROCEDURE — 80048 BASIC METABOLIC PNL TOTAL CA: CPT

## 2023-02-09 PROCEDURE — 36415 COLL VENOUS BLD VENIPUNCTURE: CPT

## 2023-02-09 PROCEDURE — 87336 ENTAMOEB HIST DISPR AG IA: CPT

## 2023-02-09 PROCEDURE — 85027 COMPLETE CBC AUTOMATED: CPT

## 2023-02-09 PROCEDURE — 87324 CLOSTRIDIUM AG IA: CPT

## 2023-02-09 PROCEDURE — 83735 ASSAY OF MAGNESIUM: CPT

## 2023-02-09 PROCEDURE — 87449 NOS EACH ORGANISM AG IA: CPT

## 2023-02-09 PROCEDURE — 6370000000 HC RX 637 (ALT 250 FOR IP): Performed by: INTERNAL MEDICINE

## 2023-02-09 PROCEDURE — 87425 ROTAVIRUS AG IA: CPT

## 2023-02-09 PROCEDURE — 6360000002 HC RX W HCPCS: Performed by: HOSPITALIST

## 2023-02-09 PROCEDURE — 84100 ASSAY OF PHOSPHORUS: CPT

## 2023-02-09 RX ORDER — METRONIDAZOLE 500 MG/1
500 TABLET ORAL 3 TIMES DAILY
DISCHARGE
Start: 2023-02-09 | End: 2023-02-16

## 2023-02-09 RX ORDER — POLYETHYLENE GLYCOL 3350 17 G/17G
17 POWDER, FOR SOLUTION ORAL DAILY
DISCHARGE
Start: 2023-02-09

## 2023-02-09 RX ORDER — CIPROFLOXACIN 500 MG/1
500 TABLET, FILM COATED ORAL 2 TIMES DAILY
Qty: 10 TABLET | Refills: 0 | DISCHARGE
Start: 2023-02-09 | End: 2023-02-14

## 2023-02-09 RX ADMIN — CIPROFLOXACIN 400 MG: 2 INJECTION, SOLUTION INTRAVENOUS at 01:25

## 2023-02-09 RX ADMIN — POTASSIUM & SODIUM PHOSPHATES POWDER PACK 280-160-250 MG 500 MG: 280-160-250 PACK at 14:57

## 2023-02-09 RX ADMIN — METRONIDAZOLE 500 MG: 500 INJECTION, SOLUTION INTRAVENOUS at 02:48

## 2023-02-09 NOTE — PLAN OF CARE
Problem: Safety - Adult  Goal: Free from fall injury  Description: INTERVENTIONS:  Outcome: Progressing     Problem: Pain  Goal: Pain level  Description: Minimal reported or demonstrated pain. Outcome: Progressing     Problem: Skin/Tissue Integrity  Goal: Absence of new skin breakdown  Description: 1. Monitor for areas of redness and/or skin breakdown  2. Assess vascular access sites hourly  3. Every 4-6 hours minimum:  Change oxygen saturation probe site  4. Every 4-6 hours:  If on nasal continuous positive airway pressure, respiratory therapy assess nares and determine need for appliance change or resting period. Outcome: Progressing     Problem: Discharge Planning  Goal: Discharge to home or other facility with appropriate resources  Outcome: Progressing     Problem: Confusion  Goal: Confusion, delirium, dementia, or psychosis is improved or at baseline  Description: INTERVENTIONS:  1. Assess for possible contributors to thought disturbance, including medications, impaired vision or hearing, underlying metabolic abnormalities, dehydration, psychiatric diagnoses, and notify attending LIP  2. [place] high risk fall precautions, as indicated  3. Provide frequent short contacts to provide reality reorientation, refocusing and direction  4. Decrease environmental stimuli, including noise as appropriate  5. Monitor and intervene to maintain adequate nutrition, hydration, elimination, sleep and activity  6. If unable to ensure safety without constant attention obtain sitter and review sitter guidelines with assigned personnel  7.  Initiate Psychosocial CNS and Spiritual Care consult, as indicated  Outcome: Progressing     Problem: Neurosensory - Adult  Goal: Achieves stable or improved neurological status  Outcome: Progressing  Goal: Achieves maximal functionality and self care  Outcome: Progressing     Problem: Skin/Tissue Integrity - Adult  Goal: Skin integrity remains intact  Outcome: Progressing Problem: Musculoskeletal - Adult  Goal: Return mobility to safest level of function  Outcome: Progressing  Goal: Maintain proper alignment of affected body part  Outcome: Progressing     Problem: Gastrointestinal - Adult  Goal: Maintains or returns to baseline bowel function  Outcome: Progressing  Goal: Maintains adequate nutritional intake  Outcome: Progressing     Problem: Genitourinary - Adult  Goal: Absence of urinary retention  Outcome: Progressing     Problem: Infection - Adult  Goal: Absence of infection at discharge  Outcome: Progressing     Problem: Metabolic/Fluid and Electrolytes - Adult  Goal: Electrolytes maintained within normal limits  Outcome: Progressing

## 2023-02-09 NOTE — DISCHARGE SUMMARY
Name:  Mahnaz Valdez  Room:  8097/1813-52  MRN:    [id number]    Discharge Summary      This discharge summary is in conjunction with a complete physical exam done on the day of discharge. Discharging Provider: Dr. Reina Ramsey MD      Admit: 2/6/2023  Discharge: 02/09/23      HPI taken from admission H&P:    The patient is a 65 y.o. female with PMHx GERD, IBS, hx colon cancer, pancreatic cancer, CAD, HTN, osteoarthritis and anxiety who presented to [facility] ED via EMS from [facility] with complaint of rectal bleeding and abdominal pain. Patient had acute onset diarrhea last Thursday and Friday, that resolved with OTC medications. States she continued to experience crampy abdominal pain, however.  reports a similar but less severe bought of diarrhea around that same time. Per chart review, staff at the [facility] facility noted bloody stools starting yesterday. Patient herself was not aware.  at bedside does not have additional details. She endorses intermittent chills, but denies fever, nausea, vomiting, constipation or urinary symptoms. Patient admitted for further evaluation and symptomatic management of suspected colitis. Patient is a [employer] witness and does not accept blood products. Diagnoses this Admission and Hospital Course   #Colitis  #IBS  #Hx colon cancer 2012  -recent history of diarrhea, now resolved   -GI consulted  -FOBT+  -CT abdomen suggestive of inflammatory vs ischemic colitis  -lactate WNLs  -stool studies negative  -trending H&H  -patient is [employer], will not accept blood products  -continue IVF  -Continue IV cipro and flagyl D#3-for likely infectious bacterial colitis  -start clears and advanced to low fiber diet as tolerated   -will need OP colonoscopy in 4 weeks    Overall tolerating low fiber diet although oral intake is poor . patient has some mild abdominal tenderness and minimal diarrhea. Per GI okay to switch to oral antibiotics. discharged back to  [facility] today       # Hypokalemia   - repleted     # Hypomagnesemia   - repleted     # Breast  cancer  -follows w/ [facility]  -continue anastrozole     # S/P colectomy for colon cancer 2012     #  Needs Clarification on diagnosis of pancreatic Cancer . NH informed . I reviewed records on patient's current chart, as well as notes from oncologist office. No mention of pancreatic cancer. Patient's  clearly states that patient does not have pancreatic cancer. There is some discrepancy with her cancer diagnosis. .  Nursing home still insist that patient does have a diagnosis of pancreatic cancer although we cannot find documentation of this any. Recent reports from [facility] do not mention pancreatic cancer and patient's  also mentioned only breast cancer and colon cancer. Patient is ready for discharge from a GI standpoint. .  As for her cancer diagnosis and further documentation -> nursing home will need to  follow-up with [facility] and to clarify the diagnosis. #GERD  -continue pepcid      #Non-obstructing right nephrolithiasis  -incidental finding on CT     #Bilateral renal cysts  -incidental finding on CT     #HTN  -BP stable  -continue norvasc      #HLD  #CAD  -continue statin, coreg  -holding ASA     #Hypothyroidism  -continue synthroid      #Depression  #Anxiety  -continue zoloft      #Osteoarthritis   -tylenol prn     # History of dementia  -  she is more awake today , responding appropriately      Procedures (Please Review Full Report for Details)  N/A    Consults    IP CONSULT TO GI      Physical Exam at Discharge:  BP (!) 92/59   Pulse 82   Temp 97.5 °F (36.4 °C) (Oral)   Resp 18   Ht 5' 6\" (1.676 m)   Wt 137 lb 12.8 oz (62.5 kg)   SpO2 97%   BMI 22.24 kg/m²   Gen: No distress. Alert. Chronically ill-appearing elderly woman. More awake and responding today. partially oriented to person. Eyes: PERRL. No sclera icterus. No conjunctival injection.    ENT: No discharge. Pharynx clear. Neck: No JVD. No Carotid Bruit. Trachea midline. Resp: No accessory muscle use. No crackles. No wheezes. No rhonchi. CV: Regular rate. Regular rhythm. No murmur. No rub. No edema. Peripheral Pulses: +2 palpable, equal bilaterally   GI: Non-distended. No masses. No organomegaly. Normal bowel sounds. No hernia. Mildly tender to palpation   Skin: Warm and dry. No nodule on exposed extremities. No rash on exposed extremities. M/S: No cyanosis. No joint deformity. No clubbing. Neuro: Awake. Grossly nonfocal    Psych: Oriented to person and place. Not time or situation. Intermittent confusion. No anxiety or agitation. CBC:   Recent Labs     02/06/23 2140 02/07/23 1120 02/07/23 1643 02/07/23 2206 02/09/23 0514   WBC 7.7  --   --   --  6.4   HGB 12.2   < > 11.7* 10.8* 10.4*   HCT 35.4*   < > 34.4* 32.1* 31.0*   MCV 87.8  --   --   --  91.5     --   --   --  171    < > = values in this interval not displayed. BMP:   Recent Labs     02/06/23 2140 02/07/23 1643 02/08/23 0523 02/09/23 0514     --  139 140   K 5.3* 3.5 3.2* 3.2*     --  109 110   CO2 21  --  21 21   PHOS  --   --   --  2.4*   BUN 9  --  10 7   CREATININE 0.6  --  0.7 <0.5*     LIVER PROFILE:   Recent Labs     02/06/23 2140 02/08/23 0523   AST 39* 17   ALT 37 21   LIPASE 54.0  --    BILIDIR  --  <0.2   BILITOT 0.4 0.4   ALKPHOS 143* 103       PT/INR:   Recent Labs     02/06/23 2140   PROTIME 13.2   INR 1.02     APTT:   Recent Labs     02/06/23 2140   APTT 34.4*     UA:  Recent Labs     02/07/23  0000   COLORU Yellow   PHUR 6.0   WBCUA 6-9*   RBCUA 3-4   BACTERIA 2+*   CLARITYU Clear   SPECGRAV 1.010   LEUKOCYTESUR TRACE*   UROBILINOGEN 0.2   BILIRUBINUR Negative   BLOODU Negative   GLUCOSEU Negative       CULTURES  Covid/influenza not detected   GI panel neg  C.  Diff pending  Ova & Parasite pending  Giardia antigen pending     RADIOLOGY  CT ABDOMEN PELVIS W IV CONTRAST Additional Contrast? None   Final Result   1. Severe circumferential wall thickening involving the sigmoid colon and   rectum, most consistent with a focal infectious or inflammatory colitis or   ischemic colitis. There is no evidence of perforation, free air, or abscess. 2. Nonobstructing right nephrolithiasis. 3. Bilateral renal cysts. 4. Patient status post cholecystectomy with postoperative dilatation of the   biliary ducts. 5. Mild amounts of intraluminal gas within the urinary bladder, likely   secondary to prior instrumentation. Discharge Medications     Medication List        START taking these medications      ciprofloxacin 500 MG tablet  Commonly known as: CIPRO  Take 1 tablet by mouth 2 times daily for 5 days  Notes to patient: Ciprofloxacin (Cipro®)  Use: treat infections    Side effects: dizziness, nausea, diarrhea, or     headache. Finish all this medication     metroNIDAZOLE 500 MG tablet  Commonly known as: FLAGYL  Take 1 tablet by mouth 3 times daily for 7 days  Notes to patient: Metronidazole (Flagyl®)  Use: to treat infections. Side effects: upset stomach, diarrhea, headache,     metallic taste, changes in urine to black or brown.             CHANGE how you take these medications      polyethylene glycol 17 g packet  Commonly known as: GLYCOLAX  Take 17 g by mouth daily  What changed: when to take this            CONTINUE taking these medications      * acetaminophen 500 MG tablet  Commonly known as: TYLENOL     * acetaminophen 325 MG tablet  Commonly known as: TYLENOL     Acidophilus Lactobacillus Caps     amantadine 100 MG capsule  Commonly known as: SYMMETREL     amLODIPine 5 MG tablet  Commonly known as: NORVASC  Take 1 tablet by mouth daily     anastrozole 1 MG tablet  Commonly known as: ARIMIDEX     aspirin EC 81 MG EC tablet  Take 1 tablet by mouth daily     atorvastatin 40 MG tablet  Commonly known as: LIPITOR     carvedilol 3.125 MG tablet  Commonly known as: COREG     Compression Stockings Misc  by Does not apply route     famotidine 20 MG tablet  Commonly known as: PEPCID     furosemide 20 MG tablet  Commonly known as: LASIX  Take up to once a week for leg swelling if needed     levothyroxine 75 MCG tablet  Commonly known as: SYNTHROID  Take 1 tablet by mouth daily     loperamide 2 MG tablet  Commonly known as: IMODIUM A-D     melatonin 3 MG Tabs tablet     mirtazapine 7.5 MG tablet  Commonly known as: REMERON     ondansetron 4 MG disintegrating tablet  Commonly known as: ZOFRAN-ODT     ondansetron 4 MG tablet  Commonly known as: ZOFRAN     potassium chloride 20 MEQ/15ML (10%) solution     sertraline 50 MG tablet  Commonly known as: ZOLOFT     vitamin C 500 MG tablet  Commonly known as: ASCORBIC ACID           * This list has 2 medication(s) that are the same as other medications prescribed for you. Read the directions carefully, and ask your doctor or other care provider to review them with you. STOP taking these medications      bisacodyl 10 MG suppository  Commonly known as: DULCOLAX     citalopram 40 MG tablet  Commonly known as: CELEXA     fluconazole 100 MG tablet  Commonly known as: DIFLUCAN     nitrofurantoin (macrocrystal-monohydrate) 100 MG capsule  Commonly known as: MACROBID     potassium chloride 20 MEQ extended release tablet  Commonly known as: KLOR-CON M     risperiDONE 1 MG tablet  Commonly known as: RISPERDAL               Where to Get Your Medications        Information about where to get these medications is not yet available    Ask your nurse or doctor about these medications  ciprofloxacin 500 MG tablet  metroNIDAZOLE 500 MG tablet  polyethylene glycol 17 g packet           Discharged in stable condition to [facility]    Total time 40 minutes. > 50%  dominated by  coordination of care. Follow Up:   Follow up with physician at [facility]

## 2023-02-09 NOTE — CARE COORDINATION
DISCHARGE ORDER  Date/Time 2023 2:55 PM  Completed by: Morey Najjar, RN, Case Management    Patient Name: Kristin Sanderson    : 1945      Admit order Date and Status:2023 inpt  Noted discharge order. (verify MD's last order for status of admission/Traditional Medicare 3 [place] Inpatient qualifying stay required for SNF)    Confirmed discharge plan with:              Patient:  No              When pt confirms DC plan does any support person need to be contacted by CM Yes if yes who_spouse at bedside_____                      Discharge to Facility: [address] [place] [address] phone number for staff giving report: [phone]   Pre-certification completed: LTC not needed   Hospital Exemption Notification (HENS) completed: LTC not needed   Discharge orders and Continuity of Care faxed to facility:  Jennifer Mcdaniel to pull via [employer]:               Medical Transport explained with choice list offered to pt/family. Choice:Yes(no preference)  Agency used: Thelbert Pillion up time:   [phone]      Pt/family/Nursing/Facility aware of  time:   Yes Names: spouse/bedside RN Tanja Lombardi @ [facility]  Ambulance form completed:  yes:      Date Last IMM Given: 2023    Comments:Chart reviewed. Met with spouse at bedside and pt is returning to [facility] @ [facility], spouse aware of pickup time. Jennifer Mcdaniel @ [facility] aware pt will return today and can accept negative covid test from 2023. Pt is being d/c'd to [facility] today. Pt's O2 sats are 97% on RA. Discharge timeout done with Jane Rivera. All discharge needs and concerns addressed. Discharging nurse to complete TALHA, reconcile AVS, and place final copy with patient's discharge packet. Discharging RN to ensure that written prescriptions for  Level II medications are sent with patient to the facility as per protocol.

## 2023-02-09 NOTE — PROGRESS NOTES
PROGRESS NOTE  S:77 yrs Patient  admitted on 2023 with Colitis [K52.9]  Rectal bleeding [K62.5] . She denies abdominal pain today. No  reports of bleeding or diarrhea. She is tolerating a low fiber diet    Exam:   Vitals:    23 0742   BP: (!) 114/57   Pulse: 69   Resp: 16   Temp: 98.4 °F (36.9 °C)   SpO2: 95%      General appearance: appears stated age, cachectic, and Somnolent  Head: Normocephalic, without obvious abnormality  Neck: supple, symmetrical, trachea midline  Heart: regular rate and rhythm  Abdomen:  soft, NT, ND  Extremities: no edema    Medications: Reviewed    Labs:  CBC:   Recent Labs     23   WBC 7.7  --   --   --  6.4   HGB 12.2   < > 11.7* 10.8* 10.4*   HCT 35.4*   < > 34.4* 32.1* 31.0*   MCV 87.8  --   --   --  91.5     --   --   --  171    < > = values in this interval not displayed. BMP:   Recent Labs     2314     --  139 140   K 5.3* 3.5 3.2* 3.2*     --  109 110   CO2 21  --  21 21   PHOS  --   --   --  2.4*   BUN 9  --  10 7   CREATININE 0.6  --  0.7 <0.5*     LIVER PROFILE:   Recent Labs     23   AST 39* 17   ALT 37 21   LIPASE 54.0  --    PROT 7.3 5.5*   BILIDIR  --  <0.2   BILITOT 0.4 0.4   ALKPHOS 143* 103     PT/INR:   Recent Labs     23   INR 1.02     Imagin. Severe circumferential wall thickening involving the sigmoid colon and   rectum, most consistent with a focal infectious or inflammatory colitis or   ischemic colitis. There is no evidence of perforation, free air, or abscess. 2. Nonobstructing right nephrolithiasis. 3. Bilateral renal cysts. 4. Patient status post cholecystectomy with postoperative dilatation of the   biliary ducts.    5. Mild amounts of intraluminal gas within the urinary bladder, likely   secondary to prior instrumentation. Assessment:   Pt. Is a 65 y.o. female with pmx of CAD, GERD, HTN, HLD, IBS, osteoarthritis, colon cancer (2012) admitted with colitis likely infectious versus ischemic etiologies.      Plan:   Continue supportive care  Broad spectrum antibiotics x 10d  Monitor Hgb  Outpatient colonoscopy in 4 weeks  Low fiber diet  Okay to discharge from GI standpoint        Emmanuelle Kuo MD, MD  10:43 AM 2/9/2023

## 2023-02-09 NOTE — PLAN OF CARE
Problem: Safety - Adult  Goal: Free from fall injury  Description: INTERVENTIONS:  Outcome: Progressing     Problem: Pain  Goal: Pain level  Description: Minimal reported or demonstrated pain. Outcome: Progressing     Problem: Skin/Tissue Integrity  Goal: Absence of new skin breakdown  Description: 1. Monitor for areas of redness and/or skin breakdown  2. Assess vascular access sites hourly  3. Every 4-6 hours minimum:  Change oxygen saturation probe site  4. Every 4-6 hours:  If on nasal continuous positive airway pressure, respiratory therapy assess nares and determine need for appliance change or resting period. Outcome: Progressing     Problem: Discharge Planning  Goal: Discharge to home or other facility with appropriate resources  Outcome: Progressing     Problem: Confusion  Goal: Confusion, delirium, dementia, or psychosis is improved or at baseline  Description: INTERVENTIONS:  1. Assess for possible contributors to thought disturbance, including medications, impaired vision or hearing, underlying metabolic abnormalities, dehydration, psychiatric diagnoses, and notify attending LIP  2. [place] high risk fall precautions, as indicated  3. Provide frequent short contacts to provide reality reorientation, refocusing and direction  4. Decrease environmental stimuli, including noise as appropriate  5. Monitor and intervene to maintain adequate nutrition, hydration, elimination, sleep and activity  6. If unable to ensure safety without constant attention obtain sitter and review sitter guidelines with assigned personnel  7.  Initiate Psychosocial CNS and Spiritual Care consult, as indicated  Outcome: Progressing     Problem: Neurosensory - Adult  Goal: Achieves stable or improved neurological status  Outcome: Progressing  Goal: Achieves maximal functionality and self care  Outcome: Progressing     Problem: Neurosensory - Adult  Goal: Achieves stable or improved neurological status  Outcome: Progressing  Goal: Achieves maximal functionality and self care  Outcome: Progressing     Problem: Skin/Tissue Integrity - Adult  Goal: Skin integrity remains intact  Outcome: Progressing  Flowsheets (Taken 2/8/2023 2058)  Skin Integrity Remains Intact:   Monitor for areas of redness and/or skin breakdown   Assess vascular access sites hourly   Every 4-6 hours minimum: Change oxygen saturation probe site   Every 4-6 hours: If on nasal continuous positive airway pressure, respiratory therapy assesses nares and determine need for appliance change or resting period     Problem: Gastrointestinal - Adult  Goal: Maintains or returns to baseline bowel function  Outcome: Progressing  Goal: Maintains adequate nutritional intake  Outcome: Progressing     Problem: Musculoskeletal - Adult  Goal: Return mobility to safest level of function  Outcome: Progressing  Goal: Maintain proper alignment of affected body part  Outcome: Progressing     Problem: Genitourinary - Adult  Goal: Absence of urinary retention  Outcome: Progressing     Problem: Infection - Adult  Goal: Absence of infection at discharge  Outcome: Progressing     Problem: Metabolic/Fluid and Electrolytes - Adult  Goal: Electrolytes maintained within normal limits  Outcome: Progressing

## 2023-02-09 NOTE — FLOWSHEET NOTE
02/09/23 0742   Vital Signs   Temp 98.4 °F (36.9 °C)   Temp Source Oral   Heart Rate 69   Heart Rate Source Monitor   Resp 16   BP (!) 114/57   MAP (Calculated) 76   BP Location Left upper arm   BP Method Automatic   Patient Position Semi fowlers   Level of Consciousness 0   MEWS Score 1   Oxygen Therapy   SpO2 95 %   O2 Device None (Room air)   Pt a/o. Am assessment completed see flow sheet. Pt denies any pain/ needs at this time. Call light within reach. Will continue to monitor. Bedside Mobility Assessment Tool (BMAT):     Assessment Level 1- Sit and Shake    1. From a semi-reclined position, ask patient to sit up and rotate to a seated position at the side of the bed. Can use the bedrail. 2. Ask patient to reach out and grab your hand and shake making sure patient reaches across his/her midline. Fail- Patient is unable to perform tasks, patient is MOBILITY LEVEL 1. Assessment Level 2- Stretch and Point   1. With patient in seated position at the side of the bed, have patient place both feet on the floor (or stool) with knees no higher than hips. 2. Ask patient to stretch one leg and straighten the knee, then bend the ankle/flex and point the toes. If appropriate, repeat with the other leg. Fail- Patient is unable to complete task. Patient is MOBILITY LEVEL 2. Assessment Level 3- Stand   1. Ask patient to elevate off the bed or chair (seated to standing) using an assistive device (cane, bedrail). 2. Patient should be able to raise buttocks off be and hold for a count of five. May repeat once. Fail- Patient unable to demonstrate standing stability. Patient is MOBILITY LEVEL 3. Assessment Level 4- Walk   1. Ask patient to march in place at bedside. 2. Then ask patient to advance step and return each foot. Some medical conditions may render a patient from stepping backwards, use your best clinical judgement.    Fail- Patient not able to complete tasks OR requires use of assistive device. Patient is MOBILITY LEVEL 3.        Mobility Level- 2

## 2023-02-09 NOTE — DISCHARGE INSTR - COC
Continuity of Care Form    Patient Name: Mary Pereyra   :  1945  MRN:  [id number]    Admit date:  2023  Discharge date:  2023    Code Status Order: Full Code   Advance Directives:     Admitting Physician:  Feliciano Shepherd MD  PCP: Erick Morin MD    Discharging Nurse: [facility] Unit/Room#: 8404/5498-16  Discharging Unit Phone Number: [phone]    Emergency Contact:   Extended Emergency Contact Information  Primary Emergency Contact: QuintenRaymond MAMIE  Address: [address] [place] [address] Phone: [phone]  Mobile Phone: [phone]  Relation: Spouse  Secondary Emergency Contact: [address] [place] [address] Phone: [phone]  Relation: Child   needed?  No    Past Surgical History:  Past Surgical History:   Procedure Laterality Date    APPENDECTOMY      BREAST LUMPECTOMY Bilateral 2021    RIGHT RADIO FREQUENCY IDENTIFICATION TAG LOCALIZED PARTIAL MASTECTOMY TIMES TWO; LEFT RADIO FREQUENCY IDENTIFICATION TAG LOCALIZED PARTIAL MASTECTOMY performed by Jocelynn Ko MD at [address]  2014    [facility]    COLONOSCOPY  3/29/16    normal    US BREAST BIOPSY NEEDLE ADDITIONAL RIGHT Right 2021    US BREAST BIOPSY NEEDLE ADDITIONAL RIGHT 2021 Mike Weir MD [facility] W LOC DEVICE 1ST LESION LEFT Left 2021    US BREAST NEEDLE BIOPSY LEFT 2021 Katty De La Cruz MD [facility] [place] W LOC DEVICE 1ST LESION RIGHT Right 2021    US BREAST NEEDLE BIOPSY RIGHT 2021 Katty De La Cruz MD [facility]    US BREAST BIOPSY W LOC DEVICE 1ST LESION RIGHT Right 2021    US BREAST NEEDLE BIOPSY RIGHT 2021 Mike Weir MD [facility] [address] NEEDLE LOC BREAST ADDL RIGHT Right 2021    US GUIDED NEEDLE LOC BREAST ADDL RIGHT 2021 [facility]    US GUIDED NEEDLE LOC OF LEFT BREAST Left 7/20/2021    US GUIDED NEEDLE LOC OF LEFT BREAST 7/20/2021 [facility]    US GUIDED NEEDLE LOC OF RIGHT BREAST Right 7/20/2021    US GUIDED NEEDLE LOC OF RIGHT BREAST 7/20/2021 [address] [facility]       Immunization History:   Immunization History   Administered Date(s) Administered    COVID-19, MODERNA BLUE border, Primary or Immunocompromised, (age 12y+), IM, 100 mcg/0.5mL 02/05/2021, 03/05/2021, 11/04/2021    Influenza Vaccine, unspecified formulation 12/22/2016    Influenza Virus Vaccine 11/12/2014, 12/22/2016    Influenza, FLUARIX, FLULAVAL, FLUZONE (age 10 mo+) AND AFLURIA, (age 1 y+), PF, 0.5mL 11/28/2018, 10/13/2020    Influenza, High Dose (Fluzone 65 yrs and older) 10/20/2016, 12/17/2019    Pneumococcal Conjugate 13-valent (Nccgobc25) 10/20/2016    Pneumococcal Polysaccharide (Gbhitdrau42) 02/23/2015, 12/22/2016    Tdap (Boostrix, Adacel) 07/30/2012       Active Problems:  Patient Active Problem List   Diagnosis Code    GERD (gastroesophageal reflux disease) K21.9    Irritable bowel syndrome (IBS) K58.9    Chest pain R07.9    Near syncope R55    Shortness of breath R06.02    Dizziness R42    Cardiac microvascular disease I25.89    Coronary artery disease involving native coronary artery of native heart without angina pectoris I25.10    Mixed hyperlipidemia E78.2    MCI (mild cognitive impairment) G31.84    HTN (hypertension), benign I10    Dysthymia F34.1    Dyslipidemia E78.5    AMS (altered mental status) R41.82    Acute encephalopathy G93.40    Acute respiratory failure with hypoxemia (HCC) J96.01    Low grade fever R50.9    Hyponatremia E87.1    Acute respiratory failure (HCC) J96.00    Unsteady gait R26.81    Pre-syncope R55    Acute UTI N39.0    Hypotension I95.9    Abnormal chest x-ray R93.89    Acute pain of right shoulder M25.511    Right wrist pain M25.531    Rotator cuff strain, right, initial encounter S46.011A    Right wrist sprain, initial encounter S63.501A    Malignant neoplasm of upper-outer quadrant of right breast in female, estrogen receptor positive ([facility] C50.411, Z17.0    Malignant neoplasm of lower-outer quadrant of left breast of female, estrogen receptor positive (HCC) C50.512, Z17.0    Malignant neoplasm of lower-inner quadrant of right breast of female, estrogen receptor positive ([facility] C50.311, Z17.0    Swelling of both lower extremities M79.89    Anxiety with depression F41.8    Clogged feeding tube T85.598A    Constipation K59.00    Pulmonary nodules R91.8    Encounter for nasogastric (NG) tube placement Z46.59    Lives in long-term care facility Z59.3    Hypovolemia E86.1    FREDI (acute kidney injury) ([facility] N17.9    Colitis K52.9    Rectal bleeding K62.5       Isolation/Infection:   Isolation            C Diff Contact          Patient Infection Status       Infection Onset Added Last Indicated Last Indicated By Review Planned Expiration Resolved Resolved By    C-diff Rule Out 02/07/23 02/07/23 02/07/23 Clostridium Difficile Toxin/Antigen (Ordered) 02/14/23 02/17/23      Resolved    COVID-19 (Rule Out) 02/07/23 02/07/23 02/07/23 COVID-19 & Influenza Combo (Ordered)   02/07/23 Rule-Out Test Resulted    COVID-19 (Rule Out) 01/28/22 01/28/22 01/28/22 COVID-19 & Influenza Combo (Ordered)   01/28/22 Rule-Out Test Resulted    COVID-19 (Rule Out) 05/18/20 05/18/20 05/18/20 COVID-19 (Ordered)   05/18/20 Rule-Out Test Resulted            Nurse Assessment:  Last Vital Signs: BP (!) 114/57   Pulse 69   Temp 98.4 °F (36.9 °C) (Oral)   Resp 16   Ht 5' 6\" (1.676 m)   Wt 137 lb 12.8 oz (62.5 kg)   SpO2 95%   BMI 22.24 kg/m²     Last documented pain score (0-10 scale): Pain Level: 0  Last Weight:   Wt Readings from Last 1 Encounters:   02/06/23 137 lb 12.8 oz (62.5 kg)     Mental Status:  disoriented    IV Access:  - None    Nursing Mobility/ADLs:  Walking   Assisted  Transfer  Assisted  Bathing  Assisted  Dressing Assisted  Toileting  Dependent  Feeding  Assisted  Med Admin  Assisted  Med Delivery   crushed with applesauce    Wound Care Documentation and Therapy:        Elimination:  Continence: Bowel: No  Bladder: No  Urinary Catheter: None   Colostomy/Ileostomy/Ileal Conduit: No       Date of Last BM: 2/9/2023      Intake/Output Summary (Last 24 hours) at 2/9/2023 1245  Last data filed at 2/9/2023 1153  Gross per 24 hour   Intake 1600.76 ml   Output 1100 ml   Net 500.76 ml     I/O last 3 completed shifts: In: 4002.7 [P.O.:480; I.V.:2410.9; IV Piggyback:1111.8]  Out: 900 [Urine:900]    Safety Concerns: At Risk for Falls    Impairments/Disabilities:      None    Nutrition Therapy:  Current Nutrition Therapy:   - Oral Diet:  Low Fiber    Routes of Feeding: Oral  Liquids: Thin Liquids  Daily Fluid Restriction: no  Last Modified Barium Swallow with Video (Video Swallowing Test): not done    Treatments at the Time of Hospital Discharge:   Respiratory Treatments: na    Oxygen Therapy:  is not on home oxygen therapy.   Ventilator:    - No ventilator support    Rehab Therapies: {THERAPEUTIC INTERVENTION:9360730713}  Weight Bearing Status/Restrictions: No weight bearing restrictions  Other Medical Equipment (for information only, NOT a DME order):  {EQUIPMENT:003563950}  Other Treatments: ***    Patient's personal belongings (please select all that are sent with patient):  {[facility] DME Belongings:609900464}    RN SIGNATURE:  Electronically signed by Maria Guadalupe Gonzalez RN on 2/9/23 at 3:33 PM EST    CASE MANAGEMENT/SOCIAL WORK SECTION    Inpatient Status Date: ***    Readmission Risk Assessment Score:  Readmission Risk              Risk of Unplanned Readmission:  23           Discharging to Facility/ Agency   Name:   Address:  Phone:  Fax:    Dialysis Facility (if applicable)   Name:  Address:  Dialysis Schedule:  Phone:  Fax:    / signature: {Esignature:[id number]}    PHYSICIAN SECTION    Prognosis: Fair    Condition at Discharge: Stable    Rehab Potential (if transferring to Rehab): Fair    Recommended Labs or Other Treatments After Discharge:     Physician Certification: I certify the above information and transfer of Donnie Wallis  is necessary for the continuing treatment of the diagnosis listed and that she requires [facility] for greater 30 days.      Update Admission H&P: No change in H&P    PHYSICIAN SIGNATURE:  Electronically signed by Jonh Dowd MD on 2/9/23 at 12:45 PM EST

## 2023-02-10 LAB
CRYPTOSPORIDIUM ANTIGEN STOOL: NORMAL
E HISTOLYTICA ANTIGEN STOOL: NORMAL
GI BACTERIAL PATHOGENS BY PCR: NORMAL
GIARDIA ANTIGEN STOOL: NORMAL

## 2023-02-11 LAB — ROTAVIRUS ANTIGEN: NEGATIVE

## 2023-06-21 NOTE — TELEPHONE ENCOUNTER
Spoke with pt's  he stated there has been no change in her cardiac status. No new symptoms. Pt is scheduled for EKG on 7/22 per [facility]. If clearance is given please place letter in [address] [place] [address] [address]. [address]

## 2023-08-06 ENCOUNTER — HOSPITAL ENCOUNTER (EMERGENCY)
Age: 78
Discharge: NURSING, REHAB OR LONG-TERM CARE | End: 2023-08-06
Payer: COMMERCIAL

## 2023-08-06 ENCOUNTER — APPOINTMENT (OUTPATIENT)
Dept: GENERAL RADIOLOGY | Age: 78
End: 2023-08-06
Payer: COMMERCIAL

## 2023-08-06 VITALS
SYSTOLIC BLOOD PRESSURE: 125 MMHG | OXYGEN SATURATION: 98 % | HEART RATE: 81 BPM | TEMPERATURE: 97.9 F | DIASTOLIC BLOOD PRESSURE: 79 MMHG | RESPIRATION RATE: 19 BRPM

## 2023-08-06 DIAGNOSIS — R55 NEAR SYNCOPE: Primary | ICD-10-CM

## 2023-08-06 DIAGNOSIS — N30.00 ACUTE CYSTITIS WITHOUT HEMATURIA: ICD-10-CM

## 2023-08-06 LAB
ALBUMIN SERPL-MCNC: 3.3 G/DL (ref 3.4–5)
ALBUMIN/GLOB SERPL: 0.8 {RATIO} (ref 1.1–2.2)
ALP SERPL-CCNC: 163 U/L (ref 40–129)
ALT SERPL-CCNC: 60 U/L (ref 10–40)
ANION GAP SERPL CALCULATED.3IONS-SCNC: 13 MMOL/L (ref 3–16)
AST SERPL-CCNC: 68 U/L (ref 15–37)
BACTERIA URNS QL MICRO: ABNORMAL /HPF
BASOPHILS # BLD: 0 K/UL (ref 0–0.2)
BASOPHILS NFR BLD: 0 %
BILIRUB SERPL-MCNC: 0.3 MG/DL (ref 0–1)
BILIRUB UR QL STRIP.AUTO: NEGATIVE
BUN SERPL-MCNC: 17 MG/DL (ref 7–20)
CALCIUM SERPL-MCNC: 9.3 MG/DL (ref 8.3–10.6)
CHLORIDE SERPL-SCNC: 103 MMOL/L (ref 99–110)
CLARITY UR: CLEAR
CO2 SERPL-SCNC: 23 MMOL/L (ref 21–32)
COLOR UR: YELLOW
CREAT SERPL-MCNC: 1.3 MG/DL (ref 0.6–1.2)
DEPRECATED RDW RBC AUTO: 14.2 % (ref 12.4–15.4)
EOSINOPHIL # BLD: 0.2 K/UL (ref 0–0.6)
EOSINOPHIL NFR BLD: 2 %
EPI CELLS #/AREA URNS HPF: ABNORMAL /HPF (ref 0–5)
GFR SERPLBLD CREATININE-BSD FMLA CKD-EPI: 42 ML/MIN/{1.73_M2}
GLUCOSE SERPL-MCNC: 105 MG/DL (ref 70–99)
GLUCOSE UR STRIP.AUTO-MCNC: NEGATIVE MG/DL
HCT VFR BLD AUTO: 29.7 % (ref 36–48)
HGB BLD-MCNC: 10.3 G/DL (ref 12–16)
HGB UR QL STRIP.AUTO: ABNORMAL
KETONES UR STRIP.AUTO-MCNC: NEGATIVE MG/DL
LEUKOCYTE ESTERASE UR QL STRIP.AUTO: ABNORMAL
LYMPHOCYTES # BLD: 3.4 K/UL (ref 1–5.1)
LYMPHOCYTES NFR BLD: 45 %
MCH RBC QN AUTO: 30.2 PG (ref 26–34)
MCHC RBC AUTO-ENTMCNC: 34.5 G/DL (ref 31–36)
MCV RBC AUTO: 87.7 FL (ref 80–100)
MONOCYTES # BLD: 0.2 K/UL (ref 0–1.3)
MONOCYTES NFR BLD: 3 %
NEUTROPHILS # BLD: 3.8 K/UL (ref 1.7–7.7)
NEUTROPHILS NFR BLD: 50 %
NITRITE UR QL STRIP.AUTO: POSITIVE
PH UR STRIP.AUTO: 6.5 [PH] (ref 5–8)
PLATELET # BLD AUTO: 261 K/UL (ref 135–450)
PLATELET BLD QL SMEAR: ADEQUATE
PMV BLD AUTO: 7.3 FL (ref 5–10.5)
POTASSIUM SERPL-SCNC: 4.4 MMOL/L (ref 3.5–5.1)
PROT SERPL-MCNC: 7.4 G/DL (ref 6.4–8.2)
PROT UR STRIP.AUTO-MCNC: NEGATIVE MG/DL
RBC # BLD AUTO: 3.39 M/UL (ref 4–5.2)
RBC #/AREA URNS HPF: ABNORMAL /HPF (ref 0–4)
SLIDE REVIEW: ABNORMAL
SODIUM SERPL-SCNC: 139 MMOL/L (ref 136–145)
SP GR UR STRIP.AUTO: <=1.005 (ref 1–1.03)
TROPONIN, HIGH SENSITIVITY: 25 NG/L (ref 0–14)
TROPONIN, HIGH SENSITIVITY: 29 NG/L (ref 0–14)
UA COMPLETE W REFLEX CULTURE PNL UR: ABNORMAL
UA DIPSTICK W REFLEX MICRO PNL UR: YES
URN SPEC COLLECT METH UR: ABNORMAL
UROBILINOGEN UR STRIP-ACNC: 0.2 E.U./DL
WBC # BLD AUTO: 7.5 K/UL (ref 4–11)
WBC #/AREA URNS HPF: ABNORMAL /HPF (ref 0–5)

## 2023-08-06 PROCEDURE — 2580000003 HC RX 258: Performed by: NURSE PRACTITIONER

## 2023-08-06 PROCEDURE — 80053 COMPREHEN METABOLIC PANEL: CPT

## 2023-08-06 PROCEDURE — 6370000000 HC RX 637 (ALT 250 FOR IP): Performed by: NURSE PRACTITIONER

## 2023-08-06 PROCEDURE — 81001 URINALYSIS AUTO W/SCOPE: CPT

## 2023-08-06 PROCEDURE — 87086 URINE CULTURE/COLONY COUNT: CPT

## 2023-08-06 PROCEDURE — 85025 COMPLETE CBC W/AUTO DIFF WBC: CPT

## 2023-08-06 PROCEDURE — 93005 ELECTROCARDIOGRAM TRACING: CPT | Performed by: NURSE PRACTITIONER

## 2023-08-06 PROCEDURE — 99285 EMERGENCY DEPT VISIT HI MDM: CPT

## 2023-08-06 PROCEDURE — 84484 ASSAY OF TROPONIN QUANT: CPT

## 2023-08-06 PROCEDURE — 71045 X-RAY EXAM CHEST 1 VIEW: CPT

## 2023-08-06 PROCEDURE — 36415 COLL VENOUS BLD VENIPUNCTURE: CPT

## 2023-08-06 RX ORDER — NITROFURANTOIN 25; 75 MG/1; MG/1
100 CAPSULE ORAL 2 TIMES DAILY
Qty: 14 CAPSULE | Refills: 0 | Status: SHIPPED | OUTPATIENT
Start: 2023-08-06 | End: 2023-08-13

## 2023-08-06 RX ORDER — NITROFURANTOIN 25; 75 MG/1; MG/1
100 CAPSULE ORAL ONCE
Status: COMPLETED | OUTPATIENT
Start: 2023-08-06 | End: 2023-08-06

## 2023-08-06 RX ORDER — NITROFURANTOIN 25; 75 MG/1; MG/1
100 CAPSULE ORAL 2 TIMES DAILY
Qty: 14 CAPSULE | Refills: 0 | Status: SHIPPED | OUTPATIENT
Start: 2023-08-06 | End: 2023-08-06 | Stop reason: SDUPTHER

## 2023-08-06 RX ORDER — 0.9 % SODIUM CHLORIDE 0.9 %
500 INTRAVENOUS SOLUTION INTRAVENOUS ONCE
Status: COMPLETED | OUTPATIENT
Start: 2023-08-06 | End: 2023-08-06

## 2023-08-06 RX ADMIN — SODIUM CHLORIDE 500 ML: 9 INJECTION, SOLUTION INTRAVENOUS at 14:43

## 2023-08-06 RX ADMIN — NITROFURANTOIN MONOHYDRATE/MACROCRYSTALLINE 100 MG: 25; 75 CAPSULE ORAL at 18:02

## 2023-08-06 ASSESSMENT — ENCOUNTER SYMPTOMS
SHORTNESS OF BREATH: 0
NAUSEA: 0
VOMITING: 0
ABDOMINAL PAIN: 0
EYE PAIN: 0
RHINORRHEA: 0
SORE THROAT: 0
COUGH: 0
BACK PAIN: 0

## 2023-08-06 ASSESSMENT — PAIN - FUNCTIONAL ASSESSMENT: PAIN_FUNCTIONAL_ASSESSMENT: 0-10

## 2023-08-06 ASSESSMENT — PAIN SCALES - GENERAL: PAINLEVEL_OUTOF10: 0

## 2023-08-06 NOTE — ED NOTES
Attempts for BLS transport to [facility] ROCÍO Weathers  08/06/23 2191

## 2023-08-06 NOTE — ED PROVIDER NOTES
[address] [address] ED  EMERGENCY DEPARTMENT ENCOUNTER        Pt Name: Walter Murillo  MRN: [id number]  [address] [facility] [place] 1945  Date of evaluation: 8/6/2023  Provider: MOHINDER Kent - CORA  PCP: Nancy Young MD  Note Started: 7:29 PM EDT 8/6/23      GREG. I have evaluated this patient. CHIEF COMPLAINT       Chief Complaint   Patient presents with    Dizziness     Pt brought in by EMS from [facility] for hypotension. Family reported to EMS pt had near syncopal episode while sitting outside. Pt states that when she stood up she became dizzy        HISTORY OF PRESENT ILLNESS: 1 or more Elements     History From: Patient     Limitations to history : None    Social Determinants Significantly Affecting Health : None    Chief Complaint: Jennifre Santiago is a 77 y.o. female who presents to the emergency department with symptoms of dizziness lightheadedness. Ultimately she reported to me that she started having a lightheadedness but whenever she stood up. She felt lightheaded like she may pass out. She ultimately is a resident at the [facility]. Patient states has been eating and drinking well. States that she got lightheaded upon standing and without information to decide to go and bring her to the hospital.  Denies any symptoms currently. States that she feels well. She is been awake alert and answering my questions somewhat appropriately. She does have a history dementia but otherwise answers questions appropriately. I spoke with her  who is at bedside and states that she is at her baseline and said that she had stood up felt lightheaded and let the nurse know what she was feeling and ultimately decided to send her to the emerge department for evaluation. Nursing Notes were all reviewed and agreed with or any disagreements were addressed in the HPI.     REVIEW OF SYSTEMS :      Review of Systems   Constitutional:  Negative for chills, diaphoresis and

## 2023-08-06 NOTE — ED NOTES
1427  12 lead ECG completed, given to Dr. Arthur Mccloud for review     Alicia Fernández  08/06/23 8279

## 2023-08-06 NOTE — ED PROVIDER NOTES
I did not perform a face-to-face evaluation of this patient. I did interpret the patient's EKG as noted below: The Ekg interpreted by me shows  normal sinus rhythm with a rate of 78  Axis is   Left axis deviation  QTc is  normal  Intervals and Durations are unremarkable.       ST Segments: no acute change  No significant change from prior EKG dated 7/5/22         Jesus Navas MD  08/06/23 8837

## 2023-08-07 LAB
BACTERIA UR CULT: NORMAL
EKG ATRIAL RATE: 78 BPM
EKG DIAGNOSIS: NORMAL
EKG P AXIS: 34 DEGREES
EKG P-R INTERVAL: 194 MS
EKG Q-T INTERVAL: 390 MS
EKG QRS DURATION: 74 MS
EKG QTC CALCULATION (BAZETT): 444 MS
EKG R AXIS: -19 DEGREES
EKG T AXIS: 50 DEGREES
EKG VENTRICULAR RATE: 78 BPM

## 2023-08-07 PROCEDURE — 93010 ELECTROCARDIOGRAM REPORT: CPT | Performed by: INTERNAL MEDICINE

## 2023-08-07 NOTE — ED NOTES
Report called to [facility] spoke to [facility]. Pt left in stable condition via stretcher per ambulance transport pt transported back to [facility].      [address], RN  08/06/23 2002

## 2023-08-07 NOTE — ED NOTES
Unable to verify insurance for transport to [facility] (on the phone for over an hour) [facility] [employer] approved.  Tan Shin Clinical

## 2023-09-11 NOTE — ED NOTES
Report to oncoming RN. LUISA.  at bedside. Moderate brown bm. Pt cleaned and changed.          Ye Ghotra RN  01/28/22 3431 Bexarotene Pregnancy And Lactation Text: This medication is Pregnancy Category X and should not be given to women who are pregnant or may become pregnant. This medication should not be used if you are breast feeding.

## 2024-10-14 LAB — VALPROIC ACID LEVEL: 13 MCG/ML (ref 50–100)

## 2025-02-27 ENCOUNTER — HOSPITAL ENCOUNTER (INPATIENT)
Age: 80
LOS: 4 days | DRG: 871 | End: 2025-03-03
Attending: EMERGENCY MEDICINE | Admitting: INTERNAL MEDICINE
Payer: COMMERCIAL

## 2025-02-27 ENCOUNTER — APPOINTMENT (OUTPATIENT)
Dept: GENERAL RADIOLOGY | Age: 80
DRG: 871 | End: 2025-02-27
Payer: COMMERCIAL

## 2025-02-27 ENCOUNTER — APPOINTMENT (OUTPATIENT)
Dept: CT IMAGING | Age: 80
DRG: 871 | End: 2025-02-27
Payer: COMMERCIAL

## 2025-02-27 DIAGNOSIS — J18.9 MULTIFOCAL PNEUMONIA: ICD-10-CM

## 2025-02-27 DIAGNOSIS — R91.8 LUNG MASS: ICD-10-CM

## 2025-02-27 DIAGNOSIS — I26.99 OTHER PULMONARY EMBOLISM WITHOUT ACUTE COR PULMONALE, UNSPECIFIED CHRONICITY (HCC): ICD-10-CM

## 2025-02-27 DIAGNOSIS — E87.0 HYPERNATREMIA: ICD-10-CM

## 2025-02-27 DIAGNOSIS — J10.1 INFLUENZA A: Primary | ICD-10-CM

## 2025-02-27 DIAGNOSIS — E86.0 DEHYDRATION: ICD-10-CM

## 2025-02-27 LAB
ALBUMIN SERPL-MCNC: 2.8 G/DL (ref 3.4–5)
ALBUMIN/GLOB SERPL: 0.7 {RATIO} (ref 1.1–2.2)
ALP SERPL-CCNC: 163 U/L (ref 40–129)
ALT SERPL-CCNC: 85 U/L (ref 10–40)
ANION GAP SERPL CALCULATED.3IONS-SCNC: 12 MMOL/L (ref 3–16)
ANISOCYTOSIS BLD QL SMEAR: ABNORMAL
AST SERPL-CCNC: 125 U/L (ref 15–37)
BASE EXCESS BLDV CALC-SCNC: -7.8 MMOL/L (ref -3–3)
BASOPHILS # BLD: 0.2 K/UL (ref 0–0.2)
BASOPHILS NFR BLD: 1 %
BILIRUB SERPL-MCNC: 0.3 MG/DL (ref 0–1)
BUN SERPL-MCNC: 51 MG/DL (ref 7–20)
CALCIUM SERPL-MCNC: 9.3 MG/DL (ref 8.3–10.6)
CHLORIDE SERPL-SCNC: 122 MMOL/L (ref 99–110)
CO2 BLDV-SCNC: 17 MMOL/L
CO2 SERPL-SCNC: 15 MMOL/L (ref 21–32)
COHGB MFR BLDV: 5.1 % (ref 0–1.5)
CREAT SERPL-MCNC: 1 MG/DL (ref 0.6–1.2)
DEPRECATED RDW RBC AUTO: 14.2 % (ref 12.4–15.4)
EKG ATRIAL RATE: 87 BPM
EKG DIAGNOSIS: NORMAL
EKG P AXIS: 61 DEGREES
EKG P-R INTERVAL: 170 MS
EKG Q-T INTERVAL: 350 MS
EKG QRS DURATION: 60 MS
EKG QTC CALCULATION (BAZETT): 421 MS
EKG R AXIS: 10 DEGREES
EKG T AXIS: 75 DEGREES
EKG VENTRICULAR RATE: 87 BPM
EOSINOPHIL # BLD: 0.2 K/UL (ref 0–0.6)
EOSINOPHIL NFR BLD: 1 %
FLUAV RNA RESP QL NAA+PROBE: DETECTED
FLUBV RNA RESP QL NAA+PROBE: NOT DETECTED
GFR SERPLBLD CREATININE-BSD FMLA CKD-EPI: 57 ML/MIN/{1.73_M2}
GLUCOSE SERPL-MCNC: 114 MG/DL (ref 70–99)
HCO3 BLDV-SCNC: 16.2 MMOL/L (ref 23–29)
HCT VFR BLD AUTO: 35.3 % (ref 36–48)
HGB BLD-MCNC: 11.6 G/DL (ref 12–16)
HYPOCHROMIA BLD QL SMEAR: ABNORMAL
LACTATE BLDV-SCNC: 1.3 MMOL/L (ref 0.4–1.9)
LYMPHOCYTES # BLD: 1.4 K/UL (ref 1–5.1)
LYMPHOCYTES NFR BLD: 7 %
MCH RBC QN AUTO: 30.9 PG (ref 26–34)
MCHC RBC AUTO-ENTMCNC: 32.8 G/DL (ref 31–36)
MCV RBC AUTO: 94.2 FL (ref 80–100)
METHGB MFR BLDV: 0.3 %
MONOCYTES # BLD: 1.8 K/UL (ref 0–1.3)
MONOCYTES NFR BLD: 9 %
NEUTROPHILS # BLD: 16.3 K/UL (ref 1.7–7.7)
NEUTROPHILS NFR BLD: 80 %
NEUTS BAND NFR BLD MANUAL: 2 % (ref 0–7)
NT-PROBNP SERPL-MCNC: 402 PG/ML (ref 0–449)
O2 CT VFR BLDV CALC: 13 VOL %
O2 THERAPY: ABNORMAL
PCO2 BLDV: 28.7 MMHG (ref 40–50)
PH BLDV: 7.37 [PH] (ref 7.35–7.45)
PLATELET # BLD AUTO: 219 K/UL (ref 135–450)
PLATELET BLD QL SMEAR: ADEQUATE
PMV BLD AUTO: 8.5 FL (ref 5–10.5)
PO2 BLDV: 41.8 MMHG (ref 25–40)
POTASSIUM SERPL-SCNC: 4.2 MMOL/L (ref 3.5–5.1)
PROT SERPL-MCNC: 6.9 G/DL (ref 6.4–8.2)
RBC # BLD AUTO: 3.75 M/UL (ref 4–5.2)
RSV BY PCR: NOT DETECTED
SAO2 % BLDV: 77 %
SARS-COV-2 RNA RESP QL NAA+PROBE: NOT DETECTED
SLIDE REVIEW: ABNORMAL
SODIUM SERPL-SCNC: 149 MMOL/L (ref 136–145)
TROPONIN, HIGH SENSITIVITY: 40 NG/L (ref 0–14)
TROPONIN, HIGH SENSITIVITY: 42 NG/L (ref 0–14)
WBC # BLD AUTO: 19.9 K/UL (ref 4–11)

## 2025-02-27 PROCEDURE — 93010 ELECTROCARDIOGRAM REPORT: CPT | Performed by: INTERNAL MEDICINE

## 2025-02-27 PROCEDURE — 93005 ELECTROCARDIOGRAM TRACING: CPT | Performed by: PHYSICIAN ASSISTANT

## 2025-02-27 PROCEDURE — 84484 ASSAY OF TROPONIN QUANT: CPT

## 2025-02-27 PROCEDURE — 99285 EMERGENCY DEPT VISIT HI MDM: CPT

## 2025-02-27 PROCEDURE — 6360000004 HC RX CONTRAST MEDICATION: Performed by: PHYSICIAN ASSISTANT

## 2025-02-27 PROCEDURE — 2580000003 HC RX 258: Performed by: PHYSICIAN ASSISTANT

## 2025-02-27 PROCEDURE — 85025 COMPLETE CBC W/AUTO DIFF WBC: CPT

## 2025-02-27 PROCEDURE — 36415 COLL VENOUS BLD VENIPUNCTURE: CPT

## 2025-02-27 PROCEDURE — 82803 BLOOD GASES ANY COMBINATION: CPT

## 2025-02-27 PROCEDURE — 6360000002 HC RX W HCPCS: Performed by: EMERGENCY MEDICINE

## 2025-02-27 PROCEDURE — 70450 CT HEAD/BRAIN W/O DYE: CPT

## 2025-02-27 PROCEDURE — 87637 SARSCOV2&INF A&B&RSV AMP PRB: CPT

## 2025-02-27 PROCEDURE — 83605 ASSAY OF LACTIC ACID: CPT

## 2025-02-27 PROCEDURE — 71045 X-RAY EXAM CHEST 1 VIEW: CPT

## 2025-02-27 PROCEDURE — 74177 CT ABD & PELVIS W/CONTRAST: CPT

## 2025-02-27 PROCEDURE — 83880 ASSAY OF NATRIURETIC PEPTIDE: CPT

## 2025-02-27 PROCEDURE — 80053 COMPREHEN METABOLIC PANEL: CPT

## 2025-02-27 PROCEDURE — 2580000003 HC RX 258: Performed by: EMERGENCY MEDICINE

## 2025-02-27 PROCEDURE — 71260 CT THORAX DX C+: CPT

## 2025-02-27 PROCEDURE — 2060000000 HC ICU INTERMEDIATE R&B

## 2025-02-27 PROCEDURE — 6360000002 HC RX W HCPCS: Performed by: PHYSICIAN ASSISTANT

## 2025-02-27 RX ORDER — BISACODYL 10 MG
10 SUPPOSITORY, RECTAL RECTAL DAILY PRN
COMMUNITY

## 2025-02-27 RX ORDER — ACETAMINOPHEN 325 MG/1
650 TABLET ORAL EVERY 6 HOURS PRN
Status: DISCONTINUED | OUTPATIENT
Start: 2025-02-27 | End: 2025-02-27

## 2025-02-27 RX ORDER — CHOLECALCIFEROL (VITAMIN D3) 1250 MCG
50000 CAPSULE ORAL WEEKLY
COMMUNITY

## 2025-02-27 RX ORDER — IOPAMIDOL 755 MG/ML
75 INJECTION, SOLUTION INTRAVASCULAR
Status: COMPLETED | OUTPATIENT
Start: 2025-02-27 | End: 2025-02-27

## 2025-02-27 RX ORDER — ACETAMINOPHEN 325 MG/1
650 TABLET ORAL EVERY 6 HOURS PRN
Status: DISCONTINUED | OUTPATIENT
Start: 2025-02-27 | End: 2025-03-03 | Stop reason: HOSPADM

## 2025-02-27 RX ORDER — POTASSIUM CHLORIDE 1500 MG/1
40 TABLET, EXTENDED RELEASE ORAL PRN
Status: DISCONTINUED | OUTPATIENT
Start: 2025-02-27 | End: 2025-02-28

## 2025-02-27 RX ORDER — LEVOTHYROXINE SODIUM 125 UG/1
125 TABLET ORAL DAILY
Status: DISCONTINUED | OUTPATIENT
Start: 2025-02-28 | End: 2025-03-03 | Stop reason: HOSPADM

## 2025-02-27 RX ORDER — SODIUM CHLORIDE 0.9 % (FLUSH) 0.9 %
5-40 SYRINGE (ML) INJECTION EVERY 12 HOURS SCHEDULED
Status: DISCONTINUED | OUTPATIENT
Start: 2025-02-27 | End: 2025-03-03 | Stop reason: HOSPADM

## 2025-02-27 RX ORDER — GLUCAGON 1 MG/ML
1 KIT INJECTION PRN
Status: DISCONTINUED | OUTPATIENT
Start: 2025-02-27 | End: 2025-03-03 | Stop reason: HOSPADM

## 2025-02-27 RX ORDER — LEVOTHYROXINE SODIUM 125 UG/1
125 TABLET ORAL
COMMUNITY

## 2025-02-27 RX ORDER — SODIUM CHLORIDE 9 MG/ML
INJECTION, SOLUTION INTRAVENOUS PRN
Status: DISCONTINUED | OUTPATIENT
Start: 2025-02-27 | End: 2025-03-03 | Stop reason: HOSPADM

## 2025-02-27 RX ORDER — DEXTROSE MONOHYDRATE AND SODIUM CHLORIDE 5; .45 G/100ML; G/100ML
75 INJECTION, SOLUTION INTRAVENOUS 2 TIMES DAILY
COMMUNITY

## 2025-02-27 RX ORDER — AMOXICILLIN 250 MG
2 CAPSULE ORAL DAILY
COMMUNITY

## 2025-02-27 RX ORDER — SODIUM PHOSPHATE,MONO-DIBASIC 19G-7G/197
1 ENEMA (ML) RECTAL DAILY PRN
COMMUNITY

## 2025-02-27 RX ORDER — INSULIN LISPRO 100 [IU]/ML
0-4 INJECTION, SOLUTION INTRAVENOUS; SUBCUTANEOUS
Status: DISCONTINUED | OUTPATIENT
Start: 2025-02-27 | End: 2025-03-03 | Stop reason: HOSPADM

## 2025-02-27 RX ORDER — POTASSIUM CHLORIDE 750 MG/1
20 TABLET, EXTENDED RELEASE ORAL 2 TIMES DAILY
COMMUNITY

## 2025-02-27 RX ORDER — ENOXAPARIN SODIUM 100 MG/ML
1 INJECTION SUBCUTANEOUS EVERY 12 HOURS
Status: DISCONTINUED | OUTPATIENT
Start: 2025-02-28 | End: 2025-03-03 | Stop reason: HOSPADM

## 2025-02-27 RX ORDER — ONDANSETRON 2 MG/ML
4 INJECTION INTRAMUSCULAR; INTRAVENOUS EVERY 6 HOURS PRN
Status: DISCONTINUED | OUTPATIENT
Start: 2025-02-27 | End: 2025-03-03 | Stop reason: HOSPADM

## 2025-02-27 RX ORDER — POLYETHYLENE GLYCOL 3350 17 G/17G
17 POWDER, FOR SOLUTION ORAL DAILY PRN
COMMUNITY

## 2025-02-27 RX ORDER — SODIUM CHLORIDE 9 MG/ML
INJECTION, SOLUTION INTRAVENOUS CONTINUOUS
Status: DISCONTINUED | OUTPATIENT
Start: 2025-02-27 | End: 2025-02-28

## 2025-02-27 RX ORDER — ANASTROZOLE 1 MG/1
1 TABLET ORAL DAILY
Status: DISCONTINUED | OUTPATIENT
Start: 2025-02-28 | End: 2025-03-03 | Stop reason: HOSPADM

## 2025-02-27 RX ORDER — OSELTAMIVIR PHOSPHATE 30 MG/1
30 CAPSULE ORAL 2 TIMES DAILY
Status: DISCONTINUED | OUTPATIENT
Start: 2025-02-28 | End: 2025-03-03 | Stop reason: HOSPADM

## 2025-02-27 RX ORDER — OSELTAMIVIR PHOSPHATE 75 MG/1
75 CAPSULE ORAL ONCE
Status: DISCONTINUED | OUTPATIENT
Start: 2025-02-27 | End: 2025-03-03 | Stop reason: HOSPADM

## 2025-02-27 RX ORDER — AMANTADINE HYDROCHLORIDE 100 MG/1
100 CAPSULE, GELATIN COATED ORAL 2 TIMES DAILY
Status: DISCONTINUED | OUTPATIENT
Start: 2025-02-28 | End: 2025-03-03 | Stop reason: HOSPADM

## 2025-02-27 RX ORDER — POTASSIUM CHLORIDE 7.45 MG/ML
10 INJECTION INTRAVENOUS PRN
Status: DISCONTINUED | OUTPATIENT
Start: 2025-02-27 | End: 2025-02-28

## 2025-02-27 RX ORDER — ACETAMINOPHEN 325 MG/1
650 TABLET ORAL EVERY 4 HOURS PRN
COMMUNITY

## 2025-02-27 RX ORDER — AMLODIPINE BESYLATE 2.5 MG/1
2.5 TABLET ORAL DAILY
COMMUNITY

## 2025-02-27 RX ORDER — DEXTROSE MONOHYDRATE 100 MG/ML
INJECTION, SOLUTION INTRAVENOUS CONTINUOUS PRN
Status: DISCONTINUED | OUTPATIENT
Start: 2025-02-27 | End: 2025-03-03 | Stop reason: HOSPADM

## 2025-02-27 RX ORDER — MAGNESIUM SULFATE IN WATER 40 MG/ML
2000 INJECTION, SOLUTION INTRAVENOUS PRN
Status: DISCONTINUED | OUTPATIENT
Start: 2025-02-27 | End: 2025-03-03 | Stop reason: HOSPADM

## 2025-02-27 RX ORDER — ONDANSETRON 4 MG/1
4 TABLET, ORALLY DISINTEGRATING ORAL EVERY 8 HOURS PRN
Status: DISCONTINUED | OUTPATIENT
Start: 2025-02-27 | End: 2025-03-03 | Stop reason: HOSPADM

## 2025-02-27 RX ORDER — 0.9 % SODIUM CHLORIDE 0.9 %
30 INTRAVENOUS SOLUTION INTRAVENOUS ONCE
Status: COMPLETED | OUTPATIENT
Start: 2025-02-27 | End: 2025-02-27

## 2025-02-27 RX ORDER — POLYETHYLENE GLYCOL 3350 17 G/17G
17 POWDER, FOR SOLUTION ORAL DAILY PRN
Status: DISCONTINUED | OUTPATIENT
Start: 2025-02-27 | End: 2025-03-03 | Stop reason: HOSPADM

## 2025-02-27 RX ORDER — CARVEDILOL 3.12 MG/1
3.12 TABLET ORAL 2 TIMES DAILY WITH MEALS
Status: DISCONTINUED | OUTPATIENT
Start: 2025-02-28 | End: 2025-03-03 | Stop reason: HOSPADM

## 2025-02-27 RX ORDER — ASPIRIN 81 MG/1
81 TABLET, CHEWABLE ORAL DAILY
COMMUNITY

## 2025-02-27 RX ORDER — ACETAMINOPHEN 650 MG/1
650 SUPPOSITORY RECTAL EVERY 6 HOURS PRN
Status: DISCONTINUED | OUTPATIENT
Start: 2025-02-27 | End: 2025-03-03 | Stop reason: HOSPADM

## 2025-02-27 RX ORDER — FAMOTIDINE 20 MG/1
20 TABLET, FILM COATED ORAL DAILY
Status: DISCONTINUED | OUTPATIENT
Start: 2025-02-28 | End: 2025-03-03 | Stop reason: HOSPADM

## 2025-02-27 RX ORDER — SODIUM CHLORIDE 0.9 % (FLUSH) 0.9 %
5-40 SYRINGE (ML) INJECTION PRN
Status: DISCONTINUED | OUTPATIENT
Start: 2025-02-27 | End: 2025-03-03 | Stop reason: HOSPADM

## 2025-02-27 RX ORDER — ATORVASTATIN CALCIUM 40 MG/1
40 TABLET, FILM COATED ORAL NIGHTLY
Status: DISCONTINUED | OUTPATIENT
Start: 2025-02-27 | End: 2025-03-03 | Stop reason: HOSPADM

## 2025-02-27 RX ORDER — ENOXAPARIN SODIUM 100 MG/ML
1 INJECTION SUBCUTANEOUS ONCE
Status: COMPLETED | OUTPATIENT
Start: 2025-02-27 | End: 2025-02-27

## 2025-02-27 RX ORDER — ASPIRIN 81 MG/1
81 TABLET ORAL DAILY
Status: DISCONTINUED | OUTPATIENT
Start: 2025-02-28 | End: 2025-03-03 | Stop reason: HOSPADM

## 2025-02-27 RX ADMIN — SODIUM CHLORIDE 1000 MG: 9 INJECTION, SOLUTION INTRAVENOUS at 17:31

## 2025-02-27 RX ADMIN — ENOXAPARIN SODIUM 60 MG: 100 INJECTION SUBCUTANEOUS at 17:33

## 2025-02-27 RX ADMIN — VANCOMYCIN HYDROCHLORIDE 1000 MG: 1 INJECTION, POWDER, LYOPHILIZED, FOR SOLUTION INTRAVENOUS at 18:42

## 2025-02-27 RX ADMIN — SODIUM CHLORIDE 1683 ML: 0.9 INJECTION, SOLUTION INTRAVENOUS at 17:30

## 2025-02-27 RX ADMIN — IOPAMIDOL 75 ML: 755 INJECTION, SOLUTION INTRAVENOUS at 14:59

## 2025-02-27 NOTE — PLAN OF CARE
Pt from next door nursing home with AMS, flu, dehydration and PE  Given IVF< lovenox and Tamiflu  Possible pna , started abx

## 2025-02-27 NOTE — ED PROVIDER NOTES
ED Attending Attestation Note    I personally saw the patient and made/approved the management plan and take responsibility for patient management.     Briefly, 79 y.o. female presents with concern for elevated white blood cell count and need for possible IV access and antibiotics.  Patient was sent from the nursing facility via EMS and had reportedly had an attempted IV yesterday that was unsuccessful so they administered subcutaneous fluids.  The  as well as son are at bedside and provides additional history as patient really unable to give any history at all.  She will whisper some simple yes or no answers but is unable to provide any additional contacts.  They state that she has been having worsening illness over the past 2 to 3 weeks with decreased p.o. intake and only eating about 1 meal a day now.  She is bedbound at baseline and they state that they have been \"doctoring\" a sore on her sacral area that she sustained.  They state that this was associated also with when she had some diarrheal illness that contributed to that development from irritation of her back side.  No report of any vomiting at all.  Family states that she is susceptible to UTIs.     Focused exam:   Gen: 79 y.o. female, generally ill appearing  Dry mucous membranes tacky.  Slight tachypnea with expiratory wheezes.  Abdomen soft without any distention but does appear to be diffusely tender to palpation.    The Ekg interpreted by me shows  normal sinus rhythm with a rate of 87  Axis is   Normal  QTc is  normal  Intervals and Durations are unremarkable.      ST Segments: nonspecific changes  No significant change from prior EKG dated 8/6/23        Imaging:   CT ABDOMEN PELVIS W IV CONTRAST Additional Contrast? None   Final Result   Small nonocclusive pulmonary emboli in the segmental and proximal   subsegmental branches of the right upper and lower lobe pulmonary arteries.   The emboli are string like and most consistent with subacute 
negatives as per HPI.     SURGICAL HISTORY     Past Surgical History:   Procedure Laterality Date    APPENDECTOMY      BREAST LUMPECTOMY Bilateral 7/27/2021    RIGHT RADIO FREQUENCY IDENTIFICATION TAG LOCALIZED PARTIAL MASTECTOMY TIMES TWO; LEFT RADIO FREQUENCY IDENTIFICATION TAG LOCALIZED PARTIAL MASTECTOMY performed by Nini Castelan MD at [facility] OR    CHOLECYSTECTOMY      COLON SURGERY      [facility]    COLONOSCOPY  3/29/16    normal    US BREAST BIOPSY W LOC DEVICE 1ST LESION LEFT Left 6/17/2021    US BREAST NEEDLE BIOPSY LEFT 6/17/2021 Ellis Ramesh MD [facility]    US BREAST BIOPSY W LOC DEVICE 1ST LESION RIGHT Right 6/17/2021    US BREAST NEEDLE BIOPSY RIGHT 6/17/2021 Ellis Ramesh MD [facility]    US BREAST BIOPSY W LOC DEVICE 1ST LESION RIGHT Right 6/29/2021    US BREAST NEEDLE BIOPSY RIGHT 6/29/2021 Irving Sargent MD [facility]    US BREAST BIOPSY W LOC DEVICE EACH ADDL LESION RIGHT Right 6/29/2021    US BREAST BIOPSY NEEDLE ADDITIONAL RIGHT 6/29/2021 Irving Sargent MD [facility]    US PLACE BREAST LOC DEVICE 1ST LESION LEFT Left 7/20/2021    US GUIDED NEEDLE LOC OF LEFT BREAST 7/20/2021 [facility]    US PLACE BREAST LOC DEVICE 1ST LESION RIGHT Right 7/20/2021    US GUIDED NEEDLE LOC OF RIGHT BREAST 7/20/2021 [facility]    US PLACE BREAST LOC DEVICE EACH ADDL RIGHT Right 7/20/2021    US GUIDED NEEDLE LOC BREAST ADDL RIGHT 7/20/2021 [facility]       CURRENTMEDICATIONS       Previous Medications    ACETAMINOPHEN (TYLENOL) 325 MG TABLET    Take 1-2 tablets by mouth every 4 hours as needed for Pain    ACETAMINOPHEN (TYLENOL) 325 MG TABLET    Take 2 tablets by mouth every 4 hours as needed for Fever ((temp above 100))    ACETAMINOPHEN (TYLENOL) 500 MG TABLET    Take 1 tablet by mouth every morning    ACIDOPHILUS LACTOBACILLUS CAPS    Take 1 capsule by mouth 2 times daily    AMANTADINE (SYMMETREL)

## 2025-02-28 PROBLEM — J10.1 INFLUENZA A: Status: ACTIVE | Noted: 2025-02-28

## 2025-02-28 PROBLEM — R93.89 ABNORMAL CT OF THE CHEST: Status: ACTIVE | Noted: 2025-02-28

## 2025-02-28 PROBLEM — J18.9 MULTIFOCAL PNEUMONIA: Status: ACTIVE | Noted: 2025-02-28

## 2025-02-28 PROBLEM — E86.0 DEHYDRATION: Status: ACTIVE | Noted: 2025-02-28

## 2025-02-28 PROBLEM — R91.8 LUNG MASS: Status: ACTIVE | Noted: 2025-02-28

## 2025-02-28 PROBLEM — I26.99 OTHER PULMONARY EMBOLISM WITHOUT ACUTE COR PULMONALE (HCC): Status: ACTIVE | Noted: 2025-02-28

## 2025-02-28 PROBLEM — E87.0 HYPERNATREMIA: Status: ACTIVE | Noted: 2025-02-28

## 2025-02-28 PROBLEM — R63.8 POOR FLUID INTAKE: Status: ACTIVE | Noted: 2025-02-28

## 2025-02-28 LAB
ANION GAP SERPL CALCULATED.3IONS-SCNC: 10 MMOL/L (ref 3–16)
ANION GAP SERPL CALCULATED.3IONS-SCNC: 10 MMOL/L (ref 3–16)
ANION GAP SERPL CALCULATED.3IONS-SCNC: 11 MMOL/L (ref 3–16)
ANISOCYTOSIS BLD QL SMEAR: ABNORMAL
BACTERIA URNS QL MICRO: ABNORMAL /HPF
BASOPHILS # BLD: 0 K/UL (ref 0–0.2)
BASOPHILS NFR BLD: 0 %
BILIRUB UR QL STRIP.AUTO: NEGATIVE
BUN SERPL-MCNC: 30 MG/DL (ref 7–20)
BUN SERPL-MCNC: 33 MG/DL (ref 7–20)
BUN SERPL-MCNC: 33 MG/DL (ref 7–20)
CALCIUM SERPL-MCNC: 8.8 MG/DL (ref 8.3–10.6)
CALCIUM SERPL-MCNC: 9 MG/DL (ref 8.3–10.6)
CALCIUM SERPL-MCNC: 9.1 MG/DL (ref 8.3–10.6)
CHLORIDE SERPL-SCNC: 129 MMOL/L (ref 99–110)
CHLORIDE SERPL-SCNC: 129 MMOL/L (ref 99–110)
CHLORIDE SERPL-SCNC: 131 MMOL/L (ref 99–110)
CLARITY UR: CLEAR
CO2 SERPL-SCNC: 15 MMOL/L (ref 21–32)
CO2 SERPL-SCNC: 16 MMOL/L (ref 21–32)
CO2 SERPL-SCNC: 16 MMOL/L (ref 21–32)
COLOR UR: YELLOW
CREAT SERPL-MCNC: 0.7 MG/DL (ref 0.6–1.2)
CREAT SERPL-MCNC: 0.7 MG/DL (ref 0.6–1.2)
CREAT SERPL-MCNC: 0.8 MG/DL (ref 0.6–1.2)
DEPRECATED RDW RBC AUTO: 13.9 % (ref 12.4–15.4)
EOSINOPHIL # BLD: 0.4 K/UL (ref 0–0.6)
EOSINOPHIL NFR BLD: 2 %
EPI CELLS #/AREA URNS HPF: ABNORMAL /HPF (ref 0–5)
GFR SERPLBLD CREATININE-BSD FMLA CKD-EPI: 74 ML/MIN/{1.73_M2}
GFR SERPLBLD CREATININE-BSD FMLA CKD-EPI: 87 ML/MIN/{1.73_M2}
GFR SERPLBLD CREATININE-BSD FMLA CKD-EPI: 87 ML/MIN/{1.73_M2}
GLUCOSE BLD-MCNC: 108 MG/DL (ref 70–99)
GLUCOSE BLD-MCNC: 111 MG/DL (ref 70–99)
GLUCOSE BLD-MCNC: 121 MG/DL (ref 70–99)
GLUCOSE BLD-MCNC: 134 MG/DL (ref 70–99)
GLUCOSE SERPL-MCNC: 120 MG/DL (ref 70–99)
GLUCOSE SERPL-MCNC: 96 MG/DL (ref 70–99)
GLUCOSE SERPL-MCNC: 99 MG/DL (ref 70–99)
GLUCOSE UR STRIP.AUTO-MCNC: NEGATIVE MG/DL
HCT VFR BLD AUTO: 33.9 % (ref 36–48)
HGB BLD-MCNC: 11.2 G/DL (ref 12–16)
HGB UR QL STRIP.AUTO: ABNORMAL
HYPOCHROMIA BLD QL SMEAR: ABNORMAL
KETONES UR STRIP.AUTO-MCNC: NEGATIVE MG/DL
LEUKOCYTE ESTERASE UR QL STRIP.AUTO: ABNORMAL
LYMPHOCYTES # BLD: 1.3 K/UL (ref 1–5.1)
LYMPHOCYTES NFR BLD: 7 %
MAGNESIUM SERPL-MCNC: 1.92 MG/DL (ref 1.8–2.4)
MAGNESIUM SERPL-MCNC: 2.01 MG/DL (ref 1.8–2.4)
MCH RBC QN AUTO: 30.6 PG (ref 26–34)
MCHC RBC AUTO-ENTMCNC: 32.9 G/DL (ref 31–36)
MCV RBC AUTO: 93 FL (ref 80–100)
MONOCYTES # BLD: 0.8 K/UL (ref 0–1.3)
MONOCYTES NFR BLD: 4 %
MUCOUS THREADS #/AREA URNS LPF: ABNORMAL /LPF
NEUTROPHILS # BLD: 16.5 K/UL (ref 1.7–7.7)
NEUTROPHILS NFR BLD: 87 %
NITRITE UR QL STRIP.AUTO: NEGATIVE
PERFORMED ON: ABNORMAL
PH UR STRIP.AUTO: 5.5 [PH] (ref 5–8)
PLATELET # BLD AUTO: 210 K/UL (ref 135–450)
PLATELET BLD QL SMEAR: ADEQUATE
PMV BLD AUTO: 8.1 FL (ref 5–10.5)
POTASSIUM SERPL-SCNC: 3.1 MMOL/L (ref 3.5–5.1)
POTASSIUM SERPL-SCNC: 3.5 MMOL/L (ref 3.5–5.1)
POTASSIUM SERPL-SCNC: 3.7 MMOL/L (ref 3.5–5.1)
PROCALCITONIN SERPL IA-MCNC: 0.19 NG/ML (ref 0–0.15)
PROT UR STRIP.AUTO-MCNC: ABNORMAL MG/DL
RBC # BLD AUTO: 3.65 M/UL (ref 4–5.2)
RBC #/AREA URNS HPF: ABNORMAL /HPF (ref 0–4)
SLIDE REVIEW: ABNORMAL
SODIUM SERPL-SCNC: 154 MMOL/L (ref 136–145)
SODIUM SERPL-SCNC: 155 MMOL/L (ref 136–145)
SODIUM SERPL-SCNC: 158 MMOL/L (ref 136–145)
SP GR UR STRIP.AUTO: 1.02 (ref 1–1.03)
TSH SERPL DL<=0.005 MIU/L-ACNC: 0.86 UIU/ML (ref 0.27–4.2)
UA COMPLETE W REFLEX CULTURE PNL UR: ABNORMAL
UA DIPSTICK W REFLEX MICRO PNL UR: YES
URN SPEC COLLECT METH UR: ABNORMAL
UROBILINOGEN UR STRIP-ACNC: 0.2 E.U./DL
WBC # BLD AUTO: 19 K/UL (ref 4–11)
WBC #/AREA URNS HPF: ABNORMAL /HPF (ref 0–5)

## 2025-02-28 PROCEDURE — 2700000000 HC OXYGEN THERAPY PER DAY

## 2025-02-28 PROCEDURE — 2580000003 HC RX 258: Performed by: INTERNAL MEDICINE

## 2025-02-28 PROCEDURE — 0202U NFCT DS 22 TRGT SARS-COV-2: CPT

## 2025-02-28 PROCEDURE — 94761 N-INVAS EAR/PLS OXIMETRY MLT: CPT

## 2025-02-28 PROCEDURE — 36415 COLL VENOUS BLD VENIPUNCTURE: CPT

## 2025-02-28 PROCEDURE — 2060000000 HC ICU INTERMEDIATE R&B

## 2025-02-28 PROCEDURE — 84443 ASSAY THYROID STIM HORMONE: CPT

## 2025-02-28 PROCEDURE — 6360000002 HC RX W HCPCS: Performed by: INTERNAL MEDICINE

## 2025-02-28 PROCEDURE — 87086 URINE CULTURE/COLONY COUNT: CPT

## 2025-02-28 PROCEDURE — 99232 SBSQ HOSP IP/OBS MODERATE 35: CPT | Performed by: NURSE PRACTITIONER

## 2025-02-28 PROCEDURE — 87449 NOS EACH ORGANISM AG IA: CPT

## 2025-02-28 PROCEDURE — 81001 URINALYSIS AUTO W/SCOPE: CPT

## 2025-02-28 PROCEDURE — 84145 PROCALCITONIN (PCT): CPT

## 2025-02-28 PROCEDURE — 2500000003 HC RX 250 WO HCPCS: Performed by: INTERNAL MEDICINE

## 2025-02-28 PROCEDURE — 85025 COMPLETE CBC W/AUTO DIFF WBC: CPT

## 2025-02-28 PROCEDURE — 99223 1ST HOSP IP/OBS HIGH 75: CPT | Performed by: INTERNAL MEDICINE

## 2025-02-28 PROCEDURE — 6370000000 HC RX 637 (ALT 250 FOR IP): Performed by: INTERNAL MEDICINE

## 2025-02-28 PROCEDURE — 83735 ASSAY OF MAGNESIUM: CPT

## 2025-02-28 PROCEDURE — 80048 BASIC METABOLIC PNL TOTAL CA: CPT

## 2025-02-28 RX ORDER — POTASSIUM CHLORIDE 1500 MG/1
40 TABLET, EXTENDED RELEASE ORAL PRN
Status: DISCONTINUED | OUTPATIENT
Start: 2025-02-28 | End: 2025-03-03 | Stop reason: HOSPADM

## 2025-02-28 RX ORDER — SODIUM CHLORIDE 450 MG/100ML
INJECTION, SOLUTION INTRAVENOUS CONTINUOUS
Status: DISCONTINUED | OUTPATIENT
Start: 2025-02-28 | End: 2025-02-28

## 2025-02-28 RX ORDER — BISACODYL 5 MG/1
5 TABLET, DELAYED RELEASE ORAL ONCE
Status: DISCONTINUED | OUTPATIENT
Start: 2025-02-28 | End: 2025-03-03 | Stop reason: HOSPADM

## 2025-02-28 RX ORDER — POTASSIUM CHLORIDE 7.45 MG/ML
10 INJECTION INTRAVENOUS PRN
Status: DISCONTINUED | OUTPATIENT
Start: 2025-02-28 | End: 2025-03-03 | Stop reason: HOSPADM

## 2025-02-28 RX ADMIN — VANCOMYCIN HYDROCHLORIDE 1000 MG: 1 INJECTION, POWDER, LYOPHILIZED, FOR SOLUTION INTRAVENOUS at 17:59

## 2025-02-28 RX ADMIN — POTASSIUM CHLORIDE 10 MEQ: 7.46 INJECTION, SOLUTION INTRAVENOUS at 23:21

## 2025-02-28 RX ADMIN — Medication 10 ML: at 03:49

## 2025-02-28 RX ADMIN — CARVEDILOL 3.12 MG: 3.12 TABLET, FILM COATED ORAL at 09:34

## 2025-02-28 RX ADMIN — CEFEPIME 2000 MG: 2 INJECTION, POWDER, FOR SOLUTION INTRAVENOUS at 09:47

## 2025-02-28 RX ADMIN — CEFEPIME 2000 MG: 2 INJECTION, POWDER, FOR SOLUTION INTRAVENOUS at 02:09

## 2025-02-28 RX ADMIN — Medication 10 ML: at 09:47

## 2025-02-28 RX ADMIN — ENOXAPARIN SODIUM 60 MG: 100 INJECTION SUBCUTANEOUS at 18:04

## 2025-02-28 RX ADMIN — ENOXAPARIN SODIUM 60 MG: 100 INJECTION SUBCUTANEOUS at 07:01

## 2025-02-28 RX ADMIN — VASOPRESSIN: 20 INJECTION, SOLUTION INTRAVENOUS at 14:03

## 2025-02-28 RX ADMIN — SODIUM CHLORIDE: 0.9 INJECTION, SOLUTION INTRAVENOUS at 03:48

## 2025-02-28 RX ADMIN — CEFEPIME 2000 MG: 2 INJECTION, POWDER, FOR SOLUTION INTRAVENOUS at 20:06

## 2025-02-28 NOTE — H&P
lesions.    LABORATORY DATA:  CT chest PE protocol shows right-sided pulmonary emboli.  CT abdomen and pelvis shows no obvious acute abnormalities.  CT head shows no acute intracranial abnormality.  Sodium 149, potassium 4.2, BUN 51, creatinine 1.0, carbon dioxide 15, chloride 122, troponin 42.  White count 19.9, hemoglobin 11.6, hematocrit 35.3, platelets of 219.  Influenza A positive.  UA shows 26-50 leukocytes, large amount of leukocyte esterase, many bacteria.    EKG shows normal sinus rhythm.    ASSESSMENT:    1. Acute encephalopathy.  2. Complicated gram-negative bacterial urinary tract infection with severe sepsis.  3. Acute pulmonary embolism.  4. Influenza A.    PLAN OF CARE:    1. Patient admitted to internal medicine service.  NPO status has been initiated given the patient's obtundation.  IV antibiotics have been initiated and will be continued.  2. Full-dose therapeutic Lovenox initiated.  3. IV hydration initiated and will be continued.    CODE STATUS:  Full.    EXPECTED LENGTH OF STAY:  More than 2 midnights based on plan of care above.    RISK:  High due to the patient's presentation with concomitant UTI and encephalopathy and influenza A.    DISPOSITION:  Admitted to internal medicine service.          NADEEM HUMPHREY MD      D:  02/27/2025 23:24:51     T:  02/28/2025 00:01:56     ANJU/GIAN  Job #:  [id number]     Doc#:  [id number]

## 2025-02-28 NOTE — ED NOTES
Pt attempted to take pill after drinking water without difficulty. Pt had coughing spell afterwards. RN performed swallow screening again and pt failed. Pt unable to follow directions

## 2025-03-01 PROBLEM — G93.41 ACUTE METABOLIC ENCEPHALOPATHY: Status: ACTIVE | Noted: 2025-03-01

## 2025-03-01 LAB
ANION GAP SERPL CALCULATED.3IONS-SCNC: 10 MMOL/L (ref 3–16)
ANION GAP SERPL CALCULATED.3IONS-SCNC: 8 MMOL/L (ref 3–16)
ANION GAP SERPL CALCULATED.3IONS-SCNC: 9 MMOL/L (ref 3–16)
ANISOCYTOSIS BLD QL SMEAR: ABNORMAL
BASOPHILS # BLD: 0 K/UL (ref 0–0.2)
BASOPHILS NFR BLD: 0 %
BUN SERPL-MCNC: 22 MG/DL (ref 7–20)
BUN SERPL-MCNC: 25 MG/DL (ref 7–20)
BUN SERPL-MCNC: 29 MG/DL (ref 7–20)
CALCIUM SERPL-MCNC: 8.5 MG/DL (ref 8.3–10.6)
CALCIUM SERPL-MCNC: 8.5 MG/DL (ref 8.3–10.6)
CALCIUM SERPL-MCNC: 8.8 MG/DL (ref 8.3–10.6)
CHLORIDE SERPL-SCNC: 125 MMOL/L (ref 99–110)
CHLORIDE SERPL-SCNC: 126 MMOL/L (ref 99–110)
CHLORIDE SERPL-SCNC: 129 MMOL/L (ref 99–110)
CO2 SERPL-SCNC: 15 MMOL/L (ref 21–32)
CO2 SERPL-SCNC: 16 MMOL/L (ref 21–32)
CO2 SERPL-SCNC: 17 MMOL/L (ref 21–32)
CREAT SERPL-MCNC: 0.6 MG/DL (ref 0.6–1.2)
CREAT SERPL-MCNC: 0.7 MG/DL (ref 0.6–1.2)
CREAT SERPL-MCNC: 0.7 MG/DL (ref 0.6–1.2)
DEPRECATED RDW RBC AUTO: 14.2 % (ref 12.4–15.4)
EOSINOPHIL # BLD: 0 K/UL (ref 0–0.6)
EOSINOPHIL NFR BLD: 0 %
GFR SERPLBLD CREATININE-BSD FMLA CKD-EPI: 87 ML/MIN/{1.73_M2}
GFR SERPLBLD CREATININE-BSD FMLA CKD-EPI: 87 ML/MIN/{1.73_M2}
GFR SERPLBLD CREATININE-BSD FMLA CKD-EPI: >90 ML/MIN/{1.73_M2}
GLUCOSE BLD-MCNC: 105 MG/DL (ref 70–99)
GLUCOSE BLD-MCNC: 145 MG/DL (ref 70–99)
GLUCOSE BLD-MCNC: 145 MG/DL (ref 70–99)
GLUCOSE BLD-MCNC: 151 MG/DL (ref 70–99)
GLUCOSE BLD-MCNC: 151 MG/DL (ref 70–99)
GLUCOSE SERPL-MCNC: 111 MG/DL (ref 70–99)
GLUCOSE SERPL-MCNC: 118 MG/DL (ref 70–99)
GLUCOSE SERPL-MCNC: 141 MG/DL (ref 70–99)
HCT VFR BLD AUTO: 32.2 % (ref 36–48)
HGB BLD-MCNC: 10.7 G/DL (ref 12–16)
LEGIONELLA AG UR QL: NORMAL
LYMPHOCYTES # BLD: 1.7 K/UL (ref 1–5.1)
LYMPHOCYTES NFR BLD: 11 %
MAGNESIUM SERPL-MCNC: 1.92 MG/DL (ref 1.8–2.4)
MAGNESIUM SERPL-MCNC: 1.97 MG/DL (ref 1.8–2.4)
MCH RBC QN AUTO: 31.1 PG (ref 26–34)
MCHC RBC AUTO-ENTMCNC: 33.3 G/DL (ref 31–36)
MCV RBC AUTO: 93.3 FL (ref 80–100)
METAMYELOCYTES NFR BLD MANUAL: 2 %
MONOCYTES # BLD: 0.8 K/UL (ref 0–1.3)
MONOCYTES NFR BLD: 5 %
NEUTROPHILS # BLD: 13.3 K/UL (ref 1.7–7.7)
NEUTROPHILS NFR BLD: 81 %
NEUTS BAND NFR BLD MANUAL: 1 % (ref 0–7)
PERFORMED ON: ABNORMAL
PLATELET # BLD AUTO: 193 K/UL (ref 135–450)
PLATELET BLD QL SMEAR: ADEQUATE
PMV BLD AUTO: 8 FL (ref 5–10.5)
POTASSIUM SERPL-SCNC: 3.2 MMOL/L (ref 3.5–5.1)
POTASSIUM SERPL-SCNC: 3.5 MMOL/L (ref 3.5–5.1)
POTASSIUM SERPL-SCNC: 3.8 MMOL/L (ref 3.5–5.1)
RBC # BLD AUTO: 3.45 M/UL (ref 4–5.2)
REPORT: NORMAL
RESP PATH DNA+RNA PNL NPH NAA+NON-PROBE: NORMAL
S PNEUM AG UR QL: NORMAL
SLIDE REVIEW: ABNORMAL
SODIUM SERPL-SCNC: 149 MMOL/L (ref 136–145)
SODIUM SERPL-SCNC: 152 MMOL/L (ref 136–145)
SODIUM SERPL-SCNC: 154 MMOL/L (ref 136–145)
VANCOMYCIN TROUGH SERPL-MCNC: 6.7 UG/ML (ref 10–20)
WBC # BLD AUTO: 15.8 K/UL (ref 4–11)

## 2025-03-01 PROCEDURE — 6360000002 HC RX W HCPCS: Performed by: STUDENT IN AN ORGANIZED HEALTH CARE EDUCATION/TRAINING PROGRAM

## 2025-03-01 PROCEDURE — 87641 MR-STAPH DNA AMP PROBE: CPT

## 2025-03-01 PROCEDURE — 36415 COLL VENOUS BLD VENIPUNCTURE: CPT

## 2025-03-01 PROCEDURE — 2580000003 HC RX 258: Performed by: INTERNAL MEDICINE

## 2025-03-01 PROCEDURE — 2700000000 HC OXYGEN THERAPY PER DAY

## 2025-03-01 PROCEDURE — 99233 SBSQ HOSP IP/OBS HIGH 50: CPT | Performed by: INTERNAL MEDICINE

## 2025-03-01 PROCEDURE — 6370000000 HC RX 637 (ALT 250 FOR IP): Performed by: INTERNAL MEDICINE

## 2025-03-01 PROCEDURE — 6360000002 HC RX W HCPCS: Performed by: INTERNAL MEDICINE

## 2025-03-01 PROCEDURE — 85025 COMPLETE CBC W/AUTO DIFF WBC: CPT

## 2025-03-01 PROCEDURE — 80048 BASIC METABOLIC PNL TOTAL CA: CPT

## 2025-03-01 PROCEDURE — 2060000000 HC ICU INTERMEDIATE R&B

## 2025-03-01 PROCEDURE — 83735 ASSAY OF MAGNESIUM: CPT

## 2025-03-01 PROCEDURE — 87040 BLOOD CULTURE FOR BACTERIA: CPT

## 2025-03-01 PROCEDURE — 94761 N-INVAS EAR/PLS OXIMETRY MLT: CPT

## 2025-03-01 PROCEDURE — 80202 ASSAY OF VANCOMYCIN: CPT

## 2025-03-01 RX ORDER — DEXTROSE MONOHYDRATE 50 MG/ML
INJECTION, SOLUTION INTRAVENOUS CONTINUOUS
Status: DISCONTINUED | OUTPATIENT
Start: 2025-03-01 | End: 2025-03-02

## 2025-03-01 RX ORDER — POTASSIUM CHLORIDE 7.45 MG/ML
10 INJECTION INTRAVENOUS ONCE
Status: DISCONTINUED | OUTPATIENT
Start: 2025-03-01 | End: 2025-03-01

## 2025-03-01 RX ORDER — POTASSIUM CHLORIDE 29.8 MG/ML
20 INJECTION INTRAVENOUS ONCE
Status: DISCONTINUED | OUTPATIENT
Start: 2025-03-01 | End: 2025-03-01

## 2025-03-01 RX ADMIN — POTASSIUM CHLORIDE 10 MEQ: 7.46 INJECTION, SOLUTION INTRAVENOUS at 02:45

## 2025-03-01 RX ADMIN — POTASSIUM CHLORIDE 10 MEQ: 7.46 INJECTION, SOLUTION INTRAVENOUS at 18:46

## 2025-03-01 RX ADMIN — POTASSIUM CHLORIDE 10 MEQ: 7.46 INJECTION, SOLUTION INTRAVENOUS at 01:44

## 2025-03-01 RX ADMIN — DEXTROSE MONOHYDRATE: 50 INJECTION, SOLUTION INTRAVENOUS at 13:58

## 2025-03-01 RX ADMIN — ENOXAPARIN SODIUM 60 MG: 100 INJECTION SUBCUTANEOUS at 05:06

## 2025-03-01 RX ADMIN — VANCOMYCIN HYDROCHLORIDE 750 MG: 750 INJECTION, POWDER, LYOPHILIZED, FOR SOLUTION INTRAVENOUS at 17:46

## 2025-03-01 RX ADMIN — ENOXAPARIN SODIUM 60 MG: 100 INJECTION SUBCUTANEOUS at 17:54

## 2025-03-01 RX ADMIN — POTASSIUM CHLORIDE 10 MEQ: 7.46 INJECTION, SOLUTION INTRAVENOUS at 20:01

## 2025-03-01 RX ADMIN — CEFEPIME 2000 MG: 2 INJECTION, POWDER, FOR SOLUTION INTRAVENOUS at 20:57

## 2025-03-01 RX ADMIN — ACETAMINOPHEN 650 MG: 650 SUPPOSITORY RECTAL at 11:54

## 2025-03-01 RX ADMIN — VASOPRESSIN: 20 INJECTION, SOLUTION INTRAVENOUS at 01:41

## 2025-03-01 RX ADMIN — DEXTROSE MONOHYDRATE: 50 INJECTION, SOLUTION INTRAVENOUS at 23:23

## 2025-03-01 RX ADMIN — CEFEPIME 2000 MG: 2 INJECTION, POWDER, FOR SOLUTION INTRAVENOUS at 08:27

## 2025-03-01 RX ADMIN — POTASSIUM CHLORIDE 10 MEQ: 7.46 INJECTION, SOLUTION INTRAVENOUS at 00:36

## 2025-03-01 NOTE — FLOWSHEET NOTE
03/01/25 0747   Vital Signs   Temp 99.8 °F (37.7 °C)   Temp Source Axillary   Pulse 84   Heart Rate Source Monitor   Respirations 18   BP (!) 154/83   MAP (Calculated) 107   BP Location Right upper arm   BP Method Automatic   Patient Position Semi fowlers   Oxygen Therapy   SpO2 94 %   O2 Device Nasal cannula   O2 Flow Rate (L/min) 4 L/min     Am assessment complete pt barely opened eyes with assessment and lab draws.Repositioned pt ,telesitter in place.

## 2025-03-01 NOTE — PLAN OF CARE
Problem: Discharge Planning  Goal: Discharge to home or other facility with appropriate resources  3/1/2025 0910 by Trena Perez, RN  Outcome: Progressing  3/1/2025 0106 by Isa Burciaga RN  Outcome: Progressing     Problem: Safety - Adult  Goal: Free from fall injury  3/1/2025 0910 by Trena Perez, RN  Outcome: Progressing  3/1/2025 0106 by Isa Burciaga RN  Outcome: Progressing     Problem: Skin/Tissue Integrity  Goal: Skin integrity remains intact  Description: 1.  Monitor for areas of redness and/or skin breakdown  2.  Assess vascular access sites hourly  3.  Every 4-6 hours minimum:  Change oxygen saturation probe site  4.  Every 4-6 hours:  If on nasal continuous positive airway pressure, respiratory therapy assess nares and determine need for appliance change or resting period  Outcome: Progressing

## 2025-03-01 NOTE — FLOWSHEET NOTE
02/28/25 2323   Vital Signs   Temp 99.2 °F (37.3 °C)   Temp Source Axillary   Pulse 88   Heart Rate Source Monitor   Respirations 18   /80   MAP (Calculated) 94   Oxygen Therapy   SpO2 94 %   O2 Device Nasal cannula   O2 Flow Rate (L/min) 2 L/min     Resting quietly with respirations WNL on 2 L NC.  Call in easy reach.  Bed alarm on & telesitter in place for safety.  Continue to monitor closely.  Isa Burciaga RN

## 2025-03-02 LAB
ANION GAP SERPL CALCULATED.3IONS-SCNC: 10 MMOL/L (ref 3–16)
ANION GAP SERPL CALCULATED.3IONS-SCNC: 10 MMOL/L (ref 3–16)
ANION GAP SERPL CALCULATED.3IONS-SCNC: 12 MMOL/L (ref 3–16)
ANISOCYTOSIS BLD QL SMEAR: ABNORMAL
BACTERIA UR CULT: NORMAL
BASOPHILS # BLD: 0 K/UL (ref 0–0.2)
BASOPHILS NFR BLD: 0 %
BUN SERPL-MCNC: 12 MG/DL (ref 7–20)
BUN SERPL-MCNC: 15 MG/DL (ref 7–20)
BUN SERPL-MCNC: 19 MG/DL (ref 7–20)
CALCIUM SERPL-MCNC: 7.7 MG/DL (ref 8.3–10.6)
CALCIUM SERPL-MCNC: 8.3 MG/DL (ref 8.3–10.6)
CALCIUM SERPL-MCNC: 8.4 MG/DL (ref 8.3–10.6)
CHLORIDE SERPL-SCNC: 113 MMOL/L (ref 99–110)
CHLORIDE SERPL-SCNC: 116 MMOL/L (ref 99–110)
CHLORIDE SERPL-SCNC: 120 MMOL/L (ref 99–110)
CO2 SERPL-SCNC: 16 MMOL/L (ref 21–32)
CREAT SERPL-MCNC: 0.5 MG/DL (ref 0.6–1.2)
CREAT SERPL-MCNC: 0.6 MG/DL (ref 0.6–1.2)
CREAT SERPL-MCNC: 0.6 MG/DL (ref 0.6–1.2)
DEPRECATED RDW RBC AUTO: 13.7 % (ref 12.4–15.4)
EOSINOPHIL # BLD: 0.6 K/UL (ref 0–0.6)
EOSINOPHIL NFR BLD: 4 %
GFR SERPLBLD CREATININE-BSD FMLA CKD-EPI: >90 ML/MIN/{1.73_M2}
GLUCOSE BLD-MCNC: 103 MG/DL (ref 70–99)
GLUCOSE BLD-MCNC: 121 MG/DL (ref 70–99)
GLUCOSE BLD-MCNC: 123 MG/DL (ref 70–99)
GLUCOSE BLD-MCNC: 135 MG/DL (ref 70–99)
GLUCOSE BLD-MCNC: 155 MG/DL (ref 70–99)
GLUCOSE SERPL-MCNC: 117 MG/DL (ref 70–99)
GLUCOSE SERPL-MCNC: 125 MG/DL (ref 70–99)
GLUCOSE SERPL-MCNC: 129 MG/DL (ref 70–99)
HCT VFR BLD AUTO: 31.6 % (ref 36–48)
HGB BLD-MCNC: 10.6 G/DL (ref 12–16)
LYMPHOCYTES # BLD: 1.5 K/UL (ref 1–5.1)
LYMPHOCYTES NFR BLD: 10 %
MAGNESIUM SERPL-MCNC: 1.62 MG/DL (ref 1.8–2.4)
MAGNESIUM SERPL-MCNC: 1.79 MG/DL (ref 1.8–2.4)
MAGNESIUM SERPL-MCNC: 2.19 MG/DL (ref 1.8–2.4)
MCH RBC QN AUTO: 30.6 PG (ref 26–34)
MCHC RBC AUTO-ENTMCNC: 33.4 G/DL (ref 31–36)
MCV RBC AUTO: 91.5 FL (ref 80–100)
METAMYELOCYTES NFR BLD MANUAL: 4 %
MONOCYTES # BLD: 1 K/UL (ref 0–1.3)
MONOCYTES NFR BLD: 7 %
MRSA DNA SPEC QL NAA+PROBE: ABNORMAL
NEUTROPHILS # BLD: 11.5 K/UL (ref 1.7–7.7)
NEUTROPHILS NFR BLD: 73 %
NEUTS BAND NFR BLD MANUAL: 2 % (ref 0–7)
ORGANISM: ABNORMAL
PERFORMED ON: ABNORMAL
PLATELET # BLD AUTO: 163 K/UL (ref 135–450)
PLATELET BLD QL SMEAR: ABNORMAL
PMV BLD AUTO: 8.1 FL (ref 5–10.5)
POTASSIUM SERPL-SCNC: 3.1 MMOL/L (ref 3.5–5.1)
POTASSIUM SERPL-SCNC: 3.1 MMOL/L (ref 3.5–5.1)
POTASSIUM SERPL-SCNC: 3.5 MMOL/L (ref 3.5–5.1)
RBC # BLD AUTO: 3.46 M/UL (ref 4–5.2)
SLIDE REVIEW: ABNORMAL
SODIUM SERPL-SCNC: 141 MMOL/L (ref 136–145)
SODIUM SERPL-SCNC: 142 MMOL/L (ref 136–145)
SODIUM SERPL-SCNC: 146 MMOL/L (ref 136–145)
WBC # BLD AUTO: 14.6 K/UL (ref 4–11)

## 2025-03-02 PROCEDURE — 2580000003 HC RX 258: Performed by: INTERNAL MEDICINE

## 2025-03-02 PROCEDURE — 94761 N-INVAS EAR/PLS OXIMETRY MLT: CPT

## 2025-03-02 PROCEDURE — 99233 SBSQ HOSP IP/OBS HIGH 50: CPT | Performed by: INTERNAL MEDICINE

## 2025-03-02 PROCEDURE — 6360000002 HC RX W HCPCS: Performed by: STUDENT IN AN ORGANIZED HEALTH CARE EDUCATION/TRAINING PROGRAM

## 2025-03-02 PROCEDURE — 2500000003 HC RX 250 WO HCPCS: Performed by: INTERNAL MEDICINE

## 2025-03-02 PROCEDURE — 85025 COMPLETE CBC W/AUTO DIFF WBC: CPT

## 2025-03-02 PROCEDURE — 83735 ASSAY OF MAGNESIUM: CPT

## 2025-03-02 PROCEDURE — 6360000002 HC RX W HCPCS: Performed by: INTERNAL MEDICINE

## 2025-03-02 PROCEDURE — 80048 BASIC METABOLIC PNL TOTAL CA: CPT

## 2025-03-02 PROCEDURE — 36415 COLL VENOUS BLD VENIPUNCTURE: CPT

## 2025-03-02 PROCEDURE — 2060000000 HC ICU INTERMEDIATE R&B

## 2025-03-02 PROCEDURE — 99232 SBSQ HOSP IP/OBS MODERATE 35: CPT | Performed by: INTERNAL MEDICINE

## 2025-03-02 PROCEDURE — 2700000000 HC OXYGEN THERAPY PER DAY

## 2025-03-02 RX ADMIN — SODIUM BICARBONATE: 84 INJECTION, SOLUTION INTRAVENOUS at 15:14

## 2025-03-02 RX ADMIN — VANCOMYCIN HYDROCHLORIDE 750 MG: 750 INJECTION, POWDER, LYOPHILIZED, FOR SOLUTION INTRAVENOUS at 18:26

## 2025-03-02 RX ADMIN — DEXTROSE MONOHYDRATE: 50 INJECTION, SOLUTION INTRAVENOUS at 11:35

## 2025-03-02 RX ADMIN — CEFEPIME 2000 MG: 2 INJECTION, POWDER, FOR SOLUTION INTRAVENOUS at 20:25

## 2025-03-02 RX ADMIN — POTASSIUM CHLORIDE 10 MEQ: 7.46 INJECTION, SOLUTION INTRAVENOUS at 10:35

## 2025-03-02 RX ADMIN — CEFEPIME 2000 MG: 2 INJECTION, POWDER, FOR SOLUTION INTRAVENOUS at 14:27

## 2025-03-02 RX ADMIN — VANCOMYCIN HYDROCHLORIDE 750 MG: 750 INJECTION, POWDER, LYOPHILIZED, FOR SOLUTION INTRAVENOUS at 05:32

## 2025-03-02 RX ADMIN — ENOXAPARIN SODIUM 60 MG: 100 INJECTION SUBCUTANEOUS at 05:31

## 2025-03-02 RX ADMIN — POTASSIUM CHLORIDE 10 MEQ: 7.46 INJECTION, SOLUTION INTRAVENOUS at 13:22

## 2025-03-02 RX ADMIN — POTASSIUM CHLORIDE 10 MEQ: 7.46 INJECTION, SOLUTION INTRAVENOUS at 12:32

## 2025-03-02 RX ADMIN — MAGNESIUM SULFATE HEPTAHYDRATE 2000 MG: 40 INJECTION, SOLUTION INTRAVENOUS at 07:52

## 2025-03-02 RX ADMIN — POTASSIUM CHLORIDE 10 MEQ: 7.46 INJECTION, SOLUTION INTRAVENOUS at 11:33

## 2025-03-02 RX ADMIN — ENOXAPARIN SODIUM 60 MG: 100 INJECTION SUBCUTANEOUS at 18:29

## 2025-03-02 RX ADMIN — Medication 10 ML: at 20:26

## 2025-03-02 NOTE — PLAN OF CARE
Problem: Discharge Planning  Goal: Discharge to home or other facility with appropriate resources  3/2/2025 0840 by Trena Perez, RN  Outcome: Progressing  3/1/2025 2359 by Isa Burciaga RN  Outcome: Progressing     Problem: Safety - Adult  Goal: Free from fall injury  3/2/2025 0840 by Trena Perez, RN  Outcome: Progressing  3/1/2025 2359 by Isa Burciaga RN  Outcome: Progressing     Problem: Skin/Tissue Integrity  Goal: Skin integrity remains intact  Description: 1.  Monitor for areas of redness and/or skin breakdown  2.  Assess vascular access sites hourly  3.  Every 4-6 hours minimum:  Change oxygen saturation probe site  4.  Every 4-6 hours:  If on nasal continuous positive airway pressure, respiratory therapy assess nares and determine need for appliance change or resting period  Outcome: Progressing

## 2025-03-02 NOTE — FLOWSHEET NOTE
03/01/25 2324   Vital Signs   Temp 98.4 °F (36.9 °C)   Temp Source Axillary   Pulse 88   Heart Rate Source Monitor   Respirations 18   BP (!) 157/92   MAP (Calculated) 114   Oxygen Therapy   SpO2 92 %   O2 Device Nasal cannula   O2 Flow Rate (L/min) 4 L/min     Resting quietly with respirations WNL on 4 L NC.  Call in easy reach.  Bed alarm on & telesitter in place for safety.  Continue to monitor closely.  Isa Burciaga RN

## 2025-03-02 NOTE — FLOWSHEET NOTE
03/02/25 0745   Vital Signs   Temp 98.4 °F (36.9 °C)   Temp Source Axillary   Pulse 88   Heart Rate Source Monitor   Respirations 18   BP (!) 165/95   MAP (Calculated) 118   Oxygen Therapy   SpO2 90 %   O2 Device Nasal cannula   O2 Flow Rate (L/min) 4 L/min     Pt not open eyes to voice or stimulation when repositioned her she moaned out loud of that was all response pt gave. Repositioned to right side.Mag started for replacement. Telesitter in room

## 2025-03-03 VITALS
RESPIRATION RATE: 40 BRPM | OXYGEN SATURATION: 95 % | HEIGHT: 66 IN | SYSTOLIC BLOOD PRESSURE: 162 MMHG | BODY MASS INDEX: 19.44 KG/M2 | HEART RATE: 82 BPM | TEMPERATURE: 98.9 F | DIASTOLIC BLOOD PRESSURE: 96 MMHG | WEIGHT: 121 LBS

## 2025-03-03 LAB
ANION GAP SERPL CALCULATED.3IONS-SCNC: 9 MMOL/L (ref 3–16)
BUN SERPL-MCNC: 13 MG/DL (ref 7–20)
CALCIUM SERPL-MCNC: 7.7 MG/DL (ref 8.3–10.6)
CHLORIDE SERPL-SCNC: 113 MMOL/L (ref 99–110)
CO2 SERPL-SCNC: 20 MMOL/L (ref 21–32)
CREAT SERPL-MCNC: 0.6 MG/DL (ref 0.6–1.2)
GFR SERPLBLD CREATININE-BSD FMLA CKD-EPI: >90 ML/MIN/{1.73_M2}
GLUCOSE BLD-MCNC: 117 MG/DL (ref 70–99)
GLUCOSE BLD-MCNC: 136 MG/DL (ref 70–99)
GLUCOSE SERPL-MCNC: 114 MG/DL (ref 70–99)
MAGNESIUM SERPL-MCNC: 1.67 MG/DL (ref 1.8–2.4)
PERFORMED ON: ABNORMAL
PERFORMED ON: ABNORMAL
POTASSIUM SERPL-SCNC: 3 MMOL/L (ref 3.5–5.1)
SODIUM SERPL-SCNC: 142 MMOL/L (ref 136–145)
VANCOMYCIN TROUGH SERPL-MCNC: 13 UG/ML (ref 10–20)

## 2025-03-03 PROCEDURE — 83735 ASSAY OF MAGNESIUM: CPT

## 2025-03-03 PROCEDURE — 6370000000 HC RX 637 (ALT 250 FOR IP): Performed by: STUDENT IN AN ORGANIZED HEALTH CARE EDUCATION/TRAINING PROGRAM

## 2025-03-03 PROCEDURE — 6360000002 HC RX W HCPCS: Performed by: STUDENT IN AN ORGANIZED HEALTH CARE EDUCATION/TRAINING PROGRAM

## 2025-03-03 PROCEDURE — 2500000003 HC RX 250 WO HCPCS: Performed by: INTERNAL MEDICINE

## 2025-03-03 PROCEDURE — 2580000003 HC RX 258: Performed by: INTERNAL MEDICINE

## 2025-03-03 PROCEDURE — 2700000000 HC OXYGEN THERAPY PER DAY

## 2025-03-03 PROCEDURE — 80048 BASIC METABOLIC PNL TOTAL CA: CPT

## 2025-03-03 PROCEDURE — 6360000002 HC RX W HCPCS: Performed by: INTERNAL MEDICINE

## 2025-03-03 PROCEDURE — 99232 SBSQ HOSP IP/OBS MODERATE 35: CPT | Performed by: INTERNAL MEDICINE

## 2025-03-03 PROCEDURE — 80202 ASSAY OF VANCOMYCIN: CPT

## 2025-03-03 PROCEDURE — 94761 N-INVAS EAR/PLS OXIMETRY MLT: CPT

## 2025-03-03 PROCEDURE — 36415 COLL VENOUS BLD VENIPUNCTURE: CPT

## 2025-03-03 RX ADMIN — VANCOMYCIN HYDROCHLORIDE 750 MG: 750 INJECTION, POWDER, LYOPHILIZED, FOR SOLUTION INTRAVENOUS at 06:25

## 2025-03-03 RX ADMIN — POTASSIUM CHLORIDE 10 MEQ: 7.46 INJECTION, SOLUTION INTRAVENOUS at 07:52

## 2025-03-03 RX ADMIN — ENOXAPARIN SODIUM 60 MG: 100 INJECTION SUBCUTANEOUS at 06:25

## 2025-03-03 RX ADMIN — SODIUM BICARBONATE: 84 INJECTION, SOLUTION INTRAVENOUS at 01:58

## 2025-03-03 RX ADMIN — POTASSIUM CHLORIDE 40 MEQ: 20 TABLET, EXTENDED RELEASE ORAL at 10:58

## 2025-03-03 RX ADMIN — POTASSIUM CHLORIDE 40 MEQ: 20 TABLET, EXTENDED RELEASE ORAL at 09:57

## 2025-03-03 RX ADMIN — SODIUM BICARBONATE: 84 INJECTION INTRAVENOUS at 11:51

## 2025-03-03 RX ADMIN — POTASSIUM CHLORIDE 10 MEQ: 7.46 INJECTION, SOLUTION INTRAVENOUS at 12:01

## 2025-03-03 RX ADMIN — POTASSIUM CHLORIDE 10 MEQ: 7.46 INJECTION, SOLUTION INTRAVENOUS at 08:52

## 2025-03-03 NOTE — CARE COORDINATION
Call to Najma from [facility] to ensure pt is able to return without precert. She states pt is able to return without barriers when pt is medically ready      1500: notified that pt has passed  
Freedom of choice list with basic dialogue that supports the patient's individualized plan of care/goals and shares the quality data associated with the providers was provided to:     Patient Representative Name:       The Patient and/or Patient Representative Agree with the Discharge Plan?      Dasia Aguirre  Case Management Department  Ph: [phone] Fax: [phone]    
the last 72 hours.    Invalid input(s): \"ALB\"  Troponin: No results for input(s): \"TROPONINI\" in the last 72 hours.  BNP: No results for input(s): \"BNP\" in the last 72 hours.  ABGs: No results for input(s): \"PHART\", \"GIB3VLO\", \"PO2ART\" in the last 72 hours.  INR: No results for input(s): \"INR\" in the last 72 hours.    U/A:  Recent Labs     02/28/25  1102   COLORU Yellow   PHUR 5.5   WBCUA 6-9*   RBCUA 5-10*   MUCUS Rare*   BACTERIA 1+*   CLARITYU Clear   LEUKOCYTESUR MODERATE*   UROBILINOGEN 0.2   BILIRUBINUR Negative   BLOODU TRACE-INTACT*   GLUCOSEU Negative       CT ABDOMEN PELVIS W IV CONTRAST Additional Contrast? None   Final Result   Small nonocclusive pulmonary emboli in the segmental and proximal   subsegmental branches of the right upper and lower lobe pulmonary arteries.   The emboli are string like and most consistent with subacute to chronic   emboli.  Negative for right heart strain.      Moderate multifocal centrilobular ground-glass and consolidative opacities   greatest in the right lower lobe but also present in the right middle and   upper lobes.  Finding favored to represent multifocal pneumonia however   aspiration is also possible.      Stable 7 mm nodule in the right lower lobe.      7 mm rounded opacity in the non dependent right inter lobar bronchus which   could represent small mass.      Large amount of stool within the rectum      Nonobstructing right renal calculi.      Other chronic findings of the chest abdomen and pelvis as above.         CT CHEST PULMONARY EMBOLISM W CONTRAST   Final Result   Small nonocclusive pulmonary emboli in the segmental and proximal   subsegmental branches of the right upper and lower lobe pulmonary arteries.   The emboli are string like and most consistent with subacute to chronic   emboli.  Negative for right heart strain.      Moderate multifocal centrilobular ground-glass and consolidative opacities   greatest in the right lower lobe but also present in

## 2025-03-03 NOTE — FLOWSHEET NOTE
03/03/25 0025   Vital Signs   Temp 97.9 °F (36.6 °C)   Temp Source Axillary   Pulse 88   Heart Rate Source Monitor   Respirations 20   BP (!) 146/84   MAP (Calculated) 105   BP Location Right upper arm   BP Method Automatic   Patient Position Semi fowlers   Oxygen Therapy   SpO2 94 %   O2 Device Nasal cannula   O2 Flow Rate (L/min) 4 L/min   Rhythm Interpretation   Cardiac Rhythm Sinus rhythm     Reassessment completed, No change to previous assessment

## 2025-03-03 NOTE — CONSULTS
[facility]   Pharmacy Pharmacokinetic Monitoring Service - Vancomycin     Ninoska Shipley is a 79 y.o. female starting on vancomycin therapy for nosocomial pneumonia x 7 days. Pharmacy consulted by Dr Barbour for monitoring and adjustment.    Target Concentration: Goal AUC/MILAGRO 400-600 mg*hr/L    Additional Antimicrobials: Cefepime    Pertinent Laboratory Values:   Wt Readings from Last 1 Encounters:   02/27/25 56.1 kg (123 lb 9.6 oz)     Temp Readings from Last 1 Encounters:   02/27/25 99 °F (37.2 °C) (Axillary)     Estimated Creatinine Clearance: 40 mL/min (based on SCr of 1 mg/dL).  Recent Labs     02/26/25  0536 02/27/25  1415   CREATININE 1.0 1.0   BUN 51* 51*   WBC 23.6* 19.9*     Procalcitonin: ordered    MRSA Nasal Swab:  ordered    Plan:  Dosing recommendations based on Bayesian software  Patient received vancomycin 1000 mg IV x one dose in the ED at 1842 today. Will start vancomycin 1000 mg IV every 24 hours.  Anticipated AUC of 499 and trough concentration of 15.3 at steady state  Renal labs as indicated   Vancomycin trough ordered for 03/01 @ 1700.   Pharmacy will continue to monitor patient and adjust therapy as indicated    Thank you for the consult.  
PC consult completed. See notes.  
Pharmacy to dose vancomycin one time dose in the ED:    Anita Florentino PA-C requested pharmacy to dose vancomycin IV one time dose in the ED for CAP on this 79 year old female.  HT 66 inches  WT 56.1 kg   BUN/SRCR 51/1   Est crcl = 40 ml/min   WBC 19.9                Tmax 99  Based on patient's age, weight, and renal function will give vancomycin 1000 mg IVPB X one dose in the ED.  If patient is admitted and vancomycin is to be continued, please re-consult pharmacy to dose vancomycin as in-patient.     
Pulmonary & Critical Care Consultation Note    Patient is being seen at the request of Luke Barbour MD   for a consultation for influenza PE    HISTORY OF PRESENT ILLNESS:   79 years old with history of CAD presented from nursing home with confusion.  Tested positive for influenza.  Chest imaging revealed acute pulmonary embolism and pneumonia.  Patient is lethargic noncommunicative unable to obtain further HPI.    PAST MEDICAL HISTORY:  Past Medical History:   Diagnosis Date    Anxiety     Benign essential hypertension 01/17/2017    Bowel obstruction (HCC)     Breast cancer (HCC)     CAD (coronary artery disease)     Cancer (HCC) 2012    colon    Depression     GERD (gastroesophageal reflux disease)     Hyperlipidemia     Hypertension     Irritable bowel syndrome (IBS)     Obesity     Osteoarthritis      PAST SURGICAL HISTORY:  Past Surgical History:   Procedure Laterality Date    APPENDECTOMY      BREAST LUMPECTOMY Bilateral 7/27/2021    RIGHT RADIO FREQUENCY IDENTIFICATION TAG LOCALIZED PARTIAL MASTECTOMY TIMES TWO; LEFT RADIO FREQUENCY IDENTIFICATION TAG LOCALIZED PARTIAL MASTECTOMY performed by Nini Castelan MD at [facility] OR    CHOLECYSTECTOMY      COLON SURGERY      [facility]    COLONOSCOPY  3/29/16    normal    US BREAST BIOPSY W LOC DEVICE 1ST LESION LEFT Left 6/17/2021    US BREAST NEEDLE BIOPSY LEFT 6/17/2021 Ellis Ramesh MD [facility]    US BREAST BIOPSY W LOC DEVICE 1ST LESION RIGHT Right 6/17/2021    US BREAST NEEDLE BIOPSY RIGHT 6/17/2021 Ellis Ramesh MD [facility]    US BREAST BIOPSY W LOC DEVICE 1ST LESION RIGHT Right 6/29/2021    US BREAST NEEDLE BIOPSY RIGHT 6/29/2021 Irving Sargent MD [facility]    US BREAST BIOPSY W LOC DEVICE EACH ADDL LESION RIGHT Right 6/29/2021    US BREAST BIOPSY NEEDLE ADDITIONAL RIGHT 6/29/2021 Irving Sargent MD [facility]    US PLACE BREAST LOC DEVICE 1ST LESION LEFT Left 
  Component Value Date/Time    WBC 19.0 02/28/2025 07:11 AM    RBC 3.65 02/28/2025 07:11 AM    HGB 11.2 02/28/2025 07:11 AM    HCT 33.9 02/28/2025 07:11 AM    MCV 93.0 02/28/2025 07:11 AM    MCH 30.6 02/28/2025 07:11 AM    MCHC 32.9 02/28/2025 07:11 AM    RDW 13.9 02/28/2025 07:11 AM     02/28/2025 07:11 AM    MPV 8.1 02/28/2025 07:11 AM     BMP:    Lab Results   Component Value Date/Time     02/28/2025 07:11 AM    K 3.7 02/28/2025 07:11 AM     02/28/2025 07:11 AM    CO2 16 02/28/2025 07:11 AM    BUN 33 02/28/2025 07:11 AM    CREATININE 0.8 02/28/2025 07:11 AM    CALCIUM 9.0 02/28/2025 07:11 AM    GFRAA >60 07/07/2022 09:09 AM    LABGLOM 74 02/28/2025 07:11 AM    LABGLOM 42 08/06/2023 02:17 PM    GLUCOSE 99 02/28/2025 07:11 AM         Assessment/    - Hypernatremia - in setting of dehydration    - Non-anion gap metabolic acidosis - in setting of dehydration      Plan/    - Start hypotonic fluids with bicarbonate replacement  - Trend labs, bp's, weights, & urine output      Thank you for the consultation.  Please do not hesitate to call with questions.    ____________________________________  Acosta Braswell MD  [facility]  [email or web address]  Office: [phone]

## 2025-03-03 NOTE — PLAN OF CARE
Problem: Discharge Planning  Goal: Discharge to home or other facility with appropriate resources  3/3/2025 0946 by Trena Perez RN  Outcome: Progressing  3/2/2025 2156 by Melinda Alegre RN  Outcome: Progressing     Problem: Safety - Adult  Goal: Free from fall injury  3/3/2025 0946 by Trena Perez RN  Outcome: Progressing  3/2/2025 2156 by Melinda Alegre RN  Outcome: Progressing     Problem: Skin/Tissue Integrity  Goal: Skin integrity remains intact  Description: 1.  Monitor for areas of redness and/or skin breakdown  2.  Assess vascular access sites hourly  3.  Every 4-6 hours minimum:  Change oxygen saturation probe site  4.  Every 4-6 hours:  If on nasal continuous positive airway pressure, respiratory therapy assess nares and determine need for appliance change or resting period  3/3/2025 0946 by Trena Perez RN  Outcome: Progressing  3/2/2025 2156 by Melinda Alegre RN  Outcome: Progressing

## 2025-03-03 NOTE — DISCHARGE SUMMARY
multifocal centrilobular ground-glass and consolidative opacities   greatest in the right lower lobe but also present in the right middle and   upper lobes.  Finding favored to represent multifocal pneumonia however   aspiration is also possible.      Stable 7 mm nodule in the right lower lobe.      7 mm rounded opacity in the non dependent right inter lobar bronchus which   could represent small mass.      Large amount of stool within the rectum      Nonobstructing right renal calculi.      Other chronic findings of the chest abdomen and pelvis as above.         CT CHEST PULMONARY EMBOLISM W CONTRAST   Final Result   Small nonocclusive pulmonary emboli in the segmental and proximal   subsegmental branches of the right upper and lower lobe pulmonary arteries.   The emboli are string like and most consistent with subacute to chronic   emboli.  Negative for right heart strain.      Moderate multifocal centrilobular ground-glass and consolidative opacities   greatest in the right lower lobe but also present in the right middle and   upper lobes.  Finding favored to represent multifocal pneumonia however   aspiration is also possible.      Stable 7 mm nodule in the right lower lobe.      7 mm rounded opacity in the non dependent right inter lobar bronchus which   could represent small mass.      Large amount of stool within the rectum      Nonobstructing right renal calculi.      Other chronic findings of the chest abdomen and pelvis as above.         CT Head W/O Contrast   Final Result   No acute intracranial abnormality.         XR CHEST PORTABLE   Final Result   No acute process.                  SUJATA MONRYO MD 3/3/2025 3:06 PM

## 2025-03-03 NOTE — WOUND CARE
Wound Referral Progress Note       NAME:  Ninoska Shipley  MEDICAL RECORD NUMBER:  [id number]  AGE: 79 y.o.   GENDER: female  : 1945  TODAY'S DATE:  3/3/2025    Subjective Pt with eyes closed, rapid breathing, nonverbal   Reason for WOCN Evaluation and Assessment: Sacrum      Ninoska Shipley is a 79 y.o. female referred by:   Provider and Nursing    Wound Identification:  Wound Type: pressure  Contributing Factors: chronic pressure and decreased mobility        Patient Care Goal:  Other ANGELA        PAST MEDICAL HISTORY        Diagnosis Date    Anxiety     Benign essential hypertension 2017    Bowel obstruction (HCC)     Breast cancer (HCC)     CAD (coronary artery disease)     Cancer (HCC)     colon    Depression     GERD (gastroesophageal reflux disease)     Hyperlipidemia     Hypertension     Irritable bowel syndrome (IBS)     Osteoarthritis        PAST SURGICAL HISTORY    Past Surgical History:   Procedure Laterality Date    APPENDECTOMY      BREAST LUMPECTOMY Bilateral 2021    RIGHT RADIO FREQUENCY IDENTIFICATION TAG LOCALIZED PARTIAL MASTECTOMY TIMES TWO; LEFT RADIO FREQUENCY IDENTIFICATION TAG LOCALIZED PARTIAL MASTECTOMY performed by Nini Castelan MD at [facility] OR    CHOLECYSTECTOMY      COLON SURGERY      [facility]      [facility]    COLONOSCOPY  3/29/16    normal    US BREAST BIOPSY W LOC DEVICE 1ST LESION LEFT Left 2021    US BREAST NEEDLE BIOPSY LEFT 2021 Ellis Ramesh MD [facility]    US BREAST BIOPSY W LOC DEVICE 1ST LESION RIGHT Right 2021    US BREAST NEEDLE BIOPSY RIGHT 2021 Ellis Ramesh MD [facility]    US BREAST BIOPSY W LOC DEVICE 1ST LESION RIGHT Right 2021    US BREAST NEEDLE BIOPSY RIGHT 2021 Irving Sargent MD [facility]    US BREAST BIOPSY W LOC DEVICE EACH ADDL LESION RIGHT Right 2021    US BREAST BIOPSY NEEDLE ADDITIONAL RIGHT 2021 Irving Sargent MD [facility]

## 2025-03-03 NOTE — FLOWSHEET NOTE
03/02/25 2018   Vital Signs   Temp 98.3 °F (36.8 °C)   Temp Source Axillary   Pulse 84   Heart Rate Source Monitor   Respirations 20   BP (!) 157/89   MAP (Calculated) 112   BP Location Right upper arm   BP Method Automatic   Patient Position Semi fowlers   Oxygen Therapy   SpO2 95 %   O2 Device Nasal cannula   O2 Flow Rate (L/min) 4 L/min   Rhythm Interpretation   Cardiac Rhythm Sinus rhythm     PM Assessment completed. IV medications given per MAR, On 4L of oxygen, Pt non-verbal, Vital Signs completed and Charted, Call light within reach, Reminded patient to call RN if she needed anything. Tele sitter in place for patients safety

## 2025-03-03 NOTE — FLOWSHEET NOTE
03/03/25 0745   Vital Signs   Temp 98 °F (36.7 °C)   Temp Source Axillary   Pulse 86   Respirations 24   /78   MAP (Calculated) 97   BP Location Right upper arm   BP Method Automatic   Oxygen Therapy   SpO2 94 %   O2 Device Nasal cannula   O2 Flow Rate (L/min) 4 L/min     Am assessment complete,pt potassium replacement started.Pt with a congested cough-turned  on left side and repositioned. Pt not open eyes even to pain today just slight moan.Telesitter in place

## 2025-03-03 NOTE — PLAN OF CARE
Problem: Discharge Planning  Goal: Discharge to home or other facility with appropriate resources  3/2/2025 2156 by Melinda Alegre RN  Outcome: Progressing  3/2/2025 0840 by Trena Perez RN  Outcome: Progressing     Problem: Safety - Adult  Goal: Free from fall injury  3/2/2025 2156 by Melinda Alegre RN  Outcome: Progressing  3/2/2025 0840 by Trena Perez RN  Outcome: Progressing     Problem: Skin/Tissue Integrity  Goal: Skin integrity remains intact  Description: 1.  Monitor for areas of redness and/or skin breakdown  2.  Assess vascular access sites hourly  3.  Every 4-6 hours minimum:  Change oxygen saturation probe site  4.  Every 4-6 hours:  If on nasal continuous positive airway pressure, respiratory therapy assess nares and determine need for appliance change or resting period  3/2/2025 2156 by Melinda Alegre RN  Outcome: Progressing  3/2/2025 0840 by Trena Perez RN  Outcome: Progressing

## 2025-03-03 NOTE — PROGRESS NOTES
Pharmacy Vancomycin Consult     Vancomycin Day:   Current Dosin mg q12h  Current indication: Nosocomial PNA    Temp max:  98.8    Recent Labs     25  0517 25  1302 25  0529 25  1315 25  2116 25  0501   BUN 29*   < > 19   < > 12 13   CREATININE 0.7   < > 0.6   < > 0.5* 0.6   WBC 15.8*  --  14.6*  --   --   --     < > = values in this interval not displayed.       Intake/Output Summary (Last 24 hours) at 3/3/2025 0617  Last data filed at 3/2/2025 1825  Gross per 24 hour   Intake 250 ml   Output 600 ml   Net -350 ml     Culture Date      Source                       Results      Ht Readings from Last 1 Encounters:   25 1.676 m (5' 6\")        Wt Readings from Last 1 Encounters:   25 54.9 kg (121 lb)       Body mass index is 19.53 kg/m².    Estimated Creatinine Clearance: 65 mL/min (based on SCr of 0.6 mg/dL).    Trough: 13    Assessment/Plan:  Current dose yields an AUC of 470.  Continue same dose and schedule of vancomycin.    
  Speech Language Pathology  Attempt Note     Name: Ninoska Shipley  : 1945  Medical Diagnosis: Dehydration [E86.0]  Hypernatremia [E87.0]  Lung mass [R91.8]  Influenza A [J10.1]  Other pulmonary embolism without acute cor pulmonale, unspecified chronicity (HCC) [I26.99]  Multifocal pneumonia [J18.9]  Pulmonary embolism without acute cor pulmonale, unspecified chronicity, unspecified pulmonary embolism type (HCC) [I26.99]      SLP attempted to see pt for bedside swallow evaluation (BSE). Order acknowledged and chart reviewed for completion of eval. Evaluation / treatment unable to be completed due to pt in Ed and transferring to floor, then being assessed after transfer, SLP to re-attempt this weekend as pt's condition and schedule allows. No charges.    Thank you,    Emi Craven M.A. SLP  Speech-Language Pathologist  [place] Lic #[id number]  [phone]    
  Speech Language Pathology  Attempt Note     Name: Ninoska Shipley  : 1945  Medical Diagnosis: Dehydration [E86.0]  Hypernatremia [E87.0]  Lung mass [R91.8]  Influenza A [J10.1]  Other pulmonary embolism without acute cor pulmonale, unspecified chronicity (HCC) [I26.99]  Multifocal pneumonia [J18.9]  Pulmonary embolism without acute cor pulmonale, unspecified chronicity, unspecified pulmonary embolism type (HCC) [I26.99]      SLP attempted to see pt for bedside swallow evaluation (BSE). SLP spoke with Fatoumata/RN who reports pt not very alert and little reaction or attempt to swallow observed during oral care. Evaluation / treatment unable to be completed as RR at rest observed to be 32 bpm and as pt does not arouse to level required for safe po intake. SLP to re-attempt as pt's condition and schedule allows. RN aware. No charges.    Thank you,    Emi Craven M.A. SLP  Speech-Language Pathologist  [place] Lic #[id number]  [phone]    
  [facility] Progress Note           Subjective/   79 y.o. year old female who we are seeing in consultation for Hypernatremia, NAGMA.     HPI:  Sodium, acidosis slow to improve with hypotonic fluids.  Mentation decreased.    ROS:  +weak, intake reduced.    Objective/   GEN:  Chronically ill, BP (!) 152/83   Pulse 82   Temp 98.1 °F (36.7 °C) (Axillary)   Resp 18   Ht 1.676 m (5' 6\")   Wt 54.9 kg (121 lb)   SpO2 90%   BMI 19.53 kg/m²   HEENT: non-icteric, no JVD  CV: S1, S2 without m/r/g; no LE edema  RESP: CTA B without w/r/r; breathing wnl  ABD: +bs, soft, nt, no hsm  SKIN: warm, no rashes    Data/  Recent Labs     02/28/25  0711 03/01/25  0517 03/02/25  0529   WBC 19.0* 15.8* 14.6*   HGB 11.2* 10.7* 10.6*   HCT 33.9* 32.2* 31.6*   MCV 93.0 93.3 91.5    193 163     Recent Labs     03/01/25  2122 03/02/25  0529 03/02/25  1315   * 146* 142   K 3.5 3.1* 3.5   * 120* 116*   CO2 15* 16* 16*   GLUCOSE 111* 129* 117*   MG 1.92 1.62* 2.19   BUN 22* 19 15   CREATININE 0.6 0.6 0.6   LABGLOM >90 >90 >90       Assessment/     - Hypernatremia - in setting of dehydration, better with hypotonic fluids     - Non-anion gap metabolic acidosis - in setting of dehydration    - Hypokalemia - prn replacement    - Influenza A/multi-focal PNA    - Acute PE       Plan/     - Change IVF's to D51/2 NS with 75 meq of bicarbonate per liter at 100 ml/hour  - Trend labs, bp's, weights, & urine output     ____________________________________  Acosta Braswell MD  [facility]  [email or web address]  Office: [phone]    
  [facility] Progress Note           Subjective/   79 y.o. year old female who we are seeing in consultation for Hypernatremia, RESHMA.     HPI:  Sodium, acidosis slow to improve with hypotonic fluids.  Mentation decreased.    ROS:  +weak, intake reduced.    Objective/   GEN:  Chronically ill, BP (!) 136/92   Pulse 92   Temp 99.6 °F (37.6 °C) (Axillary)   Resp 18   Ht 1.676 m (5' 6\")   Wt 56.1 kg (123 lb 9.6 oz)   SpO2 94%   BMI 19.95 kg/m²   HEENT: non-icteric, no JVD  CV: S1, S2 without m/r/g; no LE edema  RESP: CTA B without w/r/r; breathing wnl  ABD: +bs, soft, nt, no hsm  SKIN: warm, no rashes    Data/  Recent Labs     02/27/25  1415 02/28/25  0711 03/01/25  0517   WBC 19.9* 19.0* 15.8*   HGB 11.6* 11.2* 10.7*   HCT 35.3* 33.9* 32.2*   MCV 94.2 93.0 93.3    210 193     Recent Labs     02/28/25  1351 02/28/25  2118 03/01/25  0517 03/01/25  1302   * 155* 154* 152*   K 3.5 3.1* 3.8 3.2*   * 129* 129* 126*   CO2 15* 16* 17* 16*   GLUCOSE 96 120* 118* 141*   MG 2.01 1.92  --   --    BUN 33* 30* 29* 25*   CREATININE 0.7 0.7 0.7 0.7   LABGLOM 87 87 87 87       Assessment/     - Hypernatremia - in setting of dehydration     - Non-anion gap metabolic acidosis - in setting of dehydration    - Hypokalemia - prn replacement    - Influenza A/multi-focal PNA    - Acute PE       Plan/     - Change IVF's to D5W 100 ml/hr   - Trend labs, bp's, weights, & urine output     ____________________________________  Acosta Braswell MD  [facility]  [email or web address]  Office: [phone]    
  [facility] Progress Note           Subjective/   79 y.o. year old female who we are seeing in consultation for Hypernatremia, RESHMA.     HPI:  The patient was seen and examined; she feels well today with no CP, SOB, nausea or vomiting.    ROS: No fever or chills.  Social: Family at bedside.    Objective/   GEN:  Chronically ill, /78   Pulse 86   Temp 98 °F (36.7 °C) (Axillary)   Resp 24   Ht 1.676 m (5' 6\")   Wt 54.9 kg (121 lb)   SpO2 94%   BMI 19.53 kg/m²     HEENT: non-icteric, no JVD  CV: S1, S2 without m/r/g; no LE edema  RESP: CTA B without w/r/r; breathing wnl  ABD: +bs, soft, nt, no hsm  SKIN: warm, no rashes    Data/  Recent Labs     03/01/25  0517 03/02/25  0529   WBC 15.8* 14.6*   HGB 10.7* 10.6*   HCT 32.2* 31.6*   MCV 93.3 91.5    163     Recent Labs     03/02/25  1315 03/02/25  2116 03/03/25  0501    141 142   K 3.5 3.1* 3.0*   * 113* 113*   CO2 16* 16* 20*   GLUCOSE 117* 125* 114*   MG 2.19 1.79* 1.67*   BUN 15 12 13   CREATININE 0.6 0.5* 0.6   LABGLOM >90 >90 >90       Assessment/     - Hypernatremia - in setting of dehydration, better with hypotonic fluids     - Non-anion gap metabolic acidosis - in setting of dehydration    - Hypokalemia - prn replacement    - Influenza A/multi-focal PNA    - Acute PE       Plan/     - Continue hypotonic fluids with IV Sodium Bicarbonate    - PRN potassium replacement    - Trend labs, bp's, weights, & urine output     ____________________________________  Matt Dacosta MD  [facility]  [email or web address]  Office: [phone]    
Bedside report and transfer of care given to JEANINE Ham. Pt currently resting in bed with the call light within reach. Pt denies any other care needs at this time. Pt stable at this time.   
Call placed to [facility], updated on patient passing.     Call placed to [facility], spoke with Ninoska.   Per Ninoska, not following for donation.   Referral #: [id number]  
H/p dictation id [id number].  Date of service 02/27/25.  Acute enephalopathy.  Complicated uti with sepsis.  Influenza a.  Acute PE.    
Home medication list completed by [facility] via [facility].     Michelle Valenzuela PharmD, [facility], 2/27/2025 11:03 PM    
IM Progress Note    Admit Date:  2/27/2025    Nursing home patient from [facility]   Admitted for AMS, Influenza A and dehydration.  Patient with severe hypernatremia.  She has been lethargic in the nursing home for a week and a half.  Getting IV fluids with D5.  Workup also revealed the patient to have PE and pneumonia    Subjective:  Ms. Shipley remains lethargic, poorly responsive, nonverbal , unable to take anything by mouth.  Getting IV fluids.  CODE STATUS is meds only code    Objective:   Patient Vitals for the past 4 hrs:   BP Temp Temp src Pulse Resp SpO2   03/03/25 0745 135/78 98 °F (36.7 °C) Axillary 86 24 94 %          Intake/Output Summary (Last 24 hours) at 3/3/2025 1001  Last data filed at 3/2/2025 1825  Gross per 24 hour   Intake --   Output 600 ml   Net -600 ml       Physical Exam:    Gen: Elderly patient appears chronically ill , weak,  lethargic and poorly responsive , nonverbal .   +cachectic  Eyes: PERRL. No sclera icterus. No conjunctival injection.   Dry MM  ENT: No discharge. Pharynx clear.   Neck: No JVD.  Trachea midline.  Resp: No accessory muscle use. +diminished throughout with expiratory wheezes. No crackles. No rhonchi.   2 liters n/c  CV: Regular rate. Regular rhythm. No murmur.  No rub. No edema.   Capillary Refill: Brisk,< 3 seconds   GI: Non-tender. Non-distended.  Normal bowel sounds.  Skin: Warm and dry. No nodule on exposed extremities. No rash on exposed extremities.   Decubitus wounds in sacrococcygeal area , wound examined there is some healthy granulation tissue .  M/S: No cyanosis.  No clubbing. +BUE contractures  +muscle wasting BLE  Neuro: Poorly responsive, nonfocal     Scheduled Meds:   vancomycin  750 mg IntraVENous Q12H    bisacodyl  5 mg Oral Once    oseltamivir  75 mg Oral Once    amantadine  100 mg Oral BID    anastrozole  1 mg Oral Daily    aspirin EC  81 mg Oral Daily    carvedilol  3.125 mg Oral BID WC    atorvastatin  40 mg Oral Nightly    famotidine  20 mg Oral 
Nursing handoff to JEANINE Ham.  Isa Burciaga RN  
Nursing handoff to JEANINE Ham.  Isa Burciaga RN\  
Patient fever 100.00 axillary -given rectal tylenol. Performed wound care to coccyx cleansed with normal saline,pat dry,apply silver alginate to wound bed,covered with foam dressing  
Per Dr Barillas replace K with 20 meq total IV Potassium  
Progress Note    Admit Date:  2/27/2025    Admitted for AMS Influenza A and dehydration    Subjective:  Ms. Shipley is resting in bed.  She will awake to verbal stimuli, but doesn't talk.  She appears very dry. +BUE contractions. On 2 liters chronically.  Attempted to call [facility] to update and verify code status with no answer.     Objective:   Patient Vitals for the past 4 hrs:   BP Pulse Resp SpO2   02/28/25 0702 (!) 149/91 91 -- 95 %   02/28/25 0603 (!) 146/90 90 20 93 %          Intake/Output Summary (Last 24 hours) at 2/28/2025 0731  Last data filed at 2/28/2025 0624  Gross per 24 hour   Intake 1299.76 ml   Output --   Net 1299.76 ml       Physical Exam:    Gen: No distress. Appears chronically ill. +cachectic  Eyes: PERRL. No sclera icterus. No conjunctival injection.   Dry MM  ENT: No discharge. Pharynx clear.   Neck: No JVD.  Trachea midline.  Resp: No accessory muscle use. +diminished throughout with expiratory wheezes. No crackles. No rhonchi.   2 liters n/c  CV: Regular rate. Regular rhythm. No murmur.  No rub. No edema.   Capillary Refill: Brisk,< 3 seconds   GI: Non-tender. Non-distended.  Normal bowel sounds.  Skin: Warm and dry. No nodule on exposed extremities. No rash on exposed extremities.   M/S: No cyanosis.  No clubbing. +BUE contractures  +muscle wasting BLE  Neuro: Awake.   Psych: No anxiety or agitation.      Scheduled Meds:   oseltamivir  75 mg Oral Once    amantadine  100 mg Oral BID    anastrozole  1 mg Oral Daily    aspirin EC  81 mg Oral Daily    carvedilol  3.125 mg Oral BID WC    atorvastatin  40 mg Oral Nightly    famotidine  20 mg Oral Daily    levothyroxine  125 mcg Oral Daily    sodium chloride flush  5-40 mL IntraVENous 2 times per day    enoxaparin  1 mg/kg SubCUTAneous Q12H    insulin lispro  0-4 Units SubCUTAneous 4x Daily AC & HS    oseltamivir  30 mg Oral BID    cefepime  2,000 mg IntraVENous Q12H    vancomycin  1,000 mg IntraVENous Q24H       Continuous 
Progress Note    Admit Date:  2/27/2025    Nursing home patient from [facility]   Admitted for AMS Influenza A and dehydration.  Patient with severe hypernatremia.  She has been lethargic in the nursing home for a week and a half.  Getting IV fluids with D5.  Workup also revealed the patient to have PE and pneumonia    Subjective:  Ms. Shipley remains lethargic, poorly responsive, nonverbal , unable to take anything by mouth.  Getting IV fluids.  CODE STATUS is meds only code  +BUE contractions. On 2 liters chronically.        Patient seen and examined with patient's nurse and plan of care discussed with patient's nurse    Objective:   Patient Vitals for the past 4 hrs:   BP Temp Temp src SpO2   03/01/25 1415 (!) 151/89 98.6 °F (37 °C) Axillary 93 %          Intake/Output Summary (Last 24 hours) at 3/1/2025 1739  Last data filed at 3/1/2025 0802  Gross per 24 hour   Intake 1400 ml   Output 500 ml   Net 900 ml       Physical Exam:    Gen: Elderly patient appears chronically ill , weak,  lethargic and poorly responsive , nonverbal .   +cachectic  Eyes: PERRL. No sclera icterus. No conjunctival injection.   Dry MM  ENT: No discharge. Pharynx clear.   Neck: No JVD.  Trachea midline.  Resp: No accessory muscle use. +diminished throughout with expiratory wheezes. No crackles. No rhonchi.   2 liters n/c  CV: Regular rate. Regular rhythm. No murmur.  No rub. No edema.   Capillary Refill: Brisk,< 3 seconds   GI: Non-tender. Non-distended.  Normal bowel sounds.  Skin: Warm and dry. No nodule on exposed extremities. No rash on exposed extremities.   Decubitus wounds reported see nursing notes  M/S: No cyanosis.  No clubbing. +BUE contractures  +muscle wasting BLE  Neuro: Poorly responsive, nonfocal  Psych: No anxiety or agitation.      Scheduled Meds:   chapstick        potassium chloride  10 mEq IntraVENous Once    bisacodyl  5 mg Oral Once    oseltamivir  75 mg Oral Once    amantadine  100 mg Oral BID    anastrozole  1 mg Oral 
Pt wound care performed,cleansed with normal saline and silver alginate to wound and covered with foam dressing.  
Pulmonary Progress Note    CC: Influenza PE    Subjective:   Poorly responsive  Remains on 4 L O2          Intake/Output Summary (Last 24 hours) at 3/2/2025 0746  Last data filed at 3/2/2025 0632  Gross per 24 hour   Intake 1450 ml   Output 300 ml   Net 1150 ml       Exam:   BP (!) 154/88   Pulse 84   Temp 98.6 °F (37 °C) (Oral)   Resp 20   Ht 1.676 m (5' 6\")   Wt 54.9 kg (121 lb)   SpO2 91%   BMI 19.53 kg/m²  on 4 L  Gen: No distress.   Eyes: No sclera icterus. No conjunctival injection.   Neck: Trachea midline. No obvious mass.    Resp: No accessory muscle use.  Few crackles. No wheezes.  Few rhonchi.   CV: Tacky rate. Regular rhythm. No murmur or rub. No edema.  GI: Non-tender. Non-distended  Neuro: Lethargic.  Responsive to painful stimuli.    Psych: No agitation       Scheduled Meds:   vancomycin  750 mg IntraVENous Q12H    bisacodyl  5 mg Oral Once    oseltamivir  75 mg Oral Once    amantadine  100 mg Oral BID    anastrozole  1 mg Oral Daily    aspirin EC  81 mg Oral Daily    carvedilol  3.125 mg Oral BID WC    atorvastatin  40 mg Oral Nightly    famotidine  20 mg Oral Daily    levothyroxine  125 mcg Oral Daily    sodium chloride flush  5-40 mL IntraVENous 2 times per day    enoxaparin  1 mg/kg SubCUTAneous Q12H    insulin lispro  0-4 Units SubCUTAneous 4x Daily AC & HS    oseltamivir  30 mg Oral BID    cefepime  2,000 mg IntraVENous Q12H     Continuous Infusions:   dextrose 100 mL/hr at 03/01/25 2323    dextrose      sodium chloride       PRN Meds:  potassium chloride **OR** potassium alternative oral replacement **OR** potassium chloride, glucose, dextrose bolus **OR** dextrose bolus, glucagon (rDNA), dextrose, sodium chloride flush, sodium chloride, magnesium sulfate, ondansetron **OR** ondansetron, polyethylene glycol, acetaminophen **OR** acetaminophen    Labs:  CBC:   Recent Labs     02/28/25  0711 03/01/25  0517 03/02/25  0529   WBC 19.0* 15.8* 14.6*   HGB 11.2* 10.7* 10.6*   HCT 33.9* 32.2* 
RN called to room by patient,found pt with stridor and respiratory rate in 40's with secretions gurgling in mouth-rolled to side and suctioned mouth.Repositioned pt on right side.At bedside pt opened eyes at 1415 and looked at RN saw her looking around called  to bedside-she looked at  and started crying ,then 1420 pt turned blue called by [facility] sats 40% called staff assist placed pt on nonrebreather mask.Asked family at bedside if the wanted up to do meds code as ordered son and  declined and  said \"just let her go.\" Called Dr Luna to bedside.Adarsh Rangel at bedside he and myself listened to patient at 1424 and felt pulse none detected.Dr Luna at bedside declared pt at 1425  
Staff assist called to this room. Primary nurse and clinical at bedside. Family is wishing for no further treatment or chest compressions completed. Family is at bedside.  
Transport here pt taken to [facility]  
02/28/25  0711 03/01/25  0517   WBC 19.9* 19.0* 15.8*   HGB 11.6* 11.2* 10.7*   HCT 35.3* 33.9* 32.2*   MCV 94.2 93.0 93.3    210 193     BMP:   Recent Labs     02/28/25  1351 02/28/25  2118 03/01/25  0517   * 155* 154*   K 3.5 3.1* 3.8   * 129* 129*   CO2 15* 16* 17*   BUN 33* 30* 29*   CREATININE 0.7 0.7 0.7     LIVER PROFILE:   Recent Labs     02/27/25  1415   *   ALT 85*   BILITOT 0.3   ALKPHOS 163*     PT/INR: No results for input(s): \"PROTIME\", \"INR\" in the last 72 hours.  APTT: No results for input(s): \"APTT\" in the last 72 hours.  UA:  Recent Labs     02/28/25  1102   COLORU Yellow   PHUR 5.5   WBCUA 6-9*   RBCUA 5-10*   MUCUS Rare*   BACTERIA 1+*   CLARITYU Clear   LEUKOCYTESUR MODERATE*   UROBILINOGEN 0.2   BILIRUBINUR Negative   BLOODU TRACE-INTACT*   GLUCOSEU Negative     No results for input(s): \"PHART\", \"QUL5EFG\", \"PO2ART\" in the last 72 hours.      Microbiology:  2/27 respiratory NGTD  2/27 influenza A detected  2/28 strep and Legionella negative  2/28 UC NGTD  2/28 pneumonia panel negative      Imaging:  CT chest 2/27 images independently reviewed by me and showed:  Small nonocclusive pulmonary emboli in the segmental and proximal   subsegmental branches of the right upper and lower lobe pulmonary arteries.   The emboli are string like and most consistent with subacute to chronic   emboli.  Negative for right heart strain.   Moderate multifocal centrilobular ground-glass and consolidative opacities   greatest in the right lower lobe but also present in the right middle and   upper lobes.    Stable 7 mm nodule in the right lower lobe.   7 mm rounded opacity in the non dependent right inter lobar bronchus which   could represent small mass.   Large amount of stool within the rectum   Nonobstructing right renal calculi.   Other chronic findings of the chest abdomen and pelvis as above.         ASSESSMENT:  Influenza A  Multifocal pneumonia  UTI  Acute pulmonary embolism  RLL 7 
2/28/25 at 6:32 PM EST   
colon cancer 2012      Plan of care discussed with nursing at bedside.         Note above makes patient higher risk for morbidity and mortality requiring testing and treatment.      DVT Prophylaxis: Lovenox   Diet: Diet NPO  Code Status: Limited.       Kyle Barillas MD    3/2/2025

## 2025-03-05 LAB
BACTERIA BLD CULT: NORMAL
BACTERIA BLD CULT: NORMAL

## (undated) DEVICE — SUTURE MCRYL + SZ 4-0 L18IN ABSRB UD L19MM PS-2 3/8 CIR MCP496G

## (undated) DEVICE — STANDARD HYPODERMIC NEEDLE,POLYPROPYLENE HUB: Brand: MONOJECT

## (undated) DEVICE — COVER US PRB W12XL244CM SURGICAL INTRAOPERATIVE PLAS TAPR L

## (undated) DEVICE — SUTURE PERMA-HAND SZ 2-0 L30IN NONABSORBABLE BLK L26MM SH K833H

## (undated) DEVICE — CLIP LIG M BLU TI HRT SHP WIRE HORZ 600 PER BX

## (undated) DEVICE — GLOVE,SURG,SENSICARE SLT,LF,PF,6.5: Brand: MEDLINE

## (undated) DEVICE — SUTURE VCRL + SZ 3-0 L27IN ABSRB UD L26MM SH 1/2 CIR VCP416H

## (undated) DEVICE — GOWN SIRUS NONREIN LG W/TWL: Brand: MEDLINE INDUSTRIES, INC.

## (undated) DEVICE — Z CONVERTED USE 2271043 CONTAINER SPEC COLL 4OZ SCR ON LID PEEL PCH

## (undated) DEVICE — SYRINGE MED 10ML LUERLOCK TIP W/O SFTY DISP

## (undated) DEVICE — CLIP LIG M BLU TI HRT SHP WIRE HORZ 180 PER BX

## (undated) DEVICE — BLADE ES ELASTOMERIC COAT INSUL DURABLE BEND UPTO 90DEG

## (undated) DEVICE — SOLUTION IV IRRIG 500ML 0.9% SODIUM CHL 2F7123

## (undated) DEVICE — ADHESIVE SKIN CLSR 0.7ML TOP DERMBND ADV

## (undated) DEVICE — Device

## (undated) DEVICE — YANKAUER,OPEN TIP,W/O VENT,STERILE: Brand: MEDLINE INDUSTRIES, INC.

## (undated) DEVICE — POST-SURG COMPRESSION BRA,XL: Brand: MEDLINE